# Patient Record
Sex: MALE | Race: WHITE | NOT HISPANIC OR LATINO | Employment: OTHER | URBAN - METROPOLITAN AREA
[De-identification: names, ages, dates, MRNs, and addresses within clinical notes are randomized per-mention and may not be internally consistent; named-entity substitution may affect disease eponyms.]

---

## 2017-02-09 ENCOUNTER — HOSPITAL ENCOUNTER (OUTPATIENT)
Dept: LAB | Facility: MEDICAL CENTER | Age: 72
End: 2017-02-09
Attending: UROLOGY
Payer: MEDICARE

## 2017-02-09 LAB
BUN SERPL-MCNC: 22 MG/DL (ref 8–22)
CREAT SERPL-MCNC: 1.18 MG/DL (ref 0.5–1.4)

## 2017-02-09 PROCEDURE — 36415 COLL VENOUS BLD VENIPUNCTURE: CPT

## 2017-02-09 PROCEDURE — 84520 ASSAY OF UREA NITROGEN: CPT

## 2017-02-09 PROCEDURE — 82565 ASSAY OF CREATININE: CPT

## 2017-02-13 ENCOUNTER — HOSPITAL ENCOUNTER (OUTPATIENT)
Dept: RADIOLOGY | Facility: MEDICAL CENTER | Age: 72
End: 2017-02-13
Attending: UROLOGY
Payer: MEDICARE

## 2017-02-13 DIAGNOSIS — R97.20 ELEVATED PROSTATE SPECIFIC ANTIGEN (PSA): ICD-10-CM

## 2017-02-13 DIAGNOSIS — Z80.42 FAMILY HISTORY OF MALIGNANT NEOPLASM OF PROSTATE: ICD-10-CM

## 2017-02-13 PROCEDURE — 700117 HCHG RX CONTRAST REV CODE 255: Performed by: UROLOGY

## 2017-02-13 PROCEDURE — 700111 HCHG RX REV CODE 636 W/ 250 OVERRIDE (IP): Performed by: RADIOLOGY

## 2017-02-13 PROCEDURE — 72197 MRI PELVIS W/O & W/DYE: CPT

## 2017-02-13 PROCEDURE — A9579 GAD-BASE MR CONTRAST NOS,1ML: HCPCS | Performed by: UROLOGY

## 2017-02-13 RX ADMIN — GADODIAMIDE 20 ML: 287 INJECTION INTRAVENOUS at 13:56

## 2017-02-13 RX ADMIN — GLUCAGON HYDROCHLORIDE 1 MG: KIT at 13:19

## 2017-02-21 DIAGNOSIS — I10 ESSENTIAL HYPERTENSION: ICD-10-CM

## 2017-02-21 RX ORDER — LOSARTAN POTASSIUM AND HYDROCHLOROTHIAZIDE 12.5; 1 MG/1; MG/1
1 TABLET ORAL DAILY
Qty: 90 TAB | Refills: 3 | Status: SHIPPED | OUTPATIENT
Start: 2017-02-21 | End: 2018-02-26 | Stop reason: SDUPTHER

## 2017-03-07 ENCOUNTER — TELEPHONE (OUTPATIENT)
Dept: CARDIOLOGY | Facility: MEDICAL CENTER | Age: 72
End: 2017-03-07

## 2017-03-07 NOTE — TELEPHONE ENCOUNTER
Scheduled for Trans-rectal biopsy jonathon/ Avelina on 3/22 & needs surgical clearance. He's only taking losartan/ HCTZ. He never started the Diltiazem due to reading about it & choosing not to take. (also had vague side effects from amlodipine)  BP in 118/77 range per pt.   To Dr. Alfonso to advise.

## 2017-03-15 NOTE — TELEPHONE ENCOUNTER
Clearance letter faxed to Dr. Quan        Message  Received: 3 days ago       YENNIFER Foote R.N.       Caller: Unspecified (1 week ago)                     Low risk

## 2017-08-10 ENCOUNTER — TELEPHONE (OUTPATIENT)
Dept: CARDIOLOGY | Facility: MEDICAL CENTER | Age: 72
End: 2017-08-10

## 2017-08-10 NOTE — TELEPHONE ENCOUNTER
"----- Message from Lashell Johnson sent at 8/10/2017  1:31 PM PDT -----  Regarding: Patient wants call back  JERARDO/Sana    Patient sent a \"my chart\" message to Dr Alfonso yesterday, 8/9 and wants a call back at [phone].  "

## 2017-08-11 ENCOUNTER — HOSPITAL ENCOUNTER (EMERGENCY)
Facility: MEDICAL CENTER | Age: 72
End: 2017-08-11
Attending: EMERGENCY MEDICINE
Payer: MEDICARE

## 2017-08-11 ENCOUNTER — APPOINTMENT (OUTPATIENT)
Dept: RADIOLOGY | Facility: MEDICAL CENTER | Age: 72
End: 2017-08-11
Attending: EMERGENCY MEDICINE
Payer: MEDICARE

## 2017-08-11 VITALS
RESPIRATION RATE: 14 BRPM | HEIGHT: 72 IN | SYSTOLIC BLOOD PRESSURE: 115 MMHG | TEMPERATURE: 97.4 F | WEIGHT: 191.36 LBS | DIASTOLIC BLOOD PRESSURE: 60 MMHG | BODY MASS INDEX: 25.92 KG/M2 | HEART RATE: 63 BPM | OXYGEN SATURATION: 95 %

## 2017-08-11 DIAGNOSIS — R00.2 PALPITATIONS: ICD-10-CM

## 2017-08-11 LAB
ALBUMIN SERPL BCP-MCNC: 4 G/DL (ref 3.2–4.9)
ALBUMIN/GLOB SERPL: 1.5 G/DL
ALP SERPL-CCNC: 54 U/L (ref 30–99)
ALT SERPL-CCNC: 15 U/L (ref 2–50)
ANION GAP SERPL CALC-SCNC: 9 MMOL/L (ref 0–11.9)
APPEARANCE UR: CLEAR
APTT PPP: 29.5 SEC (ref 24.7–36)
AST SERPL-CCNC: 20 U/L (ref 12–45)
BASOPHILS # BLD AUTO: 0.9 % (ref 0–1.8)
BASOPHILS # BLD: 0.06 K/UL (ref 0–0.12)
BILIRUB SERPL-MCNC: 0.8 MG/DL (ref 0.1–1.5)
BILIRUB UR QL STRIP.AUTO: NEGATIVE
BNP SERPL-MCNC: 68 PG/ML (ref 0–100)
BUN SERPL-MCNC: 21 MG/DL (ref 8–22)
CALCIUM SERPL-MCNC: 9.4 MG/DL (ref 8.5–10.5)
CHLORIDE SERPL-SCNC: 108 MMOL/L (ref 96–112)
CO2 SERPL-SCNC: 22 MMOL/L (ref 20–33)
COLOR UR: YELLOW
CREAT SERPL-MCNC: 0.98 MG/DL (ref 0.5–1.4)
CULTURE IF INDICATED INDCX: NO UA CULTURE
EKG IMPRESSION: NORMAL
EOSINOPHIL # BLD AUTO: 0.09 K/UL (ref 0–0.51)
EOSINOPHIL NFR BLD: 1.3 % (ref 0–6.9)
ERYTHROCYTE [DISTWIDTH] IN BLOOD BY AUTOMATED COUNT: 44.1 FL (ref 35.9–50)
GFR SERPL CREATININE-BSD FRML MDRD: >60 ML/MIN/1.73 M 2
GLOBULIN SER CALC-MCNC: 2.6 G/DL (ref 1.9–3.5)
GLUCOSE SERPL-MCNC: 116 MG/DL (ref 65–99)
GLUCOSE UR STRIP.AUTO-MCNC: NEGATIVE MG/DL
HCT VFR BLD AUTO: 47.7 % (ref 42–52)
HGB BLD-MCNC: 16.3 G/DL (ref 14–18)
IMM GRANULOCYTES # BLD AUTO: 0.02 K/UL (ref 0–0.11)
IMM GRANULOCYTES NFR BLD AUTO: 0.3 % (ref 0–0.9)
INR PPP: 1.04 (ref 0.87–1.13)
KETONES UR STRIP.AUTO-MCNC: ABNORMAL MG/DL
LEUKOCYTE ESTERASE UR QL STRIP.AUTO: NEGATIVE
LIPASE SERPL-CCNC: 19 U/L (ref 11–82)
LYMPHOCYTES # BLD AUTO: 1.72 K/UL (ref 1–4.8)
LYMPHOCYTES NFR BLD: 24.8 % (ref 22–41)
MAGNESIUM SERPL-MCNC: 2.1 MG/DL (ref 1.5–2.5)
MCH RBC QN AUTO: 30.5 PG (ref 27–33)
MCHC RBC AUTO-ENTMCNC: 34.2 G/DL (ref 33.7–35.3)
MCV RBC AUTO: 89.3 FL (ref 81.4–97.8)
MICRO URNS: ABNORMAL
MONOCYTES # BLD AUTO: 0.52 K/UL (ref 0–0.85)
MONOCYTES NFR BLD AUTO: 7.5 % (ref 0–13.4)
NEUTROPHILS # BLD AUTO: 4.52 K/UL (ref 1.82–7.42)
NEUTROPHILS NFR BLD: 65.2 % (ref 44–72)
NITRITE UR QL STRIP.AUTO: NEGATIVE
NRBC # BLD AUTO: 0 K/UL
NRBC BLD AUTO-RTO: 0 /100 WBC
PH UR STRIP.AUTO: 8 [PH]
PHOSPHATE SERPL-MCNC: 2.3 MG/DL (ref 2.5–4.5)
PLATELET # BLD AUTO: 214 K/UL (ref 164–446)
PMV BLD AUTO: 10.8 FL (ref 9–12.9)
POTASSIUM SERPL-SCNC: 3.8 MMOL/L (ref 3.6–5.5)
PROT SERPL-MCNC: 6.6 G/DL (ref 6–8.2)
PROT UR QL STRIP: NEGATIVE MG/DL
PROTHROMBIN TIME: 13.9 SEC (ref 12–14.6)
RBC # BLD AUTO: 5.34 M/UL (ref 4.7–6.1)
RBC UR QL AUTO: NEGATIVE
SODIUM SERPL-SCNC: 139 MMOL/L (ref 135–145)
SP GR UR STRIP.AUTO: 1.02
TROPONIN I SERPL-MCNC: <0.01 NG/ML (ref 0–0.04)
TSH SERPL DL<=0.005 MIU/L-ACNC: 2.07 UIU/ML (ref 0.3–3.7)
UROBILINOGEN UR STRIP.AUTO-MCNC: 0.2 MG/DL
WBC # BLD AUTO: 6.9 K/UL (ref 4.8–10.8)

## 2017-08-11 PROCEDURE — 99284 EMERGENCY DEPT VISIT MOD MDM: CPT

## 2017-08-11 PROCEDURE — 80053 COMPREHEN METABOLIC PANEL: CPT

## 2017-08-11 PROCEDURE — 85025 COMPLETE CBC W/AUTO DIFF WBC: CPT

## 2017-08-11 PROCEDURE — 36415 COLL VENOUS BLD VENIPUNCTURE: CPT

## 2017-08-11 PROCEDURE — 84484 ASSAY OF TROPONIN QUANT: CPT

## 2017-08-11 PROCEDURE — 83690 ASSAY OF LIPASE: CPT

## 2017-08-11 PROCEDURE — 81003 URINALYSIS AUTO W/O SCOPE: CPT

## 2017-08-11 PROCEDURE — 84443 ASSAY THYROID STIM HORMONE: CPT

## 2017-08-11 PROCEDURE — 83880 ASSAY OF NATRIURETIC PEPTIDE: CPT

## 2017-08-11 PROCEDURE — 83735 ASSAY OF MAGNESIUM: CPT

## 2017-08-11 PROCEDURE — 93005 ELECTROCARDIOGRAM TRACING: CPT

## 2017-08-11 PROCEDURE — 85730 THROMBOPLASTIN TIME PARTIAL: CPT

## 2017-08-11 PROCEDURE — 84100 ASSAY OF PHOSPHORUS: CPT

## 2017-08-11 PROCEDURE — 85610 PROTHROMBIN TIME: CPT

## 2017-08-11 PROCEDURE — 71010 DX-CHEST-LIMITED (1 VIEW): CPT

## 2017-08-11 RX ORDER — VITAMIN B COMPLEX
1 TABLET ORAL DAILY
COMMUNITY
End: 2019-06-25

## 2017-08-11 RX ORDER — AMOXICILLIN 500 MG/1
2000 CAPSULE ORAL ONCE
COMMUNITY
End: 2018-06-12

## 2017-08-11 RX ORDER — VITAMIN E 268 MG
2000 CAPSULE ORAL DAILY
COMMUNITY
End: 2018-12-14

## 2017-08-11 RX ORDER — CHLORAL HYDRATE 500 MG
1000 CAPSULE ORAL 2 TIMES DAILY
COMMUNITY
End: 2019-06-25

## 2017-08-11 ASSESSMENT — PAIN SCALES - GENERAL
PAINLEVEL_OUTOF10: 0
PAINLEVEL_OUTOF10: 0

## 2017-08-11 NOTE — ED AVS SNAPSHOT
8/11/2017    Selma Avila  [address] [place] [address]    Dear Selma:    [facility] wants to ensure your discharge home is safe and you or your loved ones have had all of your questions answered regarding your care after you leave the hospital.    Below is a list of resources and contact information should you have any questions regarding your hospital stay, follow-up instructions, or active medical symptoms.    Questions or Concerns Regarding… Contact   Medical Questions Related to Your Discharge  (7 days a week, 8am-5pm) Contact a Nurse Care Coordinator   [phone]   Medical Questions Not Related to Your Discharge  (24 hours a day / 7 days a week)  Contact the Nurse Health Line   [phone]    Medications or Discharge Instructions Refer to your discharge packet   or contact your [facility] Primary Care Provider   [phone]   Follow-up Appointment(s) Schedule your appointment via [employer]   or contact Scheduling [phone]   Billing Review your statement via [employer]  or contact Billing [phone]   Medical Records Review your records via [employer]   or contact Medical Records [phone]     You may receive a telephone call within two days of discharge. This call is to make certain you understand your discharge instructions and have the opportunity to have any questions answered. You can also easily access your medical information, test results and upcoming appointments via the [employer] free online health management tool. You can learn more and sign up at [employer]. For assistance setting up your [employer] account, please call [phone].    Once again, we want to ensure your discharge home is safe and that you have a clear understanding of any next steps in your care. If you have any questions or concerns, please do not hesitate to contact us, we are here for you. Thank you for choosing [facility] for your healthcare needs.    Sincerely,    Your [facility] Healthcare Team

## 2017-08-11 NOTE — ED PROVIDER NOTES
"ED Provider Note    Scribed for Donnell Lee D.O. by Montserrat Duncan. 8/11/2017  3:52 PM    Primary care provider: Antony Ceja M.D.  Means of arrival: walk-in  History obtained from: patient  History limited by: none    CHIEF COMPLAINT  Chief Complaint   Patient presents with   • Irregular Heart Beat     x 3 days   • Shortness of Breath   • Fatigue       HPI  Selma Avila is a 72 y.o. male who presents to the Emergency Department for intermittent episodes of palpitations onset 4 days ago with associated shortness of breath and fatigue. Patient describes this discomfort as \"skipping heart beats\" and it has woken him up every morning since onset. He states that he has experienced this sensation in the past, however, it always resolves after a short amount of time and he has never had it evaluated. No history of any previous heart attacks, diagnosed arrhythmias, COPD, or asthma. Patient states that he works out on a regular basis and eats healthily. He takes Losartan to control his blood pressure.   At this moment in time, patient is still experiencing the palpitations. Patient has been attempting to reach his PCP since onset for the last few days for evaluation of it, and was finally able to contact them, and it was their office who prompted him to come into the ER today. Patient smoked for one month 50 years ago, has never been diagnosed with diabetes, and his father has a history for atrial fibrillation. No family history of cardiac problems at a young age. Dr. Gansert is the cardiologist who follow his mitral valve prolapse.   Patient has no history of blood clots. He stopped drinking his normal one cup of coffee a day yesterday.  No complaints of leg swelling, chest pain, headache, vision changes, nausea, vomiting, fever, chills, light headed, dizzy.    REVIEW OF SYSTEMS  Pertinent positives include heart palpitations, shortness of breath, and fatigue. Pertinent negatives include no chest pain, " headache, vision changes, nausea, vomiting, fever, chills, light headed, dizziness.  All other systems reviewed and negative.    PAST MEDICAL HISTORY  Mitral valve prolapse    SURGICAL HISTORY  No pertinent surgical history    SOCIAL HISTORY  Social History   Substance Use Topics   • Smoking status: Former Smoker     Quit date: 09/12/1963   • Smokeless tobacco: Never Used   • Alcohol Use: Yes      Comment: 8 oz of beer two to three time a week      History   Drug Use Not on file       FAMILY HISTORY  Atrial fibrillation in his father    CURRENT MEDICATIONS  Home Medications     Reviewed by Yesenia Cao (Pharmacy Tech) on 08/11/17 at 1650  Med List Status: Complete    Medication Last Dose Status    amoxicillin (AMOXIL) 500 MG Cap 8/8/2017 Active    Coenzyme Q10 (COQ10) 100 MG Cap 8/11/2017 Active    losartan-hydrochlorothiazide (HYZAAR) 100-12.5 MG per tablet 8/11/2017 Active    multivitamin (THERAGRAN) Tab 8/10/2017 Active    Omega-3 Fatty Acids (FISH OIL) 1000 MG Cap capsule 8/11/2017 Active    Saw Palmetto, Serenoa repens, (SAW PALMETTO EXTRACT PO) 8/11/2017 Active    vitamin e (VITAMIN E) 400 UNIT Cap 8/11/2017 Active                ALLERGIES  No Known Allergies    PHYSICAL EXAM  VITAL SIGNS: /60 mmHg  Pulse 57  Temp(Src) 36.3 °C (97.4 °F)  Resp 17  Ht 1.829 m (6')  Wt 86.8 kg (191 lb 5.8 oz)  BMI 25.95 kg/m2  SpO2 98%    Nursing notes and vitals reviewed.  Constitutional: Well developed, Well nourished, No acute distress, Non-toxic appearance.   Eyes: PERRLA, EOMI, Conjunctiva normal, No discharge.   Cardiovascular: Normal heart rate, Normal rhythm, 3/6 systolic murmur, No rubs, No gallops. noJVD  Thorax & Lungs: No respiratory distress, No rales, No rhonchi, No wheezing, No chest tenderness.   Abdomen: Bowel sounds normal, Soft, No tenderness, No guarding, No rebound, No masses, No pulsatile masses.   Skin: Warm, Dry, No erythema, No rash.   Musculoskeletal: Intact distal pulses, No LE  edema, No cyanosis, No clubbing. Good range of motion in all major joints. No tenderness to palpation or major deformities noted, no midline back tenderness.   Neurologic: Alert & oriented x 3, Normal motor function, Normal sensory function, No focal deficits noted.  Psychiatric: Affect normal for clinical presentation.    DIAGNOSTIC STUDIES/PROCEDURES    LABS  Results for orders placed or performed during the hospital encounter of 08/11/17   Troponin   Result Value Ref Range    Troponin I <0.01 0.00 - 0.04 ng/mL   Btype Natriuretic Peptide   Result Value Ref Range    B Natriuretic Peptide 68 0 - 100 pg/mL   CBC with Differential   Result Value Ref Range    WBC 6.9 4.8 - 10.8 K/uL    RBC 5.34 4.70 - 6.10 M/uL    Hemoglobin 16.3 14.0 - 18.0 g/dL    Hematocrit 47.7 42.0 - 52.0 %    MCV 89.3 81.4 - 97.8 fL    MCH 30.5 27.0 - 33.0 pg    MCHC 34.2 33.7 - 35.3 g/dL    RDW 44.1 35.9 - 50.0 fL    Platelet Count 214 164 - 446 K/uL    MPV 10.8 9.0 - 12.9 fL    Neutrophils-Polys 65.20 44.00 - 72.00 %    Lymphocytes 24.80 22.00 - 41.00 %    Monocytes 7.50 0.00 - 13.40 %    Eosinophils 1.30 0.00 - 6.90 %    Basophils 0.90 0.00 - 1.80 %    Immature Granulocytes 0.30 0.00 - 0.90 %    Nucleated RBC 0.00 /100 WBC    Neutrophils (Absolute) 4.52 1.82 - 7.42 K/uL    Lymphs (Absolute) 1.72 1.00 - 4.80 K/uL    Monos (Absolute) 0.52 0.00 - 0.85 K/uL    Eos (Absolute) 0.09 0.00 - 0.51 K/uL    Baso (Absolute) 0.06 0.00 - 0.12 K/uL    Immature Granulocytes (abs) 0.02 0.00 - 0.11 K/uL    NRBC (Absolute) 0.00 K/uL   Complete Metabolic Panel (CMP)   Result Value Ref Range    Sodium 139 135 - 145 mmol/L    Potassium 3.8 3.6 - 5.5 mmol/L    Chloride 108 96 - 112 mmol/L    Co2 22 20 - 33 mmol/L    Anion Gap 9.0 0.0 - 11.9    Glucose 116 (H) 65 - 99 mg/dL    Bun 21 8 - 22 mg/dL    Creatinine 0.98 0.50 - 1.40 mg/dL    Calcium 9.4 8.5 - 10.5 mg/dL    AST(SGOT) 20 12 - 45 U/L    ALT(SGPT) 15 2 - 50 U/L    Alkaline Phosphatase 54 30 - 99 U/L    Total  Bilirubin 0.8 0.1 - 1.5 mg/dL    Albumin 4.0 3.2 - 4.9 g/dL    Total Protein 6.6 6.0 - 8.2 g/dL    Globulin 2.6 1.9 - 3.5 g/dL    A-G Ratio 1.5 g/dL   Prothrombin Time   Result Value Ref Range    PT 13.9 12.0 - 14.6 sec    INR 1.04 0.87 - 1.13   APTT   Result Value Ref Range    APTT 29.5 24.7 - 36.0 sec   Lipase   Result Value Ref Range    Lipase 19 11 - 82 U/L   ESTIMATED GFR   Result Value Ref Range    GFR If African American >60 >60 mL/min/1.73 m 2    GFR If Non African American >60 >60 mL/min/1.73 m 2   TSH   Result Value Ref Range    TSH 2.070 0.300 - 3.700 uIU/mL   URINALYSIS,CULTURE IF INDICATED   Result Value Ref Range    Color Yellow     Character Clear     Specific Gravity 1.024 <1.035    Ph 8.0 5.0-8.0    Glucose Negative Negative mg/dL    Ketones Trace (A) Negative mg/dL    Protein Negative Negative mg/dL    Bilirubin Negative Negative    Urobilinogen, Urine 0.2 Negative    Nitrite Negative Negative    Leukocyte Esterase Negative Negative    Occult Blood Negative Negative    Micro Urine Req see below     Culture Indicated No UA Culture   MAGNESIUM   Result Value Ref Range    Magnesium 2.1 1.5 - 2.5 mg/dL   PHOSPHORUS   Result Value Ref Range    Phosphorus 2.3 (L) 2.5 - 4.5 mg/dL   EKG (NOW)   Result Value Ref Range    Report       [facility] Emergency Dept.    Test Date:  2017  Pt Name:    MILLA HAMILTON                  Department: ER  MRN:        [id number]                      Room:  Gender:     M                            Technician: [id number]  :        [id number]                   Requested By:ER TRIAGE PROTOCOL  Order #:    [id number]                    Ness MD: BERNARDO SCHMITZ, DO    Measurements  Intervals                                Axis  Rate:       70                           P:          54  KS:         204                          QRS:        33  QRSD:       110                          T:          -6  QT:         412  QTc:        445    Interpretive  Statements  SINUS RHYTHM  VENTRICULAR TRIGEMINY  NONSPECIFIC INTRAVENTRICULAR CONDUCTION DELAY  No previous ECG available for comparison    Electronically Signed On 8- 16:01:06 PDT by BERNARDO SCHMITZ, DO         All labs reviewed by me.      RADIOLOGY  DX-CHEST-LIMITED (1 VIEW)   Final Result      1.  No acute cardiac or pulmonary abnormalities are identified.   2.  Emphysema   3.  Mild bibasilar peripheral atelectasis and/or scar        The radiologist's interpretation of all radiological studies have been reviewed by me.    COURSE & MEDICAL DECISION MAKING  Pertinent Labs & Imaging studies reviewed. (See chart for details)    3:52 PM - Patient seen and examined at bedside. Ordered chest xray, UA, TSH, magnesium, phosphorus, troponin, BNP, CBC, CMP, prothrombin time, APTT, lipase, estimated GFR, and EKG to evaluate his symptoms. I informed the patient that we would evaluate his cardiac status with labs and imaging.    4:10 PM Paged Cardiologist    4:20 PM Spoke with Dr. Puente, Cardiologist, about the patient's condition. Advises the patient can go home and follow up outpatient    4:21 PM I informed the patient that he is currently experiencing PVCs which can be caused by several different things. I explained that we have ruled out acute causes for his symptoms and that his labs have returned normal. I informed him what the Cardiologist advised and gave him strict return precautions such as chest, increased shortness of breath, or significant worsening of the palpitations. Patient will be discharged.      [facility] 72 [facility] that presents with palpitations. The patient does have evidence of intermittent PVC. The patient has no evidence of elevated troponin, no evidence of ST or non-ST elevation myocardial infarction. I discussed the patient with Dr. Puente who agrees that the patient has no evidence of significant cardiac abnormality, acute coronary syndrome or myocardial  infarction and is comfortable for the patient to follow-up with a cardiologist next week. The patient will return for new or worsening symptoms and is stable at the time of discharge.    DISPOSITION:  Patient will be discharged home in stable condition.    FOLLOW UP:  [facility], Emergency Dept  [address]  [place] [id number] [phone]    If symptoms worsen    Russell Damian M.D.  [address]  [address]  [facility] [address]  [phone]    Schedule an appointment as soon as possible for a visit in 1 day          FINAL IMPRESSION  1. Palpitations          IMontserrat (Scribe), am scribing for, and in the presence of, Donnell Lee D.O    Electronically signed by: Montserrat Dunacn (Scribe), 8/11/2017    IDonnell D.O. personally performed the services described in this documentation, as scribed by Montserrat Duncan in my presence, and it is both accurate and complete.    The note accurately reflects work and decisions made by me.  Donnell Lee  8/11/2017  11:56 PM

## 2017-08-11 NOTE — ED AVS SNAPSHOT
Playtabase Access Code: Activation code not generated  Current Playtabase Status: Active    [employer]  A secure, online tool to manage your health information     [employer]’s Playtabase® is a secure, online tool that connects you to your personalized health information from the privacy of your home -- day or night - making it very easy for you to manage your healthcare. Once the activation process is completed, you can even access your medical information using the Playtabase lloyd, which is available for free in the [employer] Lloyd store or Google Play store.     Playtabase provides the following levels of access (as shown below):   My Chart Features   [facility] Primary Care Doctor [facility]  Specialists [facility]  Urgent  Care Non-[facility]  Primary Care  Doctor   Email your healthcare team securely and privately 24/7 X X X X   Manage appointments: schedule your next appointment; view details of past/upcoming appointments X      Request prescription refills. X      View recent personal medical records, including lab and immunizations X X X X   View health record, including health history, allergies, medications X X X X   Read reports about your outpatient visits, procedures, consult and ER notes X X X X   See your discharge summary, which is a recap of your hospital and/or ER visit that includes your diagnosis, lab results, and care plan. X X       How to register for Playtabase:  1. Go to  [email or web address].  2. Click on the Sign Up Now box, which takes you to the New Member Sign Up page. You will need to provide the following information:  a. Enter your Playtabase Access Code exactly as it appears at the top of this page. (You will not need to use this code after you’ve completed the sign-up process. If you do not sign up before the expiration date, you must request a new code.)   b. Enter your date of birth.   c. Enter your home email address.   d. Click Submit, and follow the next screen’s instructions.  3. Create a Playtabase ID. This will  be your [employer] login ID and cannot be changed, so think of one that is secure and easy to remember.  4. Create a [employer] password. You can change your password at any time.  5. Enter your Password Reset Question and Answer. This can be used at a later time if you forget your password.   6. Enter your e-mail address. This allows you to receive e-mail notifications when new information is available in [employer].  7. Click Sign Up. You can now view your health information.    For assistance activating your [employer] account, call [phone]

## 2017-08-11 NOTE — TELEPHONE ENCOUNTER
"He informs me that he has been in a sustained irregular rhythm and \"occasionally\" gets a normal beat.  He is also short of breath which is uncharacteristic of him.  I have advised him to go to ER and he will comply.    "

## 2017-08-11 NOTE — ED NOTES
Med rec completed per pt at bedside  Allergies reviewed  Pt took a one time dose of Amoxicillin on 8/8/17 before  A dentist appointment

## 2017-08-11 NOTE — ED NOTES
73 y/o male ambulatory to triage with c/o irregular heart beat causing sob and fatigue. Pt states he has had intermittent episodes of palpitations that resolve after an hour or less. Pt states he has a history of MVP. EKG done prior to triage.

## 2017-08-12 NOTE — DISCHARGE INSTRUCTIONS
Palpitations  A palpitation is the feeling that your heartbeat is irregular or is faster than normal. It may feel like your heart is fluttering or skipping a beat. Palpitations are usually not a serious problem. However, in some cases, you may need further medical evaluation.  CAUSES   Palpitations can be caused by:  · Smoking.  · Caffeine or other stimulants, such as diet pills or energy drinks.  · Alcohol.  · Stress and anxiety.  · Strenuous physical activity.  · Fatigue.  · Certain medicines.  · Heart disease, especially if you have a history of irregular heart rhythms (arrhythmias), such as atrial fibrillation, atrial flutter, or supraventricular tachycardia.  · An improperly working pacemaker or defibrillator.  DIAGNOSIS   To find the cause of your palpitations, your health care provider will take your medical history and perform a physical exam. Your health care provider may also have you take a test called an ambulatory electrocardiogram (ECG). An ECG records your heartbeat patterns over a 24-hour period. You may also have other tests, such as:  · Transthoracic echocardiogram (TTE). During echocardiography, sound waves are used to evaluate how blood flows through your heart.  · Transesophageal echocardiogram (LOUIS).  · Cardiac monitoring. This allows your health care provider to monitor your heart rate and rhythm in real time.  · Holter monitor. This is a portable device that records your heartbeat and can help diagnose heart arrhythmias. It allows your health care provider to track your heart activity for several days, if needed.  · Stress tests by exercise or by giving medicine that makes the heart beat faster.  TREATMENT   Treatment of palpitations depends on the cause of your symptoms and can vary greatly. Most cases of palpitations do not require any treatment other than time, relaxation, and monitoring your symptoms. Other causes, such as atrial fibrillation, atrial flutter, or supraventricular  tachycardia, usually require further treatment.  HOME CARE INSTRUCTIONS   · Avoid:  ¨ Caffeinated coffee, tea, soft drinks, diet pills, and energy drinks.  ¨ Chocolate.  ¨ Alcohol.  · Stop smoking if you smoke.  · Reduce your stress and anxiety. Things that can help you relax include:  ¨ A method of controlling things in your body, such as your heartbeats, with your mind (biofeedback).  ¨ Yoga.  ¨ Meditation.  ¨ Physical activity such as swimming, jogging, or walking.  · Get plenty of rest and sleep.  SEEK MEDICAL CARE IF:   · You continue to have a fast or irregular heartbeat beyond 24 hours.  · Your palpitations occur more often.  SEEK IMMEDIATE MEDICAL CARE IF:  · You have chest pain or shortness of breath.  · You have a severe headache.  · You feel dizzy or you faint.  MAKE SURE YOU:  · Understand these instructions.  · Will watch your condition.  · Will get help right away if you are not doing well or get worse.     This information is not intended to replace advice given to you by your health care provider. Make sure you discuss any questions you have with your health care provider.     Document Released: 12/15/2001 Document Revised: 12/23/2014 Document Reviewed: 02/15/2013  Elsevier Interactive Patient Education ©2016 Elsevier Inc.

## 2017-08-12 NOTE — ED NOTES
OK for discharge per ERP, patient and significant other given discharge instructions and follow up information, verbalized understanding, ambulatory out of ED w/steady gait, NAD

## 2017-08-14 ENCOUNTER — TELEPHONE (OUTPATIENT)
Dept: CARDIOLOGY | Facility: MEDICAL CENTER | Age: 72
End: 2017-08-14

## 2017-08-14 NOTE — TELEPHONE ENCOUNTER
"Pt of Dr. Alfonso. MD has an opening tomorrow at 10am. Called pt and he confirmed appt. He says he has \"turned a corner\" and is feeling better.    "

## 2017-08-14 NOTE — TELEPHONE ENCOUNTER
"Pt of Dr. Alfonso. MD has an opening tomorrow at 10am. Called pt and he confirmed appt. He says he has \"turned a corner\" and is feeling better.      ALE Hood RN    ----- Message from Russell Damian M.D. sent at 8/11/2017  5:20 PM PDT -----  Regarding: ER follow-up  Please have the patient seen on Monday or Tuesday for ER follow-up for palpitations and PVCs history mitral valve prolapse.  "

## 2017-08-15 ENCOUNTER — OFFICE VISIT (OUTPATIENT)
Dept: CARDIOLOGY | Facility: MEDICAL CENTER | Age: 72
End: 2017-08-15
Payer: MEDICARE

## 2017-08-15 VITALS
HEIGHT: 71 IN | OXYGEN SATURATION: 92 % | DIASTOLIC BLOOD PRESSURE: 78 MMHG | SYSTOLIC BLOOD PRESSURE: 104 MMHG | WEIGHT: 188 LBS | HEART RATE: 60 BPM | BODY MASS INDEX: 26.32 KG/M2

## 2017-08-15 DIAGNOSIS — I34.0 MITRAL VALVE INSUFFICIENCY, UNSPECIFIED ETIOLOGY: ICD-10-CM

## 2017-08-15 DIAGNOSIS — I49.3 VPC'S (VENTRICULAR PREMATURE COMPLEXES): ICD-10-CM

## 2017-08-15 DIAGNOSIS — I34.1 MITRAL VALVE PROLAPSE: ICD-10-CM

## 2017-08-15 DIAGNOSIS — I10 ESSENTIAL HYPERTENSION: ICD-10-CM

## 2017-08-15 PROCEDURE — 99214 OFFICE O/P EST MOD 30 MIN: CPT | Performed by: INTERNAL MEDICINE

## 2017-08-15 ASSESSMENT — ENCOUNTER SYMPTOMS
NERVOUS/ANXIOUS: 0
BLURRED VISION: 0
PALPITATIONS: 1
DIZZINESS: 0
NAUSEA: 0
PND: 0
MYALGIAS: 0
HEARTBURN: 0
BRUISES/BLEEDS EASILY: 0
FEVER: 0
EYE DISCHARGE: 0
HEADACHES: 0
SHORTNESS OF BREATH: 0
CHILLS: 0
DEPRESSION: 0
COUGH: 0

## 2017-08-15 NOTE — Clinical Note
[facility]   [address]   [address]  [place] [address]  Phone: [phone]  Fax: [phone]              Selma Avila  1945    Encounter Date: 8/15/2017    Ulises Alfonso M.D.          PROGRESS NOTE:  Subjective:   Selma Avila is a 72 y.o. male who presents today after recent emergency room visit for palpitations documented his frequent PVCs with an otherwise normal EKG. The palpitations and the present only for several weeks.  He is still having palpitations but tends to ignore them now.  No change in exercise ability  No new medical issues.  All lab work including TSH was normal.  No particular stressors in his life    No past medical history on file.  No past surgical history on file.  No family history on file.  History   Smoking status   • Former Smoker   • Quit date: 09/12/1963   Smokeless tobacco   • Never Used     No Known Allergies  Outpatient Encounter Prescriptions as of 8/15/2017   Medication Sig Dispense Refill   • Omega-3 Fatty Acids (FISH OIL) 1000 MG Cap capsule Take 1,000 mg by mouth every day.     • vitamin e (VITAMIN E) 400 UNIT Cap Take 400 Units by mouth every day.     • Saw Palmetto, Serenoa repens, (SAW PALMETTO EXTRACT PO) Take 1 Cap by mouth every day.     • Coenzyme Q10 (COQ10) 100 MG Cap Take 1 Cap by mouth every day.     • multivitamin (THERAGRAN) Tab Take 1 Tab by mouth every day. Vitalizer Gold Multivitamin     • amoxicillin (AMOXIL) 500 MG Cap Take 2,000 mg by mouth Once. One time dose before dentist appointment on 8/8/17     • losartan-hydrochlorothiazide (HYZAAR) 100-12.5 MG per tablet Take 1 Tab by mouth every day. 90 Tab 3     No facility-administered encounter medications on file as of 8/15/2017.     Review of Systems   Constitutional: Negative for fever, chills and malaise/fatigue.   Eyes: Negative for blurred vision and discharge.   Respiratory: Negative for cough and shortness of  "breath.    Cardiovascular: Positive for palpitations. Negative for chest pain, leg swelling and PND.   Gastrointestinal: Negative for heartburn and nausea.   Genitourinary: Negative for dysuria and urgency.   Musculoskeletal: Negative for myalgias.   Skin: Negative for itching and rash.   Neurological: Negative for dizziness and headaches.   Endo/Heme/Allergies: Negative for environmental allergies. Does not bruise/bleed easily.   Psychiatric/Behavioral: Negative for depression. The patient is not nervous/anxious.         Objective:   /78 mmHg  Pulse 60  Ht 1.803 m (5' 10.98\")  Wt 85.276 kg (188 lb)  BMI 26.23 kg/m2  SpO2 92%    Physical Exam   Constitutional: He is oriented to person, place, and time. He appears well-developed and well-nourished.   HENT:   Head: Normocephalic and atraumatic.   Eyes: Conjunctivae and EOM are normal. No scleral icterus.   Neck: Neck supple. No JVD present. No thyromegaly present.   Cardiovascular: Normal rate.  An irregular rhythm present. Frequent extrasystoles are present. Exam reveals no gallop and no friction rub.    Murmur heard.   Systolic murmur is present with a grade of 3/6   Systolic blood pressure 114 right arm sitting by palpation   Pulmonary/Chest: Effort normal and breath sounds normal. No respiratory distress. He has no wheezes. He has no rales. He exhibits no tenderness.   Abdominal: Soft. Bowel sounds are normal. He exhibits no distension and no mass. There is no tenderness.   Neurological: He is alert and oriented to person, place, and time. Coordination normal.   Skin: Skin is warm and dry. No rash noted. No pallor.   Psychiatric: He has a normal mood and affect. His behavior is normal. Judgment and thought content normal.       Assessment:     1. Mitral valve prolapse  Echocardiogram Comp w/o Cont   2. Mitral valve insufficiency, unspecified etiology  Echocardiogram Comp w/o Cont   3. VPC's (ventricular premature complexes)  Echocardiogram Comp w/o Cont "    HOLTER MONITOR STUDY   4. Essential hypertension          we'll quantitate PVCs with a Holter monitor  We will evaluate mitral regurgitation with follow-up echo  Consider beta-blockade for hypertension depending on the above results  Return in one month after above tests      Antony Ceja M.D.  [address] [place] [address] [address]  [address]  [place] [address]  VIA Facsimile: [phone]

## 2017-08-15 NOTE — MR AVS SNAPSHOT
"        Selma Avila   8/15/2017 10:00 AM   Office Visit   MRN: [id number]    Department:  Heart [facility]   Dept Phone:  [phone]    Description:  Male : 1945   Provider:  Ulises Alfonso M.D.           Reason for Visit     Follow-Up           Allergies as of 8/15/2017     No Known Allergies      You were diagnosed with     Mitral valve prolapse   [654224]       Mitral valve insufficiency, unspecified etiology   [4274557]       VPC's (ventricular premature complexes)   [823750]       Essential hypertension   [1134637]         Vital Signs     Blood Pressure Pulse Height Weight Body Mass Index Oxygen Saturation    104/78 mmHg 60 1.803 m (5' 10.98\") 85.276 kg (188 lb) 26.23 kg/m2 92%    Smoking Status                   Former Smoker           Basic Information     Date Of Birth Sex Race Ethnicity Preferred Language    1945 Male White Non- English      Problem List              ICD-10-CM Priority Class Noted - Resolved    Mitral valve prolapse I34.1   2013 - Present    Mitral regurgitation I34.0   2013 - Present    Chest rales R09.89   2013 - Present    HTN (hypertension) I10   2013 - Present      Health Maintenance        Date Due Completion Dates    IMM DTaP/Tdap/Td Vaccine (1 - Tdap) 1965 ---    COLONOSCOPY 1996 ---    IMM ZOSTER VACCINE 2006 ---    IMM PNEUMOCOCCAL 65+ (ADULT) LOW/MEDIUM RISK SERIES (1 of 2 - PCV13) 2011 ---    IMM INFLUENZA (1) 2017 ---            Current Immunizations     No immunizations on file.      Below and/or attached are the medications your provider expects you to take. Review all of your home medications and newly ordered medications with your provider and/or pharmacist. Follow medication instructions as directed by your provider and/or pharmacist. Please keep your medication list with you and share with your provider. Update the information when medications are discontinued, doses are changed, or new " medications (including over-the-counter products) are added; and carry medication information at all times in the event of emergency situations     Allergies:  No Known Allergies          Medications  Valid as of: August 15, 2017 - 11:27 AM    Generic Name Brand Name Tablet Size Instructions for use    Amoxicillin (Cap) AMOXIL 500 MG Take 2,000 mg by mouth Once. One time dose before dentist appointment on 8/8/17        Coenzyme Q10 (Cap) CoQ10 100 MG Take 1 Cap by mouth every day.        Losartan Potassium-HCTZ (Tab) HYZAAR 100-12.5 MG Take 1 Tab by mouth every day.        Multiple Vitamin (Tab) THERAGRAN  Take 1 Tab by mouth every day. Vitalizer Gold Multivitamin        Omega-3 Fatty Acids (Cap) fish oil 1000 MG Take 1,000 mg by mouth every day.        Saw Palmetto (Serenoa repens)   Take 1 Cap by mouth every day.        Vitamin E (Cap) VITAMIN E 400 UNIT Take 400 Units by mouth every day.        .                 Medicines prescribed today were sent to:     [facility] [id number] - [place], [place] - [address]    [address] [place] [address]    Phone: [phone] Fax: [phone]    Open 24 Hours?: No      Medication refill instructions:       If your prescription bottle indicates you have medication refills left, it is not necessary to call your provider’s office. Please contact your pharmacy and they will refill your medication.    If your prescription bottle indicates you do not have any refills left, you may request refills at any time through one of the following ways: The online [employer] system (except Urgent Care), by calling your provider’s office, or by asking your pharmacy to contact your provider’s office with a refill request. Medication refills are processed only during regular business hours and may not be available until the next business day. Your provider may request additional information or to have a follow-up visit with you prior to refilling your medication.   *Please Note: Medication refills  are assigned a new Rx number when refilled electronically. Your pharmacy may indicate that no refills were authorized even though a new prescription for the same medication is available at the pharmacy. Please request the medicine by name with the pharmacy before contacting your provider for a refill.        Your To Do List     Future Labs/Procedures Complete By Expires    Echocardiogram Comp w/o Cont  As directed 8/15/2018         [employer] Access Code: Activation code not generated  Current [employer] Status: Active

## 2017-08-15 NOTE — PROGRESS NOTES
Subjective:   Selma Avila is a 72 y.o. male who presents today after recent emergency room visit for palpitations documented his frequent PVCs with an otherwise normal EKG. The palpitations and the present only for several weeks.  He is still having palpitations but tends to ignore them now.  No change in exercise ability  No new medical issues.  All lab work including TSH was normal.  No particular stressors in his life    No past medical history on file.  No past surgical history on file.  No family history on file.  History   Smoking status   • Former Smoker   • Quit date: 09/12/1963   Smokeless tobacco   • Never Used     No Known Allergies  Outpatient Encounter Prescriptions as of 8/15/2017   Medication Sig Dispense Refill   • Omega-3 Fatty Acids (FISH OIL) 1000 MG Cap capsule Take 1,000 mg by mouth every day.     • vitamin e (VITAMIN E) 400 UNIT Cap Take 400 Units by mouth every day.     • Saw Palmetto, Serenoa repens, (SAW PALMETTO EXTRACT PO) Take 1 Cap by mouth every day.     • Coenzyme Q10 (COQ10) 100 MG Cap Take 1 Cap by mouth every day.     • multivitamin (THERAGRAN) Tab Take 1 Tab by mouth every day. Vitalizer Gold Multivitamin     • amoxicillin (AMOXIL) 500 MG Cap Take 2,000 mg by mouth Once. One time dose before dentist appointment on 8/8/17     • losartan-hydrochlorothiazide (HYZAAR) 100-12.5 MG per tablet Take 1 Tab by mouth every day. 90 Tab 3     No facility-administered encounter medications on file as of 8/15/2017.     Review of Systems   Constitutional: Negative for fever, chills and malaise/fatigue.   Eyes: Negative for blurred vision and discharge.   Respiratory: Negative for cough and shortness of breath.    Cardiovascular: Positive for palpitations. Negative for chest pain, leg swelling and PND.   Gastrointestinal: Negative for heartburn and nausea.   Genitourinary: Negative for dysuria and urgency.   Musculoskeletal: Negative for myalgias.   Skin: Negative for itching and rash.  "  Neurological: Negative for dizziness and headaches.   Endo/Heme/Allergies: Negative for environmental allergies. Does not bruise/bleed easily.   Psychiatric/Behavioral: Negative for depression. The patient is not nervous/anxious.         Objective:   /78 mmHg  Pulse 60  Ht 1.803 m (5' 10.98\")  Wt 85.276 kg (188 lb)  BMI 26.23 kg/m2  SpO2 92%    Physical Exam   Constitutional: He is oriented to person, place, and time. He appears well-developed and well-nourished.   HENT:   Head: Normocephalic and atraumatic.   Eyes: Conjunctivae and EOM are normal. No scleral icterus.   Neck: Neck supple. No JVD present. No thyromegaly present.   Cardiovascular: Normal rate.  An irregular rhythm present. Frequent extrasystoles are present. Exam reveals no gallop and no friction rub.    Murmur heard.   Systolic murmur is present with a grade of 3/6   Systolic blood pressure 114 right arm sitting by palpation   Pulmonary/Chest: Effort normal and breath sounds normal. No respiratory distress. He has no wheezes. He has no rales. He exhibits no tenderness.   Abdominal: Soft. Bowel sounds are normal. He exhibits no distension and no mass. There is no tenderness.   Neurological: He is alert and oriented to person, place, and time. Coordination normal.   Skin: Skin is warm and dry. No rash noted. No pallor.   Psychiatric: He has a normal mood and affect. His behavior is normal. Judgment and thought content normal.       Assessment:     1. Mitral valve prolapse  Echocardiogram Comp w/o Cont   2. Mitral valve insufficiency, unspecified etiology  Echocardiogram Comp w/o Cont   3. VPC's (ventricular premature complexes)  Echocardiogram Comp w/o Cont    HOLTER MONITOR STUDY   4. Essential hypertension          we'll quantitate PVCs with a Holter monitor  We will evaluate mitral regurgitation with follow-up echo  Consider beta-blockade for hypertension depending on the above results  Return in one month after above tests  "

## 2017-08-30 ENCOUNTER — HOSPITAL ENCOUNTER (OUTPATIENT)
Dept: CARDIOLOGY | Facility: MEDICAL CENTER | Age: 72
End: 2017-08-30
Attending: INTERNAL MEDICINE
Payer: MEDICARE

## 2017-08-30 DIAGNOSIS — I49.3 VPC'S (VENTRICULAR PREMATURE COMPLEXES): ICD-10-CM

## 2017-08-30 DIAGNOSIS — I34.1 MITRAL VALVE PROLAPSE: ICD-10-CM

## 2017-08-30 DIAGNOSIS — I34.0 MITRAL VALVE INSUFFICIENCY, UNSPECIFIED ETIOLOGY: ICD-10-CM

## 2017-08-30 PROCEDURE — 93306 TTE W/DOPPLER COMPLETE: CPT | Mod: 26 | Performed by: INTERNAL MEDICINE

## 2017-08-30 PROCEDURE — 93306 TTE W/DOPPLER COMPLETE: CPT

## 2017-08-31 LAB
LV EJECT FRACT  99904: 65
LV EJECT FRACT MOD 2C 99903: 64.08
LV EJECT FRACT MOD 4C 99902: 64.26
LV EJECT FRACT MOD BP 99901: 65.39

## 2017-09-01 ENCOUNTER — NON-PROVIDER VISIT (OUTPATIENT)
Dept: CARDIOLOGY | Facility: MEDICAL CENTER | Age: 72
End: 2017-09-01
Payer: MEDICARE

## 2017-09-01 DIAGNOSIS — I49.8 BIGEMINY: ICD-10-CM

## 2017-09-01 DIAGNOSIS — R00.8 TRIGEMINY: ICD-10-CM

## 2017-09-01 DIAGNOSIS — R00.1 SINUS BRADYCARDIA: ICD-10-CM

## 2017-09-01 DIAGNOSIS — I49.3 PVC (PREMATURE VENTRICULAR CONTRACTION): ICD-10-CM

## 2017-09-05 ENCOUNTER — TELEPHONE (OUTPATIENT)
Dept: CARDIOLOGY | Facility: MEDICAL CENTER | Age: 72
End: 2017-09-05

## 2017-09-06 NOTE — TELEPHONE ENCOUNTER
He is wondering about the results of his holter.  I explained it had not been read yet and I would let him know ASAP.

## 2017-09-07 ENCOUNTER — HOSPITAL ENCOUNTER (OUTPATIENT)
Dept: LAB | Facility: MEDICAL CENTER | Age: 72
End: 2017-09-07
Attending: INTERNAL MEDICINE
Payer: MEDICARE

## 2017-09-07 DIAGNOSIS — I49.3 FREQUENT PVCS: ICD-10-CM

## 2017-09-07 DIAGNOSIS — I49.3 PVC (PREMATURE VENTRICULAR CONTRACTION): ICD-10-CM

## 2017-09-07 PROCEDURE — 83735 ASSAY OF MAGNESIUM: CPT

## 2017-09-07 PROCEDURE — 36415 COLL VENOUS BLD VENIPUNCTURE: CPT

## 2017-09-07 RX ORDER — METOPROLOL SUCCINATE 25 MG/1
25 TABLET, EXTENDED RELEASE ORAL DAILY
Qty: 30 TAB | Refills: 6 | Status: SHIPPED | OUTPATIENT
Start: 2017-09-07 | End: 2017-12-21 | Stop reason: SINTOL

## 2017-09-07 NOTE — TELEPHONE ENCOUNTER
Jenny was informed that, per Dr. Eller, he should start Toprol XL 25 and reduce his Losartan / HCTZ by 1/2 if he has lightheadedness.  He will also proceed to the lab to get his Mg+ done and I will call him tomorrow with those results.

## 2017-09-07 NOTE — PROGRESS NOTES
Holter showed very frequent PVCs with multiform pairs. We'll therefore follow through with Dr. Alfonso's plan of adding beta blockade but he may require reduction in his losartan/HCT dose to make room for this. He will notify us immediately if he gets any symptoms. Follow-up with Dr. TEJADA next week. Serum magnesium before then.

## 2017-09-08 LAB — MAGNESIUM SERPL-MCNC: 1.9 MG/DL (ref 1.5–2.5)

## 2017-09-12 ENCOUNTER — OFFICE VISIT (OUTPATIENT)
Dept: CARDIOLOGY | Facility: MEDICAL CENTER | Age: 72
End: 2017-09-12
Payer: MEDICARE

## 2017-09-12 VITALS
SYSTOLIC BLOOD PRESSURE: 110 MMHG | DIASTOLIC BLOOD PRESSURE: 68 MMHG | OXYGEN SATURATION: 94 % | HEIGHT: 71 IN | WEIGHT: 189 LBS | BODY MASS INDEX: 26.46 KG/M2 | HEART RATE: 66 BPM

## 2017-09-12 DIAGNOSIS — I49.3 FREQUENT PVCS: ICD-10-CM

## 2017-09-12 DIAGNOSIS — I34.0 MITRAL VALVE INSUFFICIENCY, UNSPECIFIED ETIOLOGY: ICD-10-CM

## 2017-09-12 DIAGNOSIS — I34.1 MITRAL VALVE PROLAPSE: ICD-10-CM

## 2017-09-12 LAB — EKG IMPRESSION: NORMAL

## 2017-09-12 PROCEDURE — 93224 XTRNL ECG REC UP TO 48 HRS: CPT | Performed by: INTERNAL MEDICINE

## 2017-09-12 PROCEDURE — 99213 OFFICE O/P EST LOW 20 MIN: CPT | Performed by: INTERNAL MEDICINE

## 2017-09-12 ASSESSMENT — ENCOUNTER SYMPTOMS
NAUSEA: 0
BLURRED VISION: 0
DIZZINESS: 0
SHORTNESS OF BREATH: 0
FEVER: 0
BRUISES/BLEEDS EASILY: 0
CHILLS: 0
EYE DISCHARGE: 0
MYALGIAS: 0
PND: 0
NERVOUS/ANXIOUS: 0
DEPRESSION: 0
HEADACHES: 0
COUGH: 0
PALPITATIONS: 1
HEARTBURN: 0

## 2017-09-12 NOTE — PROGRESS NOTES
Subjective:   Selma Avila is a 72 y.o. male who presents today In follow-up for mitral valve prolapse, mitral regurgitation and frequent PVCs that are somewhat symptomatic.    24-hour Holter monitor demonstrates 10,500 PVCs with nothing sustained.  Echocardiogram demonstrates mild mitral regurgitation, LVEF equals 65 and PA systolic pressure of 30    Metoprolol was added to his regimen. 25 mg per day  He notes less morning palpitations.  Evening blood pressures initially were normal and now they've increased a little bit.  No side effects from metoprolol    No past medical history on file.  No past surgical history on file.  History reviewed. No pertinent family history.  History   Smoking Status   • Former Smoker   • Quit date: 9/12/1963   Smokeless Tobacco   • Never Used     No Known Allergies  Outpatient Encounter Prescriptions as of 9/12/2017   Medication Sig Dispense Refill   • metoprolol SR (TOPROL XL) 25 MG TABLET SR 24 HR Take 1 Tab by mouth every day. 30 Tab 6   • Omega-3 Fatty Acids (FISH OIL) 1000 MG Cap capsule Take 1,000 mg by mouth 2 Times a Day.     • vitamin e (VITAMIN E) 400 UNIT Cap Take 2,000 Units by mouth every day.     • Saw Palmetto, Serenoa repens, (SAW PALMETTO EXTRACT PO) Take 2 Caps by mouth every day.     • Coenzyme Q10 (COQ10) 100 MG Cap Take 1 Cap by mouth every day.     • multivitamin (THERAGRAN) Tab Take 1 Tab by mouth every day. Vitalizer Gold Multivitamin     • amoxicillin (AMOXIL) 500 MG Cap Take 2,000 mg by mouth Once. One time dose before dentist appointment on 8/8/17     • losartan-hydrochlorothiazide (HYZAAR) 100-12.5 MG per tablet Take 1 Tab by mouth every day. 90 Tab 3     No facility-administered encounter medications on file as of 9/12/2017.      Review of Systems   Constitutional: Negative for chills, fever and malaise/fatigue.   Eyes: Negative for blurred vision and discharge.   Respiratory: Negative for cough and shortness of breath.    Cardiovascular: Positive for  "palpitations. Negative for chest pain, leg swelling and PND.   Gastrointestinal: Negative for heartburn and nausea.   Genitourinary: Negative for dysuria and urgency.   Musculoskeletal: Negative for myalgias.   Skin: Negative for itching and rash.   Neurological: Negative for dizziness and headaches.   Endo/Heme/Allergies: Negative for environmental allergies. Does not bruise/bleed easily.   Psychiatric/Behavioral: Negative for depression. The patient is not nervous/anxious.         Objective:   /68   Pulse 66   Ht 1.803 m (5' 11\")   Wt 85.7 kg (189 lb)   SpO2 94%   BMI 26.36 kg/m²     Physical Exam   Constitutional: He is oriented to person, place, and time. He appears well-developed and well-nourished.   HENT:   Head: Normocephalic and atraumatic.   Eyes: Conjunctivae and EOM are normal. No scleral icterus.   Neck: Neck supple. No JVD present. No thyromegaly present.   Cardiovascular: Normal rate.  An irregular rhythm present. Frequent extrasystoles are present. Exam reveals no gallop and no friction rub.    Murmur heard.   Systolic murmur is present with a grade of 3/6   Pulmonary/Chest: Effort normal and breath sounds normal. No respiratory distress. He has no wheezes. He has no rales. He exhibits no tenderness.   Abdominal: Soft. Bowel sounds are normal. He exhibits no distension and no mass. There is no tenderness.   Neurological: He is alert and oriented to person, place, and time. Coordination normal.   Skin: Skin is warm and dry. No rash noted. No pallor.   Psychiatric: He has a normal mood and affect. His behavior is normal. Judgment and thought content normal.       Assessment:     1. Frequent PVCs     2. Mitral valve prolapse     3. Mitral valve insufficiency, unspecified etiology         Medical Decision Making:  Today's Assessment / Status / Plan:   Clinical status has improved with metoprolol 25 mg per day with less morning Palpitations.   continue current medications and return in 3 " months

## 2017-09-12 NOTE — LETTER
[facility]   [address]   [address]  [place] [address]  Phone: [phone]  Fax: [phone]              Nancyal Jenny Avila  1945    Encounter Date: 9/12/2017    Ulises Alfonso M.D.          PROGRESS NOTE:  No notes on file      Antony Ceja M.D.  [address] [facility] [address]  [address]  [place] [address]  VIA Facsimile: [phone]

## 2017-11-20 ENCOUNTER — HOSPITAL ENCOUNTER (OUTPATIENT)
Dept: CARDIOLOGY | Facility: MEDICAL CENTER | Age: 72
End: 2017-11-20
Attending: INTERNAL MEDICINE
Payer: MEDICARE

## 2017-11-20 DIAGNOSIS — I10 ESSENTIAL HYPERTENSION: ICD-10-CM

## 2017-11-20 DIAGNOSIS — I34.0 NON-RHEUMATIC MITRAL REGURGITATION: ICD-10-CM

## 2017-11-20 LAB
LV EJECT FRACT MOD 2C 99903: 59.3
LV EJECT FRACT MOD 4C 99902: 79.86
LV EJECT FRACT MOD BP 99901: 70.83

## 2017-11-20 PROCEDURE — 93306 TTE W/DOPPLER COMPLETE: CPT | Mod: 26 | Performed by: INTERNAL MEDICINE

## 2017-11-20 PROCEDURE — 93306 TTE W/DOPPLER COMPLETE: CPT

## 2017-12-12 ENCOUNTER — OFFICE VISIT (OUTPATIENT)
Dept: CARDIOLOGY | Facility: MEDICAL CENTER | Age: 72
End: 2017-12-12
Payer: MEDICARE

## 2017-12-12 VITALS
OXYGEN SATURATION: 93 % | WEIGHT: 195 LBS | SYSTOLIC BLOOD PRESSURE: 130 MMHG | BODY MASS INDEX: 27.3 KG/M2 | DIASTOLIC BLOOD PRESSURE: 82 MMHG | HEIGHT: 71 IN | HEART RATE: 64 BPM

## 2017-12-12 DIAGNOSIS — I49.3 FREQUENT PVCS: ICD-10-CM

## 2017-12-12 DIAGNOSIS — I34.0 MITRAL VALVE INSUFFICIENCY, UNSPECIFIED ETIOLOGY: ICD-10-CM

## 2017-12-12 DIAGNOSIS — I34.1 MITRAL VALVE PROLAPSE: ICD-10-CM

## 2017-12-12 PROCEDURE — 99214 OFFICE O/P EST MOD 30 MIN: CPT | Performed by: INTERNAL MEDICINE

## 2017-12-12 RX ORDER — GRAPE SEED OIL 100 %
OIL (ML) MISCELLANEOUS
COMMUNITY
End: 2018-06-12

## 2017-12-12 ASSESSMENT — ENCOUNTER SYMPTOMS
COUGH: 0
NERVOUS/ANXIOUS: 0
FEVER: 0
EYE DISCHARGE: 0
DEPRESSION: 0
HEADACHES: 1
BLURRED VISION: 0
HEARTBURN: 0
CHILLS: 0
BRUISES/BLEEDS EASILY: 0
SHORTNESS OF BREATH: 0
PND: 0
PALPITATIONS: 0
MYALGIAS: 0
NAUSEA: 0
DIZZINESS: 0

## 2017-12-12 NOTE — PROGRESS NOTES
Subjective:   Selma Avila is a 72 y.o. male who presents todayIn follow-up for mitral regurgitation mitral valve prolapse and frequent PVCs   With metoprolol, he has had less palpitations  However he has a constant low-grade headache and wonders if it's due to metoprolol.  He also reports that with vigorous hunting he has to stop and rest. 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    Today we went over the results of the echo in detail and noted that by Doppler the mitral regurgitation appears to be moderate and left ventricular measurements including ejection fraction and end-systolic diameter are okay. Left atrium was not enlarged and the pulmonary artery pressure cannot be measured    History reviewed. No pertinent surgical history.  History reviewed. No pertinent family history.  History   Smoking Status   • Former Smoker   • Quit date: 9/12/1963   Smokeless Tobacco   • Never Used     No Known Allergies  Outpatient Encounter Prescriptions as of 12/12/2017   Medication Sig Dispense Refill   • Grape Seed Oil by Does not apply route.     • metoprolol SR (TOPROL XL) 25 MG TABLET SR 24 HR Take 1 Tab by mouth every day. 30 Tab 6   • Omega-3 Fatty Acids (FISH OIL) 1000 MG Cap capsule Take 1,000 mg by mouth 2 Times a Day.     • vitamin e (VITAMIN E) 400 UNIT Cap Take 2,000 Units by mouth every day.     • Saw Palmetto, Serenoa repens, (SAW PALMETTO EXTRACT PO) Take 2 Caps by mouth every day.     • Coenzyme Q10 (COQ10) 100 MG Cap Take 1 Cap by mouth every day.     • multivitamin (THERAGRAN) Tab Take 1 Tab by mouth every day. Vitalizer Gold Multivitamin     • amoxicillin (AMOXIL) 500 MG Cap Take 2,000 mg by mouth Once. One time dose before dentist appointment on 8/8/17     • losartan-hydrochlorothiazide (HYZAAR) 100-12.5 MG per tablet Take 1 Tab by mouth every day. 90 Tab 3     No facility-administered encounter medications  "on file as of 12/12/2017.      Review of Systems   Constitutional: Negative for chills, fever and malaise/fatigue.   Eyes: Negative for blurred vision and discharge.   Respiratory: Negative for cough and shortness of breath.    Cardiovascular: Negative for chest pain, palpitations, leg swelling and PND.   Gastrointestinal: Negative for heartburn and nausea.   Genitourinary: Negative for dysuria and urgency.   Musculoskeletal: Negative for myalgias.   Skin: Negative for itching and rash.   Neurological: Positive for headaches. Negative for dizziness.   Endo/Heme/Allergies: Negative for environmental allergies. Does not bruise/bleed easily.   Psychiatric/Behavioral: Negative for depression. The patient is not nervous/anxious.         Objective:   /82   Pulse 64   Ht 1.803 m (5' 11\")   Wt 88.5 kg (195 lb)   SpO2 93%   BMI 27.20 kg/m²     Physical Exam   Constitutional: He is oriented to person, place, and time. He appears well-developed and well-nourished.   HENT:   Head: Normocephalic and atraumatic.   Eyes: Conjunctivae and EOM are normal. No scleral icterus.   Neck: Neck supple. No JVD present. No thyromegaly present.   Cardiovascular: Normal rate.  An irregular rhythm present.  No extrasystoles are present. Exam reveals no gallop and no friction rub.    Murmur heard.   Systolic murmur is present with a grade of 3/6   Pulmonary/Chest: Effort normal and breath sounds normal. No respiratory distress. He has no wheezes. He has no rales. He exhibits no tenderness.   Abdominal: Soft. Bowel sounds are normal. He exhibits no distension and no mass. There is no tenderness.   Neurological: He is alert and oriented to person, place, and time. Coordination normal.   Skin: Skin is warm and dry. No rash noted. No pallor.   Psychiatric: He has a normal mood and affect. His behavior is normal. Judgment and thought content normal.       Assessment:     1. Frequent PVCs     2. Mitral valve prolapse     3. Mitral valve " insufficiency, unspecified etiology         Medical Decision Making:  Today's Assessment / Status / Plan:   With respect to headache, stop metoprolol for 7 days.  If headache disappears he will call us and we'll prescribe atenolol for PVCs.  Return in 6 months.

## 2017-12-12 NOTE — LETTER
[facility]   [address]   [address]  [place] [address]  Phone: [phone]  Fax: [phone]              Selma Avila  1945    Encounter Date: 12/12/2017    Ulises Alfonso M.D.          PROGRESS NOTE:  Subjective:   Selma Avila is a 72 y.o. male who presents todayIn follow-up for mitral regurgitation mitral valve prolapse and frequent PVCs   With metoprolol, he has had less palpitations  However he has a constant low-grade headache and wonders if it's due to metoprolol.  He also reports that with vigorous hunting he has to stop and rest. 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    Today we went over the results of the echo in detail and noted that by Doppler the mitral regurgitation appears to be moderate and left ventricular measurements including ejection fraction and end-systolic diameter are okay. Left atrium was not enlarged and the pulmonary artery pressure cannot be measured    History reviewed. No pertinent surgical history.  History reviewed. No pertinent family history.  History   Smoking Status   • Former Smoker   • Quit date: 9/12/1963   Smokeless Tobacco   • Never Used     No Known Allergies  Outpatient Encounter Prescriptions as of 12/12/2017   Medication Sig Dispense Refill   • Grape Seed Oil by Does not apply route.     • metoprolol SR (TOPROL XL) 25 MG TABLET SR 24 HR Take 1 Tab by mouth every day. 30 Tab 6   • Omega-3 Fatty Acids (FISH OIL) 1000 MG Cap capsule Take 1,000 mg by mouth 2 Times a Day.     • vitamin e (VITAMIN E) 400 UNIT Cap Take 2,000 Units by mouth every day.     • Saw Palmetto, Serenoa repens, (SAW PALMETTO EXTRACT PO) Take 2 Caps by mouth every day.     • Coenzyme Q10 (COQ10) 100 MG Cap Take 1 Cap by mouth every day.     • multivitamin (THERAGRAN) Tab Take 1 Tab by mouth every day. Vitalizer  "Gold Multivitamin     • amoxicillin (AMOXIL) 500 MG Cap Take 2,000 mg by mouth Once. One time dose before dentist appointment on 8/8/17     • losartan-hydrochlorothiazide (HYZAAR) 100-12.5 MG per tablet Take 1 Tab by mouth every day. 90 Tab 3     No facility-administered encounter medications on file as of 12/12/2017.      Review of Systems   Constitutional: Negative for chills, fever and malaise/fatigue.   Eyes: Negative for blurred vision and discharge.   Respiratory: Negative for cough and shortness of breath.    Cardiovascular: Negative for chest pain, palpitations, leg swelling and PND.   Gastrointestinal: Negative for heartburn and nausea.   Genitourinary: Negative for dysuria and urgency.   Musculoskeletal: Negative for myalgias.   Skin: Negative for itching and rash.   Neurological: Positive for headaches. Negative for dizziness.   Endo/Heme/Allergies: Negative for environmental allergies. Does not bruise/bleed easily.   Psychiatric/Behavioral: Negative for depression. The patient is not nervous/anxious.         Objective:   /82   Pulse 64   Ht 1.803 m (5' 11\")   Wt 88.5 kg (195 lb)   SpO2 93%   BMI 27.20 kg/m²      Physical Exam   Constitutional: He is oriented to person, place, and time. He appears well-developed and well-nourished.   HENT:   Head: Normocephalic and atraumatic.   Eyes: Conjunctivae and EOM are normal. No scleral icterus.   Neck: Neck supple. No JVD present. No thyromegaly present.   Cardiovascular: Normal rate.  An irregular rhythm present.  No extrasystoles are present. Exam reveals no gallop and no friction rub.    Murmur heard.   Systolic murmur is present with a grade of 3/6   Pulmonary/Chest: Effort normal and breath sounds normal. No respiratory distress. He has no wheezes. He has no rales. He exhibits no tenderness.   Abdominal: Soft. Bowel sounds are normal. He exhibits no distension and no mass. There is no tenderness.   Neurological: He is alert and oriented to person, " place, and time. Coordination normal.   Skin: Skin is warm and dry. No rash noted. No pallor.   Psychiatric: He has a normal mood and affect. His behavior is normal. Judgment and thought content normal.       Assessment:     1. Frequent PVCs     2. Mitral valve prolapse     3. Mitral valve insufficiency, unspecified etiology         Medical Decision Making:  Today's Assessment / Status / Plan:   With respect to headache, stop metoprolol for 7 days.  If headache disappears he will call us and we'll prescribe atenolol for PVCs.  Return in 6 months.      Antony Ceja M.D.  [address] [place] [address] [address]  [address]  [place] [address]  VIA Facsimile: [phone]

## 2017-12-19 ENCOUNTER — HOSPITAL ENCOUNTER (OUTPATIENT)
Dept: LAB | Facility: MEDICAL CENTER | Age: 72
End: 2017-12-19
Attending: UROLOGY
Payer: MEDICARE

## 2017-12-19 LAB — PSA SERPL-MCNC: 10.48 NG/ML (ref 0–4)

## 2017-12-19 PROCEDURE — 36415 COLL VENOUS BLD VENIPUNCTURE: CPT

## 2017-12-19 PROCEDURE — 84153 ASSAY OF PSA TOTAL: CPT

## 2017-12-21 RX ORDER — ATENOLOL 25 MG/1
12.5 TABLET ORAL DAILY
Qty: 45 TAB | Refills: 6 | OUTPATIENT
Start: 2017-12-21 | End: 2017-12-21 | Stop reason: RX

## 2017-12-21 RX ORDER — NEBIVOLOL 5 MG/1
5 TABLET ORAL DAILY
Qty: 30 TAB | Refills: 6 | OUTPATIENT
Start: 2017-12-21 | End: 2018-06-12 | Stop reason: SDUPTHER

## 2018-01-30 ENCOUNTER — HOSPITAL ENCOUNTER (OUTPATIENT)
Dept: LAB | Facility: MEDICAL CENTER | Age: 73
End: 2018-01-30
Attending: UROLOGY
Payer: MEDICARE

## 2018-01-30 LAB — PSA SERPL-MCNC: 10.33 NG/ML (ref 0–4)

## 2018-01-30 PROCEDURE — 84153 ASSAY OF PSA TOTAL: CPT

## 2018-01-30 PROCEDURE — 36415 COLL VENOUS BLD VENIPUNCTURE: CPT

## 2018-02-26 DIAGNOSIS — I10 ESSENTIAL HYPERTENSION: ICD-10-CM

## 2018-02-26 RX ORDER — LOSARTAN POTASSIUM AND HYDROCHLOROTHIAZIDE 12.5; 1 MG/1; MG/1
1 TABLET ORAL DAILY
Qty: 90 TAB | Refills: 1 | OUTPATIENT
Start: 2018-02-26 | End: 2018-08-28 | Stop reason: SDUPTHER

## 2018-04-23 ENCOUNTER — HOSPITAL ENCOUNTER (OUTPATIENT)
Dept: LAB | Facility: MEDICAL CENTER | Age: 73
End: 2018-04-23
Attending: PODIATRIST
Payer: MEDICARE

## 2018-04-23 LAB
BASOPHILS # BLD AUTO: 0.6 % (ref 0–1.8)
BASOPHILS # BLD: 0.04 K/UL (ref 0–0.12)
CRP SERPL HS-MCNC: 0.15 MG/DL (ref 0–0.75)
EOSINOPHIL # BLD AUTO: 0.11 K/UL (ref 0–0.51)
EOSINOPHIL NFR BLD: 1.6 % (ref 0–6.9)
ERYTHROCYTE [DISTWIDTH] IN BLOOD BY AUTOMATED COUNT: 42.5 FL (ref 35.9–50)
ERYTHROCYTE [SEDIMENTATION RATE] IN BLOOD BY WESTERGREN METHOD: 3 MM/HOUR (ref 0–20)
HCT VFR BLD AUTO: 49.3 % (ref 42–52)
HGB BLD-MCNC: 16.5 G/DL (ref 14–18)
IMM GRANULOCYTES # BLD AUTO: 0.02 K/UL (ref 0–0.11)
IMM GRANULOCYTES NFR BLD AUTO: 0.3 % (ref 0–0.9)
LYMPHOCYTES # BLD AUTO: 1.82 K/UL (ref 1–4.8)
LYMPHOCYTES NFR BLD: 25.8 % (ref 22–41)
MCH RBC QN AUTO: 30.1 PG (ref 27–33)
MCHC RBC AUTO-ENTMCNC: 33.5 G/DL (ref 33.7–35.3)
MCV RBC AUTO: 90 FL (ref 81.4–97.8)
MONOCYTES # BLD AUTO: 0.64 K/UL (ref 0–0.85)
MONOCYTES NFR BLD AUTO: 9.1 % (ref 0–13.4)
NEUTROPHILS # BLD AUTO: 4.42 K/UL (ref 1.82–7.42)
NEUTROPHILS NFR BLD: 62.6 % (ref 44–72)
NRBC # BLD AUTO: 0 K/UL
NRBC BLD-RTO: 0 /100 WBC
PLATELET # BLD AUTO: 208 K/UL (ref 164–446)
PMV BLD AUTO: 10.8 FL (ref 9–12.9)
RBC # BLD AUTO: 5.48 M/UL (ref 4.7–6.1)
RHEUMATOID FACT SER IA-ACNC: <10 IU/ML (ref 0–14)
URATE SERPL-MCNC: 7.2 MG/DL (ref 2.5–8.3)
WBC # BLD AUTO: 7.1 K/UL (ref 4.8–10.8)

## 2018-04-23 PROCEDURE — 85025 COMPLETE CBC W/AUTO DIFF WBC: CPT

## 2018-04-23 PROCEDURE — 85652 RBC SED RATE AUTOMATED: CPT

## 2018-04-23 PROCEDURE — 86038 ANTINUCLEAR ANTIBODIES: CPT

## 2018-04-23 PROCEDURE — 86140 C-REACTIVE PROTEIN: CPT

## 2018-04-23 PROCEDURE — 36415 COLL VENOUS BLD VENIPUNCTURE: CPT

## 2018-04-23 PROCEDURE — 86431 RHEUMATOID FACTOR QUANT: CPT

## 2018-04-23 PROCEDURE — 84550 ASSAY OF BLOOD/URIC ACID: CPT

## 2018-04-25 LAB — NUCLEAR IGG SER QL IA: NORMAL

## 2018-06-12 ENCOUNTER — OFFICE VISIT (OUTPATIENT)
Dept: CARDIOLOGY | Facility: MEDICAL CENTER | Age: 73
End: 2018-06-12
Payer: MEDICARE

## 2018-06-12 VITALS
SYSTOLIC BLOOD PRESSURE: 118 MMHG | HEART RATE: 60 BPM | OXYGEN SATURATION: 89 % | HEIGHT: 71 IN | BODY MASS INDEX: 27.3 KG/M2 | DIASTOLIC BLOOD PRESSURE: 64 MMHG | WEIGHT: 195 LBS

## 2018-06-12 DIAGNOSIS — I10 ESSENTIAL HYPERTENSION: ICD-10-CM

## 2018-06-12 DIAGNOSIS — I34.1 MITRAL VALVE PROLAPSE: ICD-10-CM

## 2018-06-12 DIAGNOSIS — I49.3 FREQUENT PVCS: ICD-10-CM

## 2018-06-12 PROCEDURE — 99213 OFFICE O/P EST LOW 20 MIN: CPT | Performed by: INTERNAL MEDICINE

## 2018-06-12 RX ORDER — NEBIVOLOL 5 MG/1
5 TABLET ORAL DAILY
Qty: 30 TAB | Refills: 11 | Status: SHIPPED | OUTPATIENT
Start: 2018-06-12 | End: 2018-12-14

## 2018-06-12 ASSESSMENT — ENCOUNTER SYMPTOMS
DEPRESSION: 0
FEVER: 0
BRUISES/BLEEDS EASILY: 0
COUGH: 0
PALPITATIONS: 0
NERVOUS/ANXIOUS: 0
BLURRED VISION: 0
MYALGIAS: 0
PND: 0
NAUSEA: 0
HEARTBURN: 0
EYE DISCHARGE: 0
CHILLS: 0
SHORTNESS OF BREATH: 0
HEADACHES: 0
DIZZINESS: 0

## 2018-06-12 NOTE — PROGRESS NOTES
Chief Complaint   Patient presents with   • Premature Ventricular Contractions (PVCs)     Frequent       Subjective:   Selma Avila is a 73 y.o. male who presents today in follow-up for hypertension, mitral valve prolapse with regurgitation and PVCs.  He did not tolerate metoprolol  He is tolerating Bystolic 2.5 mg per day which controls his blood pressure and PVCs with low sense of slowing of heart rate or other side effects.  Exercise treadmill is fine.  Severe foot pain prevents hunting.  No effort dyspnea.  No new medical issues.    History reviewed. No pertinent past medical history.  History reviewed. No pertinent surgical history.  History reviewed. No pertinent family history.  Social History     Social History   • Marital status:      Spouse name: N/A   • Number of children: N/A   • Years of education: N/A     Occupational History   • Not on file.     Social History Main Topics   • Smoking status: Former Smoker     Quit date: 9/12/1963   • Smokeless tobacco: Never Used   • Alcohol use Yes      Comment: 8 oz of beer two to three time a week   • Drug use: No   • Sexual activity: Not on file      Comment:      Other Topics Concern   • Not on file     Social History Narrative   • No narrative on file     No Known Allergies  Outpatient Encounter Prescriptions as of 6/12/2018   Medication Sig Dispense Refill   • Multiple Vitamins-Minerals (MULTIVITAMIN ADULTS PO) Take  by mouth.     • Cholecalciferol (SM VITAMIN D3) 4000 units Cap Take  by mouth.     • nebivolol (BYSTOLIC) 5 MG Tab tablet Take 1 Tab by mouth every day. 30 Tab 11   • losartan-hydrochlorothiazide (HYZAAR) 100-12.5 MG per tablet Take 1 Tab by mouth every day. 90 Tab 1   • Omega-3 Fatty Acids (FISH OIL) 1000 MG Cap capsule Take 1,000 mg by mouth 2 Times a Day.     • vitamin e (VITAMIN E) 400 UNIT Cap Take 2,000 Units by mouth every day.     • Coenzyme Q10 (COQ10) 100 MG Cap Take 1 Cap by mouth every day.     • [DISCONTINUED]  "nebivolol (BYSTOLIC) 5 MG Tab tablet Take 1 Tab by mouth every day. (Patient taking differently: Take 2.5 mg by mouth every day.) 30 Tab 6   • [DISCONTINUED] Grape Seed Oil by Does not apply route.     • [DISCONTINUED] Saw Palmetto, Serenoa repens, (SAW PALMETTO EXTRACT PO) Take 2 Caps by mouth every day.     • [DISCONTINUED] multivitamin (THERAGRAN) Tab Take 1 Tab by mouth every day. Vitalizer Gold Multivitamin     • [DISCONTINUED] amoxicillin (AMOXIL) 500 MG Cap Take 2,000 mg by mouth Once. One time dose before dentist appointment on 8/8/17       No facility-administered encounter medications on file as of 6/12/2018.      Review of Systems   Constitutional: Negative for chills, fever and malaise/fatigue.   Eyes: Negative for blurred vision and discharge.   Respiratory: Negative for cough and shortness of breath.    Cardiovascular: Negative for chest pain, palpitations, leg swelling and PND.   Gastrointestinal: Negative for heartburn and nausea.   Genitourinary: Negative for dysuria and urgency.   Musculoskeletal: Positive for joint pain. Negative for myalgias.   Skin: Negative for itching and rash.   Neurological: Negative for dizziness and headaches.   Endo/Heme/Allergies: Negative for environmental allergies. Does not bruise/bleed easily.   Psychiatric/Behavioral: Negative for depression. The patient is not nervous/anxious.         Objective:   /64   Pulse 60   Ht 1.803 m (5' 11\")   Wt 88.5 kg (195 lb)   SpO2 89%   BMI 27.20 kg/m²     Physical Exam   Constitutional: He is oriented to person, place, and time. He appears well-developed and well-nourished.   Neck: No JVD present.   Cardiovascular: Normal rate and regular rhythm.  Exam reveals no gallop and no friction rub.    Murmur heard.   Systolic murmur is present with a grade of 3/6   Pulmonary/Chest: Effort normal and breath sounds normal.   Abdominal: Soft.   Musculoskeletal: He exhibits no edema.   Neurological: He is alert and oriented to " person, place, and time.   Skin: Skin is warm and dry.       Assessment:     1. Essential hypertension     2. Frequent PVCs     3. Mitral valve prolapse  ECHOCARDIOGRAM COMP W/O CONT       Medical Decision Making:  Today's Assessment / Status / Plan:   Clinical status stable  No change in meds  Echocardiogram in 6 months and follow-up thereafter

## 2018-06-12 NOTE — LETTER
[facility]   [address]   [address]  [place] [address]  Phone: [phone]  Fax: [phone]              Selma Avila  1945    Encounter Date: 6/12/2018    Ulises Alfonso M.D.          PROGRESS NOTE:  Chief Complaint   Patient presents with   • Premature Ventricular Contractions (PVCs)     Frequent       Subjective:   Selma Avila is a 73 y.o. male who presents today in follow-up for hypertension, mitral valve prolapse with regurgitation and PVCs.  He did not tolerate metoprolol  He is tolerating Bystolic 2.5 mg per day which controls his blood pressure and PVCs with low sense of slowing of heart rate or other side effects.  Exercise treadmill is fine.  Severe foot pain prevents hunting.  No effort dyspnea.  No new medical issues.    History reviewed. No pertinent past medical history.  History reviewed. No pertinent surgical history.  History reviewed. No pertinent family history.  Social History     Social History   • Marital status:      Spouse name: N/A   • Number of children: N/A   • Years of education: N/A     Occupational History   • Not on file.     Social History Main Topics   • Smoking status: Former Smoker     Quit date: 9/12/1963   • Smokeless tobacco: Never Used   • Alcohol use Yes      Comment: 8 oz of beer two to three time a week   • Drug use: No   • Sexual activity: Not on file      Comment:      Other Topics Concern   • Not on file     Social History Narrative   • No narrative on file     No Known Allergies  Outpatient Encounter Prescriptions as of 6/12/2018   Medication Sig Dispense Refill   • Multiple Vitamins-Minerals (MULTIVITAMIN ADULTS PO) Take  by mouth.     • Cholecalciferol (SM VITAMIN D3) 4000 units Cap Take  by mouth.     • nebivolol (BYSTOLIC) 5 MG Tab tablet Take 1 Tab by mouth every day. 30 Tab 11   • losartan-hydrochlorothiazide (HYZAAR) 100-12.5 MG per tablet Take 1 Tab by  "mouth every day. 90 Tab 1   • Omega-3 Fatty Acids (FISH OIL) 1000 MG Cap capsule Take 1,000 mg by mouth 2 Times a Day.     • vitamin e (VITAMIN E) 400 UNIT Cap Take 2,000 Units by mouth every day.     • Coenzyme Q10 (COQ10) 100 MG Cap Take 1 Cap by mouth every day.     • [DISCONTINUED] nebivolol (BYSTOLIC) 5 MG Tab tablet Take 1 Tab by mouth every day. (Patient taking differently: Take 2.5 mg by mouth every day.) 30 Tab 6   • [DISCONTINUED] Grape Seed Oil by Does not apply route.     • [DISCONTINUED] Saw Palmetto, Serenoa repens, (SAW PALMETTO EXTRACT PO) Take 2 Caps by mouth every day.     • [DISCONTINUED] multivitamin (THERAGRAN) Tab Take 1 Tab by mouth every day. Vitalizer Gold Multivitamin     • [DISCONTINUED] amoxicillin (AMOXIL) 500 MG Cap Take 2,000 mg by mouth Once. One time dose before dentist appointment on 8/8/17       No facility-administered encounter medications on file as of 6/12/2018.      Review of Systems   Constitutional: Negative for chills, fever and malaise/fatigue.   Eyes: Negative for blurred vision and discharge.   Respiratory: Negative for cough and shortness of breath.    Cardiovascular: Negative for chest pain, palpitations, leg swelling and PND.   Gastrointestinal: Negative for heartburn and nausea.   Genitourinary: Negative for dysuria and urgency.   Musculoskeletal: Positive for joint pain. Negative for myalgias.   Skin: Negative for itching and rash.   Neurological: Negative for dizziness and headaches.   Endo/Heme/Allergies: Negative for environmental allergies. Does not bruise/bleed easily.   Psychiatric/Behavioral: Negative for depression. The patient is not nervous/anxious.         Objective:   /64   Pulse 60   Ht 1.803 m (5' 11\")   Wt 88.5 kg (195 lb)   SpO2 89%   BMI 27.20 kg/m²      Physical Exam   Constitutional: He is oriented to person, place, and time. He appears well-developed and well-nourished.   Neck: No JVD present.   Cardiovascular: Normal rate and regular " rhythm.  Exam reveals no gallop and no friction rub.    Murmur heard.   Systolic murmur is present with a grade of 3/6   Pulmonary/Chest: Effort normal and breath sounds normal.   Abdominal: Soft.   Musculoskeletal: He exhibits no edema.   Neurological: He is alert and oriented to person, place, and time.   Skin: Skin is warm and dry.       Assessment:     1. Essential hypertension     2. Frequent PVCs     3. Mitral valve prolapse  ECHOCARDIOGRAM COMP W/O CONT       Medical Decision Making:  Today's Assessment / Status / Plan:   Clinical status stable  No change in meds  Echocardiogram in 6 months and follow-up thereafter      Antony Ceja M.D.  [address] [facility] [address]  [address]  [place] [address]  VIA Facsimile: [phone]

## 2018-08-28 DIAGNOSIS — I10 ESSENTIAL HYPERTENSION: ICD-10-CM

## 2018-08-29 RX ORDER — LOSARTAN POTASSIUM AND HYDROCHLOROTHIAZIDE 12.5; 1 MG/1; MG/1
1 TABLET ORAL DAILY
Qty: 90 TAB | Refills: 2 | Status: SHIPPED | OUTPATIENT
Start: 2018-08-29 | End: 2019-06-20 | Stop reason: SDUPTHER

## 2018-09-11 ENCOUNTER — HOSPITAL ENCOUNTER (OUTPATIENT)
Dept: LAB | Facility: MEDICAL CENTER | Age: 73
End: 2018-09-11
Attending: NURSE PRACTITIONER
Payer: MEDICARE

## 2018-09-11 LAB
ALBUMIN SERPL BCP-MCNC: 4.3 G/DL (ref 3.2–4.9)
ALBUMIN/GLOB SERPL: 1.6 G/DL
ALP SERPL-CCNC: 52 U/L (ref 30–99)
ALT SERPL-CCNC: 23 U/L (ref 2–50)
ANION GAP SERPL CALC-SCNC: 2 MMOL/L (ref 0–11.9)
AST SERPL-CCNC: 28 U/L (ref 12–45)
BILIRUB SERPL-MCNC: 1.5 MG/DL (ref 0.1–1.5)
BUN SERPL-MCNC: 17 MG/DL (ref 8–22)
CALCIUM SERPL-MCNC: 9.5 MG/DL (ref 8.5–10.5)
CHLORIDE SERPL-SCNC: 105 MMOL/L (ref 96–112)
CHOLEST SERPL-MCNC: 189 MG/DL (ref 100–199)
CO2 SERPL-SCNC: 29 MMOL/L (ref 20–33)
CREAT SERPL-MCNC: 1.08 MG/DL (ref 0.5–1.4)
EST. AVERAGE GLUCOSE BLD GHB EST-MCNC: 117 MG/DL
FASTING STATUS PATIENT QL REPORTED: NORMAL
GLOBULIN SER CALC-MCNC: 2.7 G/DL (ref 1.9–3.5)
GLUCOSE SERPL-MCNC: 94 MG/DL (ref 65–99)
HBA1C MFR BLD: 5.7 % (ref 0–5.6)
HDLC SERPL-MCNC: 47 MG/DL
LDLC SERPL CALC-MCNC: 120 MG/DL
POTASSIUM SERPL-SCNC: 4.2 MMOL/L (ref 3.6–5.5)
PROT SERPL-MCNC: 7 G/DL (ref 6–8.2)
SODIUM SERPL-SCNC: 136 MMOL/L (ref 135–145)
TRIGL SERPL-MCNC: 111 MG/DL (ref 0–149)

## 2018-09-11 PROCEDURE — 36415 COLL VENOUS BLD VENIPUNCTURE: CPT

## 2018-09-11 PROCEDURE — 80053 COMPREHEN METABOLIC PANEL: CPT

## 2018-09-11 PROCEDURE — 83036 HEMOGLOBIN GLYCOSYLATED A1C: CPT

## 2018-09-11 PROCEDURE — 80061 LIPID PANEL: CPT

## 2018-12-03 ENCOUNTER — HOSPITAL ENCOUNTER (OUTPATIENT)
Dept: CARDIOLOGY | Facility: MEDICAL CENTER | Age: 73
End: 2018-12-03
Attending: INTERNAL MEDICINE
Payer: MEDICARE

## 2018-12-03 DIAGNOSIS — I34.1 MITRAL VALVE PROLAPSE: ICD-10-CM

## 2018-12-03 LAB
LV EJECT FRACT  99904: 65
LV EJECT FRACT MOD 2C 99903: 64.24
LV EJECT FRACT MOD 4C 99902: 68.09
LV EJECT FRACT MOD BP 99901: 64.17

## 2018-12-03 PROCEDURE — 93306 TTE W/DOPPLER COMPLETE: CPT

## 2018-12-03 PROCEDURE — 93306 TTE W/DOPPLER COMPLETE: CPT | Mod: 26 | Performed by: INTERNAL MEDICINE

## 2018-12-14 ENCOUNTER — OFFICE VISIT (OUTPATIENT)
Dept: CARDIOLOGY | Facility: MEDICAL CENTER | Age: 73
End: 2018-12-14
Payer: MEDICARE

## 2018-12-14 VITALS
HEART RATE: 58 BPM | DIASTOLIC BLOOD PRESSURE: 78 MMHG | HEIGHT: 71 IN | BODY MASS INDEX: 27.16 KG/M2 | OXYGEN SATURATION: 96 % | SYSTOLIC BLOOD PRESSURE: 122 MMHG | WEIGHT: 194 LBS

## 2018-12-14 DIAGNOSIS — I34.0 NON-RHEUMATIC MITRAL REGURGITATION: ICD-10-CM

## 2018-12-14 DIAGNOSIS — I34.1 MITRAL VALVE PROLAPSE: Primary | ICD-10-CM

## 2018-12-14 DIAGNOSIS — Z91.89 OTHER SPECIFIED PERSONAL RISK FACTORS, NOT ELSEWHERE CLASSIFIED: ICD-10-CM

## 2018-12-14 DIAGNOSIS — E78.5 DYSLIPIDEMIA: ICD-10-CM

## 2018-12-14 DIAGNOSIS — I10 ESSENTIAL HYPERTENSION: ICD-10-CM

## 2018-12-14 PROCEDURE — 99214 OFFICE O/P EST MOD 30 MIN: CPT | Performed by: INTERNAL MEDICINE

## 2018-12-14 NOTE — LETTER
Name:          Selma Avila   YOB: 1945  Date:     12/14/2018      Antony Ceja M.D.  [address] [facility]  [facility] [address]     Zay Pearson MD  [address]  [place] [address]  Phone: [phone]  Back Line: [phone]  Fax: [phone]  E-mail: [email or web address]   Dear Dr. Ceja,    We had the pleasure of seeing your patient, Selma Avila, in Cardiology Clinic at [facility] today.    As you know, he is a 73-year-old man with a history of mitral valve prolapse, mild to moderate mitral regurgitation, hypertension, and dyslipidemia.    He is doing well today, and has no cardiovascular complaints.  Certainly, his murmur of mitral regurgitation with his mitral valve prolapse is fairly noticeable on physical examination.  I reviewed with him the results of his echocardiogram from about 10 days ago at this point documenting prolapse of his anterior leaflet and mild to moderate mitral regurgitation.  I am relatively optimistic in the setting of his age of 73 that this may never turn into severe mitral regurgitation especially if he does not sustain any other injury to his left ventricle such as with a myocardial infarction.  In that vein, I noticed that is LDL is 120 mg/dL, and I ordered a coronary calcium score to inform our lipid targets.  Certainly, if we need to start a statin, we should consider medical management of prediabetes level hemoglobin A1c.  I will defer that to the primary care setting.    Return in about 3 months (around 3/14/2019).    Thank you for the referral and please do not hesitate to contact me at any time. My contact information is listed above.    This note was dictated using Dragon speech recognition software.     A full note including my physical examination and a full list of rectified medications is available in our medical record, and can be faxed as well.    Zay Pearson MD  Cardiologist  [facility]

## 2018-12-14 NOTE — PROGRESS NOTES
Chief Complaint   Patient presents with   • Hypertension       Subjective:   Selma Avila is a 73 -year-old man with a history of mitral valve prolapse, mild to moderate mitral regurgitation, hypertension, and dyslipidemia.    He is doing well today, and has no cardiovascular complaints coming in to review the results of his recent labs and echocardiogram.    He denies any side effects with his medications.    Past Medical History:   Diagnosis Date   • Dyslipidemia 12/14/2018   • Essential hypertension 9/12/2013   • Mitral valve prolapse 9/12/2013   • Non-rheumatic mitral regurgitation 9/12/2013     History reviewed. No pertinent surgical history.  Family History   Problem Relation Age of Onset   • Heart Disease Neg Hx      Social History     Social History   • Marital status:      Spouse name: N/A   • Number of children: N/A   • Years of education: N/A     Occupational History   • Not on file.     Social History Main Topics   • Smoking status: Former Smoker     Quit date: 9/12/1963   • Smokeless tobacco: Never Used   • Alcohol use Yes      Comment: 8 oz of beer two to three time a week   • Drug use: No   • Sexual activity: Not on file      Comment:      Other Topics Concern   • Not on file     Social History Narrative   • No narrative on file     No Known Allergies  Outpatient Encounter Prescriptions as of 12/14/2018   Medication Sig Dispense Refill   • losartan-hydrochlorothiazide (HYZAAR) 100-12.5 MG per tablet Take 1 Tab by mouth every day. 90 Tab 2   • Multiple Vitamins-Minerals (MULTIVITAMIN ADULTS PO) Take  by mouth.     • Cholecalciferol (SM VITAMIN D3) 4000 units Cap Take  by mouth.     • Omega-3 Fatty Acids (FISH OIL) 1000 MG Cap capsule Take 1,000 mg by mouth 2 Times a Day.     • Coenzyme Q10 (COQ10) 100 MG Cap Take 1 Cap by mouth every day.     • [DISCONTINUED] nebivolol (BYSTOLIC) 5 MG Tab tablet Take 1 Tab by mouth every day. (Patient not taking: Reported on 12/14/2018) 30 Tab 11   •  "[DISCONTINUED] vitamin e (VITAMIN E) 400 UNIT Cap Take 2,000 Units by mouth every day.       No facility-administered encounter medications on file as of 12/14/2018.      ROS     Objective:   /78 (BP Location: Right arm, Patient Position: Sitting, BP Cuff Size: Adult)   Pulse (!) 58   Ht 1.803 m (5' 11\")   Wt 88 kg (194 lb 0.1 oz)   SpO2 96%   BMI 27.06 kg/m²     Physical Exam    Lab Results   Component Value Date/Time    WBC 7.1 04/23/2018 10:38 AM    RBC 5.48 04/23/2018 10:38 AM    HEMOGLOBIN 16.5 04/23/2018 10:38 AM    HEMATOCRIT 49.3 04/23/2018 10:38 AM    MCV 90.0 04/23/2018 10:38 AM    MCH 30.1 04/23/2018 10:38 AM    MCHC 33.5 (L) 04/23/2018 10:38 AM    MPV 10.8 04/23/2018 10:38 AM        Lab Results   Component Value Date/Time    SODIUM 136 09/11/2018 09:59 AM    POTASSIUM 4.2 09/11/2018 09:59 AM    CHLORIDE 105 09/11/2018 09:59 AM    CO2 29 09/11/2018 09:59 AM    GLUCOSE 94 09/11/2018 09:59 AM    BUN 17 09/11/2018 09:59 AM    CREATININE 1.08 09/11/2018 09:59 AM        Lab Results   Component Value Date/Time    ASTSGOT 28 09/11/2018 09:59 AM    ALTSGPT 23 09/11/2018 09:59 AM        Lab Results   Component Value Date/Time    CHOLSTRLTOT 189 09/11/2018 09:59 AM     (H) 09/11/2018 09:59 AM    HDL 47 09/11/2018 09:59 AM    TRIGLYCERIDE 111 09/11/2018 09:59 AM         No results found for this or any previous visit.    Echocardiogram, 12/3/2018, images and report reviewed, my dictation: Anterior mitral valve prolapse with mild to moderate mitral regurgitation, left ventricular ejection fraction is 65-70%.  There is mild, concentric left ventricular hypertrophy, and mild left atrial enlargement with a volume index of 39 mL/meter squared    Assessment:     1. Mitral valve prolapse     2. Other specified personal risk factors, not elsewhere classified  CT-CARDIAC SCORING   3. Dyslipidemia  CT-CARDIAC SCORING   4. Non-rheumatic mitral regurgitation     5. Essential hypertension         Medical " Decision Making:  Today's Assessment / Status / Plan:     He is doing well today, and has no cardiovascular complaints.  Certainly, his murmur of mitral regurgitation with his mitral valve prolapse is fairly noticeable on physical examination.  I reviewed with him the results of his echocardiogram from about 10 days ago at this point documenting prolapse of his anterior leaflet and mild to moderate mitral regurgitation.  I am relatively optimistic in the setting of his age of 73 that this may never turn into severe mitral regurgitation especially if he does not sustain any other injury to his left ventricle such as with a myocardial infarction.  In that vein, I noticed that is LDL is 120 mg/dL, and I ordered a coronary calcium score to inform our lipid targets.  Certainly, if we need to start a statin, we should consider medical management of prediabetes level hemoglobin A1c.  I will defer that to the primary care setting.    Zay Pearson MD  Cardiologist, [facility] [place]     Return in about 3 months (around 3/14/2019).

## 2018-12-14 NOTE — PATIENT INSTRUCTIONS
"For more information about diet and heart disease, I would recommend that you read    \"The Spectrum\" by Andrea Oscar MD          We can discuss your reaction to his advice at our next follow-up.  I will preface to say that I do disagree with his stance on Olive Oil, which is part of a Mediterranean diet.  A Mediterranean diet has been associated with less heart attacks by studies (Primary Prevention of Cardiovascular Disease with a Mediterranean Diet, [place] Journal of Medicine 2013; 368:5632-1063).    For more information on the therapeutic benefits of exercise, consider watching the "[employer]ube video, called \"23 and 1/2 hours: What is the single best thing we can do for our health?\":    https://www.[employer].com/watch?[email or web address]          Zay Pearson MD  Cardiologist, [facility] and V[facility] [place]   "

## 2019-01-04 ENCOUNTER — HOSPITAL ENCOUNTER (OUTPATIENT)
Dept: RADIOLOGY | Facility: MEDICAL CENTER | Age: 74
End: 2019-01-04
Attending: INTERNAL MEDICINE
Payer: COMMERCIAL

## 2019-01-04 DIAGNOSIS — Z91.89 OTHER SPECIFIED PERSONAL RISK FACTORS, NOT ELSEWHERE CLASSIFIED: ICD-10-CM

## 2019-01-04 DIAGNOSIS — E78.5 DYSLIPIDEMIA: ICD-10-CM

## 2019-01-04 PROCEDURE — 4410556 CT-CARDIAC SCORING

## 2019-06-19 DIAGNOSIS — I10 ESSENTIAL HYPERTENSION: ICD-10-CM

## 2019-06-19 RX ORDER — LOSARTAN POTASSIUM AND HYDROCHLOROTHIAZIDE 12.5; 1 MG/1; MG/1
1 TABLET ORAL DAILY
Qty: 90 TAB | Refills: 0 | Status: CANCELLED | OUTPATIENT
Start: 2019-06-19

## 2019-06-20 DIAGNOSIS — I10 ESSENTIAL HYPERTENSION: ICD-10-CM

## 2019-06-20 RX ORDER — LOSARTAN POTASSIUM AND HYDROCHLOROTHIAZIDE 12.5; 1 MG/1; MG/1
1 TABLET ORAL DAILY
Qty: 100 TAB | Refills: 0 | Status: SHIPPED | OUTPATIENT
Start: 2019-06-20 | End: 2019-09-28 | Stop reason: SDUPTHER

## 2019-06-20 RX ORDER — LOSARTAN POTASSIUM AND HYDROCHLOROTHIAZIDE 12.5; 1 MG/1; MG/1
1 TABLET ORAL DAILY
Qty: 90 TAB | Refills: 0 | Status: SHIPPED | OUTPATIENT
Start: 2019-06-20 | End: 2019-06-20 | Stop reason: SDUPTHER

## 2019-06-25 ENCOUNTER — OFFICE VISIT (OUTPATIENT)
Dept: CARDIOLOGY | Facility: MEDICAL CENTER | Age: 74
End: 2019-06-25
Payer: MEDICARE

## 2019-06-25 VITALS
BODY MASS INDEX: 26.6 KG/M2 | HEART RATE: 73 BPM | HEIGHT: 71 IN | WEIGHT: 190 LBS | OXYGEN SATURATION: 95 % | DIASTOLIC BLOOD PRESSURE: 80 MMHG | SYSTOLIC BLOOD PRESSURE: 130 MMHG

## 2019-06-25 DIAGNOSIS — I34.1 MITRAL VALVE PROLAPSE: ICD-10-CM

## 2019-06-25 DIAGNOSIS — E78.5 DYSLIPIDEMIA: ICD-10-CM

## 2019-06-25 DIAGNOSIS — I10 ESSENTIAL HYPERTENSION: ICD-10-CM

## 2019-06-25 DIAGNOSIS — I34.0 NON-RHEUMATIC MITRAL REGURGITATION: ICD-10-CM

## 2019-06-25 PROCEDURE — 99214 OFFICE O/P EST MOD 30 MIN: CPT | Performed by: INTERNAL MEDICINE

## 2019-06-25 ASSESSMENT — ENCOUNTER SYMPTOMS
PALPITATIONS: 0
STRIDOR: 0
HEMOPTYSIS: 0
ORTHOPNEA: 0
LOSS OF CONSCIOUSNESS: 0
CARDIOVASCULAR NEGATIVE: 1
CONSTITUTIONAL NEGATIVE: 1
RESPIRATORY NEGATIVE: 1
CLAUDICATION: 0
MUSCULOSKELETAL NEGATIVE: 1
FEVER: 0
WEAKNESS: 0
EYES NEGATIVE: 1
SORE THROAT: 0
PND: 0
SHORTNESS OF BREATH: 0
DIZZINESS: 0
COUGH: 0
NEUROLOGICAL NEGATIVE: 1
CHILLS: 0
WHEEZING: 0
GASTROINTESTINAL NEGATIVE: 1
SPUTUM PRODUCTION: 0
BRUISES/BLEEDS EASILY: 0

## 2019-06-25 NOTE — PROGRESS NOTES
Chief Complaint   Patient presents with   • Hypertension       Subjective:   Selma Avila is a 74 y.o. male who presents today as a follow-up for his mitral valve prolapse high blood pressure and hyperlipidemia.  Since he was last seen he continues to do well.  He hikes around 10,000 feet hunting elk with no physical limitations.  He only takes Hyzaar for blood pressure.  He gets no lower extremity edema.  Is not been have any worsening shortness of breath or chest pain.  His last LDL was at goal.    Past Medical History:   Diagnosis Date   • Dyslipidemia 12/14/2018   • Essential hypertension 9/12/2013   • Mitral valve prolapse 9/12/2013   • Non-rheumatic mitral regurgitation 9/12/2013     History reviewed. No pertinent surgical history.  Family History   Problem Relation Age of Onset   • Heart Disease Neg Hx      Social History     Social History   • Marital status:      Spouse name: N/A   • Number of children: N/A   • Years of education: N/A     Occupational History   • Not on file.     Social History Main Topics   • Smoking status: Former Smoker     Quit date: 9/12/1963   • Smokeless tobacco: Never Used   • Alcohol use Yes      Comment: 8 oz of beer two to three time a week   • Drug use: No   • Sexual activity: Not on file      Comment:      Other Topics Concern   • Not on file     Social History Narrative   • No narrative on file     No Known Allergies  Outpatient Encounter Prescriptions as of 6/25/2019   Medication Sig Dispense Refill   • losartan-hydrochlorothiazide (HYZAAR) 100-12.5 MG per tablet Take 1 Tab by mouth every day. 100 Tab 0   • [DISCONTINUED] Multiple Vitamins-Minerals (MULTIVITAMIN ADULTS PO) Take  by mouth.     • [DISCONTINUED] Cholecalciferol ( VITAMIN D3) 4000 units Cap Take  by mouth.     • [DISCONTINUED] Omega-3 Fatty Acids (FISH OIL) 1000 MG Cap capsule Take 1,000 mg by mouth 2 Times a Day.     • [DISCONTINUED] Coenzyme Q10 (COQ10) 100 MG Cap Take 1 Cap by mouth every  "day.       No facility-administered encounter medications on file as of 6/25/2019.      Review of Systems   Constitutional: Negative.  Negative for chills, fever and malaise/fatigue.   HENT: Negative.  Negative for sore throat.    Eyes: Negative.    Respiratory: Negative.  Negative for cough, hemoptysis, sputum production, shortness of breath, wheezing and stridor.    Cardiovascular: Negative.  Negative for chest pain, palpitations, orthopnea, claudication, leg swelling and PND.   Gastrointestinal: Negative.    Genitourinary: Negative.    Musculoskeletal: Negative.    Skin: Negative.    Neurological: Negative.  Negative for dizziness, loss of consciousness and weakness.   Endo/Heme/Allergies: Negative.  Does not bruise/bleed easily.   All other systems reviewed and are negative.       Objective:   /80 (BP Location: Left arm, Patient Position: Sitting, BP Cuff Size: Adult)   Pulse 73   Ht 1.803 m (5' 11\")   Wt 86.2 kg (190 lb)   SpO2 95%   BMI 26.50 kg/m²     Physical Exam   Constitutional: He appears well-developed and well-nourished. No distress.   HENT:   Head: Normocephalic and atraumatic.   Right Ear: External ear normal.   Left Ear: External ear normal.   Nose: Nose normal.   Mouth/Throat: No oropharyngeal exudate.   Eyes: Pupils are equal, round, and reactive to light. Conjunctivae and EOM are normal. Right eye exhibits no discharge. Left eye exhibits no discharge. No scleral icterus.   Neck: Neck supple. No JVD present.   Cardiovascular: Normal rate, regular rhythm and intact distal pulses.  Exam reveals no gallop and no friction rub.    No murmur heard.  Pulmonary/Chest: Effort normal. No stridor. No respiratory distress. He has no wheezes. He has no rales. He exhibits no tenderness.   Abdominal: Soft. He exhibits no distension. There is no guarding.   Musculoskeletal: Normal range of motion. He exhibits no edema, tenderness or deformity.   Neurological: He is alert. He has normal reflexes. He " displays normal reflexes. No cranial nerve deficit. He exhibits normal muscle tone. Coordination normal.   Skin: Skin is warm and dry. No rash noted. He is not diaphoretic. No erythema. No pallor.   Psychiatric: He has a normal mood and affect. His behavior is normal. Judgment and thought content normal.   Nursing note and vitals reviewed.      Assessment:     1. Essential hypertension     2. Mitral valve prolapse     3. Non-rheumatic mitral regurgitation     4. Dyslipidemia         Medical Decision Making:  Today's Assessment / Status / Plan:     74-year-old male with nonfunctional limiting mild mitral valve prolapse and high blood pressure.  At this point his risk factors are well controlled.  I do not feel we need a repeat echocardiogram.  He has no limitations.  We can continue his medications for his high blood pressure.  We will continue to watch his lipids.  He declines medication at this visit.  We will see him back in 1 year.    Thank for you allowing me to take part in your patient's care, please call should you have any questions or would like to discuss this patient.

## 2019-06-25 NOTE — LETTER
[facility]   [address], [facility] [address]  [place] [address]  Phone: [phone]  Fax: [phone]              Selma Avila  1945    Encounter Date: 6/25/2019    Edgar Perez M.D.          PROGRESS NOTE:  Chief Complaint   Patient presents with   • Hypertension       Subjective:   Selam Avila is a 74 y.o. male who presents today as a follow-up for his mitral valve prolapse high blood pressure and hyperlipidemia.  Since he was last seen he continues to do well.  He hikes around 10,000 feet hunting elk with no physical limitations.  He only takes Hyzaar for blood pressure.  He gets no lower extremity edema.  Is not been have any worsening shortness of breath or chest pain.  His last LDL was at goal.    Past Medical History:   Diagnosis Date   • Dyslipidemia 12/14/2018   • Essential hypertension 9/12/2013   • Mitral valve prolapse 9/12/2013   • Non-rheumatic mitral regurgitation 9/12/2013     History reviewed. No pertinent surgical history.  Family History   Problem Relation Age of Onset   • Heart Disease Neg Hx      Social History     Social History   • Marital status:      Spouse name: N/A   • Number of children: N/A   • Years of education: N/A     Occupational History   • Not on file.     Social History Main Topics   • Smoking status: Former Smoker     Quit date: 9/12/1963   • Smokeless tobacco: Never Used   • Alcohol use Yes      Comment: 8 oz of beer two to three time a week   • Drug use: No   • Sexual activity: Not on file      Comment:      Other Topics Concern   • Not on file     Social History Narrative   • No narrative on file     No Known Allergies  Outpatient Encounter Prescriptions as of 6/25/2019   Medication Sig Dispense Refill   • losartan-hydrochlorothiazide (HYZAAR) 100-12.5 MG per tablet Take 1 Tab by mouth every day. 100 Tab 0   • [DISCONTINUED] Multiple Vitamins-Minerals (MULTIVITAMIN ADULTS PO) Take  by mouth.     •  "[DISCONTINUED] Cholecalciferol ( VITAMIN D3) 4000 units Cap Take  by mouth.     • [DISCONTINUED] Omega-3 Fatty Acids (FISH OIL) 1000 MG Cap capsule Take 1,000 mg by mouth 2 Times a Day.     • [DISCONTINUED] Coenzyme Q10 (COQ10) 100 MG Cap Take 1 Cap by mouth every day.       No facility-administered encounter medications on file as of 6/25/2019.      Review of Systems   Constitutional: Negative.  Negative for chills, fever and malaise/fatigue.   HENT: Negative.  Negative for sore throat.    Eyes: Negative.    Respiratory: Negative.  Negative for cough, hemoptysis, sputum production, shortness of breath, wheezing and stridor.    Cardiovascular: Negative.  Negative for chest pain, palpitations, orthopnea, claudication, leg swelling and PND.   Gastrointestinal: Negative.    Genitourinary: Negative.    Musculoskeletal: Negative.    Skin: Negative.    Neurological: Negative.  Negative for dizziness, loss of consciousness and weakness.   Endo/Heme/Allergies: Negative.  Does not bruise/bleed easily.   All other systems reviewed and are negative.       Objective:   /80 (BP Location: Left arm, Patient Position: Sitting, BP Cuff Size: Adult)   Pulse 73   Ht 1.803 m (5' 11\")   Wt 86.2 kg (190 lb)   SpO2 95%   BMI 26.50 kg/m²      Physical Exam   Constitutional: He appears well-developed and well-nourished. No distress.   HENT:   Head: Normocephalic and atraumatic.   Right Ear: External ear normal.   Left Ear: External ear normal.   Nose: Nose normal.   Mouth/Throat: No oropharyngeal exudate.   Eyes: Pupils are equal, round, and reactive to light. Conjunctivae and EOM are normal. Right eye exhibits no discharge. Left eye exhibits no discharge. No scleral icterus.   Neck: Neck supple. No JVD present.   Cardiovascular: Normal rate, regular rhythm and intact distal pulses.  Exam reveals no gallop and no friction rub.    No murmur heard.  Pulmonary/Chest: Effort normal. No stridor. No respiratory distress. He has no " wheezes. He has no rales. He exhibits no tenderness.   Abdominal: Soft. He exhibits no distension. There is no guarding.   Musculoskeletal: Normal range of motion. He exhibits no edema, tenderness or deformity.   Neurological: He is alert. He has normal reflexes. He displays normal reflexes. No cranial nerve deficit. He exhibits normal muscle tone. Coordination normal.   Skin: Skin is warm and dry. No rash noted. He is not diaphoretic. No erythema. No pallor.   Psychiatric: He has a normal mood and affect. His behavior is normal. Judgment and thought content normal.   Nursing note and vitals reviewed.      Assessment:     1. Essential hypertension     2. Mitral valve prolapse     3. Non-rheumatic mitral regurgitation     4. Dyslipidemia         Medical Decision Making:  Today's Assessment / Status / Plan:     74-year-old male with nonfunctional limiting mild mitral valve prolapse and high blood pressure.  At this point his risk factors are well controlled.  I do not feel we need a repeat echocardiogram.  He has no limitations.  We can continue his medications for his high blood pressure.  We will continue to watch his lipids.  He declines medication at this visit.  We will see him back in 1 year.    Thank for you allowing me to take part in your patient's care, please call should you have any questions or would like to discuss this patient.      Antony Ceja M.D.  [address]  [address]  [place] [address]  VIA Facsimile: [phone]

## 2019-09-28 DIAGNOSIS — I10 ESSENTIAL HYPERTENSION: ICD-10-CM

## 2019-09-30 RX ORDER — LOSARTAN POTASSIUM AND HYDROCHLOROTHIAZIDE 12.5; 1 MG/1; MG/1
TABLET ORAL
Qty: 100 TAB | Refills: 3 | Status: SHIPPED
Start: 2019-09-30 | End: 2020-06-15

## 2020-01-16 ENCOUNTER — HOSPITAL ENCOUNTER (OUTPATIENT)
Dept: LAB | Facility: MEDICAL CENTER | Age: 75
End: 2020-01-16
Attending: PHYSICIAN ASSISTANT
Payer: MEDICARE

## 2020-01-16 LAB
ALBUMIN SERPL BCP-MCNC: 4.2 G/DL (ref 3.2–4.9)
ALBUMIN/GLOB SERPL: 1.4 G/DL
ALP SERPL-CCNC: 63 U/L (ref 30–99)
ALT SERPL-CCNC: 13 U/L (ref 2–50)
ANION GAP SERPL CALC-SCNC: 8 MMOL/L (ref 0–11.9)
AST SERPL-CCNC: 19 U/L (ref 12–45)
BASOPHILS # BLD AUTO: 0.6 % (ref 0–1.8)
BASOPHILS # BLD: 0.07 K/UL (ref 0–0.12)
BILIRUB SERPL-MCNC: 0.7 MG/DL (ref 0.1–1.5)
BUN SERPL-MCNC: 26 MG/DL (ref 8–22)
CALCIUM SERPL-MCNC: 9.5 MG/DL (ref 8.5–10.5)
CHLORIDE SERPL-SCNC: 103 MMOL/L (ref 96–112)
CHOLEST SERPL-MCNC: 157 MG/DL (ref 100–199)
CO2 SERPL-SCNC: 27 MMOL/L (ref 20–33)
CREAT SERPL-MCNC: 1.01 MG/DL (ref 0.5–1.4)
EOSINOPHIL # BLD AUTO: 0.1 K/UL (ref 0–0.51)
EOSINOPHIL NFR BLD: 0.9 % (ref 0–6.9)
ERYTHROCYTE [DISTWIDTH] IN BLOOD BY AUTOMATED COUNT: 46.4 FL (ref 35.9–50)
EST. AVERAGE GLUCOSE BLD GHB EST-MCNC: 111 MG/DL
GLOBULIN SER CALC-MCNC: 3 G/DL (ref 1.9–3.5)
GLUCOSE SERPL-MCNC: 83 MG/DL (ref 65–99)
HBA1C MFR BLD: 5.5 % (ref 0–5.6)
HCT VFR BLD AUTO: 51.5 % (ref 42–52)
HDLC SERPL-MCNC: 45 MG/DL
HGB BLD-MCNC: 16.6 G/DL (ref 14–18)
IMM GRANULOCYTES # BLD AUTO: 0.06 K/UL (ref 0–0.11)
IMM GRANULOCYTES NFR BLD AUTO: 0.5 % (ref 0–0.9)
LDLC SERPL CALC-MCNC: 101 MG/DL
LYMPHOCYTES # BLD AUTO: 1.33 K/UL (ref 1–4.8)
LYMPHOCYTES NFR BLD: 12 % (ref 22–41)
MCH RBC QN AUTO: 29.4 PG (ref 27–33)
MCHC RBC AUTO-ENTMCNC: 32.2 G/DL (ref 33.7–35.3)
MCV RBC AUTO: 91.3 FL (ref 81.4–97.8)
MONOCYTES # BLD AUTO: 0.88 K/UL (ref 0–0.85)
MONOCYTES NFR BLD AUTO: 7.9 % (ref 0–13.4)
NEUTROPHILS # BLD AUTO: 8.67 K/UL (ref 1.82–7.42)
NEUTROPHILS NFR BLD: 78.1 % (ref 44–72)
NRBC # BLD AUTO: 0 K/UL
NRBC BLD-RTO: 0 /100 WBC
PLATELET # BLD AUTO: 281 K/UL (ref 164–446)
PMV BLD AUTO: 11.8 FL (ref 9–12.9)
POTASSIUM SERPL-SCNC: 4.2 MMOL/L (ref 3.6–5.5)
PROT SERPL-MCNC: 7.2 G/DL (ref 6–8.2)
PSA SERPL-MCNC: 20.51 NG/ML (ref 0–4)
RBC # BLD AUTO: 5.64 M/UL (ref 4.7–6.1)
SODIUM SERPL-SCNC: 138 MMOL/L (ref 135–145)
TRIGL SERPL-MCNC: 53 MG/DL (ref 0–149)
WBC # BLD AUTO: 11.1 K/UL (ref 4.8–10.8)

## 2020-01-16 PROCEDURE — 80053 COMPREHEN METABOLIC PANEL: CPT

## 2020-01-16 PROCEDURE — 36415 COLL VENOUS BLD VENIPUNCTURE: CPT

## 2020-01-16 PROCEDURE — 85025 COMPLETE CBC W/AUTO DIFF WBC: CPT

## 2020-01-16 PROCEDURE — 84153 ASSAY OF PSA TOTAL: CPT

## 2020-01-16 PROCEDURE — 83036 HEMOGLOBIN GLYCOSYLATED A1C: CPT

## 2020-01-16 PROCEDURE — 80061 LIPID PANEL: CPT

## 2020-02-13 ENCOUNTER — HOSPITAL ENCOUNTER (OUTPATIENT)
Dept: LAB | Facility: MEDICAL CENTER | Age: 75
End: 2020-02-13
Attending: UROLOGY
Payer: MEDICARE

## 2020-02-13 LAB — PSA SERPL-MCNC: 11.99 NG/ML (ref 0–4)

## 2020-02-13 PROCEDURE — 36415 COLL VENOUS BLD VENIPUNCTURE: CPT

## 2020-02-13 PROCEDURE — 84153 ASSAY OF PSA TOTAL: CPT

## 2020-04-15 ENCOUNTER — TELEPHONE (OUTPATIENT)
Dept: CARDIOLOGY | Facility: MEDICAL CENTER | Age: 75
End: 2020-04-15

## 2020-04-15 NOTE — TELEPHONE ENCOUNTER
Discussed with MD.    Returned patient call. Pt remains in [place] at this time. HR 36 this AM. Pt is asymptomatic. ER advised if patient becomes symptomatic. Pt states understanding. F/U appt upon return to [place] advised.

## 2020-04-25 DIAGNOSIS — I10 ESSENTIAL HYPERTENSION: Primary | ICD-10-CM

## 2020-04-27 RX ORDER — HYDROCHLOROTHIAZIDE 12.5 MG/1
TABLET ORAL
Qty: 100 TAB | Refills: 2 | Status: SHIPPED
Start: 2020-04-27 | End: 2020-05-04 | Stop reason: CLARIF

## 2020-04-28 ENCOUNTER — HOSPITAL ENCOUNTER (OUTPATIENT)
Dept: LAB | Facility: MEDICAL CENTER | Age: 75
End: 2020-04-28
Attending: FAMILY MEDICINE
Payer: MEDICARE

## 2020-04-28 LAB
ALBUMIN SERPL BCP-MCNC: 4.5 G/DL (ref 3.2–4.9)
ALBUMIN/GLOB SERPL: 1.6 G/DL
ALP SERPL-CCNC: 66 U/L (ref 30–99)
ALT SERPL-CCNC: 18 U/L (ref 2–50)
ANION GAP SERPL CALC-SCNC: 12 MMOL/L (ref 7–16)
AST SERPL-CCNC: 26 U/L (ref 12–45)
BASOPHILS # BLD AUTO: 0.9 % (ref 0–1.8)
BASOPHILS # BLD: 0.07 K/UL (ref 0–0.12)
BILIRUB SERPL-MCNC: 1.1 MG/DL (ref 0.1–1.5)
BUN SERPL-MCNC: 25 MG/DL (ref 8–22)
CALCIUM SERPL-MCNC: 9.6 MG/DL (ref 8.5–10.5)
CHLORIDE SERPL-SCNC: 102 MMOL/L (ref 96–112)
CO2 SERPL-SCNC: 25 MMOL/L (ref 20–33)
CREAT SERPL-MCNC: 1.15 MG/DL (ref 0.5–1.4)
EOSINOPHIL # BLD AUTO: 0.15 K/UL (ref 0–0.51)
EOSINOPHIL NFR BLD: 2 % (ref 0–6.9)
ERYTHROCYTE [DISTWIDTH] IN BLOOD BY AUTOMATED COUNT: 46.9 FL (ref 35.9–50)
GLOBULIN SER CALC-MCNC: 2.9 G/DL (ref 1.9–3.5)
GLUCOSE SERPL-MCNC: 92 MG/DL (ref 65–99)
HCT VFR BLD AUTO: 51.5 % (ref 42–52)
HGB BLD-MCNC: 17 G/DL (ref 14–18)
IMM GRANULOCYTES # BLD AUTO: 0.03 K/UL (ref 0–0.11)
IMM GRANULOCYTES NFR BLD AUTO: 0.4 % (ref 0–0.9)
LYMPHOCYTES # BLD AUTO: 2.2 K/UL (ref 1–4.8)
LYMPHOCYTES NFR BLD: 29.6 % (ref 22–41)
MCH RBC QN AUTO: 29.7 PG (ref 27–33)
MCHC RBC AUTO-ENTMCNC: 33 G/DL (ref 33.7–35.3)
MCV RBC AUTO: 90 FL (ref 81.4–97.8)
MONOCYTES # BLD AUTO: 0.62 K/UL (ref 0–0.85)
MONOCYTES NFR BLD AUTO: 8.3 % (ref 0–13.4)
NEUTROPHILS # BLD AUTO: 4.36 K/UL (ref 1.82–7.42)
NEUTROPHILS NFR BLD: 58.8 % (ref 44–72)
NRBC # BLD AUTO: 0 K/UL
NRBC BLD-RTO: 0 /100 WBC
PLATELET # BLD AUTO: 220 K/UL (ref 164–446)
PMV BLD AUTO: 11.7 FL (ref 9–12.9)
POTASSIUM SERPL-SCNC: 4.3 MMOL/L (ref 3.6–5.5)
PROT SERPL-MCNC: 7.4 G/DL (ref 6–8.2)
RBC # BLD AUTO: 5.72 M/UL (ref 4.7–6.1)
SODIUM SERPL-SCNC: 139 MMOL/L (ref 135–145)
TSH SERPL DL<=0.005 MIU/L-ACNC: 2.84 UIU/ML (ref 0.38–5.33)
WBC # BLD AUTO: 7.4 K/UL (ref 4.8–10.8)

## 2020-04-28 PROCEDURE — 80053 COMPREHEN METABOLIC PANEL: CPT

## 2020-04-28 PROCEDURE — 84443 ASSAY THYROID STIM HORMONE: CPT

## 2020-04-28 PROCEDURE — 36415 COLL VENOUS BLD VENIPUNCTURE: CPT

## 2020-04-28 PROCEDURE — 85025 COMPLETE CBC W/AUTO DIFF WBC: CPT

## 2020-05-04 ENCOUNTER — HOSPITAL ENCOUNTER (EMERGENCY)
Facility: MEDICAL CENTER | Age: 75
End: 2020-05-04
Attending: EMERGENCY MEDICINE
Payer: MEDICARE

## 2020-05-04 ENCOUNTER — TELEMEDICINE (OUTPATIENT)
Dept: CARDIOLOGY | Facility: MEDICAL CENTER | Age: 75
End: 2020-05-04
Payer: MEDICARE

## 2020-05-04 ENCOUNTER — APPOINTMENT (OUTPATIENT)
Dept: RADIOLOGY | Facility: MEDICAL CENTER | Age: 75
End: 2020-05-04
Attending: EMERGENCY MEDICINE
Payer: MEDICARE

## 2020-05-04 VITALS
SYSTOLIC BLOOD PRESSURE: 140 MMHG | BODY MASS INDEX: 26.04 KG/M2 | HEART RATE: 39 BPM | DIASTOLIC BLOOD PRESSURE: 68 MMHG | HEIGHT: 71 IN | WEIGHT: 186 LBS

## 2020-05-04 VITALS
TEMPERATURE: 97.2 F | OXYGEN SATURATION: 96 % | WEIGHT: 186 LBS | BODY MASS INDEX: 25.19 KG/M2 | HEART RATE: 73 BPM | RESPIRATION RATE: 17 BRPM | DIASTOLIC BLOOD PRESSURE: 68 MMHG | SYSTOLIC BLOOD PRESSURE: 111 MMHG | HEIGHT: 72 IN

## 2020-05-04 DIAGNOSIS — I49.8 VENTRICULAR BIGEMINY: ICD-10-CM

## 2020-05-04 DIAGNOSIS — R00.1 SYMPTOMATIC BRADYCARDIA: ICD-10-CM

## 2020-05-04 DIAGNOSIS — R00.1 BRADYCARDIA: ICD-10-CM

## 2020-05-04 PROBLEM — R97.20 RAISED PROSTATE SPECIFIC ANTIGEN: Status: ACTIVE | Noted: 2020-02-12

## 2020-05-04 LAB
ALBUMIN SERPL BCP-MCNC: 4.2 G/DL (ref 3.2–4.9)
ALBUMIN/GLOB SERPL: 1.5 G/DL
ALP SERPL-CCNC: 69 U/L (ref 30–99)
ALT SERPL-CCNC: 17 U/L (ref 2–50)
ANION GAP SERPL CALC-SCNC: 13 MMOL/L (ref 7–16)
AST SERPL-CCNC: 25 U/L (ref 12–45)
BASOPHILS # BLD AUTO: 0.6 % (ref 0–1.8)
BASOPHILS # BLD: 0.05 K/UL (ref 0–0.12)
BILIRUB SERPL-MCNC: 0.7 MG/DL (ref 0.1–1.5)
BUN SERPL-MCNC: 27 MG/DL (ref 8–22)
CALCIUM SERPL-MCNC: 9.3 MG/DL (ref 8.5–10.5)
CHLORIDE SERPL-SCNC: 101 MMOL/L (ref 96–112)
CO2 SERPL-SCNC: 21 MMOL/L (ref 20–33)
CREAT SERPL-MCNC: 1.16 MG/DL (ref 0.5–1.4)
EKG IMPRESSION: NORMAL
EOSINOPHIL # BLD AUTO: 0.16 K/UL (ref 0–0.51)
EOSINOPHIL NFR BLD: 2 % (ref 0–6.9)
ERYTHROCYTE [DISTWIDTH] IN BLOOD BY AUTOMATED COUNT: 45.6 FL (ref 35.9–50)
GLOBULIN SER CALC-MCNC: 2.8 G/DL (ref 1.9–3.5)
GLUCOSE SERPL-MCNC: 102 MG/DL (ref 65–99)
HCT VFR BLD AUTO: 49.1 % (ref 42–52)
HGB BLD-MCNC: 16.7 G/DL (ref 14–18)
IMM GRANULOCYTES # BLD AUTO: 0.02 K/UL (ref 0–0.11)
IMM GRANULOCYTES NFR BLD AUTO: 0.2 % (ref 0–0.9)
LYMPHOCYTES # BLD AUTO: 2.17 K/UL (ref 1–4.8)
LYMPHOCYTES NFR BLD: 26.9 % (ref 22–41)
MCH RBC QN AUTO: 30.6 PG (ref 27–33)
MCHC RBC AUTO-ENTMCNC: 34 G/DL (ref 33.7–35.3)
MCV RBC AUTO: 89.9 FL (ref 81.4–97.8)
MONOCYTES # BLD AUTO: 0.7 K/UL (ref 0–0.85)
MONOCYTES NFR BLD AUTO: 8.7 % (ref 0–13.4)
NEUTROPHILS # BLD AUTO: 4.96 K/UL (ref 1.82–7.42)
NEUTROPHILS NFR BLD: 61.6 % (ref 44–72)
NRBC # BLD AUTO: 0 K/UL
NRBC BLD-RTO: 0 /100 WBC
PLATELET # BLD AUTO: 201 K/UL (ref 164–446)
PMV BLD AUTO: 11 FL (ref 9–12.9)
POTASSIUM SERPL-SCNC: 4.1 MMOL/L (ref 3.6–5.5)
PROT SERPL-MCNC: 7 G/DL (ref 6–8.2)
RBC # BLD AUTO: 5.46 M/UL (ref 4.7–6.1)
SODIUM SERPL-SCNC: 135 MMOL/L (ref 135–145)
T4 FREE SERPL-MCNC: 1.01 NG/DL (ref 0.93–1.7)
TROPONIN T SERPL-MCNC: 11 NG/L (ref 6–19)
TSH SERPL DL<=0.005 MIU/L-ACNC: 2.53 UIU/ML (ref 0.38–5.33)
WBC # BLD AUTO: 8.1 K/UL (ref 4.8–10.8)

## 2020-05-04 PROCEDURE — 80053 COMPREHEN METABOLIC PANEL: CPT

## 2020-05-04 PROCEDURE — 93005 ELECTROCARDIOGRAM TRACING: CPT | Performed by: EMERGENCY MEDICINE

## 2020-05-04 PROCEDURE — 84443 ASSAY THYROID STIM HORMONE: CPT

## 2020-05-04 PROCEDURE — 99284 EMERGENCY DEPT VISIT MOD MDM: CPT | Performed by: INTERNAL MEDICINE

## 2020-05-04 PROCEDURE — 99285 EMERGENCY DEPT VISIT HI MDM: CPT

## 2020-05-04 PROCEDURE — 93005 ELECTROCARDIOGRAM TRACING: CPT

## 2020-05-04 PROCEDURE — 99214 OFFICE O/P EST MOD 30 MIN: CPT | Mod: 25,95,CR | Performed by: NURSE PRACTITIONER

## 2020-05-04 PROCEDURE — 71045 X-RAY EXAM CHEST 1 VIEW: CPT

## 2020-05-04 PROCEDURE — 84439 ASSAY OF FREE THYROXINE: CPT

## 2020-05-04 PROCEDURE — 84484 ASSAY OF TROPONIN QUANT: CPT

## 2020-05-04 PROCEDURE — 85025 COMPLETE CBC W/AUTO DIFF WBC: CPT

## 2020-05-04 PROCEDURE — 94760 N-INVAS EAR/PLS OXIMETRY 1: CPT

## 2020-05-04 RX ORDER — ASCORBIC ACID 500 MG
1000 TABLET ORAL DAILY
COMMUNITY
End: 2022-01-06

## 2020-05-04 RX ORDER — TAMSULOSIN HYDROCHLORIDE 0.4 MG/1
0.4 CAPSULE ORAL DAILY
COMMUNITY
Start: 2020-04-04 | End: 2022-10-24

## 2020-05-04 RX ORDER — AMOXICILLIN 500 MG
1200 CAPSULE ORAL DAILY
Status: ON HOLD | COMMUNITY
End: 2023-07-12

## 2020-05-04 RX ORDER — CELECOXIB 200 MG/1
200 CAPSULE ORAL DAILY
COMMUNITY
Start: 2020-04-25 | End: 2020-07-20

## 2020-05-04 ASSESSMENT — ENCOUNTER SYMPTOMS
DIZZINESS: 1
NERVOUS/ANXIOUS: 1
PND: 1
SHORTNESS OF BREATH: 1
ORTHOPNEA: 1
COUGH: 0
FEVER: 0
ABDOMINAL PAIN: 0
MYALGIAS: 0
PALPITATIONS: 0
CLAUDICATION: 0

## 2020-05-04 ASSESSMENT — FIBROSIS 4 INDEX
FIB4 SCORE: 2.06
FIB4 SCORE: 2.06

## 2020-05-04 NOTE — PROGRESS NOTES
Chief Complaint   Patient presents with   • Hypertension       Subjective:   This encounter was conducted via [employer] .   Verbal consent was obtained. Patient's identity was verified.    Jenny Avila is a 75 y.o. male who is following up on symptoms of dizziness and bradycardia.    He is a patient of Dr. Perez.  He was last seen in clinic 6/25/2019.    Patient was scheduled for an appointment today after he sent a message through his [employer]t reporting dizziness/lightheadedness.  Patient reports he woke up at 3 AM gasping for air and then went to sleep in his recliner.  He is anxious.  He is also been shortness of breath with activities.    Patient states over the past 2 weeks he has noticed a low heart rate in the 30s to 40s.  He has not had any symptoms until last night.    He had an EKG performed by his provider and also with Dr. Quiroz last week.    Patient is concerned.    He is not taking any beta-blockers.    He is taking losartan-order chlorothiazide to treat his hypertension, Flomax and Celebrex.  He is also taking vitamins.    His home blood pressures have primarily been ranging from the 130s to 150s, systolic.    Additonally, patient has the following medical problems:    -Mitral valve prolapse    -Hypertension    -Hyperlipidemia: Only taking fish oil    Past Medical History:   Diagnosis Date   • Dyslipidemia 12/14/2018   • Essential hypertension 9/12/2013   • Mitral valve prolapse 9/12/2013   • Non-rheumatic mitral regurgitation 9/12/2013     History reviewed. No pertinent surgical history.  Family History   Problem Relation Age of Onset   • Heart Disease Neg Hx      Social History     Socioeconomic History   • Marital status:      Spouse name: Not on file   • Number of children: Not on file   • Years of education: Not on file   • Highest education level: Not on file   Occupational History   • Not on file   Social Needs   • Financial resource strain: Not on file   • Food insecurity     Worry:  Not on file     Inability: Not on file   • Transportation needs     Medical: Not on file     Non-medical: Not on file   Tobacco Use   • Smoking status: Former Smoker     Last attempt to quit: 1963     Years since quittin.6   • Smokeless tobacco: Never Used   Substance and Sexual Activity   • Alcohol use: Yes     Comment: 8 oz of beer two to three time a week   • Drug use: No   • Sexual activity: Not on file     Comment:    Lifestyle   • Physical activity     Days per week: Not on file     Minutes per session: Not on file   • Stress: Not on file   Relationships   • Social connections     Talks on phone: Not on file     Gets together: Not on file     Attends [employer] service: Not on file     Active member of club or organization: Not on file     Attends meetings of clubs or organizations: Not on file     Relationship status: Not on file   • Intimate partner violence     Fear of current or ex partner: Not on file     Emotionally abused: Not on file     Physically abused: Not on file     Forced sexual activity: Not on file   Other Topics Concern   • Not on file   Social History Narrative   • Not on file     No Known Allergies  Outpatient Encounter Medications as of 2020   Medication Sig Dispense Refill   • celecoxib (CELEBREX) 200 MG Cap      • tamsulosin (FLOMAX) 0.4 MG capsule      • Omega-3 Fatty Acids (FISH OIL) 1000 MG Cap capsule Take 1,000 mg by mouth every day.     • ascorbic acid (ASCORBIC ACID) 500 MG Tab Take 500 mg by mouth every day.     • Multiple Vitamin (MULTI VITAMIN DAILY PO) Take 1 Tab by mouth every day.     • losartan-hydrochlorothiazide (HYZAAR) 100-12.5 MG per tablet TAKE ONE TABLET BY MOUTH EVERY  Tab 3   • [DISCONTINUED] hydroCHLOROthiazide (HYDRODIURIL) 12.5 MG tablet TAKE ONE TABLET BY MOUTH EVERY DAY (Patient not taking: Reported on 2020) 100 Tab 2     No facility-administered encounter medications on file as of 2020.      Review of Systems  "  Constitutional: Negative for fever and malaise/fatigue.   Respiratory: Positive for shortness of breath. Negative for cough.    Cardiovascular: Positive for orthopnea (Laid in Recliner after wakign up at 3 AM today) and PND. Negative for chest pain, palpitations, claudication and leg swelling.   Gastrointestinal: Negative for abdominal pain.   Musculoskeletal: Negative for myalgias.   Neurological: Positive for dizziness.   Psychiatric/Behavioral: The patient is nervous/anxious.    All other systems reviewed and are negative.       Objective:   /68 (BP Location: Left arm, Patient Position: Sitting, BP Cuff Size: Adult)   Pulse (!) 39   Ht 1.803 m (5' 11\")   Wt 84.4 kg (186 lb)   BMI 25.94 kg/m²     Physical Exam   Constitutional: He is oriented to person, place, and time. He appears well-developed and well-nourished.   HENT:   Head: Normocephalic and atraumatic.   Eyes: EOM and lids are normal.   Neck: Normal range of motion.   Pulmonary/Chest: Effort normal.   Neurological: He is oriented to person, place, and time.   Skin: Skin is intact.   Psychiatric: He has a normal mood and affect. His speech is normal and behavior is normal. Judgment and thought content normal. Cognition and memory are normal.       Assessment:     1. Symptomatic bradycardia         Medical Decision Making:  Today's Assessment / Status / Plan:   1.  Bradycardia:  -Patient is having symptoms.  After talking the patient, recommend going to the emergency room for further evaluation.  Concern for symptomatic bradycardia or possible block.  Discussed the possibility of needing a pacemaker.    Patient to be driven to that his wife.    Patient encouraged to follow-up in clinic after discharge.    FU in clinic in 1 week after hospitalization/ER visit. Sooner if needed.    Patient verbalizes understanding and agrees with the plan of care.     Collaborating MD: Edgar Perez MD    "

## 2020-05-04 NOTE — ED PROVIDER NOTES
ED Provider Note    CHIEF COMPLAINT  Chief Complaint   Patient presents with   • Bradycardia       HPI  Selma Avila is a 75 y.o. male who presents complaining of symptomatic bradycardia that has been intermittent for the last few weeks but only symptomatic today.  The patient states that he has had heart rates down into the 30s in the past.  He is a patient of Dr. Duran.  He denies any chest pain or shortness of breath but states that today he noticed his pulse was below and he was dizzy and weak.  He denies any fevers chills sore throat cough or cold symptoms.  He denies any leg swelling PND orthopnea.  He is chest pain-free currently and mentating well.    REVIEW OF SYSTEMS  HEENT:  No ear pain, congestion or sore throat   EYES: no discharge redness or vision changes  CARDIAC: no chest pain, palpitations    PULMONARY: Positive lightheadedness with mild shortness of breath no coughing   GI: no vomiting diarrhea or abdominal pain   : no dysuria, back pain or hematuria   Neuro: positive generalized weakness, numbness aphasia or headache  Musculoskeletal: no swelling deformity or pain no joint swelling  Endocrine: no fevers, sweating, weight loss   SKIN: no rash, erythema or contusions     See history of present illness all other systems are negative      PAST MEDICAL HISTORY  Past Medical History:   Diagnosis Date   • Dyslipidemia 12/14/2018   • Mitral valve prolapse 9/12/2013   • Non-rheumatic mitral regurgitation 9/12/2013   • Essential hypertension 9/12/2013       FAMILY HISTORY  Family History   Problem Relation Age of Onset   • Heart Disease Neg Hx        SOCIAL HISTORY  Social History     Socioeconomic History   • Marital status:      Spouse name: Not on file   • Number of children: Not on file   • Years of education: Not on file   • Highest education level: Not on file   Occupational History   • Not on file   Social Needs   • Financial resource strain: Not on file   • Food insecurity     Worry:  Not on file     Inability: Not on file   • Transportation needs     Medical: Not on file     Non-medical: Not on file   Tobacco Use   • Smoking status: Former Smoker     Last attempt to quit: 1963     Years since quittin.6   • Smokeless tobacco: Never Used   Substance and Sexual Activity   • Alcohol use: Not Currently   • Drug use: No   • Sexual activity: Not on file     Comment:    Lifestyle   • Physical activity     Days per week: Not on file     Minutes per session: Not on file   • Stress: Not on file   Relationships   • Social connections     Talks on phone: Not on file     Gets together: Not on file     Attends [employer] service: Not on file     Active member of club or organization: Not on file     Attends meetings of clubs or organizations: Not on file     Relationship status: Not on file   • Intimate partner violence     Fear of current or ex partner: Not on file     Emotionally abused: Not on file     Physically abused: Not on file     Forced sexual activity: Not on file   Other Topics Concern   • Not on file   Social History Narrative   • Not on file       SURGICAL HISTORY  No past surgical history on file.    CURRENT MEDICATIONS  Home Medications     Reviewed by Yesenia Woo (Pharmacy Tech) on 20 at 1643  Med List Status: Complete   Medication Last Dose Status   ascorbic acid (ASCORBIC ACID) 500 MG Tab UNK Active   celecoxib (CELEBREX) 200 MG Cap UNK Active   losartan-hydrochlorothiazide (HYZAAR) 100-12.5 MG per tablet UNK Active   Multiple Vitamin (MULTI VITAMIN DAILY PO) UNK Active   Omega-3 Fatty Acids (FISH OIL) 1000 MG Cap capsule UNK Active   tamsulosin (FLOMAX) 0.4 MG capsule UNK Active                ALLERGIES  No Known Allergies    PHYSICAL EXAM  VITAL SIGNS: /64   Pulse (!) 35   Temp 36.2 °C (97.2 °F) (Temporal)   Resp 14   Ht 1.829 m (6')   Wt 84.4 kg (186 lb)   SpO2 94%   BMI 25.23 kg/m²       Constitutional: Well developed, Well nourished, No acute  distress, Non-toxic appearance.   HENT: Normocephalic, Atraumatic, Bilateral external ears normal, Oropharynx moist, No oral exudates, Nose normal.   Eyes: PERRLA, EOMI, Conjunctiva normal, No discharge.   Neck: Normal range of motion, No tenderness, Supple, No stridor.   Cardiovascular: Bradycardic no murmurs rubs or gallops  Thorax & Lungs: There to auscultation bilaterally without wheezes rales or rhonchi  Abdomen: Bowel sounds normal, Soft, No tenderness, No masses, No pulsatile masses.   Skin: Warm, Dry, No erythema, No rash.   Back: No tenderness, No CVA tenderness.   Extremities: Intact distal pulses, No tenderness, No cyanosis, No clubbing.  Gait of edema negative cording negative Homans sign  Neurologic: Alert & oriented x 3, Normal motor function, Normal sensory function, No focal deficits noted.         RADIOLOGY/PROCEDURES  DX-CHEST-PORTABLE (1 VIEW)   Final Result      No acute cardiopulmonary abnormality.            COURSE & MEDICAL DECISION MAKING  Pertinent Labs & Imaging studies reviewed. (See chart for details)  Differential diagnosis: Symptomatic bradycardia, cardiac ischemia, arrhythmia, bundle branch block, heart block    Results for orders placed or performed during the hospital encounter of 05/04/20   CBC with Differential   Result Value Ref Range    WBC 8.1 4.8 - 10.8 K/uL    RBC 5.46 4.70 - 6.10 M/uL    Hemoglobin 16.7 14.0 - 18.0 g/dL    Hematocrit 49.1 42.0 - 52.0 %    MCV 89.9 81.4 - 97.8 fL    MCH 30.6 27.0 - 33.0 pg    MCHC 34.0 33.7 - 35.3 g/dL    RDW 45.6 35.9 - 50.0 fL    Platelet Count 201 164 - 446 K/uL    MPV 11.0 9.0 - 12.9 fL    Neutrophils-Polys 61.60 44.00 - 72.00 %    Lymphocytes 26.90 22.00 - 41.00 %    Monocytes 8.70 0.00 - 13.40 %    Eosinophils 2.00 0.00 - 6.90 %    Basophils 0.60 0.00 - 1.80 %    Immature Granulocytes 0.20 0.00 - 0.90 %    Nucleated RBC 0.00 /100 WBC    Neutrophils (Absolute) 4.96 1.82 - 7.42 K/uL    Lymphs (Absolute) 2.17 1.00 - 4.80 K/uL    Monos  (Absolute) 0.70 0.00 - 0.85 K/uL    Eos (Absolute) 0.16 0.00 - 0.51 K/uL    Baso (Absolute) 0.05 0.00 - 0.12 K/uL    Immature Granulocytes (abs) 0.02 0.00 - 0.11 K/uL    NRBC (Absolute) 0.00 K/uL   Complete Metabolic Panel (CMP)   Result Value Ref Range    Sodium 135 135 - 145 mmol/L    Potassium 4.1 3.6 - 5.5 mmol/L    Chloride 101 96 - 112 mmol/L    Co2 21 20 - 33 mmol/L    Anion Gap 13.0 7.0 - 16.0    Glucose 102 (H) 65 - 99 mg/dL    Bun 27 (H) 8 - 22 mg/dL    Creatinine 1.16 0.50 - 1.40 mg/dL    Calcium 9.3 8.5 - 10.5 mg/dL    AST(SGOT) 25 12 - 45 U/L    ALT(SGPT) 17 2 - 50 U/L    Alkaline Phosphatase 69 30 - 99 U/L    Total Bilirubin 0.7 0.1 - 1.5 mg/dL    Albumin 4.2 3.2 - 4.9 g/dL    Total Protein 7.0 6.0 - 8.2 g/dL    Globulin 2.8 1.9 - 3.5 g/dL    A-G Ratio 1.5 g/dL   Troponin STAT   Result Value Ref Range    Troponin T 11 6 - 19 ng/L   TSH (for screening thyroid dysfunction)   Result Value Ref Range    TSH 2.530 0.380 - 5.330 uIU/mL   Free Thyroxine (add to TSH for patients with existing thyroid dysfunction)   Result Value Ref Range    Free T-4 1.01 0.93 - 1.70 ng/dL   ESTIMATED GFR   Result Value Ref Range    GFR If African American >60 >60 mL/min/1.73 m 2    GFR If Non African American >60 >60 mL/min/1.73 m 2   EKG (NOW)   Result Value Ref Range    Report       [facility]    Test Date:  2020  Pt Name:    MILLA HAMILTON                  Department: ER  MRN:        [id number]                      Room:  Gender:     Male                         Technician: [id number]  :        [id number]                   Requested By:ER TRIAGE PROTOCOL  Order #:    [id number]                    Reading MD: LAVINIA FOSTER MD    Measurements  Intervals                                Axis  Rate:       87                           P:          46  MD:         201                          QRS:        24  QRSD:       110                          T:          43  QT:         435  QTc:         524    Interpretive Statements  Sinus rhythm  Ventricular bigeminy  Probable left atrial enlargement  Compared to ECG 08/11/2017 11:21:48  Intraventricular conduction delay no longer present  Electronically Signed On 5-4-2020 15:37:36 PDT by LAVINIA FOSTER MD          Patient presents with ventricular bigeminy and bradycardia that is symptomatic with dizziness lightheadedness and some shortness of breath.  Currently on recheck he is resting comfortably with a heart rate at 64 and he is not in any respiratory distress.  I spoke with cardiology Dr. Carvajal who will consult on the patient's case.    4:40 PM Dr. Carvajal saw the patient and since he is asymptomatic now he states the patient can follow-up as an outpatient.  He is to return for any dizziness lightheadedness or worsening symptoms.    The patient will return for new or worsening symptoms and is stable at the time of discharge.    The patient is referred to a primary physician for blood pressure management, diabetic screening, and for all other preventative health concerns.      DISPOSITION:  Patient will be discharged home in stable condition.    FOLLOW UP:  Edgar Perez M.D.  [address] [address]  [phone]    In 2 days  for recheck      OUTPATIENT MEDICATIONS:  New Prescriptions    No medications on file         FINAL IMPRESSION  1. Bradycardia     2. Ventricular bigeminy             Electronically signed by: Lavinia Foster M.D., 5/4/2020 3:14 PM

## 2020-05-04 NOTE — ED TRIAGE NOTES
Selma Avila  75 y.o.  Chief Complaint   Patient presents with   • Bradycardia     Mask applied to patient prior to triage.    EKG completed in triage per protocol.    Ambulatory to triage with steady gait. A & O x 4, GCS 15.    States that he has been asymptomatically bradycardic in the 30's x 2 weeks. Today woke from sleep feeling unable to catch his breath, with accompanying dizziness and lightheadedness. Denies chest pain. Has not improved. Did Tele med consult with his Cardiologist who told him to present to the ED.    Cardiologist: Dr. Perez.    Charge MICHELL Willams notified of patient. Patient directly from triage to Red 4.

## 2020-05-05 ENCOUNTER — TELEPHONE (OUTPATIENT)
Dept: CARDIOLOGY | Facility: MEDICAL CENTER | Age: 75
End: 2020-05-05

## 2020-05-05 DIAGNOSIS — R06.02 SOB (SHORTNESS OF BREATH): ICD-10-CM

## 2020-05-05 NOTE — TELEPHONE ENCOUNTER
----- Message from Marcia Mcbride R.N. sent at 5/5/2020  8:51 AM PDT -----    ----- Message -----  From: Morgan Carvajal M.D.  Sent: 5/4/2020   4:43 PM PDT  To: CHUCK Jones, #    I was able to see this patient in ER. His bradycardia is due to bigeminy. He also is experiencing shortness of breath. He was offered admission and evaluation with echocardiogram however, he declined the suggestion. He wishes to obtain an echocardiogram as outpatient and follow up  p with Dr. Perez. Please arrange for this.    Thanks.  RAMONA

## 2020-05-05 NOTE — CONSULTS
DATE OF SERVICE:  05/04/2020    CARDIOLOGY CONSULT    REFERRING PHYSICIAN:  Roseanne Leggett MD    CHIEF COMPLAINT:  1.  Bradycardia.  2.  Shortness of breath.    HISTORY OF PRESENT ILLNESS:  The patient is a 75-year-old male with   past medical history significant for mitral valve prolapse, hypertension and   hyperlipidemia.  He is followed in our office by Dr. Edgar Perez.  He   was well until 2 weeks ago when he began to experience shortness of breath,   dizziness with heart rate in the 30-40 beats per minute range.  He was seen   via teleconference with Ms. Soni Deanner today, and due to his bradycardia,   he was recommended to be evaluated at the emergency room.  He is clinically   doing well at this time without any complaints.    PAST MEDICAL ILLNESS:  ALLERGIES:  No known drug allergies.    MEDICATIONS:  Celebrex 200 mg daily, Flomax 0.4 mg daily, Hyzaar 100/12.5 mg   daily, omega-3 fatty acids.    PAST MEDICAL ILLNESS:  As above plus BPH.    PAST SURGICAL HISTORY:  As above.    SOCIAL HISTORY:  He is , previous tobacco abuse.    FAMILY HISTORY:  Negative for heart disease.    REVIEW OF SYSTEMS:  Positive for shortness of breath, dizziness and low heart   rate.  Negative for fever, chills, productive cough.  (ROS is limited due to COVID precautions)    PHYSICAL EXAMINATION:  Includes:  VITAL SIGNS:  Pulse 75 beats per minute, respiration 14 breaths per minute, BP   138/64 mmHg, currently systolic in the 128 range.  GENERAL:  A 75-year-old male in no acute distress.  HEENT:  Head normocephalic, atraumatic.  Eyes equal, oral moist.  NECK:  Supple.  EXTREMITIES:  Lower extremity, no edema.  NEUROLOGIC:  Alert and oriented x3.  PSYCHIATRIC:  No anxiety or depression.  SKIN:  Warm and dry.  (Phyical Examination is limited due to COVID precaution)    CARDIAC STUDY/PROCEDURE:      ECHOCARDIOGRAM CONCLUSIONS (12/03/18)  Mild concentric left ventricular hypertrophy.  Normal left ventricular systolic  function.  Left ventricular ejection fraction is visually estimated to be 65%.  Normal diastolic function.  The mitral valve is not well visualized in the parasternal long axis   view.  Grossly, there appears to be mild prolapse of the anterior   mitral leaflet. Mild to moderate mitral regurgitation.  (study result reviewed)    EKG performed on (05/04/20) was reviewed: EKG personally interpreted shows  sinus rhythm with ventricular bigeminy.    ASSESSMENT AND PLAN:  1.  Bigeminy:  The patient is a 75-year-old male with past medical history significant   for mitral valve prolapse with mild to moderatemitral regurgitation and hypertension.    He presents with complaints as described above.  His EKG and telemetry shows   bigeminy.  No further recommendations from bigeminy standpoint at this point. Of   Note, he is not bradycardic with heart rate in 70 to 80 bpm range.  2.  Shortness of breath with history of mitral valve prolapse and mitral regurgitation:    The patient was recommended to undergo repeat echocardiogram to reassess his   mitral valve prolapse and his mitral regurgitation; however, he wishes to have this   performed as an outpatient.  We will arrange for echocardiogram and follow up   with Dr. Perez.  3.  Hypertension:  His blood pressure was elevated upon presentation; however,  has normalized.    Thank you for this consult.       ____________________________________     MD ARNAUD LUNA / MARY ANN    DD:  05/04/2020 16:51:59  DT:  05/04/2020 22:10:02    D#:  [id number]  Job#:  [id number]

## 2020-05-05 NOTE — PROGRESS NOTES
Called patient and spoke with patients wife Ricardo. Given [phone] for scheduling and notified about f/u appt to call once echo appointment is made. She states understanding and states she will go get him now to schedule that.

## 2020-05-06 ENCOUNTER — HOSPITAL ENCOUNTER (OUTPATIENT)
Dept: CARDIOLOGY | Facility: MEDICAL CENTER | Age: 75
End: 2020-05-06
Attending: NURSE PRACTITIONER
Payer: MEDICARE

## 2020-05-06 DIAGNOSIS — R06.02 SOB (SHORTNESS OF BREATH): ICD-10-CM

## 2020-05-06 DIAGNOSIS — R00.1 SYMPTOMATIC BRADYCARDIA: ICD-10-CM

## 2020-05-06 LAB
LV EJECT FRACT  99904: 70
LV EJECT FRACT MOD 2C 99903: 63.35
LV EJECT FRACT MOD 4C 99902: 72.09
LV EJECT FRACT MOD BP 99901: 68.36

## 2020-05-06 PROCEDURE — 93306 TTE W/DOPPLER COMPLETE: CPT | Mod: 26 | Performed by: INTERNAL MEDICINE

## 2020-05-06 PROCEDURE — 93306 TTE W/DOPPLER COMPLETE: CPT

## 2020-05-15 ENCOUNTER — NON-PROVIDER VISIT (OUTPATIENT)
Dept: CARDIOLOGY | Facility: MEDICAL CENTER | Age: 75
End: 2020-05-15
Attending: INTERNAL MEDICINE
Payer: MEDICARE

## 2020-05-15 ENCOUNTER — TELEPHONE (OUTPATIENT)
Dept: CARDIOLOGY | Facility: MEDICAL CENTER | Age: 75
End: 2020-05-15

## 2020-05-15 DIAGNOSIS — I49.3 PVC'S (PREMATURE VENTRICULAR CONTRACTIONS): ICD-10-CM

## 2020-05-15 DIAGNOSIS — R00.1 SYMPTOMATIC BRADYCARDIA: ICD-10-CM

## 2020-05-15 NOTE — TELEPHONE ENCOUNTER
Home enrollment completed for the 14 day  Zio patch program per Dr. Perez.  Monitor to be mailed to patient by [employer].    >Currently pending EOS.

## 2020-06-01 ENCOUNTER — HOSPITAL ENCOUNTER (OUTPATIENT)
Dept: LAB | Facility: MEDICAL CENTER | Age: 75
End: 2020-06-01
Attending: UROLOGY
Payer: MEDICARE

## 2020-06-01 LAB — PSA SERPL-MCNC: 9.52 NG/ML (ref 0–4)

## 2020-06-01 PROCEDURE — 84153 ASSAY OF PSA TOTAL: CPT

## 2020-06-01 PROCEDURE — 36415 COLL VENOUS BLD VENIPUNCTURE: CPT

## 2020-06-04 ENCOUNTER — TELEPHONE (OUTPATIENT)
Dept: CARDIOLOGY | Facility: MEDICAL CENTER | Age: 75
End: 2020-06-04

## 2020-06-04 NOTE — TELEPHONE ENCOUNTER
Linh calling from [facility]. Direct line # [phone] She will re-send surgical clearance request page. Pt is scheduled for surgery 6/12. She will fax to [phone]. We discussed what was received on 6/2. She sent 11 pages. 8 were received. [facility] notified of Dr. Perez's next day in office and states understanding.

## 2020-06-11 PROCEDURE — 0296T PR EXT ECG > 48HR TO 21 DAY RCRD W/CONECT INTL RCRD: CPT | Performed by: INTERNAL MEDICINE

## 2020-06-11 PROCEDURE — 0298T PR EXT ECG > 48HR TO 21 DAY REVIEW AND INTERPRETATN: CPT | Performed by: INTERNAL MEDICINE

## 2020-06-15 ENCOUNTER — OFFICE VISIT (OUTPATIENT)
Dept: CARDIOLOGY | Facility: MEDICAL CENTER | Age: 75
End: 2020-06-15
Payer: MEDICARE

## 2020-06-15 VITALS
WEIGHT: 185 LBS | HEART RATE: 42 BPM | DIASTOLIC BLOOD PRESSURE: 62 MMHG | SYSTOLIC BLOOD PRESSURE: 104 MMHG | BODY MASS INDEX: 25.06 KG/M2 | OXYGEN SATURATION: 96 % | HEIGHT: 72 IN | RESPIRATION RATE: 20 BRPM

## 2020-06-15 DIAGNOSIS — I49.8 BIGEMINY: ICD-10-CM

## 2020-06-15 DIAGNOSIS — I34.1 MITRAL VALVE PROLAPSE: ICD-10-CM

## 2020-06-15 DIAGNOSIS — R09.89 CHEST RALES: ICD-10-CM

## 2020-06-15 DIAGNOSIS — I10 ESSENTIAL HYPERTENSION: ICD-10-CM

## 2020-06-15 DIAGNOSIS — I34.0 NON-RHEUMATIC MITRAL REGURGITATION: ICD-10-CM

## 2020-06-15 DIAGNOSIS — E78.5 DYSLIPIDEMIA: ICD-10-CM

## 2020-06-15 PROCEDURE — 99214 OFFICE O/P EST MOD 30 MIN: CPT | Performed by: INTERNAL MEDICINE

## 2020-06-15 RX ORDER — FLECAINIDE ACETATE 100 MG/1
100 TABLET ORAL 2 TIMES DAILY
Qty: 60 TAB | Refills: 11 | Status: SHIPPED | OUTPATIENT
Start: 2020-06-15 | End: 2020-11-09 | Stop reason: SDUPTHER

## 2020-06-15 RX ORDER — LOSARTAN POTASSIUM 100 MG/1
100 TABLET ORAL DAILY
Qty: 30 TAB | Refills: 11 | Status: SHIPPED | OUTPATIENT
Start: 2020-06-15 | End: 2020-06-16 | Stop reason: SDUPTHER

## 2020-06-15 RX ORDER — CELECOXIB 200 MG/1
CAPSULE ORAL
COMMUNITY
Start: 2020-06-12 | End: 2021-01-26 | Stop reason: SDUPTHER

## 2020-06-15 ASSESSMENT — ENCOUNTER SYMPTOMS
WEAKNESS: 0
NEUROLOGICAL NEGATIVE: 1
SORE THROAT: 0
EYES NEGATIVE: 1
SHORTNESS OF BREATH: 0
PND: 0
CONSTITUTIONAL NEGATIVE: 1
CHILLS: 0
PALPITATIONS: 0
STRIDOR: 0
ORTHOPNEA: 0
SPUTUM PRODUCTION: 0
WHEEZING: 0
GASTROINTESTINAL NEGATIVE: 1
CLAUDICATION: 0
HEMOPTYSIS: 0
RESPIRATORY NEGATIVE: 1
COUGH: 0
MUSCULOSKELETAL NEGATIVE: 1
BRUISES/BLEEDS EASILY: 0
LOSS OF CONSCIOUSNESS: 0
FEVER: 0
DIZZINESS: 0
CARDIOVASCULAR NEGATIVE: 1

## 2020-06-15 ASSESSMENT — FIBROSIS 4 INDEX: FIB4 SCORE: 2.23

## 2020-06-15 NOTE — PROGRESS NOTES
"Chief Complaint   Patient presents with   • HTN (Controlled)   • Mitral Valve Prolapse   • Dyslipidemia       Subjective:   Jenny Avila is a 75 y.o. male who presents today as a follow-up for his dizziness lightheadedness and bigeminy.  He wore a event recorder that showed short nonsustained runs of SVT as well as bigeminy.  His symptoms are dizziness fatigue and shortness of breath but only while sitting or laying down.  He never gets symptoms when he is physically active.  He hikes daily with no function limitations.  He is looking forward to elk season because he got an LTAC.  He is concerned he will be able to do this.  His wife wants him to stop Celebrex because of her \"reported cardiac symptoms.  He does take losartan hydrochlorothiazide.  He is scheduled for an upcoming surgery.    Past Medical History:   Diagnosis Date   • Dyslipidemia 2018   • Essential hypertension 2013   • Mitral valve prolapse 2013   • Non-rheumatic mitral regurgitation 2013     No past surgical history on file.  Family History   Problem Relation Age of Onset   • Heart Disease Neg Hx      Social History     Socioeconomic History   • Marital status:      Spouse name: Not on file   • Number of children: Not on file   • Years of education: Not on file   • Highest education level: Not on file   Occupational History   • Not on file   Social Needs   • Financial resource strain: Not on file   • Food insecurity     Worry: Not on file     Inability: Not on file   • Transportation needs     Medical: Not on file     Non-medical: Not on file   Tobacco Use   • Smoking status: Former Smoker     Last attempt to quit: 1963     Years since quittin.7   • Smokeless tobacco: Never Used   Substance and Sexual Activity   • Alcohol use: Not Currently   • Drug use: No   • Sexual activity: Not on file     Comment:    Lifestyle   • Physical activity     Days per week: Not on file     Minutes per session: Not on file "   • Stress: Not on file   Relationships   • Social connections     Talks on phone: Not on file     Gets together: Not on file     Attends [employer] service: Not on file     Active member of club or organization: Not on file     Attends meetings of clubs or organizations: Not on file     Relationship status: Not on file   • Intimate partner violence     Fear of current or ex partner: Not on file     Emotionally abused: Not on file     Physically abused: Not on file     Forced sexual activity: Not on file   Other Topics Concern   • Not on file   Social History Narrative   • Not on file     No Known Allergies  Outpatient Encounter Medications as of 6/15/2020   Medication Sig Dispense Refill   • losartan (COZAAR) 100 MG Tab Take 1 Tab by mouth every day. 30 Tab 11   • flecainide (TAMBOCOR) 100 MG Tab Take 1 Tab by mouth 2 times a day. 60 Tab 11   • tamsulosin (FLOMAX) 0.4 MG capsule      • Omega-3 Fatty Acids (FISH OIL) 1000 MG Cap capsule Take 1,000 mg by mouth every day.     • ascorbic acid (ASCORBIC ACID) 500 MG Tab Take 500 mg by mouth every day.     • Multiple Vitamin (MULTI VITAMIN DAILY PO) Take 1 Tab by mouth every day.     • celecoxib (CELEBREX) 200 MG Cap      • celecoxib (CELEBREX) 200 MG Cap Take 200 mg by mouth every day.     • [DISCONTINUED] losartan-hydrochlorothiazide (HYZAAR) 100-12.5 MG per tablet TAKE ONE TABLET BY MOUTH EVERY  Tab 3     No facility-administered encounter medications on file as of 6/15/2020.      Review of Systems   Constitutional: Negative.  Negative for chills, fever and malaise/fatigue.   HENT: Negative.  Negative for sore throat.    Eyes: Negative.    Respiratory: Negative.  Negative for cough, hemoptysis, sputum production, shortness of breath, wheezing and stridor.    Cardiovascular: Negative.  Negative for chest pain, palpitations, orthopnea, claudication, leg swelling and PND.   Gastrointestinal: Negative.    Genitourinary: Negative.    Musculoskeletal: Negative.     Skin: Negative.    Neurological: Negative.  Negative for dizziness, loss of consciousness and weakness.   Endo/Heme/Allergies: Negative.  Does not bruise/bleed easily.   All other systems reviewed and are negative.       Objective:   /62 (BP Location: Left arm, Patient Position: Sitting, BP Cuff Size: Adult)   Pulse (!) 42   Resp 20   Ht 1.829 m (6')   Wt 83.9 kg (185 lb)   SpO2 96%   BMI 25.09 kg/m²     Physical Exam   Constitutional: He appears well-developed and well-nourished. No distress.   HENT:   Head: Normocephalic and atraumatic.   Right Ear: External ear normal.   Left Ear: External ear normal.   Nose: Nose normal.   Mouth/Throat: No oropharyngeal exudate.   Eyes: Pupils are equal, round, and reactive to light. Conjunctivae and EOM are normal. Right eye exhibits no discharge. Left eye exhibits no discharge. No scleral icterus.   Neck: Neck supple. No JVD present.   Cardiovascular: Normal rate, regular rhythm and intact distal pulses. Exam reveals no gallop and no friction rub.   No murmur heard.  Pulmonary/Chest: Effort normal. No stridor. No respiratory distress. He has no wheezes. He has no rales. He exhibits no tenderness.   Abdominal: Soft. He exhibits no distension. There is no guarding.   Musculoskeletal: Normal range of motion.         General: No tenderness, deformity or edema.   Neurological: He is alert. He has normal reflexes. He displays normal reflexes. No cranial nerve deficit. He exhibits normal muscle tone. Coordination normal.   Skin: Skin is warm and dry. No rash noted. He is not diaphoretic. No erythema. No pallor.   Psychiatric: He has a normal mood and affect. His behavior is normal. Judgment and thought content normal.   Nursing note and vitals reviewed.      Assessment:     1. Dyslipidemia  flecainide (TAMBOCOR) 100 MG Tab   2. Essential hypertension  losartan (COZAAR) 100 MG Tab    Comp Metabolic Panel    MAGNESIUM    flecainide (TAMBOCOR) 100 MG Tab   3. Mitral valve  prolapse  losartan (COZAAR) 100 MG Tab    Comp Metabolic Panel    MAGNESIUM   4. Non-rheumatic mitral regurgitation  losartan (COZAAR) 100 MG Tab    Comp Metabolic Panel   5. Bigeminy  Comp Metabolic Panel    MAGNESIUM   6. Chest rales         Medical Decision Making:  Today's Assessment / Status / Plan:     75-year-old male with preoperative stratification prior to a revision of his fistula.  For his bigeminy I will go and start him on flecainide 100 twice daily.  We will also stop his losartan hydrochlorothiazide using Aleve losartan.  I told him to stay hydrated.  We will check a panel of electrolytes to look for electrolyte abnormalities.  We will see him back in 4 weeks.  For preoperative stratification he is moderate risk and does not require any further testing.

## 2020-06-16 DIAGNOSIS — I34.1 MITRAL VALVE PROLAPSE: ICD-10-CM

## 2020-06-16 DIAGNOSIS — I10 ESSENTIAL HYPERTENSION: ICD-10-CM

## 2020-06-16 DIAGNOSIS — I34.0 NON-RHEUMATIC MITRAL REGURGITATION: ICD-10-CM

## 2020-06-17 ENCOUNTER — HOSPITAL ENCOUNTER (OUTPATIENT)
Dept: LAB | Facility: MEDICAL CENTER | Age: 75
End: 2020-06-17
Attending: INTERNAL MEDICINE
Payer: MEDICARE

## 2020-06-17 DIAGNOSIS — I49.8 BIGEMINY: ICD-10-CM

## 2020-06-17 DIAGNOSIS — I34.0 NON-RHEUMATIC MITRAL REGURGITATION: ICD-10-CM

## 2020-06-17 DIAGNOSIS — I10 ESSENTIAL HYPERTENSION: ICD-10-CM

## 2020-06-17 DIAGNOSIS — I34.1 MITRAL VALVE PROLAPSE: ICD-10-CM

## 2020-06-17 PROCEDURE — 36415 COLL VENOUS BLD VENIPUNCTURE: CPT

## 2020-06-17 PROCEDURE — 80053 COMPREHEN METABOLIC PANEL: CPT

## 2020-06-17 PROCEDURE — 83735 ASSAY OF MAGNESIUM: CPT

## 2020-06-18 ENCOUNTER — HOSPITAL ENCOUNTER (OUTPATIENT)
Dept: CARDIOLOGY | Facility: MEDICAL CENTER | Age: 75
End: 2020-06-18
Attending: PHYSICIAN ASSISTANT
Payer: MEDICARE

## 2020-06-18 DIAGNOSIS — Z01.810 PRE-OPERATIVE CARDIOVASCULAR EXAMINATION: ICD-10-CM

## 2020-06-18 LAB
ALBUMIN SERPL BCP-MCNC: 4.2 G/DL (ref 3.2–4.9)
ALBUMIN/GLOB SERPL: 1.5 G/DL
ALP SERPL-CCNC: 80 U/L (ref 30–99)
ALT SERPL-CCNC: 17 U/L (ref 2–50)
ANION GAP SERPL CALC-SCNC: 10 MMOL/L (ref 7–16)
AST SERPL-CCNC: 27 U/L (ref 12–45)
BILIRUB SERPL-MCNC: 0.6 MG/DL (ref 0.1–1.5)
BUN SERPL-MCNC: 20 MG/DL (ref 8–22)
CALCIUM SERPL-MCNC: 9.2 MG/DL (ref 8.5–10.5)
CHLORIDE SERPL-SCNC: 99 MMOL/L (ref 96–112)
CO2 SERPL-SCNC: 24 MMOL/L (ref 20–33)
CREAT SERPL-MCNC: 1.02 MG/DL (ref 0.5–1.4)
EKG IMPRESSION: NORMAL
GLOBULIN SER CALC-MCNC: 2.8 G/DL (ref 1.9–3.5)
GLUCOSE SERPL-MCNC: 107 MG/DL (ref 65–99)
MAGNESIUM SERPL-MCNC: 2.2 MG/DL (ref 1.5–2.5)
POTASSIUM SERPL-SCNC: 4.1 MMOL/L (ref 3.6–5.5)
PROT SERPL-MCNC: 7 G/DL (ref 6–8.2)
SODIUM SERPL-SCNC: 133 MMOL/L (ref 135–145)

## 2020-06-18 PROCEDURE — 93010 ELECTROCARDIOGRAM REPORT: CPT | Performed by: INTERNAL MEDICINE

## 2020-06-18 PROCEDURE — 93005 ELECTROCARDIOGRAM TRACING: CPT | Performed by: ANESTHESIOLOGY

## 2020-06-19 RX ORDER — LOSARTAN POTASSIUM 100 MG/1
100 TABLET ORAL DAILY
Qty: 100 TAB | Refills: 3 | Status: SHIPPED | OUTPATIENT
Start: 2020-06-19 | End: 2021-04-26 | Stop reason: SDUPTHER

## 2020-07-20 ENCOUNTER — OFFICE VISIT (OUTPATIENT)
Dept: CARDIOLOGY | Facility: MEDICAL CENTER | Age: 75
End: 2020-07-20
Payer: MEDICARE

## 2020-07-20 VITALS
OXYGEN SATURATION: 93 % | WEIGHT: 186 LBS | HEART RATE: 63 BPM | HEIGHT: 71 IN | SYSTOLIC BLOOD PRESSURE: 110 MMHG | DIASTOLIC BLOOD PRESSURE: 60 MMHG | BODY MASS INDEX: 26.04 KG/M2

## 2020-07-20 DIAGNOSIS — I10 ESSENTIAL HYPERTENSION: ICD-10-CM

## 2020-07-20 DIAGNOSIS — I49.8 BIGEMINY: ICD-10-CM

## 2020-07-20 DIAGNOSIS — I49.3 PVC'S (PREMATURE VENTRICULAR CONTRACTIONS): ICD-10-CM

## 2020-07-20 DIAGNOSIS — I34.0 NON-RHEUMATIC MITRAL REGURGITATION: ICD-10-CM

## 2020-07-20 DIAGNOSIS — I34.1 MITRAL VALVE PROLAPSE: ICD-10-CM

## 2020-07-20 DIAGNOSIS — Z79.899 HIGH RISK MEDICATION USE: ICD-10-CM

## 2020-07-20 DIAGNOSIS — E78.5 DYSLIPIDEMIA: ICD-10-CM

## 2020-07-20 LAB — EKG IMPRESSION: NORMAL

## 2020-07-20 PROCEDURE — 93000 ELECTROCARDIOGRAM COMPLETE: CPT | Performed by: INTERNAL MEDICINE

## 2020-07-20 PROCEDURE — 99214 OFFICE O/P EST MOD 30 MIN: CPT | Performed by: NURSE PRACTITIONER

## 2020-07-20 RX ORDER — DOCUSATE SODIUM 100 MG/1
200 CAPSULE, LIQUID FILLED ORAL DAILY
COMMUNITY
End: 2021-12-17

## 2020-07-20 ASSESSMENT — ENCOUNTER SYMPTOMS
DIZZINESS: 0
COUGH: 0
ABDOMINAL PAIN: 0
SHORTNESS OF BREATH: 0
PND: 0
MYALGIAS: 0
FEVER: 0
PALPITATIONS: 0
ORTHOPNEA: 0
CLAUDICATION: 0

## 2020-07-20 ASSESSMENT — FIBROSIS 4 INDEX: FIB4 SCORE: 2.41

## 2020-07-20 NOTE — PROGRESS NOTES
Chief Complaint   Patient presents with   • Dyslipidemia       Subjective:   Jenny Avila is a 75 y.o. male who presents today for follow after starting new medications and lab testing.    Patient of Dr. Perez. He was last seen in clinic on 6/15/2020. During that visit, he was started on flecainide 100 mg twice a day for his bigeminy, and losartan hydrochlorothiazide was discontinued and he was started on losartan alone.    With the medication adjustments, patient reports feeling much better.  He states he no longer has any shortness of breath, irregular rhythm, he has improved energy level.  He states he has been able to hike about 3 miles with a 35 pound backpack on.  He denies chest pain, palpitations, orthopnea, PND, edema or dizziness/lightheadedness as well.    Additonally, patient has the following medical problems:     -Mitral valve prolapse     -Hypertension     -Hyperlipidemia: Only taking fish oil    Past Medical History:   Diagnosis Date   • Dyslipidemia 2018   • Essential hypertension 2013   • Mitral valve prolapse 2013   • Non-rheumatic mitral regurgitation 2013     History reviewed. No pertinent surgical history.  Family History   Problem Relation Age of Onset   • Heart Disease Neg Hx      Social History     Socioeconomic History   • Marital status:      Spouse name: Not on file   • Number of children: Not on file   • Years of education: Not on file   • Highest education level: Not on file   Occupational History   • Not on file   Social Needs   • Financial resource strain: Not on file   • Food insecurity     Worry: Not on file     Inability: Not on file   • Transportation needs     Medical: Not on file     Non-medical: Not on file   Tobacco Use   • Smoking status: Former Smoker     Last attempt to quit: 1963     Years since quittin.8   • Smokeless tobacco: Never Used   Substance and Sexual Activity   • Alcohol use: Not Currently   • Drug use: No   • Sexual  activity: Not on file     Comment:    Lifestyle   • Physical activity     Days per week: Not on file     Minutes per session: Not on file   • Stress: Not on file   Relationships   • Social connections     Talks on phone: Not on file     Gets together: Not on file     Attends [employer] service: Not on file     Active member of club or organization: Not on file     Attends meetings of clubs or organizations: Not on file     Relationship status: Not on file   • Intimate partner violence     Fear of current or ex partner: Not on file     Emotionally abused: Not on file     Physically abused: Not on file     Forced sexual activity: Not on file   Other Topics Concern   • Not on file   Social History Narrative   • Not on file     No Known Allergies  Outpatient Encounter Medications as of 7/20/2020   Medication Sig Dispense Refill   • DOCUSATE SODIUM PO Take 200 mg by mouth every day.     • losartan (COZAAR) 100 MG Tab Take 1 Tab by mouth every day. 100 Tab 3   • celecoxib (CELEBREX) 200 MG Cap      • flecainide (TAMBOCOR) 100 MG Tab Take 1 Tab by mouth 2 times a day. 60 Tab 11   • tamsulosin (FLOMAX) 0.4 MG capsule      • Omega-3 Fatty Acids (FISH OIL) 1000 MG Cap capsule Take 1,000 mg by mouth every day.     • ascorbic acid (ASCORBIC ACID) 500 MG Tab Take 500 mg by mouth every day.     • Multiple Vitamin (MULTI VITAMIN DAILY PO) Take 1 Tab by mouth every day.     • [DISCONTINUED] celecoxib (CELEBREX) 200 MG Cap Take 200 mg by mouth every day.       No facility-administered encounter medications on file as of 7/20/2020.      Review of Systems   Constitutional: Negative for fever and malaise/fatigue.   Respiratory: Negative for cough and shortness of breath.    Cardiovascular: Negative for chest pain, palpitations, orthopnea, claudication, leg swelling and PND.   Gastrointestinal: Negative for abdominal pain.   Musculoskeletal: Negative for myalgias.   Neurological: Negative for dizziness.   All other systems  "reviewed and are negative.       Objective:   /60 (BP Location: Left arm, Patient Position: Sitting, BP Cuff Size: Adult)   Pulse 63   Ht 1.803 m (5' 11\")   Wt 84.4 kg (186 lb)   SpO2 93%   BMI 25.94 kg/m²     Physical Exam   Constitutional: He is oriented to person, place, and time. He appears well-developed and well-nourished.   HENT:   Head: Normocephalic and atraumatic.   Eyes: Pupils are equal, round, and reactive to light. EOM are normal.   Neck: Normal range of motion. Neck supple. No JVD present.   Cardiovascular: Normal rate, regular rhythm and normal heart sounds.   Pulmonary/Chest: Effort normal and breath sounds normal. No respiratory distress. He has no wheezes. He has no rales.   Abdominal: Soft. Bowel sounds are normal.   Musculoskeletal:         General: No edema.   Neurological: He is alert and oriented to person, place, and time.   Skin: Skin is warm and dry.   Psychiatric: He has a normal mood and affect. His behavior is normal.   Vitals reviewed.    Lab Results   Component Value Date/Time    CHOLSTRLTOT 157 01/16/2020 08:23 AM     (H) 01/16/2020 08:23 AM    HDL 45 01/16/2020 08:23 AM    TRIGLYCERIDE 53 01/16/2020 08:23 AM       Lab Results   Component Value Date/Time    SODIUM 133 (L) 06/17/2020 02:52 PM    POTASSIUM 4.1 06/17/2020 02:52 PM    CHLORIDE 99 06/17/2020 02:52 PM    CO2 24 06/17/2020 02:52 PM    GLUCOSE 107 (H) 06/17/2020 02:52 PM    BUN 20 06/17/2020 02:52 PM    CREATININE 1.02 06/17/2020 02:52 PM     Lab Results   Component Value Date/Time    ALKPHOSPHAT 80 06/17/2020 02:52 PM    ASTSGOT 27 06/17/2020 02:52 PM    ALTSGPT 17 06/17/2020 02:52 PM    TBILIRUBIN 0.6 06/17/2020 02:52 PM        Other imaging studies in medical record    Transthoracic Echo Report 12/3/2018  Mild concentric left ventricular hypertrophy.  Normal left ventricular systolic function.  Left ventricular ejection fraction is visually estimated to be 65%.  Normal diastolic function.  The mitral " valve is not well visualized in the parasternal long axis view.  Grossly, there appears to be mild prolapse of the anterior mitral leaflet. Mild to moderate mitral regurgitation.     Transthoracic Echo Report 5/6/2020  Compared to the images of the prior study done 12/032018 there has been no significant changes.  Left ventricular ejection fraction is visually estimated to be 70%.  Estimated right ventricular systolic pressure is  20 mmHg.     ZIO Patch 6/5/2020-6/3/2020  NO VT/VF   Sustained Bigemeny   33 runs of NSVT.   37.6% [place] of PVCs     Assessment:     1. Bigeminy  EKG    Basic Metabolic Panel   2. High risk medication use  EKG    Basic Metabolic Panel   3. Essential hypertension  Basic Metabolic Panel   4. Non-rheumatic mitral regurgitation  Basic Metabolic Panel   5. Dyslipidemia  Basic Metabolic Panel   6. PVC's (premature ventricular contractions)     7. Mitral valve prolapse         Medical Decision Making:  Today's Assessment / Status / Plan:   1.  Bigeminy/PVCs/NSVT: EKG today shows sinus rhythm 62, with 1 PVC and 1 PAC and first-degree heart block.  Patient asymptomatic, will follow  -Continue flecainide 100 mg twice a day  -Repeat a BMP in 4 months, patient had borderline low sodium level    2.  Hypertension: Stable  -Continue losartan 100 mg daily    3.  Dyslipidemia: Last  on 1/16/2020  -Continue omega-3 fatty acids 1000 mg daily    4.  Mitral valve prolapse:  -Mild to moderate mitral regurgitation on last echo, patient asymptomatic  -will follow    Patient reports he does use Celebrex on occasion, patient understands to limit use.    FU in clinic in 4-5 months with Dr. Perez. Sooner if needed.    Patient verbalizes understanding and agrees with the plan of care.     Collaborating MD: BRYANT Ko MD    PLEASE NOTE: This Note was created using voice recognition Software. I have made every reasonable attempt to correct obvious errors, but I expect that there are errors of grammar and  possibly content that I did not discover before finalizing the note

## 2020-09-24 ENCOUNTER — HOSPITAL ENCOUNTER (OUTPATIENT)
Dept: RADIOLOGY | Facility: MEDICAL CENTER | Age: 75
End: 2020-09-24
Attending: FAMILY MEDICINE
Payer: MEDICARE

## 2020-09-24 DIAGNOSIS — M25.552 LEFT HIP PAIN: ICD-10-CM

## 2020-09-24 DIAGNOSIS — M25.551 RIGHT HIP PAIN: ICD-10-CM

## 2020-09-24 DIAGNOSIS — M25.512 LEFT SHOULDER PAIN, UNSPECIFIED CHRONICITY: ICD-10-CM

## 2020-09-24 PROCEDURE — 73521 X-RAY EXAM HIPS BI 2 VIEWS: CPT

## 2020-09-24 PROCEDURE — 73030 X-RAY EXAM OF SHOULDER: CPT | Mod: LT

## 2020-11-06 ENCOUNTER — HOSPITAL ENCOUNTER (OUTPATIENT)
Dept: LAB | Facility: MEDICAL CENTER | Age: 75
End: 2020-11-06
Attending: NURSE PRACTITIONER
Payer: MEDICARE

## 2020-11-06 DIAGNOSIS — E78.5 DYSLIPIDEMIA: ICD-10-CM

## 2020-11-06 DIAGNOSIS — I34.0 NON-RHEUMATIC MITRAL REGURGITATION: ICD-10-CM

## 2020-11-06 DIAGNOSIS — I49.8 BIGEMINY: ICD-10-CM

## 2020-11-06 DIAGNOSIS — I10 ESSENTIAL HYPERTENSION: ICD-10-CM

## 2020-11-06 DIAGNOSIS — Z79.899 HIGH RISK MEDICATION USE: ICD-10-CM

## 2020-11-06 LAB
ANION GAP SERPL CALC-SCNC: 10 MMOL/L (ref 7–16)
BUN SERPL-MCNC: 19 MG/DL (ref 8–22)
CALCIUM SERPL-MCNC: 9.3 MG/DL (ref 8.5–10.5)
CHLORIDE SERPL-SCNC: 102 MMOL/L (ref 96–112)
CO2 SERPL-SCNC: 26 MMOL/L (ref 20–33)
CREAT SERPL-MCNC: 0.97 MG/DL (ref 0.5–1.4)
GLUCOSE SERPL-MCNC: 90 MG/DL (ref 65–99)
POTASSIUM SERPL-SCNC: 4.1 MMOL/L (ref 3.6–5.5)
SODIUM SERPL-SCNC: 138 MMOL/L (ref 135–145)

## 2020-11-06 PROCEDURE — 80048 BASIC METABOLIC PNL TOTAL CA: CPT

## 2020-11-06 PROCEDURE — 36415 COLL VENOUS BLD VENIPUNCTURE: CPT

## 2020-11-09 DIAGNOSIS — I10 ESSENTIAL HYPERTENSION: ICD-10-CM

## 2020-11-09 DIAGNOSIS — E78.5 DYSLIPIDEMIA: ICD-10-CM

## 2020-11-10 RX ORDER — FLECAINIDE ACETATE 100 MG/1
100 TABLET ORAL 2 TIMES DAILY
Qty: 180 TAB | Refills: 3 | Status: SHIPPED | OUTPATIENT
Start: 2020-11-10 | End: 2021-04-16

## 2020-11-30 ENCOUNTER — TELEMEDICINE (OUTPATIENT)
Dept: CARDIOLOGY | Facility: MEDICAL CENTER | Age: 75
End: 2020-11-30
Payer: MEDICARE

## 2020-11-30 DIAGNOSIS — I34.0 NON-RHEUMATIC MITRAL REGURGITATION: ICD-10-CM

## 2020-11-30 DIAGNOSIS — E78.5 DYSLIPIDEMIA: ICD-10-CM

## 2020-11-30 DIAGNOSIS — I10 ESSENTIAL HYPERTENSION: ICD-10-CM

## 2020-11-30 DIAGNOSIS — I49.8 BIGEMINY: ICD-10-CM

## 2020-11-30 DIAGNOSIS — Z79.899 HIGH RISK MEDICATION USE: ICD-10-CM

## 2020-11-30 DIAGNOSIS — I49.3 PVC'S (PREMATURE VENTRICULAR CONTRACTIONS): ICD-10-CM

## 2020-11-30 DIAGNOSIS — I34.1 MITRAL VALVE PROLAPSE: ICD-10-CM

## 2020-11-30 PROCEDURE — 99214 OFFICE O/P EST MOD 30 MIN: CPT | Mod: 95,CR | Performed by: NURSE PRACTITIONER

## 2020-11-30 ASSESSMENT — ENCOUNTER SYMPTOMS
DIZZINESS: 0
SHORTNESS OF BREATH: 0
FEVER: 0
ABDOMINAL PAIN: 0
PALPITATIONS: 0
MYALGIAS: 0
ORTHOPNEA: 0
PND: 0
COUGH: 0
CLAUDICATION: 0
ROS SKIN COMMENTS: HAIR LOSS

## 2020-12-01 NOTE — PROGRESS NOTES
Chief Complaint   Patient presents with   • Dyslipidemia   • Hypertension       Subjective:   This evaluation was conducted via Zoom using secure and encrypted videoconferencing technology. The patient was in a private location in the state of [place].    The patient's identity was confirmed and verbal consent was obtained for this virtual visit.    Jenny Avila is a 75 y.o. male who presents today for follow on his Bigeminy, HTN, HLD.     He is currently visiting his daughter in [place].     Patient of Dr. Perez. He was last seen in clinic on 7/20/2020.  No changes were made during that visit.    Patient continues to feel well. Patient denies chest pain, shortness of breath, palpitations, dizziness/lightheadedness, orthopnea, PND or Edema.     He does report having decreased stamina and hair loss.    He is compliant with his medications, did state he forgot a dose of his flecainide 1 night and did feel the difference the next day.    He has been able to do your an L count at 9000 feet without difficulty.    Additonally, patient has the following medical problems:     -Mitral valve prolapse     -Hypertension     -Hyperlipidemia: Only taking fish oil    Past Medical History:   Diagnosis Date   • Dyslipidemia 12/14/2018   • Essential hypertension 9/12/2013   • Mitral valve prolapse 9/12/2013   • Non-rheumatic mitral regurgitation 9/12/2013     History reviewed. No pertinent surgical history.  Family History   Problem Relation Age of Onset   • Heart Disease Neg Hx      Social History     Socioeconomic History   • Marital status:      Spouse name: Not on file   • Number of children: Not on file   • Years of education: Not on file   • Highest education level: Not on file   Occupational History   • Not on file   Social Needs   • Financial resource strain: Not on file   • Food insecurity     Worry: Not on file     Inability: Not on file   • Transportation needs     Medical: Not on file     Non-medical: Not on file    Tobacco Use   • Smoking status: Former Smoker     Quit date: 1963     Years since quittin.2   • Smokeless tobacco: Never Used   Substance and Sexual Activity   • Alcohol use: Not Currently   • Drug use: No   • Sexual activity: Not on file     Comment:    Lifestyle   • Physical activity     Days per week: Not on file     Minutes per session: Not on file   • Stress: Not on file   Relationships   • Social connections     Talks on phone: Not on file     Gets together: Not on file     Attends Synagogue service: Not on file     Active member of club or organization: Not on file     Attends meetings of clubs or organizations: Not on file     Relationship status: Not on file   • Intimate partner violence     Fear of current or ex partner: Not on file     Emotionally abused: Not on file     Physically abused: Not on file     Forced sexual activity: Not on file   Other Topics Concern   • Not on file   Social History Narrative   • Not on file     No Known Allergies  Outpatient Encounter Medications as of 2020   Medication Sig Dispense Refill   • flecainide (TAMBOCOR) 100 MG Tab Take 1 Tab by mouth 2 times a day. 180 Tab 3   • DOCUSATE SODIUM PO Take 200 mg by mouth every day.     • losartan (COZAAR) 100 MG Tab Take 1 Tab by mouth every day. 100 Tab 3   • celecoxib (CELEBREX) 200 MG Cap      • tamsulosin (FLOMAX) 0.4 MG capsule      • Omega-3 Fatty Acids (FISH OIL) 1000 MG Cap capsule Take 1,000 mg by mouth every day.     • ascorbic acid (ASCORBIC ACID) 500 MG Tab Take 500 mg by mouth every day.     • Multiple Vitamin (MULTI VITAMIN DAILY PO) Take 1 Tab by mouth every day.       No facility-administered encounter medications on file as of 2020.      Review of Systems   Constitutional: Positive for malaise/fatigue (Decreased stamina). Negative for fever.   Respiratory: Negative for cough and shortness of breath.    Cardiovascular: Negative for chest pain, palpitations, orthopnea, claudication, leg  swelling and PND.   Gastrointestinal: Negative for abdominal pain.   Musculoskeletal: Negative for myalgias.   Skin:        Hair loss   Neurological: Negative for dizziness.   All other systems reviewed and are negative.       Objective:   There were no vitals taken for this visit.    Physical Exam   Constitutional: He is oriented to person, place, and time. He appears well-developed and well-nourished.   HENT:   Head: Normocephalic and atraumatic.   Eyes: Pupils are equal, round, and reactive to light. EOM are normal.   Neck: Normal range of motion. Neck supple. No JVD present.   Cardiovascular: Normal rate, regular rhythm and normal heart sounds.   Pulmonary/Chest: Effort normal and breath sounds normal. No respiratory distress. He has no wheezes. He has no rales.   Abdominal: Soft. Bowel sounds are normal.   Musculoskeletal:         General: No edema.   Neurological: He is alert and oriented to person, place, and time.   Skin: Skin is warm and dry.   Psychiatric: He has a normal mood and affect. His behavior is normal.   Vitals reviewed.    Lab Results   Component Value Date/Time    CHOLSTRLTOT 157 01/16/2020 08:23 AM     (H) 01/16/2020 08:23 AM    HDL 45 01/16/2020 08:23 AM    TRIGLYCERIDE 53 01/16/2020 08:23 AM       Lab Results   Component Value Date/Time    SODIUM 138 11/06/2020 11:01 AM    POTASSIUM 4.1 11/06/2020 11:01 AM    CHLORIDE 102 11/06/2020 11:01 AM    CO2 26 11/06/2020 11:01 AM    GLUCOSE 90 11/06/2020 11:01 AM    BUN 19 11/06/2020 11:01 AM    CREATININE 0.97 11/06/2020 11:01 AM     Lab Results   Component Value Date/Time    ALKPHOSPHAT 80 06/17/2020 02:52 PM    ASTSGOT 27 06/17/2020 02:52 PM    ALTSGPT 17 06/17/2020 02:52 PM    TBILIRUBIN 0.6 06/17/2020 02:52 PM        Other imaging studies in medical record    Transthoracic Echo Report 12/3/2018  Mild concentric left ventricular hypertrophy.  Normal left ventricular systolic function.  Left ventricular ejection fraction is visually  estimated to be 65%.  Normal diastolic function.  The mitral valve is not well visualized in the parasternal long axis view.  Grossly, there appears to be mild prolapse of the anterior mitral leaflet. Mild to moderate mitral regurgitation.     Transthoracic Echo Report 5/6/2020  Compared to the images of the prior study done 12/032018 there has been no significant changes.  Left ventricular ejection fraction is visually estimated to be 70%.  Estimated right ventricular systolic pressure is  20 mmHg.     ZIO Patch 6/5/2020-6/3/2020  NO VT/VF   Sustained Bigemeny   33 runs of NSVT.   37.6% [place] of PVCs     Assessment:     1. Dyslipidemia  Comp Metabolic Panel    Lipid Profile   2. Essential hypertension  Comp Metabolic Panel    Lipid Profile   3. High risk medication use  Comp Metabolic Panel    Lipid Profile   4. Non-rheumatic mitral regurgitation  Comp Metabolic Panel    Lipid Profile   5. Bigeminy     6. PVC's (premature ventricular contractions)     7. Mitral valve prolapse         Medical Decision Making:  Today's Assessment / Status / Plan:   1.  Bigeminy/PVCs/NSVT: EKG today shows sinus rhythm 62, with 1 PVC and 1 PAC and first-degree heart block.  Patient asymptomatic, will follow  -Continue flecainide 100 mg twice a day  -Did discuss with patient that flecainide does have <1% reported alopecia with use, patient does not want to stop the medication at this time.  Suggested taking biotin or collagen if he chooses.  -Patient to get an EKG at next visit    2.  Hypertension: Stable  -Continue losartan 100 mg daily    3.  Dyslipidemia: Last  on 1/16/2020  -Continue omega-3 fatty acids 1000 mg daily    4.  Mitral valve prolapse:  -Mild to moderate mitral regurgitation on last echo, patient asymptomatic  -will follow    Patient reports he does use Celebrex on occasion, patient understands to limit use.    CMP and lipid panel due before next visit.    FU in clinic in 4-5 months with Dr. Perez and labs. Sooner  if needed.    Patient verbalizes understanding and agrees with the plan of care.     PLEASE NOTE: This Note was created using voice recognition Software. I have made every reasonable attempt to correct obvious errors, but I expect that there are errors of grammar and possibly content that I did not discover before finalizing the note

## 2020-12-15 RX ORDER — AMLODIPINE BESYLATE 5 MG/1
5 TABLET ORAL DAILY
Qty: 30 TAB | Refills: 3 | Status: SHIPPED
Start: 2020-12-15 | End: 2021-05-04

## 2020-12-15 NOTE — PROGRESS NOTES
SAMANTHA Jones.  to Me        9:57 AM  Note     Are his Blood pressures before taking his losartan or 2 hours after losartan? I saw one BP at 3 pm and it seemed ok around 130s.      He could use more control. Losartan is at the highest dose.      Would like to add Amlodipine 5 mg daily.

## 2021-01-15 DIAGNOSIS — Z23 NEED FOR VACCINATION: ICD-10-CM

## 2021-01-21 ENCOUNTER — TELEPHONE (OUTPATIENT)
Dept: MEDICAL GROUP | Facility: MEDICAL CENTER | Age: 76
End: 2021-01-21

## 2021-01-21 ENCOUNTER — HOSPITAL ENCOUNTER (OUTPATIENT)
Dept: LAB | Facility: MEDICAL CENTER | Age: 76
End: 2021-01-21
Attending: NURSE PRACTITIONER
Payer: MEDICARE

## 2021-01-21 DIAGNOSIS — E78.5 DYSLIPIDEMIA: ICD-10-CM

## 2021-01-21 DIAGNOSIS — Z79.899 HIGH RISK MEDICATION USE: ICD-10-CM

## 2021-01-21 DIAGNOSIS — I10 ESSENTIAL HYPERTENSION: ICD-10-CM

## 2021-01-21 DIAGNOSIS — I34.0 NON-RHEUMATIC MITRAL REGURGITATION: ICD-10-CM

## 2021-01-21 LAB
ALBUMIN SERPL BCP-MCNC: 4.3 G/DL (ref 3.2–4.9)
ALBUMIN/GLOB SERPL: 1.5 G/DL
ALP SERPL-CCNC: 71 U/L (ref 30–99)
ALT SERPL-CCNC: 17 U/L (ref 2–50)
ANION GAP SERPL CALC-SCNC: 8 MMOL/L (ref 7–16)
AST SERPL-CCNC: 23 U/L (ref 12–45)
BILIRUB SERPL-MCNC: 0.9 MG/DL (ref 0.1–1.5)
BUN SERPL-MCNC: 18 MG/DL (ref 8–22)
CALCIUM SERPL-MCNC: 9.6 MG/DL (ref 8.5–10.5)
CHLORIDE SERPL-SCNC: 102 MMOL/L (ref 96–112)
CHOLEST SERPL-MCNC: 182 MG/DL (ref 100–199)
CO2 SERPL-SCNC: 29 MMOL/L (ref 20–33)
CREAT SERPL-MCNC: 1.02 MG/DL (ref 0.5–1.4)
FASTING STATUS PATIENT QL REPORTED: NORMAL
GLOBULIN SER CALC-MCNC: 2.8 G/DL (ref 1.9–3.5)
GLUCOSE SERPL-MCNC: 93 MG/DL (ref 65–99)
HDLC SERPL-MCNC: 50 MG/DL
LDLC SERPL CALC-MCNC: 114 MG/DL
POTASSIUM SERPL-SCNC: 4.5 MMOL/L (ref 3.6–5.5)
PROT SERPL-MCNC: 7.1 G/DL (ref 6–8.2)
SODIUM SERPL-SCNC: 139 MMOL/L (ref 135–145)
TRIGL SERPL-MCNC: 92 MG/DL (ref 0–149)

## 2021-01-21 PROCEDURE — 36415 COLL VENOUS BLD VENIPUNCTURE: CPT

## 2021-01-21 PROCEDURE — 80061 LIPID PANEL: CPT

## 2021-01-21 PROCEDURE — 80053 COMPREHEN METABOLIC PANEL: CPT

## 2021-01-21 NOTE — TELEPHONE ENCOUNTER
Left message for patient to call back regarding pre-visit planning. Please transfer call to [phone]  .

## 2021-01-25 ENCOUNTER — OFFICE VISIT (OUTPATIENT)
Dept: MEDICAL GROUP | Facility: MEDICAL CENTER | Age: 76
End: 2021-01-25
Payer: MEDICARE

## 2021-01-25 VITALS
WEIGHT: 200 LBS | OXYGEN SATURATION: 95 % | HEIGHT: 71 IN | SYSTOLIC BLOOD PRESSURE: 106 MMHG | BODY MASS INDEX: 28 KG/M2 | HEART RATE: 62 BPM | TEMPERATURE: 97.8 F | DIASTOLIC BLOOD PRESSURE: 60 MMHG

## 2021-01-25 DIAGNOSIS — I34.1 MITRAL VALVE PROLAPSE: ICD-10-CM

## 2021-01-25 DIAGNOSIS — I49.8 BIGEMINY: ICD-10-CM

## 2021-01-25 DIAGNOSIS — K60.3 FISTULA-IN-ANO: ICD-10-CM

## 2021-01-25 DIAGNOSIS — I10 ESSENTIAL HYPERTENSION: ICD-10-CM

## 2021-01-25 DIAGNOSIS — N13.9 OBSTRUCTIVE UROPATHY: ICD-10-CM

## 2021-01-25 DIAGNOSIS — R97.20 RAISED PROSTATE SPECIFIC ANTIGEN: ICD-10-CM

## 2021-01-25 DIAGNOSIS — E78.5 HYPERLIPIDEMIA, UNSPECIFIED HYPERLIPIDEMIA TYPE: ICD-10-CM

## 2021-01-25 DIAGNOSIS — I34.0 NON-RHEUMATIC MITRAL REGURGITATION: ICD-10-CM

## 2021-01-25 PROBLEM — K60.30 FISTULA-IN-ANO: Status: ACTIVE | Noted: 2021-01-25

## 2021-01-25 PROCEDURE — 99214 OFFICE O/P EST MOD 30 MIN: CPT | Performed by: INTERNAL MEDICINE

## 2021-01-25 PROCEDURE — 8041 PR SCP AHA: Performed by: INTERNAL MEDICINE

## 2021-01-25 SDOH — HEALTH STABILITY: MENTAL HEALTH: HOW OFTEN DO YOU HAVE 6 OR MORE DRINKS ON ONE OCCASION?: MONTHLY

## 2021-01-25 SDOH — HEALTH STABILITY: MENTAL HEALTH: HOW MANY STANDARD DRINKS CONTAINING ALCOHOL DO YOU HAVE ON A TYPICAL DAY?: 1 OR 2

## 2021-01-25 ASSESSMENT — PATIENT HEALTH QUESTIONNAIRE - PHQ9: CLINICAL INTERPRETATION OF PHQ2 SCORE: 0

## 2021-01-25 ASSESSMENT — FIBROSIS 4 INDEX: FIB4 SCORE: 2.05

## 2021-01-25 NOTE — ASSESSMENT & PLAN NOTE
He has hypertension for which she is on losartan and amlodipine.  Blood pressure is satisfactory.  He denies lightheadedness.

## 2021-01-25 NOTE — ASSESSMENT & PLAN NOTE
He has hyperlipidemia treated primarily with diet.  Most recent cholesterol is 182, triglycerides 92, HDL 50, .

## 2021-01-25 NOTE — ASSESSMENT & PLAN NOTE
He has a history of bigeminy for which he is followed by cardiology and on flecainide.  He currently denies any significant palpitations.

## 2021-01-25 NOTE — ASSESSMENT & PLAN NOTE
Mitral regurgitation secondary to mitral valve prolapse.  He has a prominent systolic murmur loudest at the apex.

## 2021-01-25 NOTE — ASSESSMENT & PLAN NOTE
He has a history of mitral valve prolapse and significant mitral regurgitation for which she is followed by cardiology.

## 2021-01-25 NOTE — ASSESSMENT & PLAN NOTE
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  This was first diagnosed by Dr. Ramon and followed by Dr. Barnes.

## 2021-01-25 NOTE — ASSESSMENT & PLAN NOTE
He has had elevated PSA that is gone up to as high as 20 and currently is down to 9.52.  This is followed by urology.

## 2021-01-26 NOTE — PROGRESS NOTES
Subjective:     Chief Complaint   Patient presents with   • Establish Care     Nancyal Jenny Avila is a 75 y.o. 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    Mitral valve prolapse  He has a history of mitral valve prolapse and significant mitral regurgitation for which she is followed by cardiology.    Non-rheumatic mitral regurgitation  Mitral regurgitation secondary to mitral valve prolapse.  He has a prominent systolic murmur loudest at the apex.    Essential hypertension  He has hypertension for which she is on losartan. He was on amlodipine but that was discontinued because of side effects..  Blood pressure is satisfactory.  He denies lightheadedness.    Hyperlipidemia  He has hyperlipidemia treated primarily with diet.  Most recent cholesterol is 182, triglycerides 92, HDL 50, .    Bigeminy  He has a history of bigeminy for which he is followed by cardiology and on flecainide.  He currently denies any significant palpitations.    Obstructive uropathy  Obstructive uropathy symptoms are well controlled with tamsulosin.    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  This was first diagnosed by Dr. Ramon and followed by Dr. Barnes.    Raised prostate specific antigen  He has had elevated PSA that is gone up to as high as 20 and currently is down to 9.52.  This is followed by urology.       Diagnoses of Essential hypertension, Hyperlipidemia, unspecified hyperlipidemia type, Mitral valve prolapse, Non-rheumatic mitral regurgitation, Bigeminy, Obstructive uropathy, Fistula-in-ano, and Raised prostate specific antigen were pertinent to this visit.    Allergies: Patient has no known allergies.  Current medicines (including changes today)  Current Outpatient Medications   Medication Sig Dispense Refill   • flecainide (TAMBOCOR) 100 MG Tab Take 1 Tab by mouth 2 times a  "day. 180 Tab 3   • DOCUSATE SODIUM PO Take 200 mg by mouth every day.     • losartan (COZAAR) 100 MG Tab Take 1 Tab by mouth every day. 100 Tab 3   • celecoxib (CELEBREX) 200 MG Cap      • tamsulosin (FLOMAX) 0.4 MG capsule      • Omega-3 Fatty Acids (FISH OIL) 1000 MG Cap capsule Take 1,000 mg by mouth every day.     • ascorbic acid (ASCORBIC ACID) 500 MG Tab Take 500 mg by mouth every day.     • Multiple Vitamin (MULTI VITAMIN DAILY PO) Take 1 Tab by mouth every day.     • amLODIPine (NORVASC) 5 MG Tab Take 1 Tab by mouth every day. 30 Tab 3     No current facility-administered medications for this visit.        He  has a past medical history of Dyslipidemia (12/14/2018), Essential hypertension (9/12/2013), Mitral valve prolapse (9/12/2013), Non-rheumatic mitral regurgitation (9/12/2013), and Obstructive uropathy (1/25/2021).    ROS    Patient denies significant change in strength, weight or appetite.  No significant lightheadedness or headaches.  No change in vision, hearing, or swallowing.  No new dyspnea, coughing, chest pain, or palpitations.  No indigestion, abdominal pain, or change in bowel habits.  No change in urinating.  He does have obstructive uropathy.  No new ankle swelling.       Objective:     PE:  /60 (BP Location: Left arm, Patient Position: Sitting, BP Cuff Size: Adult)   Pulse 62   Temp 36.6 °C (97.8 °F) (Temporal)   Ht 1.803 m (5' 11\")   Wt 90.7 kg (200 lb)   SpO2 95%   BMI 27.89 kg/m²    Neck is supple without significant lymphadenopathy or masses.  Lungs are clear with normal breath sounds without wheezes or rales .  Cardiovascular: peripheral circulation is satisfactory, heart sounds are unchanged and unremarkable.  Abdomen is soft, without masses or tenderness, with normal bowel sounds.  Extremities are without significant edema, cyanosis or deformity.      Assessment and Plan:   The following treatment plan was discussed  1. Essential hypertension  Basic Metabolic Panel    No " medication change.  We reviewed low-salt diet.   2. Hyperlipidemia, unspecified hyperlipidemia type  Lipid Profile    Continue periodic lipid checks, we reviewed appropriate diet.   3. Mitral valve prolapse      Continue follow-up with cardiology.   4. Non-rheumatic mitral regurgitation      Continue follow-up with cardiology.   5. Bigeminy      Continue flecainide and follow-up with cardiology.   6. Obstructive uropathy      Continue tamsulosin.   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 Barnes and with GI.   8. Raised prostate specific antigen      Follow-up with urology.  Periodic PSA checks.       Followup: Return in about 19 weeks (around 6/7/2021), or wellness, for Long.

## 2021-03-08 ENCOUNTER — APPOINTMENT (OUTPATIENT)
Dept: CARDIOLOGY | Facility: MEDICAL CENTER | Age: 76
End: 2021-03-08
Payer: MEDICARE

## 2021-04-07 ENCOUNTER — PATIENT MESSAGE (OUTPATIENT)
Dept: CARDIOLOGY | Facility: MEDICAL CENTER | Age: 76
End: 2021-04-07

## 2021-04-07 RX ORDER — HYDRALAZINE HYDROCHLORIDE 25 MG/1
25 TABLET, FILM COATED ORAL 2 TIMES DAILY
Qty: 60 TABLET | Refills: 11 | Status: SHIPPED | OUTPATIENT
Start: 2021-04-07 | End: 2021-10-21

## 2021-04-07 NOTE — PATIENT COMMUNICATION
SAMANTHA Jones.  You 26 minutes ago (4:12 PM)        Try hydralazine 25 mg twice a day for now        S/W pt, aware to try new medication, provided S/S of hypotension to look out for.

## 2021-04-07 NOTE — PATIENT COMMUNICATION
Sel[place] Avila Christopher J, M.D. 23 minutes ago (10:50 AM)   DC  Two hours after meds, BP dropped from 159/97  to  147/ 87  Which is still nearly 20 points above optimum- as I've heard.      To PIOTR, do you want to try another anti-hypertensive since patient is no longer taking amlodipine?

## 2021-04-07 NOTE — PATIENT COMMUNICATION
S/W pt, Before taking medication BP was 159/97. Pt will take it again once he is back home about 2 hours after medication and send us another message to let us know where he is at.    Medication  Losartan 100mg daily- yes  Amlodipine- patient is not taking, only took for a couple of weeks but stopped because he was getting a headache.    Will F/U with PIOTR once I have new BP

## 2021-04-11 ENCOUNTER — PATIENT MESSAGE (OUTPATIENT)
Dept: CARDIOLOGY | Facility: MEDICAL CENTER | Age: 76
End: 2021-04-11

## 2021-04-12 NOTE — PATIENT COMMUNICATION
"S/W pt, aware that overall his blood pressures are not alarmingly high and continuously taking BP and focusing on \"will it be high\" can lead to high blood pressure. Advised patient to take BP two hours after medications only and to sit and rest 5 minutes prior. Will Continue to monitor while in [place] and knows he can reach out to us with any questions.   "

## 2021-04-16 DIAGNOSIS — E78.5 DYSLIPIDEMIA: ICD-10-CM

## 2021-04-16 DIAGNOSIS — I10 ESSENTIAL HYPERTENSION: ICD-10-CM

## 2021-04-16 RX ORDER — FLECAINIDE ACETATE 100 MG/1
TABLET ORAL
Qty: 180 TABLET | Refills: 1 | Status: SHIPPED | OUTPATIENT
Start: 2021-04-16 | End: 2021-09-08

## 2021-04-26 DIAGNOSIS — I10 ESSENTIAL HYPERTENSION: ICD-10-CM

## 2021-04-26 DIAGNOSIS — I34.1 MITRAL VALVE PROLAPSE: ICD-10-CM

## 2021-04-26 DIAGNOSIS — I34.0 NON-RHEUMATIC MITRAL REGURGITATION: ICD-10-CM

## 2021-04-26 RX ORDER — LOSARTAN POTASSIUM 100 MG/1
100 TABLET ORAL DAILY
Qty: 100 TABLET | Refills: 3 | Status: SHIPPED | OUTPATIENT
Start: 2021-04-26 | End: 2021-09-08

## 2021-05-04 ENCOUNTER — OFFICE VISIT (OUTPATIENT)
Dept: CARDIOLOGY | Facility: MEDICAL CENTER | Age: 76
End: 2021-05-04
Payer: MEDICARE

## 2021-05-04 VITALS
OXYGEN SATURATION: 94 % | HEIGHT: 71 IN | SYSTOLIC BLOOD PRESSURE: 116 MMHG | BODY MASS INDEX: 27.02 KG/M2 | WEIGHT: 193 LBS | DIASTOLIC BLOOD PRESSURE: 62 MMHG | HEART RATE: 74 BPM

## 2021-05-04 DIAGNOSIS — I49.8 BIGEMINY: ICD-10-CM

## 2021-05-04 DIAGNOSIS — I34.0 NON-RHEUMATIC MITRAL REGURGITATION: ICD-10-CM

## 2021-05-04 DIAGNOSIS — I34.1 MITRAL VALVE PROLAPSE: ICD-10-CM

## 2021-05-04 DIAGNOSIS — I10 ESSENTIAL HYPERTENSION: ICD-10-CM

## 2021-05-04 DIAGNOSIS — N13.9 OBSTRUCTIVE UROPATHY: ICD-10-CM

## 2021-05-04 DIAGNOSIS — E78.2 MIXED HYPERLIPIDEMIA: ICD-10-CM

## 2021-05-04 DIAGNOSIS — R09.89 CHEST RALES: ICD-10-CM

## 2021-05-04 PROCEDURE — 99214 OFFICE O/P EST MOD 30 MIN: CPT | Performed by: INTERNAL MEDICINE

## 2021-05-04 RX ORDER — FLUOROURACIL 50 MG/G
CREAM TOPICAL
COMMUNITY
Start: 2021-02-25 | End: 2021-05-04

## 2021-05-04 ASSESSMENT — ENCOUNTER SYMPTOMS
BRUISES/BLEEDS EASILY: 0
PALPITATIONS: 0
RESPIRATORY NEGATIVE: 1
GASTROINTESTINAL NEGATIVE: 1
SHORTNESS OF BREATH: 0
WHEEZING: 0
WEAKNESS: 0
MUSCULOSKELETAL NEGATIVE: 1
SORE THROAT: 0
EYES NEGATIVE: 1
CHILLS: 0
CARDIOVASCULAR NEGATIVE: 1
HEMOPTYSIS: 0
FEVER: 0
COUGH: 0
SPUTUM PRODUCTION: 0
CLAUDICATION: 0
STRIDOR: 0
ORTHOPNEA: 0
DIZZINESS: 0
PND: 0
LOSS OF CONSCIOUSNESS: 0
NEUROLOGICAL NEGATIVE: 1
CONSTITUTIONAL NEGATIVE: 1

## 2021-05-04 ASSESSMENT — FIBROSIS 4 INDEX: FIB4 SCORE: 2.08

## 2021-05-04 NOTE — PROGRESS NOTES
Chief Complaint   Patient presents with   • Dyslipidemia   • HTN (Controlled)       Subjective:   Jenny Avila is a 75 y.o. male who presents today as a follow-up for his dizziness lightheadedness and bigeminy.  He wore a event recorder that showed short nonsustained runs of SVT as well as bigeminy.    Since he was last seen he continues to do well with exception of occasional headaches.  He gets headaches he thinks from hydralazine.  His blood pressure at home he says in the 150s but every time he goes to his doctor's office is in the 120s.  He has had his blood pressure cuff checked and is always 10 points higher.    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      Family History   Problem Relation Age of Onset   • Cancer Mother         ovrian cancer   • Lung Disease Father    • Heart Disease Neg Hx      Social History     Socioeconomic History   • Marital status:      Spouse name: Not on file   • Number of children: Not on file   • Years of education: Not on file   • Highest education level: Not on file   Occupational History   • Not on file   Tobacco Use   • Smoking status: Former Smoker     Quit date: 1963     Years since quittin.6   • Smokeless tobacco: Never Used   Substance and Sexual Activity   • Alcohol use: Not Currently     Alcohol/week: 0.6 oz     Types: 1 Cans of beer per week   • Drug use: No   • Sexual activity: Not on file     Comment:    Other Topics Concern   • Not on file   Social History Narrative   • Not on file     Social Determinants of Health     Financial Resource Strain:    • Difficulty of Paying Living Expenses:    Food Insecurity:    • Worried About Running Out of Food in the Last Year:    • Ran Out of  Food in the Last Year:    Transportation Needs:    • Lack of Transportation (Medical):    • Lack of Transportation (Non-Medical):    Physical Activity:    • Days of Exercise per Week:    • Minutes of Exercise per Session:    Stress:    • Feeling of Stress :    Social Connections:    • Frequency of Communication with Friends and Family:    • Frequency of Social Gatherings with Friends and Family:    • Attends Anabaptist Services:    • Active Member of Clubs or Organizations:    • Attends Club or Organization Meetings:    • Marital Status:    Intimate Partner Violence:    • Fear of Current or Ex-Partner:    • Emotionally Abused:    • Physically Abused:    • Sexually Abused:      No Known Allergies  Outpatient Encounter Medications as of 5/4/2021   Medication Sig Dispense Refill   • losartan (COZAAR) 100 MG Tab Take 1 tablet by mouth every day. 100 tablet 3   • flecainide (TAMBOCOR) 100 MG Tab TAKE 1 TABLET BY MOUTH TWICE A  tablet 1   • hydrALAZINE (APRESOLINE) 25 MG Tab Take 1 tablet by mouth 2 times a day. (Patient taking differently: Take 25 mg by mouth 2 times a day. 12.5 mg) 60 tablet 11   • celecoxib (CELEBREX) 200 MG Cap Take 1 Cap by mouth every day. 60 Cap 11   • DOCUSATE SODIUM PO Take 200 mg by mouth every day.     • tamsulosin (FLOMAX) 0.4 MG capsule      • Omega-3 Fatty Acids (FISH OIL) 1000 MG Cap capsule Take 1,000 mg by mouth every day.     • ascorbic acid (ASCORBIC ACID) 500 MG Tab Take 500 mg by mouth every day.     • Multiple Vitamin (MULTI VITAMIN DAILY PO) Take 1 Tab by mouth every day.     • [DISCONTINUED] fluorouracil (EFUDEX) 5 % cream      • [DISCONTINUED] amLODIPine (NORVASC) 5 MG Tab Take 1 Tab by mouth every day. 30 Tab 3     No facility-administered encounter medications on file as of 5/4/2021.     Review of Systems   Constitutional: Negative.  Negative for chills, fever and malaise/fatigue.   HENT: Negative.  Negative for sore throat.    Eyes: Negative.    Respiratory: Negative.   "Negative for cough, hemoptysis, sputum production, shortness of breath, wheezing and stridor.    Cardiovascular: Negative.  Negative for chest pain, palpitations, orthopnea, claudication, leg swelling and PND.   Gastrointestinal: Negative.    Genitourinary: Negative.    Musculoskeletal: Negative.    Skin: Negative.    Neurological: Negative.  Negative for dizziness, loss of consciousness and weakness.   Endo/Heme/Allergies: Negative.  Does not bruise/bleed easily.   All other systems reviewed and are negative.       Objective:   /62 (BP Location: Right arm, Patient Position: Sitting, BP Cuff Size: Adult)   Pulse 74   Ht 1.803 m (5' 11\")   Wt 87.5 kg (193 lb)   SpO2 94%   BMI 26.92 kg/m²     Physical Exam   Constitutional: He appears well-developed and well-nourished. No distress.   HENT:   Head: Normocephalic and atraumatic.   Right Ear: External ear normal.   Left Ear: External ear normal.   Nose: Nose normal.   Mouth/Throat: No oropharyngeal exudate.   Eyes: Pupils are equal, round, and reactive to light. Conjunctivae and EOM are normal. Right eye exhibits no discharge. Left eye exhibits no discharge. No scleral icterus.   Neck: No JVD present.   Cardiovascular: Normal rate, regular rhythm and intact distal pulses. Exam reveals no gallop and no friction rub.   No murmur heard.  Pulmonary/Chest: Effort normal. No stridor. No respiratory distress. He has no wheezes. He has no rales. He exhibits no tenderness.   Abdominal: Soft. He exhibits no distension. There is no guarding.   Musculoskeletal:         General: No tenderness, deformity or edema. Normal range of motion.      Cervical back: Neck supple.   Neurological: He is alert. He has normal reflexes. He displays normal reflexes. No cranial nerve deficit. He exhibits normal muscle tone. Coordination normal.   Skin: Skin is warm and dry. No rash noted. He is not diaphoretic. No erythema. No pallor.   Psychiatric: He has a normal mood and affect. His " behavior is normal. Judgment and thought content normal.   Nursing note and vitals reviewed.      Assessment:     1. Mitral valve prolapse     2. Non-rheumatic mitral regurgitation     3. Obstructive uropathy     4. Essential hypertension     5. Chest rales     6. Bigeminy     7. Mixed hyperlipidemia         Medical Decision Making:  Today's Assessment / Status / Plan:     75-year-old male with preoperative stratification prior to a revision of his fistula.  For his bigeminy I will go and start him on flecainide 100 twice daily.  I offered him a change of hydralazine to a different drug but he declined at this point.  He will stay on that as well as losartan.  We can with the flecainide for the palpitations and NSVT.  We will see him back in 6 months.

## 2021-06-15 ENCOUNTER — HOSPITAL ENCOUNTER (OUTPATIENT)
Dept: LAB | Facility: MEDICAL CENTER | Age: 76
End: 2021-06-15
Attending: INTERNAL MEDICINE
Payer: MEDICARE

## 2021-06-15 DIAGNOSIS — E78.5 HYPERLIPIDEMIA, UNSPECIFIED HYPERLIPIDEMIA TYPE: ICD-10-CM

## 2021-06-15 DIAGNOSIS — I10 ESSENTIAL HYPERTENSION: ICD-10-CM

## 2021-06-15 LAB
ANION GAP SERPL CALC-SCNC: 8 MMOL/L (ref 7–16)
BUN SERPL-MCNC: 29 MG/DL (ref 8–22)
CALCIUM SERPL-MCNC: 9.1 MG/DL (ref 8.5–10.5)
CHLORIDE SERPL-SCNC: 105 MMOL/L (ref 96–112)
CHOLEST SERPL-MCNC: 181 MG/DL (ref 100–199)
CO2 SERPL-SCNC: 26 MMOL/L (ref 20–33)
CREAT SERPL-MCNC: 1.1 MG/DL (ref 0.5–1.4)
FASTING STATUS PATIENT QL REPORTED: NORMAL
GLUCOSE SERPL-MCNC: 94 MG/DL (ref 65–99)
HDLC SERPL-MCNC: 46 MG/DL
LDLC SERPL CALC-MCNC: 121 MG/DL
POTASSIUM SERPL-SCNC: 4.8 MMOL/L (ref 3.6–5.5)
SODIUM SERPL-SCNC: 139 MMOL/L (ref 135–145)
TRIGL SERPL-MCNC: 72 MG/DL (ref 0–149)

## 2021-06-15 PROCEDURE — 80048 BASIC METABOLIC PNL TOTAL CA: CPT

## 2021-06-15 PROCEDURE — 36415 COLL VENOUS BLD VENIPUNCTURE: CPT

## 2021-06-15 PROCEDURE — 80061 LIPID PANEL: CPT

## 2021-07-01 ENCOUNTER — PATIENT MESSAGE (OUTPATIENT)
Dept: HEALTH INFORMATION MANAGEMENT | Facility: OTHER | Age: 76
End: 2021-07-01

## 2021-09-05 DIAGNOSIS — I10 ESSENTIAL HYPERTENSION: ICD-10-CM

## 2021-09-05 DIAGNOSIS — I34.0 NON-RHEUMATIC MITRAL REGURGITATION: ICD-10-CM

## 2021-09-05 DIAGNOSIS — I34.1 MITRAL VALVE PROLAPSE: ICD-10-CM

## 2021-09-05 DIAGNOSIS — E78.5 DYSLIPIDEMIA: ICD-10-CM

## 2021-09-08 RX ORDER — FLECAINIDE ACETATE 100 MG/1
TABLET ORAL
Qty: 180 TABLET | Refills: 0 | Status: SHIPPED | OUTPATIENT
Start: 2021-09-08 | End: 2021-10-20 | Stop reason: SDUPTHER

## 2021-09-08 RX ORDER — LOSARTAN POTASSIUM 100 MG/1
TABLET ORAL
Qty: 100 TABLET | Refills: 3 | Status: SHIPPED | OUTPATIENT
Start: 2021-09-08 | End: 2022-03-30 | Stop reason: SDUPTHER

## 2021-09-22 ENCOUNTER — APPOINTMENT (OUTPATIENT)
Dept: MEDICAL GROUP | Facility: MEDICAL CENTER | Age: 76
End: 2021-09-22
Payer: MEDICARE

## 2021-10-20 DIAGNOSIS — I10 ESSENTIAL HYPERTENSION: ICD-10-CM

## 2021-10-20 DIAGNOSIS — E78.5 DYSLIPIDEMIA: ICD-10-CM

## 2021-10-20 RX ORDER — FLECAINIDE ACETATE 100 MG/1
TABLET ORAL
Qty: 180 TABLET | Refills: 0 | Status: SHIPPED | OUTPATIENT
Start: 2021-10-20 | End: 2021-11-04 | Stop reason: SDUPTHER

## 2021-10-20 NOTE — TELEPHONE ENCOUNTER
Received request via: Patient    Was the patient seen in the last year in this department? Yes    Does the patient have an active prescription (recently filled or refills available) for medication(s) requested? yes     Per patient pharmacy states they have no refills available for him. [facility] to [facility].    Thank you   Radha SAINI

## 2021-10-21 ENCOUNTER — OFFICE VISIT (OUTPATIENT)
Dept: MEDICAL GROUP | Facility: MEDICAL CENTER | Age: 76
End: 2021-10-21
Payer: MEDICARE

## 2021-10-21 VITALS
DIASTOLIC BLOOD PRESSURE: 60 MMHG | WEIGHT: 187.61 LBS | TEMPERATURE: 97.8 F | SYSTOLIC BLOOD PRESSURE: 120 MMHG | OXYGEN SATURATION: 96 % | HEART RATE: 71 BPM | HEIGHT: 71 IN | RESPIRATION RATE: 16 BRPM | BODY MASS INDEX: 26.27 KG/M2

## 2021-10-21 DIAGNOSIS — I49.8 BIGEMINY: ICD-10-CM

## 2021-10-21 DIAGNOSIS — R97.20 RAISED PROSTATE SPECIFIC ANTIGEN: ICD-10-CM

## 2021-10-21 DIAGNOSIS — I34.1 MITRAL VALVE PROLAPSE: ICD-10-CM

## 2021-10-21 DIAGNOSIS — I10 ESSENTIAL HYPERTENSION: ICD-10-CM

## 2021-10-21 DIAGNOSIS — E78.2 MIXED HYPERLIPIDEMIA: ICD-10-CM

## 2021-10-21 PROCEDURE — 99214 OFFICE O/P EST MOD 30 MIN: CPT | Performed by: FAMILY MEDICINE

## 2021-10-21 RX ORDER — IVERMECTIN 3 MG/1
TABLET ORAL
COMMUNITY
Start: 2021-09-13 | End: 2021-10-21

## 2021-10-21 RX ORDER — HYDROXYCHLOROQUINE SULFATE 200 MG/1
TABLET, FILM COATED ORAL
COMMUNITY
Start: 2021-10-17 | End: 2021-10-21

## 2021-10-21 ASSESSMENT — FIBROSIS 4 INDEX: FIB4 SCORE: 2.08

## 2021-10-21 NOTE — PROGRESS NOTES
CC: New patient: Hypertension, hyperlipidemia, elevated PSA, bigeminy, mitral valve prolapse    HPI:  Selma presents today to establish new PCP.    Patient has been with all ADLs.  Has the following chronic medical issues:    Essential hypertension  Blood pressure has been adequately controlled on losartan 100 mg daily.  No side effects    Mixed hyperlipidemia  Patient's last lipid panel showed:    Ref Range & Units 4 mo ago   Cholesterol,Tot 100 - 199 mg/dL 181    Triglycerides 0 - 149 mg/dL 72    HDL >=40 mg/dL 46    LDL <100 mg/dL 121 High       Patient has no history of CAD , DM, or stroke.  Currently not on medication    Raised prostate specific antigen  PSA has always been high.  However denies urinary retention or blood in the urine.  Patient had prostate biopsy and was negative for prostate cancer.  Patient has been following up with his urologist..    Bigeminy  Patient stated that sometimes he feels his heart pounding, however denies any shortness of breath or leg swelling.  It has been associated with nonsustained SVT, patient is currently on flecainide and has been following up with cardiology .    Mitral valve prolapse  Patient has been asymptomatic.  Last echocardiogram was done in May 2020 patient has been having normal ejection fraction and right ventricular systolic pressure.    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 60 Cap 11   • DOCUSATE SODIUM PO Take 200 mg by mouth every day.     • tamsulosin (FLOMAX) 0.4 MG capsule      • Omega-3 Fatty Acids (FISH OIL) 1000 MG Cap capsule Take 1,000 mg by mouth every day.     • ascorbic acid (ASCORBIC ACID) 500 MG Tab Take 500 mg by mouth every day.     • Multiple Vitamin (MULTI VITAMIN DAILY PO) Take 1 Tab by mouth every day.     • hydrALAZINE (APRESOLINE) 25 MG Tab Take 1 tablet by mouth 2 times a day. (Patient taking differently: Take 25 mg by mouth 2 times a day. 12.5 mg) 60 tablet 11     No current facility-administered medications for this visit.         Allergies as of 10/21/2021   • (No Known Allergies)        Social History     Socioeconomic History   • Marital status:      Spouse name: Not on file   • Number of children: Not on file   • Years of education: Not on file   • Highest education level: Not on file   Occupational History   • Not on file   Tobacco Use   • Smoking status: Former Smoker     Quit date: 1963     Years since quittin.1   • Smokeless tobacco: Never Used   Vaping Use   • Vaping Use: Never used   Substance and Sexual Activity   • Alcohol use: Not Currently     Alcohol/week: 0.6 oz     Types: 1 Cans of beer per week   • Drug use: No   • Sexual activity: Not on file     Comment:    Other Topics Concern   • Not on file   Social History Narrative   • Not on file     Social Determinants of Health     Financial Resource Strain:    • Difficulty of Paying Living Expenses:    Food Insecurity:    • Worried About Running Out of Food in the Last Year:    • Ran Out of Food in the Last Year:    Transportation Needs:    • Lack of Transportation (Medical):    • Lack of Transportation (Non-Medical):    Physical Activity:    • Days of Exercise per Week:    • Minutes of Exercise per Session:    Stress:    • Feeling of Stress :    Social Connections:    • Frequency of Communication with Friends and Family:    • Frequency of  "Social Gatherings with Friends and Family:    • Attends Evangelical Services:    • Active Member of Clubs or Organizations:    • Attends Club or Organization Meetings:    • Marital Status:    Intimate Partner Violence:    • Fear of Current or Ex-Partner:    • Emotionally Abused:    • Physically Abused:    • Sexually Abused:        Family History   Problem Relation Age of Onset   • Cancer Mother         ovrian cancer   • Lung Disease Father    • Heart Disease Neg Hx        Past Surgical History:   Procedure Laterality Date   • 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  All other systems are negative.    /60 (BP Location: Right arm, Patient Position: Sitting, BP Cuff Size: Adult)   Pulse 71   Temp 36.6 °C (97.8 °F) (Temporal)   Resp 16   Ht 1.803 m (5' 11\")   Wt 85.1 kg (187 lb 9.8 oz)   SpO2 96%   BMI 26.17 kg/m²     Physical Exam:  Gen:         Alert and oriented, No apparent distress.  HEENT:   Perrla, TM clear,  Oralpharynx no erythema or exudates.  Neck:       No Jugular venous distension, Lymphadenopathy, Thyromegaly, Bruits.  Lungs:     Clear to auscultation bilaterally  CV:          Regular rate and rhythm. No murmurs, rubs or gallops.  Abd:         Soft non tender, non distended. Normal active bowel sounds. No                                        Hepatosplenomegaly, No pulsatile masses.  Ext:          No clubbing, cyanosis, edema.      Assessment and Plan.   76 y.o. male     1. Essential hypertension  Controlled.  Continue losartan 100 mg daily    - CBC WITH DIFFERENTIAL; Future  - Comp Metabolic Panel; Future  - Lipid Profile; Future  - TSH; Future    2. Mixed hyperlipidemia  Last lipid panel showed high LDL(114)  Patient is counseled on lifestyle medication.  We will continue monitor lipid " panel    - Lipid Profile; Future  - TSH; Future    3. Raised prostate specific antigen  PSA has always been high.  Patient had prostate biopsy and was negative.  Continue monitor PSA  Continue follow-up with urology    4. Bigeminy  Stable.  Has been associated with nonsustained SVT, however patient has been asymptomatic  Continue flecainide  Continue follow-up with cardiology    5. Mitral valve prolapse  Stable.  Asymptomatic.  Continue follow-up with cardiology

## 2021-11-04 ENCOUNTER — OFFICE VISIT (OUTPATIENT)
Dept: CARDIOLOGY | Facility: MEDICAL CENTER | Age: 76
End: 2021-11-04
Payer: MEDICARE

## 2021-11-04 VITALS
SYSTOLIC BLOOD PRESSURE: 112 MMHG | BODY MASS INDEX: 27.3 KG/M2 | RESPIRATION RATE: 16 BRPM | HEART RATE: 74 BPM | WEIGHT: 195 LBS | OXYGEN SATURATION: 96 % | DIASTOLIC BLOOD PRESSURE: 60 MMHG | HEIGHT: 71 IN

## 2021-11-04 DIAGNOSIS — E78.5 DYSLIPIDEMIA: ICD-10-CM

## 2021-11-04 DIAGNOSIS — I34.0 NON-RHEUMATIC MITRAL REGURGITATION: ICD-10-CM

## 2021-11-04 DIAGNOSIS — N13.9 OBSTRUCTIVE UROPATHY: ICD-10-CM

## 2021-11-04 DIAGNOSIS — R09.89 CHEST RALES: ICD-10-CM

## 2021-11-04 DIAGNOSIS — I34.1 MITRAL VALVE PROLAPSE: ICD-10-CM

## 2021-11-04 DIAGNOSIS — I10 ESSENTIAL HYPERTENSION: ICD-10-CM

## 2021-11-04 DIAGNOSIS — E78.2 MIXED HYPERLIPIDEMIA: ICD-10-CM

## 2021-11-04 PROCEDURE — 99214 OFFICE O/P EST MOD 30 MIN: CPT | Performed by: INTERNAL MEDICINE

## 2021-11-04 RX ORDER — FLECAINIDE ACETATE 100 MG/1
TABLET ORAL
Qty: 180 TABLET | Refills: 3 | Status: SHIPPED | OUTPATIENT
Start: 2021-11-04 | End: 2021-12-20

## 2021-11-04 ASSESSMENT — ENCOUNTER SYMPTOMS
WHEEZING: 0
WEAKNESS: 0
PALPITATIONS: 0
PND: 0
RESPIRATORY NEGATIVE: 1
CHILLS: 0
CARDIOVASCULAR NEGATIVE: 1
GASTROINTESTINAL NEGATIVE: 1
SORE THROAT: 0
CONSTITUTIONAL NEGATIVE: 1
FEVER: 0
CLAUDICATION: 0
ORTHOPNEA: 0
SPUTUM PRODUCTION: 0
LOSS OF CONSCIOUSNESS: 0
STRIDOR: 0
COUGH: 0
DIZZINESS: 0
HEMOPTYSIS: 0
EYES NEGATIVE: 1
NEUROLOGICAL NEGATIVE: 1
BRUISES/BLEEDS EASILY: 0
MUSCULOSKELETAL NEGATIVE: 1
SHORTNESS OF BREATH: 0

## 2021-11-04 ASSESSMENT — FIBROSIS 4 INDEX: FIB4 SCORE: 2.08

## 2021-11-04 NOTE — PROGRESS NOTES
Chief Complaint   Patient presents with   • Dyslipidemia   • Hypertension       Subjective:   Jenny Avila is a 75 y.o. male who presents today as a follow-up for his dizziness lightheadedness and bigeminy.  He wore a event recorder that showed short nonsustained runs of SVT as well as bigeminy.    Since he was last seen his PVCs and SVT is well controlled.  Is having no chest pain or pressure.  He does have a murmur of MR.  Is been unchanged.  He  he does say he got short of breath the other day working on the yard.    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      Family History   Problem Relation Age of Onset   • Cancer Mother         ovrian cancer   • Lung Disease Father    • Heart Disease Neg Hx      Social History     Socioeconomic History   • Marital status:      Spouse name: Not on file   • Number of children: Not on file   • Years of education: Not on file   • Highest education level: Not on file   Occupational History   • Not on file   Tobacco Use   • Smoking status: Former Smoker     Quit date: 1963     Years since quittin.1   • Smokeless tobacco: Never Used   Vaping Use   • Vaping Use: Never used   Substance and Sexual Activity   • Alcohol use: Not Currently     Alcohol/week: 0.6 oz     Types: 1 Cans of beer per week   • Drug use: No   • Sexual activity: Not on file     Comment:    Other Topics Concern   • Not on file   Social History Narrative   • Not on file     Social Determinants of Health     Financial Resource Strain:    • Difficulty of Paying Living Expenses:    Food Insecurity:    • Worried About Running Out of Food in the Last Year:    • Ran Out of Food in the Last Year:    Transportation Needs:    • Lack of  Transportation (Medical):    • Lack of Transportation (Non-Medical):    Physical Activity:    • Days of Exercise per Week:    • Minutes of Exercise per Session:    Stress:    • Feeling of Stress :    Social Connections:    • Frequency of Communication with Friends and Family:    • Frequency of Social Gatherings with Friends and Family:    • Attends Voodoo Services:    • Active Member of Clubs or Organizations:    • Attends Club or Organization Meetings:    • Marital Status:    Intimate Partner Violence:    • Fear of Current or Ex-Partner:    • Emotionally Abused:    • Physically Abused:    • Sexually Abused:      No Known Allergies  Outpatient Encounter Medications as of 11/4/2021   Medication Sig Dispense Refill   • flecainide (TAMBOCOR) 100 MG Tab TAKE 1 TABLET BY MOUTH TWICE A  Tablet 3   • losartan (COZAAR) 100 MG Tab TAKE 1 TABLET BY MOUTH DAILY 100 Tablet 3   • celecoxib (CELEBREX) 200 MG Cap Take 1 Cap by mouth every day. 60 Cap 11   • DOCUSATE SODIUM PO Take 200 mg by mouth every day.     • tamsulosin (FLOMAX) 0.4 MG capsule      • Omega-3 Fatty Acids (FISH OIL) 1000 MG Cap capsule Take 1,000 mg by mouth every day.     • ascorbic acid (ASCORBIC ACID) 500 MG Tab Take 500 mg by mouth every day.     • [DISCONTINUED] flecainide (TAMBOCOR) 100 MG Tab TAKE 1 TABLET BY MOUTH TWICE A  Tablet 0   • [DISCONTINUED] Multiple Vitamin (MULTI VITAMIN DAILY PO) Take 1 Tab by mouth every day. (Patient not taking: Reported on 11/4/2021)       No facility-administered encounter medications on file as of 11/4/2021.     Review of Systems   Constitutional: Negative.  Negative for chills, fever and malaise/fatigue.   HENT: Negative.  Negative for sore throat.    Eyes: Negative.    Respiratory: Negative.  Negative for cough, hemoptysis, sputum production, shortness of breath, wheezing and stridor.    Cardiovascular: Negative.  Negative for chest pain, palpitations, orthopnea, claudication, leg swelling and PND.  "  Gastrointestinal: Negative.    Genitourinary: Negative.    Musculoskeletal: Negative.    Skin: Negative.    Neurological: Negative.  Negative for dizziness, loss of consciousness and weakness.   Endo/Heme/Allergies: Negative.  Does not bruise/bleed easily.   All other systems reviewed and are negative.       Objective:   /60 (BP Location: Left arm, Patient Position: Sitting, BP Cuff Size: Adult)   Pulse 74   Resp 16   Ht 1.803 m (5' 11\")   Wt 88.5 kg (195 lb)   SpO2 96%   BMI 27.20 kg/m²     Physical Exam  Vitals and nursing note reviewed.   Constitutional:       General: He is not in acute distress.     Appearance: He is well-developed. He is not diaphoretic.   HENT:      Head: Normocephalic and atraumatic.      Right Ear: External ear normal.      Left Ear: External ear normal.      Nose: Nose normal.      Mouth/Throat:      Pharynx: No oropharyngeal exudate.   Eyes:      General: No scleral icterus.        Right eye: No discharge.         Left eye: No discharge.      Conjunctiva/sclera: Conjunctivae normal.      Pupils: Pupils are equal, round, and reactive to light.   Neck:      Vascular: No JVD.   Cardiovascular:      Rate and Rhythm: Normal rate and regular rhythm.      Heart sounds: Murmur heard.   No friction rub. No gallop.    Pulmonary:      Effort: Pulmonary effort is normal. No respiratory distress.      Breath sounds: No stridor. No wheezing or rales.   Chest:      Chest wall: No tenderness.   Abdominal:      General: There is no distension.      Palpations: Abdomen is soft.      Tenderness: There is no guarding.   Musculoskeletal:         General: No tenderness or deformity. Normal range of motion.      Cervical back: Neck supple.   Skin:     General: Skin is warm and dry.      Coloration: Skin is not pale.      Findings: No erythema or rash.   Neurological:      Mental Status: He is alert.      Cranial Nerves: No cranial nerve deficit.      Motor: No abnormal muscle tone.      " Coordination: Coordination normal.      Deep Tendon Reflexes: Reflexes are normal and symmetric. Reflexes normal.   Psychiatric:         Behavior: Behavior normal.         Thought Content: Thought content normal.         Judgment: Judgment normal.         Assessment:     1. Chest rales     2. Essential hypertension  flecainide (TAMBOCOR) 100 MG Tab   3. Mixed hyperlipidemia     4. Mitral valve prolapse  EC-ECHOCARDIOGRAM COMPLETE W/O CONT   5. Non-rheumatic mitral regurgitation  EC-ECHOCARDIOGRAM COMPLETE W/O CONT   6. Obstructive uropathy     7. Dyslipidemia  flecainide (TAMBOCOR) 100 MG Tab       Medical Decision Making:  Today's Assessment / Status / Plan:     75-year-old male with preoperative stratification prior to a revision of his fistula.  For his bigeminy I will go and start him on flecainide 100 twice daily.  Given his murmur we will go ahead and repeat his echocardiogram.  I refilled his flecainide.  He is otherwise doing well.  I will see him back in 6 months.

## 2021-12-17 ENCOUNTER — TELEPHONE (OUTPATIENT)
Dept: CARDIOLOGY | Facility: MEDICAL CENTER | Age: 76
End: 2021-12-17

## 2021-12-17 ENCOUNTER — APPOINTMENT (OUTPATIENT)
Dept: RADIOLOGY | Facility: MEDICAL CENTER | Age: 76
End: 2021-12-17
Attending: EMERGENCY MEDICINE
Payer: MEDICARE

## 2021-12-17 ENCOUNTER — HOSPITAL ENCOUNTER (EMERGENCY)
Facility: MEDICAL CENTER | Age: 76
End: 2021-12-17
Attending: EMERGENCY MEDICINE
Payer: MEDICARE

## 2021-12-17 VITALS
SYSTOLIC BLOOD PRESSURE: 125 MMHG | BODY MASS INDEX: 25.06 KG/M2 | WEIGHT: 185 LBS | RESPIRATION RATE: 16 BRPM | DIASTOLIC BLOOD PRESSURE: 59 MMHG | HEIGHT: 72 IN | HEART RATE: 77 BPM | TEMPERATURE: 97.8 F | OXYGEN SATURATION: 92 %

## 2021-12-17 DIAGNOSIS — I49.8 BIGEMINY: ICD-10-CM

## 2021-12-17 LAB
ALBUMIN SERPL BCP-MCNC: 4.4 G/DL (ref 3.2–4.9)
ALBUMIN/GLOB SERPL: 1.6 G/DL
ALP SERPL-CCNC: 80 U/L (ref 30–99)
ALT SERPL-CCNC: 17 U/L (ref 2–50)
ANION GAP SERPL CALC-SCNC: 12 MMOL/L (ref 7–16)
AST SERPL-CCNC: 21 U/L (ref 12–45)
BASOPHILS # BLD AUTO: 1 % (ref 0–1.8)
BASOPHILS # BLD: 0.07 K/UL (ref 0–0.12)
BILIRUB SERPL-MCNC: 1.1 MG/DL (ref 0.1–1.5)
BUN SERPL-MCNC: 21 MG/DL (ref 8–22)
CALCIUM SERPL-MCNC: 9.5 MG/DL (ref 8.5–10.5)
CHLORIDE SERPL-SCNC: 103 MMOL/L (ref 96–112)
CO2 SERPL-SCNC: 24 MMOL/L (ref 20–33)
CREAT SERPL-MCNC: 1.01 MG/DL (ref 0.5–1.4)
EKG IMPRESSION: NORMAL
EOSINOPHIL # BLD AUTO: 0.11 K/UL (ref 0–0.51)
EOSINOPHIL NFR BLD: 1.5 % (ref 0–6.9)
ERYTHROCYTE [DISTWIDTH] IN BLOOD BY AUTOMATED COUNT: 44 FL (ref 35.9–50)
FLUAV RNA SPEC QL NAA+PROBE: NEGATIVE
FLUBV RNA SPEC QL NAA+PROBE: NEGATIVE
GLOBULIN SER CALC-MCNC: 2.8 G/DL (ref 1.9–3.5)
GLUCOSE SERPL-MCNC: 112 MG/DL (ref 65–99)
HCT VFR BLD AUTO: 51.5 % (ref 42–52)
HGB BLD-MCNC: 17.4 G/DL (ref 14–18)
IMM GRANULOCYTES # BLD AUTO: 0.06 K/UL (ref 0–0.11)
IMM GRANULOCYTES NFR BLD AUTO: 0.8 % (ref 0–0.9)
LYMPHOCYTES # BLD AUTO: 1.8 K/UL (ref 1–4.8)
LYMPHOCYTES NFR BLD: 25.2 % (ref 22–41)
MCH RBC QN AUTO: 30.3 PG (ref 27–33)
MCHC RBC AUTO-ENTMCNC: 33.8 G/DL (ref 33.7–35.3)
MCV RBC AUTO: 89.6 FL (ref 81.4–97.8)
MONOCYTES # BLD AUTO: 0.74 K/UL (ref 0–0.85)
MONOCYTES NFR BLD AUTO: 10.4 % (ref 0–13.4)
NEUTROPHILS # BLD AUTO: 4.35 K/UL (ref 1.82–7.42)
NEUTROPHILS NFR BLD: 61.1 % (ref 44–72)
NRBC # BLD AUTO: 0 K/UL
NRBC BLD-RTO: 0 /100 WBC
PLATELET # BLD AUTO: 226 K/UL (ref 164–446)
PMV BLD AUTO: 10.1 FL (ref 9–12.9)
POTASSIUM SERPL-SCNC: 4.2 MMOL/L (ref 3.6–5.5)
PROT SERPL-MCNC: 7.2 G/DL (ref 6–8.2)
RBC # BLD AUTO: 5.75 M/UL (ref 4.7–6.1)
RSV RNA SPEC QL NAA+PROBE: NEGATIVE
SARS-COV-2 RNA RESP QL NAA+PROBE: NOTDETECTED
SODIUM SERPL-SCNC: 139 MMOL/L (ref 135–145)
SPECIMEN SOURCE: NORMAL
TROPONIN T SERPL-MCNC: 10 NG/L (ref 6–19)
WBC # BLD AUTO: 7.1 K/UL (ref 4.8–10.8)

## 2021-12-17 PROCEDURE — 0241U HCHG SARS-COV-2 COVID-19 NFCT DS RESP RNA 4 TRGT MIC: CPT

## 2021-12-17 PROCEDURE — 85025 COMPLETE CBC W/AUTO DIFF WBC: CPT

## 2021-12-17 PROCEDURE — 93005 ELECTROCARDIOGRAM TRACING: CPT | Performed by: EMERGENCY MEDICINE

## 2021-12-17 PROCEDURE — 93005 ELECTROCARDIOGRAM TRACING: CPT

## 2021-12-17 PROCEDURE — 99284 EMERGENCY DEPT VISIT MOD MDM: CPT

## 2021-12-17 PROCEDURE — 700102 HCHG RX REV CODE 250 W/ 637 OVERRIDE(OP): Performed by: INTERNAL MEDICINE

## 2021-12-17 PROCEDURE — 80053 COMPREHEN METABOLIC PANEL: CPT

## 2021-12-17 PROCEDURE — 84484 ASSAY OF TROPONIN QUANT: CPT

## 2021-12-17 PROCEDURE — A9270 NON-COVERED ITEM OR SERVICE: HCPCS | Performed by: INTERNAL MEDICINE

## 2021-12-17 PROCEDURE — 71045 X-RAY EXAM CHEST 1 VIEW: CPT

## 2021-12-17 PROCEDURE — C9803 HOPD COVID-19 SPEC COLLECT: HCPCS | Performed by: EMERGENCY MEDICINE

## 2021-12-17 RX ORDER — DILTIAZEM HYDROCHLORIDE 120 MG/1
120 CAPSULE, COATED, EXTENDED RELEASE ORAL DAILY
Qty: 30 CAPSULE | Refills: 0 | Status: SHIPPED | OUTPATIENT
Start: 2021-12-17 | End: 2022-01-06

## 2021-12-17 RX ORDER — DILTIAZEM HYDROCHLORIDE 120 MG/1
120 CAPSULE, COATED, EXTENDED RELEASE ORAL
Status: DISCONTINUED | OUTPATIENT
Start: 2021-12-17 | End: 2021-12-17 | Stop reason: HOSPADM

## 2021-12-17 RX ADMIN — DILTIAZEM HYDROCHLORIDE 120 MG: 120 CAPSULE, COATED, EXTENDED RELEASE ORAL at 15:37

## 2021-12-17 ASSESSMENT — FIBROSIS 4 INDEX: FIB4 SCORE: 2.08

## 2021-12-17 NOTE — TELEPHONE ENCOUNTER
"Feels like he is short of breath at rest, when he moves up and around his heart rate seems normal. Oxygenation is ok but he \"feels\" like he can't quite get a good breath    Had pt take BP- 140/70, HR 38 on cuff    Per patient- stopped taking the flecainide about 3 weeks ago, per patient \"RO said I could\" believe there may be some confusion there, based on OVnote from 11/4/21.     Based on patient symptoms, advised for him to go to ER for immediate evaluation.     RO is rounding, notified via Voalte  "

## 2021-12-17 NOTE — ED NOTES
Pt to rm 8 from triage.  Agree with triage note.  Pt having trigeminal PVCs.  Denies , SOB.  ER Tech at bedside for IV insertion

## 2021-12-17 NOTE — ED TRIAGE NOTES
Chief Complaint   Patient presents with   • Bradycardia     Starting at 0500 this morning pt woke up feeling lightheaded and SOB. Pt states he checked and his O2 was 99, /70 and HR was 37. When patient was walking around today saw his heart rate come up to the 60's but at rest it lowers.      Pt has hx of a-fib and bigeminy. His cardiologist is Dr. Perez.  /66   Pulse (!) 36   Temp 35.9 °C (96.6 °F) (Temporal)   Resp 16   Ht 1.829 m (6')   Wt 83.9 kg (185 lb)   SpO2 96%   BMI 25.09 kg/m²   Pt placed back in lobby, educated on triage process, and told to inform staff of any change in condition.

## 2021-12-17 NOTE — DISCHARGE INSTRUCTIONS
Follow-up with Dr. Perez as scheduled on Monday, December 20th.      Return to the ER for any worsening palpitations, worsening lightheadedness or fatigue, chest pain, chest pressure, shortness of breath, unexplained sweating, or for any concerns.

## 2021-12-17 NOTE — TELEPHONE ENCOUNTER
CONSTANTINO Malik,    This patient is calling to state he is in Bradycardia since around 5am this morning. He says his pulse is around 36-38 per minute and his O2 is 96. He stated he's feeling really funny, weakness and would like a call back at [phone].      Thank you,    LAURENCE

## 2021-12-17 NOTE — ED PROVIDER NOTES
ED Provider Note    Scribed for Jodi Moe M.D. by Hakan Wilson. 12/17/2021  11:14 AM    Primary care provider: Temitope Gilbert M.D.  Means of arrival: Walk-In  History obtained from: Patient  History limited by: None    CHIEF COMPLAINT  Chief Complaint   Patient presents with   • Bradycardia     Starting at 0500 this morning pt woke up feeling lightheaded and SOB. Pt states he checked and his O2 was 99, /70 and HR was 37. When patient was walking around today saw his heart rate come up to the 60's but at rest it lowers.      HPI  Selma Avila is a 76 y.o. male with history of bigeminy who presents for evaluation of bradycardia onset 5:00 this morning. He states he woke up this morning feeling lightheaded with some slight shortness of breath and he measured his HR with his pulse oximeter which measured in the 30's. He admits to associated symptoms of general malaise and lightheadedness, but denies clamminess, diaphoresis, dizziness, body or muscle aches, chest pain, loss of taste or smell. He adds his shortness of breath is worsened when he lies down flat but is not worsened on exertion.  Patient reports that he actually feels better when he is up and moving around.  He reports he had a cold last week with a small fever and he still has nasal congestion. He denies being tested for COVID-19. No alleviating factors were reported. He denies vaccination against COVID-19 and states he believes he already had COVID-19 and now has natural immunity. He was started on flecainide after seeing his cardiologist, Dr. Perez, on November 4, 2021 for event monitor findings of bigeminy and nonsustained runs of SVT.  Patient reports he stopped taking Flecainide 3 weeks ago because he felt it was interfering with his sleep and he generally felt better off of it. He details he first cut the dose in half, saw no change in how he  slept or felt, so then stopped altogether and had felt better until today.    PPE  Note: I personally donned full PPE for all patient encounters during this visit, with an N95 respirator mask, gloves, gown, and goggles.     Scribe remained outside the patient's room and did not have any contact with the patient for the duration of patient encounter.     REVIEW OF SYSTEMS  Pertinent positives include bradycardia, shortness of breath, weakness, general malaise, and lightheadedness.   Pertinent negatives include no  clamminess, diaphoresis, dizziness, body or muscle aches, loss of taste or smel.    See HPI for further details. All other systems are negative.    PAST MEDICAL HISTORY   has a past medical history of Dyslipidemia (2018), Essential hypertension (2013), Mitral valve prolapse (2013), Non-rheumatic mitral regurgitation (2013), and Obstructive uropathy (2021).    FAMILY HISTORY  Family History   Problem Relation Age of Onset   • Cancer Mother         ovrian cancer   • Lung Disease Father    • Heart Disease Neg Hx        SOCIAL HISTORY  Social History     Tobacco Use   • Smoking status: Former Smoker     Quit date: 1963     Years since quittin.3   • Smokeless tobacco: Never Used   Vaping Use   • Vaping Use: Never used   Substance Use Topics   • Alcohol use: Not Currently     Alcohol/week: 0.6 oz     Types: 1 Cans of beer per week   • Drug use: No      Social History     Substance and Sexual Activity   Drug Use No       SURGICAL HISTORY  Past Surgical History:   Procedure Laterality Date   • FISTULA IN ANO REPAIR Left        CURRENT MEDICATIONS  Home Medications     Reviewed by Yesenia Root (Pharmacy Tech) on 21 at 1426  Med List Status: Complete   Medication Last Dose Status   ascorbic acid (ASCORBIC ACID) 500 MG Tab 2021 Active   celecoxib (CELEBREX) 200 MG Cap 2021 Active   DOCUSATE CALCIUM PO 2021 Active   flecainide (TAMBOCOR) 100 MG Tab >3 weeks Active   losartan (COZAAR) 100 MG Tab 2021 Active   Omega-3 Fatty  Acids (FISH OIL) 1200 MG Cap 12/16/2021 Active   tamsulosin (FLOMAX) 0.4 MG capsule 12/16/2021 Active   VITAMIN D PO 12/16/2021 Active                ALLERGIES  No Known Allergies    PHYSICAL EXAM  VITAL SIGNS: /66   Pulse 66   Temp 35.9 °C (96.6 °F) (Temporal)   Resp 16   Ht 1.829 m (6')   Wt 83.9 kg (185 lb)   SpO2 96%   BMI 25.09 kg/m²      Constitutional: Well developed, well nourished; No acute distress; Non-toxic appearance.   HENT: Normocephalic, atraumatic; Bilateral external ears normal; Oropharyngeal exam deferred to COVID-19 outbreak and lack of oropharyngeal complaints.   Eyes: PERRL, EOMI, Conjunctiva normal. No discharge.   Neck:  Supple, nontender midline; No stridor; No nuchal rigidity.   Lymphatic: No cervical lymphadenopathy noted.   Cardiovascular: Regular rate and rhythm with 3/6 systolic murmur heard best at the left lateral chest wall.  No rubs, or gallop.   Thorax & Lungs: No respiratory distress, breath sounds clear to auscultation bilaterally without wheezing, rales or rhonchi. Nontender chest wall. No crepitus or subcutaneous air  Abdomen: Soft, nontender, bowel sounds normal. No obvious masses; No pulsatile masses; no rebound, guarding, or peritoneal signs.   Skin: Good color; warm and dry without rash or petechia.  Back: Nontender, No CVA tenderness.   Extremities: Distal radial, dorsalis pedis, posterior tibial pulses are equal bilaterally; No edema; Nontender calves or saphenous, No cyanosis, No clubbing.   Musculoskeletal: Good range of motion in all major joints. No tenderness to palpation or major deformities noted.   Neurologic: Alert & oriented x 4, clear speech    EKG  12 Lead EKG interpreted by me as below.     LABS/RADIOLOGY/PROCEDURES  Results for orders placed or performed during the hospital encounter of 12/17/21   CBC with Differential   Result Value Ref Range    WBC 7.1 4.8 - 10.8 K/uL    RBC 5.75 4.70 - 6.10 M/uL    Hemoglobin 17.4 14.0 - 18.0 g/dL     Hematocrit 51.5 42.0 - 52.0 %    MCV 89.6 81.4 - 97.8 fL    MCH 30.3 27.0 - 33.0 pg    MCHC 33.8 33.7 - 35.3 g/dL    RDW 44.0 35.9 - 50.0 fL    Platelet Count 226 164 - 446 K/uL    MPV 10.1 9.0 - 12.9 fL    Neutrophils-Polys 61.10 44.00 - 72.00 %    Lymphocytes 25.20 22.00 - 41.00 %    Monocytes 10.40 0.00 - 13.40 %    Eosinophils 1.50 0.00 - 6.90 %    Basophils 1.00 0.00 - 1.80 %    Immature Granulocytes 0.80 0.00 - 0.90 %    Nucleated RBC 0.00 /100 WBC    Neutrophils (Absolute) 4.35 1.82 - 7.42 K/uL    Lymphs (Absolute) 1.80 1.00 - 4.80 K/uL    Monos (Absolute) 0.74 0.00 - 0.85 K/uL    Eos (Absolute) 0.11 0.00 - 0.51 K/uL    Baso (Absolute) 0.07 0.00 - 0.12 K/uL    Immature Granulocytes (abs) 0.06 0.00 - 0.11 K/uL    NRBC (Absolute) 0.00 K/uL   Complete Metabolic Panel (CMP)   Result Value Ref Range    Sodium 139 135 - 145 mmol/L    Potassium 4.2 3.6 - 5.5 mmol/L    Chloride 103 96 - 112 mmol/L    Co2 24 20 - 33 mmol/L    Anion Gap 12.0 7.0 - 16.0    Glucose 112 (H) 65 - 99 mg/dL    Bun 21 8 - 22 mg/dL    Creatinine 1.01 0.50 - 1.40 mg/dL    Calcium 9.5 8.5 - 10.5 mg/dL    AST(SGOT) 21 12 - 45 U/L    ALT(SGPT) 17 2 - 50 U/L    Alkaline Phosphatase 80 30 - 99 U/L    Total Bilirubin 1.1 0.1 - 1.5 mg/dL    Albumin 4.4 3.2 - 4.9 g/dL    Total Protein 7.2 6.0 - 8.2 g/dL    Globulin 2.8 1.9 - 3.5 g/dL    A-G Ratio 1.6 g/dL   Troponin   Result Value Ref Range    Troponin T 10 6 - 19 ng/L   ESTIMATED GFR   Result Value Ref Range    GFR If African American >60 >60 mL/min/1.73 m 2    GFR If Non African American >60 >60 mL/min/1.73 m 2   COV-2, FLU A/B, AND RSV BY PCR (2-4 HOURS CEPHEID): Collect NP swab in VTM    Specimen: Nasopharyngeal; Respirate   Result Value Ref Range    Influenza virus A RNA Negative Negative    Influenza virus B, PCR Negative Negative    RSV, PCR Negative Negative    SARS-CoV-2 by PCR NotDetected     SARS-CoV-2 Source NP Swab    EKG (NOW)   Result Value Ref Range    Report       [facility]  [facility] Emergency Dept.    Test Date:  2021  Pt Name:    MILLA HAMILTON                  Department: ER  MRN:        [id number]                      Room:  Gender:     Male                         Technician: [id number]  :        1945                   Requested By:ER TRIAGE PROTOCOL  Order #:    [id number]                    Reading MD: Jodi Moe    Measurements  Intervals                                Axis  Rate:       66                           P:          50  SD:         192                          QRS:        45  QRSD:       110                          T:          48  QT:         432  QTc:        453    Interpretive Statements  SINUS RHYTHM rate 66  Normal axis  Normal intervals  No ST elevation or depression  VENTRICULAR BIGEMINY  NONSPECIFIC INTRAVENTRICULAR CONDUCTION DELAY  ABNRM R PROG, CONSIDER ASMI OR LEAD PLACEMENT  Compared to ECG 2020 08:48:14  Ventricular premature complex(es) now present  Myocardial infarct finding now present  Livier ctronically Signed On 2021 11:17:38 PST by Jodi Moe       All labs reviewed by me.    DX-CHEST-PORTABLE (1 VIEW)   Final Result      No acute cardiac or pulmonary abnormalities are identified.        The radiologist's interpretation of all radiological studies have been reviewed by me.    COURSE & MEDICAL DECISION MAKING  Pertinent Labs & Imaging studies reviewed. (See chart for details)    Reviewed patient's old medical records which showed he saw Dr. Perez (Cardiology) in office in early  for follow-up for dizziness and bigeminy. He wore an event  which showed short non-sustatined runs of SVT and bigeminy. He was placed on Flecainide for his bigeminy. He called the Cardiology office to tell them his pulse was 36-38 bpm. The patient stopped taking Flecainide 3 weeks ago. The Cardiology office states there is probably some confusion there. Dr. Perez was notified via [employer] that the patient was coming in to the ER.    11:14 AM -  Patient seen and examined at bedside. Discussed plan of care, including EKG, labs, and imaging to evaluate his symptoms. Patient agrees to the plan of care.     1:40 PM - Cardiology paged.    1:58 PM I discussed the patient's case and the above findings with Dr. Perez (Cardiology) who will assess the patient.    3:26 PM - Nursing staff informed me the patient is frustrated about not being seen or discharged. I informed the patient I am waiting to hear back from the Cardiologist about his medications prior to discharge and will update him as soon as I hear back.     3:41 PM - I spoke with Dr. Perez who said to give the patient 120 mg of Cardizem now and to send him home with 120 mg of Cardizem. He will be seen in the office on Monday.     3:40 PM - I reevaluated the patient at bedside. I discussed plan for discharge and follow up as outlined below. The patient verbalizes they feel comfortable going home. The patient is stable for discharge at this time and will return for any new or worsening symptoms. Patient verbalizes understanding and support with my plan for discharge.       Patient presents to the ER with complaint of bradycardia which he noted on his pulse oximeter today at home.  Patient has history of bigeminy.  He was seen by Dr. Perez in the cardiology office on November 4.  He had just done a event monitor and he was found to have nonsustained episodes of SVT and bigeminy.  He was placed on flecainide.  The patient said that 3 weeks ago he decreased his dose of flecainide by a half because he says the flecainide was causing him to not sleep very well.  However, cutting the dose down by half still cause some sleep disturbance so he stopped taking it altogether.  He arrives in the ER in [facility].  He describes feeling lightheaded and a little short of breath, particularly when he walks around.  Here in the ER the patient is well-appearing.  He says he feels better here in the ER.  He is very anxious to go  home.  He is clearly in bigeminy most of his ER stay.  There are moments in which he goes back into a pretty consistent sinus rhythm, but for most part he is in bigeminy.  I spoke with Dr. Perez, patient's cardiologist.  He has kindly come to the ER to see the patient.  It is likely that the patient was getting bradycardia on his pulse oximeter because the PVCs/bigeminy beats were not perfusing.  He has no chest pain.  Troponin is negative.  There is no ST elevation or depression on his EKG.  At this time no concern for STEMI or non-STEMI.  Last week patient had what he thought was a cold.  He is not vaccinated for COVID and tells me that someone can have to hold a gun to his head in order to get vaccinated.  His cold symptoms have since improved, but I did go ahead and tested for Covid.  He is Covid negative at this time.  Dr. Max feels the patient is safe and stable for discharge from the ER.  He wrote for the patient to get 120 mg dose of diltiazem before discharge from the ER.  He is going to see the patient in his office on Monday.  He asked that I prescribed the patient 120 mg of diltiazem daily out of the emergency department.  Patient's vital signs are normal stable.  He is not in any distress.  Patient's cardiologist feels he is safe for outpatient management and discharge home.  He has been given strict return precautions and discharge instructions and he understands treatment plan and follow-up.    The patient will return for new or worsening symptoms and is stable at the time of discharge.    The patient is referred to a primary physician for blood pressure management, diabetic screening, and for all other preventative health concerns.    DISPOSITION:  Patient will be discharged home in stable condition.    FOLLOW UP:  Edgar Perez M.D.  [address]  [address]  [facility] [address]  [phone]    Go on 12/20/2021  If symptoms worsen return to ER    OUTPATIENT MEDICATIONS:  Discharge Medication  List as of 12/17/2021  3:47 PM      START taking these medications    Details   DILTIAZem CD (CARDIZEM CD) 120 MG CAPSULE SR 24 HR Take 1 Capsule by mouth every day for 30 days., Disp-30 Capsule, R-0, Normal           FINAL IMPRESSION  1. Bigeminy Acute       Hakan BAZAN (Scribe), am scribing for, and in the presence of, Jodi Moe M.D..    Electronically signed by: Hakan Wilson (Scribe), 12/17/2021    Jodi BAZAN M.D. personally performed the services described in this documentation, as scribed by Hakan Wilson in my presence, and it is both accurate and complete.    This dictation has been created using voice recognition software. The accuracy of the dictation is limited by the abilities of the software. I expect there may be some errors of grammar and possibly content. I made every attempt to manually correct the errors within my dictation. However, errors related to voice recognition software may still exist and should be interpreted within the appropriate context.    The note accurately reflects work and decisions made by me.  Jodi Moe M.D.  12/17/2021  5:37 PM

## 2021-12-20 ENCOUNTER — TELEPHONE (OUTPATIENT)
Dept: CARDIOLOGY | Facility: MEDICAL CENTER | Age: 76
End: 2021-12-20

## 2021-12-20 ENCOUNTER — OFFICE VISIT (OUTPATIENT)
Dept: CARDIOLOGY | Facility: MEDICAL CENTER | Age: 76
End: 2021-12-20
Payer: MEDICARE

## 2021-12-20 VITALS
OXYGEN SATURATION: 99 % | BODY MASS INDEX: 25.06 KG/M2 | WEIGHT: 185 LBS | SYSTOLIC BLOOD PRESSURE: 112 MMHG | HEIGHT: 72 IN | DIASTOLIC BLOOD PRESSURE: 50 MMHG | HEART RATE: 48 BPM

## 2021-12-20 DIAGNOSIS — I10 ESSENTIAL HYPERTENSION: ICD-10-CM

## 2021-12-20 DIAGNOSIS — I49.3 PVC (PREMATURE VENTRICULAR CONTRACTION): ICD-10-CM

## 2021-12-20 DIAGNOSIS — I34.1 MITRAL VALVE PROLAPSE: ICD-10-CM

## 2021-12-20 DIAGNOSIS — E78.2 MIXED HYPERLIPIDEMIA: ICD-10-CM

## 2021-12-20 DIAGNOSIS — I34.0 NON-RHEUMATIC MITRAL REGURGITATION: ICD-10-CM

## 2021-12-20 DIAGNOSIS — R09.89 CHEST RALES: ICD-10-CM

## 2021-12-20 PROCEDURE — 99215 OFFICE O/P EST HI 40 MIN: CPT | Performed by: INTERNAL MEDICINE

## 2021-12-20 RX ORDER — DRONEDARONE 400 MG/1
400 TABLET, FILM COATED ORAL 2 TIMES DAILY WITH MEALS
Qty: 60 TABLET | Refills: 11 | Status: SHIPPED | OUTPATIENT
Start: 2021-12-20 | End: 2022-01-06

## 2021-12-20 RX ORDER — PROPAFENONE HYDROCHLORIDE 150 MG/1
150 TABLET, COATED ORAL EVERY 8 HOURS
Qty: 90 TABLET | Refills: 3 | Status: SHIPPED | OUTPATIENT
Start: 2021-12-20 | End: 2021-12-20 | Stop reason: CLARIF

## 2021-12-20 ASSESSMENT — ENCOUNTER SYMPTOMS
SORE THROAT: 0
PND: 0
SPUTUM PRODUCTION: 0
CONSTITUTIONAL NEGATIVE: 1
CHILLS: 0
RESPIRATORY NEGATIVE: 1
STRIDOR: 0
EYES NEGATIVE: 1
NEUROLOGICAL NEGATIVE: 1
WEAKNESS: 0
DIZZINESS: 0
SHORTNESS OF BREATH: 0
LOSS OF CONSCIOUSNESS: 0
GASTROINTESTINAL NEGATIVE: 1
CARDIOVASCULAR NEGATIVE: 1
BRUISES/BLEEDS EASILY: 0
CLAUDICATION: 0
ORTHOPNEA: 0
HEMOPTYSIS: 0
WHEEZING: 0
FEVER: 0
MUSCULOSKELETAL NEGATIVE: 1
COUGH: 0
PALPITATIONS: 0

## 2021-12-20 ASSESSMENT — FIBROSIS 4 INDEX: FIB4 SCORE: 1.69

## 2021-12-20 NOTE — PROGRESS NOTES
No chief complaint on file.      Subjective:   Jenny Avila is a 75 y.o. male who presents today as a follow-up for his dizziness lightheadedness and bigeminy.  He wore a event recorder that showed short nonsustained runs of SVT as well as bigeminy.    Since I last saw him in the ER he continues to have palpitations.  He is bothered by them.  He did do on the flecainide.  The diltiazem is dropping his blood pressure.  He is leaving for [place] today to be supportive to his daughter whose  is passively from brain cancer.    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      Family History   Problem Relation Age of Onset   • Cancer Mother         ovrian cancer   • Lung Disease Father    • Heart Disease Neg Hx      Social History     Socioeconomic History   • Marital status:      Spouse name: Not on file   • Number of children: Not on file   • Years of education: Not on file   • Highest education level: Not on file   Occupational History   • Not on file   Tobacco Use   • Smoking status: Former Smoker     Quit date: 1963     Years since quittin.3   • Smokeless tobacco: Never Used   Vaping Use   • Vaping Use: Never used   Substance and Sexual Activity   • Alcohol use: Not Currently     Alcohol/week: 0.6 oz     Types: 1 Cans of beer per week   • Drug use: No   • Sexual activity: Not on file     Comment:    Other Topics Concern   • Not on file   Social History Narrative   • Not on file     Social Determinants of Health     Financial Resource Strain:    • Difficulty of Paying Living Expenses: Not on file   Food Insecurity:    • Worried About Running Out of Food in the Last Year: Not on file   • Ran Out of Food in the Last Year: Not  on file   Transportation Needs:    • Lack of Transportation (Medical): Not on file   • Lack of Transportation (Non-Medical): Not on file   Physical Activity:    • Days of Exercise per Week: Not on file   • Minutes of Exercise per Session: Not on file   Stress:    • Feeling of Stress : Not on file   Social Connections:    • Frequency of Communication with Friends and Family: Not on file   • Frequency of Social Gatherings with Friends and Family: Not on file   • Attends [employer] Services: Not on file   • Active Member of Clubs or Organizations: Not on file   • Attends Club or Organization Meetings: Not on file   • Marital Status: Not on file   Intimate Partner Violence:    • Fear of Current or Ex-Partner: Not on file   • Emotionally Abused: Not on file   • Physically Abused: Not on file   • Sexually Abused: Not on file   Housing Stability:    • Unable to Pay for Housing in the Last Year: Not on file   • Number of Places Lived in the Last Year: Not on file   • Unstable Housing in the Last Year: Not on file     No Known Allergies  Outpatient Encounter Medications as of 12/20/2021   Medication Sig Dispense Refill   • dronedarone (MULTAQ) 400 MG Tab Take 1 Tablet by mouth 2 times a day with meals. 60 Tablet 11   • [DISCONTINUED] propafenone (RYTHMOL) 150 MG Tab Take 1 Tablet by mouth every 8 hours. 90 Tablet 3   • DOCUSATE CALCIUM PO Take 200 mg by mouth every day.     • VITAMIN D PO Take 6,000 Units by mouth every day.     • DILTIAZem CD (CARDIZEM CD) 120 MG CAPSULE SR 24 HR Take 1 Capsule by mouth every day for 30 days. 30 Capsule 0   • losartan (COZAAR) 100 MG Tab TAKE 1 TABLET BY MOUTH DAILY 100 Tablet 3   • celecoxib (CELEBREX) 200 MG Cap Take 1 Cap by mouth every day. 60 Cap 11   • tamsulosin (FLOMAX) 0.4 MG capsule Take 0.4 mg by mouth every day.     • Omega-3 Fatty Acids (FISH OIL) 1200 MG Cap Take 1,200 mg by mouth every day.     • ascorbic acid (ASCORBIC ACID) 500 MG Tab Take 1,000 mg by mouth every day.     •  [DISCONTINUED] flecainide (TAMBOCOR) 100 MG Tab TAKE 1 TABLET BY MOUTH TWICE A DAY (Patient not taking: Reported on 12/20/2021) 180 Tablet 3     No facility-administered encounter medications on file as of 12/20/2021.     Review of Systems   Constitutional: Negative.  Negative for chills, fever and malaise/fatigue.   HENT: Negative.  Negative for sore throat.    Eyes: Negative.    Respiratory: Negative.  Negative for cough, hemoptysis, sputum production, shortness of breath, wheezing and stridor.    Cardiovascular: Negative.  Negative for chest pain, palpitations, orthopnea, claudication, leg swelling and PND.   Gastrointestinal: Negative.    Genitourinary: Negative.    Musculoskeletal: Negative.    Skin: Negative.    Neurological: Negative.  Negative for dizziness, loss of consciousness and weakness.   Endo/Heme/Allergies: Negative.  Does not bruise/bleed easily.   All other systems reviewed and are negative.       Objective:   /50 (BP Location: Left arm, Patient Position: Sitting)   Pulse (!) 48   Ht 1.829 m (6')   Wt 83.9 kg (185 lb)   SpO2 99%   BMI 25.09 kg/m²     Physical Exam  Vitals and nursing note reviewed.   Constitutional:       General: He is not in acute distress.     Appearance: He is well-developed. He is not diaphoretic.   HENT:      Head: Normocephalic and atraumatic.      Right Ear: External ear normal.      Left Ear: External ear normal.      Nose: Nose normal.      Mouth/Throat:      Pharynx: No oropharyngeal exudate.   Eyes:      General: No scleral icterus.        Right eye: No discharge.         Left eye: No discharge.      Conjunctiva/sclera: Conjunctivae normal.      Pupils: Pupils are equal, round, and reactive to light.   Neck:      Vascular: No JVD.   Cardiovascular:      Rate and Rhythm: Normal rate and regular rhythm.      Heart sounds: Murmur heard.   No friction rub. No gallop.    Pulmonary:      Effort: Pulmonary effort is normal. No respiratory distress.      Breath  sounds: No stridor. No wheezing or rales.   Chest:      Chest wall: No tenderness.   Abdominal:      General: There is no distension.      Palpations: Abdomen is soft.      Tenderness: There is no guarding.   Musculoskeletal:         General: No tenderness or deformity. Normal range of motion.      Cervical back: Neck supple.   Skin:     General: Skin is warm and dry.      Coloration: Skin is not pale.      Findings: No erythema or rash.   Neurological:      Mental Status: He is alert.      Cranial Nerves: No cranial nerve deficit.      Motor: No abnormal muscle tone.      Coordination: Coordination normal.      Deep Tendon Reflexes: Reflexes are normal and symmetric. Reflexes normal.   Psychiatric:         Behavior: Behavior normal.         Thought Content: Thought content normal.         Judgment: Judgment normal.     Coronary calcium score: Dated 2019 personally viewed to myself showing a calcium score of 0.    Echocardiogram: Dated 5/6/2020 personally viewed inter myself showing normal LV systolic function without valvular heart disease.    Lab Results   Component Value Date/Time    CHOLSTRLTOT 181 06/15/2021 07:55 AM     (H) 06/15/2021 07:55 AM    HDL 46 06/15/2021 07:55 AM    TRIGLYCERIDE 72 06/15/2021 07:55 AM       Lab Results   Component Value Date/Time    SODIUM 139 12/17/2021 10:34 AM    POTASSIUM 4.2 12/17/2021 10:34 AM    CHLORIDE 103 12/17/2021 10:34 AM    CO2 24 12/17/2021 10:34 AM    GLUCOSE 112 (H) 12/17/2021 10:34 AM    BUN 21 12/17/2021 10:34 AM    CREATININE 1.01 12/17/2021 10:34 AM     Lab Results   Component Value Date/Time    ALKPHOSPHAT 80 12/17/2021 10:34 AM    ASTSGOT 21 12/17/2021 10:34 AM    ALTSGPT 17 12/17/2021 10:34 AM    TBILIRUBIN 1.1 12/17/2021 10:34 AM          Assessment:     1. Mitral valve prolapse  dronedarone (MULTAQ) 400 MG Tab   2. Non-rheumatic mitral regurgitation  dronedarone (MULTAQ) 400 MG Tab    REFERRAL TO CARDIOLOGY   3. Chest rales  dronedarone (MULTAQ) 400  MG Tab   4. Essential hypertension     5. Mixed hyperlipidemia  REFERRAL TO CARDIOLOGY    DISCONTINUED: propafenone (RYTHMOL) 150 MG Tab   6. PVC (premature ventricular contraction)  dronedarone (MULTAQ) 400 MG Tab    REFERRAL TO CARDIOLOGY    DISCONTINUED: propafenone (RYTHMOL) 150 MG Tab       Medical Decision Making:  Today's Assessment / Status / Plan:     75-year-old male with preoperative stratification prior to a revision of his fistula.  For symptomatic bigeminy we will start him on Multaq.  We will stop his diltiazem.  We will refer him to EP for ablation.  We will see him back in 3 months.

## 2021-12-20 NOTE — TELEPHONE ENCOUNTER
CONSTANTINO Malik,    This patient called and wanted to speak to you regarding your call and script you were giving him. He states he's currently at [facility] on [facility] And the pharmacy has no information on that script you told him about. Can you please call him back at [phone].      Thank you,    LAURENCE

## 2021-12-20 NOTE — TELEPHONE ENCOUNTER
S/W [facility] and pt, aware of Multaq being in system, pt has two different profiles with [facility]. , likely caused the issue

## 2022-01-04 ENCOUNTER — HOSPITAL ENCOUNTER (OUTPATIENT)
Dept: CARDIOLOGY | Facility: MEDICAL CENTER | Age: 77
End: 2022-01-04
Attending: INTERNAL MEDICINE
Payer: MEDICARE

## 2022-01-04 DIAGNOSIS — I34.0 NON-RHEUMATIC MITRAL REGURGITATION: ICD-10-CM

## 2022-01-04 DIAGNOSIS — I34.1 MITRAL VALVE PROLAPSE: ICD-10-CM

## 2022-01-04 PROCEDURE — 93306 TTE W/DOPPLER COMPLETE: CPT

## 2022-01-05 LAB
LV EJECT FRACT  99904: 70
LV EJECT FRACT MOD 2C 99903: 72.71
LV EJECT FRACT MOD 4C 99902: 82.67
LV EJECT FRACT MOD BP 99901: 79.15

## 2022-01-05 PROCEDURE — 93306 TTE W/DOPPLER COMPLETE: CPT | Mod: 26 | Performed by: INTERNAL MEDICINE

## 2022-01-06 ENCOUNTER — TELEPHONE (OUTPATIENT)
Dept: CARDIOLOGY | Facility: MEDICAL CENTER | Age: 77
End: 2022-01-06

## 2022-01-06 ENCOUNTER — OFFICE VISIT (OUTPATIENT)
Dept: CARDIOLOGY | Facility: MEDICAL CENTER | Age: 77
End: 2022-01-06
Payer: MEDICARE

## 2022-01-06 VITALS
BODY MASS INDEX: 26.41 KG/M2 | SYSTOLIC BLOOD PRESSURE: 120 MMHG | OXYGEN SATURATION: 94 % | DIASTOLIC BLOOD PRESSURE: 82 MMHG | RESPIRATION RATE: 18 BRPM | HEIGHT: 72 IN | HEART RATE: 85 BPM | WEIGHT: 195 LBS

## 2022-01-06 DIAGNOSIS — I49.3 PVC (PREMATURE VENTRICULAR CONTRACTION): ICD-10-CM

## 2022-01-06 DIAGNOSIS — I10 ESSENTIAL HYPERTENSION: ICD-10-CM

## 2022-01-06 DIAGNOSIS — I49.8 BIGEMINY: ICD-10-CM

## 2022-01-06 DIAGNOSIS — I34.1 MITRAL VALVE PROLAPSE: ICD-10-CM

## 2022-01-06 PROCEDURE — 93000 ELECTROCARDIOGRAM COMPLETE: CPT | Performed by: INTERNAL MEDICINE

## 2022-01-06 PROCEDURE — 99214 OFFICE O/P EST MOD 30 MIN: CPT | Performed by: INTERNAL MEDICINE

## 2022-01-06 ASSESSMENT — FIBROSIS 4 INDEX: FIB4 SCORE: 1.69

## 2022-01-06 NOTE — TELEPHONE ENCOUNTER
OPO      OPO was completed and faxed to [facility]  Phone:[phone]  Fax: [phone]      Confirmation fax received

## 2022-01-06 NOTE — PROGRESS NOTES
Arrhythmia Clinic Note (New Patient)    DOS: 2022    Referring physician: Edgar Perez    Chief complaint/Reason for consult: PVCs    HPI:  Pt is a 75 yo M. History of HTN and MVP with at least moderate MR. He also has a history of anal fistula. Sees Dr. Perez initially for symptomatic PVCs. Tried antiarrhythmics in form of flec which gave him headaches and more recently Multaq which he failed to tolerate due to fatigue. He says he feels much better without antiarrhythmic and do all the normal activities. He does feel the irregularity in his heart beat and skipped beats, particularly during bedtime hours and when he wakes up in the morning, but overall not extremely bothersome to him. Referred for potential ablation.    ROS (+in BOLD):  Constitutional: Fevers/chills/fatigue/weightloss  HEENT: Blurry vision/eye pain/sore throat/hearing loss  Respiratory: Shortness of breath/cough  Cardiovascular: Chest pain/palpitations/edema/orthopnea/syncope  GI: Nausea/vomitting/diarrhea  MSK: Arthralgias/myagias/muscle weakness  Skin: Rash/sores  Neurological: Numbness/tremors/vertigo  Endocrine: Excessive thirst/polyuria/cold intolerance/heat intolerance  Psych: Depression/anxiety      Past Medical History:   Diagnosis Date   • Dyslipidemia 2018   • Essential hypertension 2013   • Mitral valve prolapse 2013   • Non-rheumatic mitral regurgitation 2013   • Obstructive uropathy 2021       Past Surgical History:   Procedure Laterality Date   • FISTULA IN ANO REPAIR Left        Social History     Socioeconomic History   • Marital status:      Spouse name: Not on file   • Number of children: Not on file   • Years of education: Not on file   • Highest education level: Not on file   Occupational History   • Not on file   Tobacco Use   • Smoking status: Former Smoker     Quit date: 1963     Years since quittin.3   • Smokeless tobacco: Never Used   Vaping Use   • Vaping Use: Never used    Substance and Sexual Activity   • Alcohol use: Not Currently     Alcohol/week: 0.6 oz     Types: 1 Cans of beer per week   • Drug use: No   • Sexual activity: Not on file     Comment:    Other Topics Concern   • Not on file   Social History Narrative   • Not on file     Social Determinants of Health     Financial Resource Strain:    • Difficulty of Paying Living Expenses: Not on file   Food Insecurity:    • Worried About Running Out of Food in the Last Year: Not on file   • Ran Out of Food in the Last Year: Not on file   Transportation Needs:    • Lack of Transportation (Medical): Not on file   • Lack of Transportation (Non-Medical): Not on file   Physical Activity:    • Days of Exercise per Week: Not on file   • Minutes of Exercise per Session: Not on file   Stress:    • Feeling of Stress : Not on file   Social Connections:    • Frequency of Communication with Friends and Family: Not on file   • Frequency of Social Gatherings with Friends and Family: Not on file   • Attends [employer] Services: Not on file   • Active Member of Clubs or Organizations: Not on file   • Attends Club or Organization Meetings: Not on file   • Marital Status: Not on file   Intimate Partner Violence:    • Fear of Current or Ex-Partner: Not on file   • Emotionally Abused: Not on file   • Physically Abused: Not on file   • Sexually Abused: Not on file   Housing Stability:    • Unable to Pay for Housing in the Last Year: Not on file   • Number of Places Lived in the Last Year: Not on file   • Unstable Housing in the Last Year: Not on file       Family History   Problem Relation Age of Onset   • Cancer Mother         ovrian cancer   • Lung Disease Father    • Heart Disease Neg Hx        No Known Allergies    Current Outpatient Medications   Medication Sig Dispense Refill   • DOCUSATE CALCIUM PO Take 200 mg by mouth every day.     • VITAMIN D PO Take 6,000 Units by mouth every day.     • losartan (COZAAR) 100 MG Tab TAKE 1 TABLET BY  MOUTH DAILY 100 Tablet 3   • celecoxib (CELEBREX) 200 MG Cap Take 1 Cap by mouth every day. 60 Cap 11   • tamsulosin (FLOMAX) 0.4 MG capsule Take 0.4 mg by mouth every day.     • Omega-3 Fatty Acids (FISH OIL) 1200 MG Cap Take 1,200 mg by mouth every day.       No current facility-administered medications for this visit.       Physical Exam:  Vitals:    01/06/22 0817   BP: 120/82   BP Location: Left arm   Patient Position: Sitting   BP Cuff Size: Adult   Pulse: 85   Resp: 18   SpO2: 94%   Weight: 88.5 kg (195 lb)   Height: 1.829 m (6')     General appearance: NAD, conversant  Eyes: anicteric sclerae, no lid-lag; PERRLA  HENT: Atraumatic; moist mucous membranes, no ulcerations  Neck: Trachea midline; FROM, supple, no thyromegaly  Lungs: CTA, with normal respiratory effort and no intercostal retractions  CV: Irregular, 3/6 systolic murmur, no JVD  Abdomen: Soft, non-tender; normal bowel sounds, no HSM  Extremities: No peripheral edema. No clubbing or cyanosis.  Skin: Normal temperature, turgor and texture; no rash or ulcers  Psych: Appropriate affect, alert and oriented to person, place and time      Data:  Labs reviewed    Prior echo/stress reviewed:  Preserved LV function, significant prolapse of anterior mitral valve leaflet and at least moderate MR    EKG interpreted by me:  Sinus, PVCs    Impression/Plan:  1. PVC (premature ventricular contraction)  EKG    OVERNIGHT PULSE OXIMETRY   2. Essential hypertension     3. Mitral valve prolapse     4. Bigeminy       -PVC of interest with RBBB, positive across precordium, inferior axis, coming from near anterior mitral valve annulus  -Quite possibly could be related to the prolapse  -I discussed with him risks/benefits/alternatives to ablation and discussed that this would be based on how much symptoms bothered him  -Also possible addressing the valve may address PVC  -He will think about his options  -OPO to screen for apnea    April Craig MD

## 2022-01-12 LAB — EKG IMPRESSION: NORMAL

## 2022-02-07 RX ORDER — CELECOXIB 200 MG/1
CAPSULE ORAL
Qty: 60 CAPSULE | Refills: 10 | Status: SHIPPED | OUTPATIENT
Start: 2022-02-07 | End: 2023-03-15

## 2022-02-08 ENCOUNTER — PATIENT MESSAGE (OUTPATIENT)
Dept: HEALTH INFORMATION MANAGEMENT | Facility: OTHER | Age: 77
End: 2022-02-08
Payer: MEDICARE

## 2022-03-21 ENCOUNTER — TELEPHONE (OUTPATIENT)
Dept: HEALTH INFORMATION MANAGEMENT | Facility: OTHER | Age: 77
End: 2022-03-21

## 2022-03-21 ENCOUNTER — OFFICE VISIT (OUTPATIENT)
Dept: URGENT CARE | Facility: CLINIC | Age: 77
End: 2022-03-21
Payer: MEDICARE

## 2022-03-21 VITALS
BODY MASS INDEX: 25.73 KG/M2 | RESPIRATION RATE: 18 BRPM | DIASTOLIC BLOOD PRESSURE: 72 MMHG | HEART RATE: 68 BPM | TEMPERATURE: 97 F | OXYGEN SATURATION: 95 % | SYSTOLIC BLOOD PRESSURE: 130 MMHG | WEIGHT: 190 LBS | HEIGHT: 72 IN

## 2022-03-21 DIAGNOSIS — R05.9 COUGH: ICD-10-CM

## 2022-03-21 DIAGNOSIS — J01.00 ACUTE NON-RECURRENT MAXILLARY SINUSITIS: ICD-10-CM

## 2022-03-21 PROCEDURE — 99214 OFFICE O/P EST MOD 30 MIN: CPT | Performed by: PHYSICIAN ASSISTANT

## 2022-03-21 RX ORDER — AMOXICILLIN AND CLAVULANATE POTASSIUM 875; 125 MG/1; MG/1
1 TABLET, FILM COATED ORAL 2 TIMES DAILY
Qty: 14 TABLET | Refills: 0 | Status: SHIPPED | OUTPATIENT
Start: 2022-03-21 | End: 2022-03-28

## 2022-03-21 RX ORDER — DEXTROMETHORPHAN HYDROBROMIDE AND PROMETHAZINE HYDROCHLORIDE 15; 6.25 MG/5ML; MG/5ML
5 SYRUP ORAL EVERY 4 HOURS PRN
Qty: 120 ML | Refills: 0 | Status: SHIPPED | OUTPATIENT
Start: 2022-03-21 | End: 2022-03-30

## 2022-03-21 RX ORDER — FLUTICASONE PROPIONATE 50 MCG
1 SPRAY, SUSPENSION (ML) NASAL DAILY
Qty: 16 G | Refills: 0 | Status: SHIPPED | OUTPATIENT
Start: 2022-03-21 | End: 2022-03-30

## 2022-03-21 RX ORDER — MULTIVITAMIN WITH IRON
125 TABLET ORAL DAILY
COMMUNITY

## 2022-03-21 ASSESSMENT — FIBROSIS 4 INDEX: FIB4 SCORE: 1.69

## 2022-03-21 ASSESSMENT — ENCOUNTER SYMPTOMS
SHORTNESS OF BREATH: 0
WHEEZING: 0
SORE THROAT: 1
FEVER: 1
COUGH: 1
SPUTUM PRODUCTION: 1
SINUS PAIN: 1

## 2022-03-29 ENCOUNTER — HOSPITAL ENCOUNTER (OUTPATIENT)
Dept: LAB | Facility: MEDICAL CENTER | Age: 77
End: 2022-03-29
Attending: FAMILY MEDICINE
Payer: MEDICARE

## 2022-03-29 DIAGNOSIS — E78.2 MIXED HYPERLIPIDEMIA: ICD-10-CM

## 2022-03-29 DIAGNOSIS — I10 ESSENTIAL HYPERTENSION: ICD-10-CM

## 2022-03-29 LAB
ALBUMIN SERPL BCP-MCNC: 4.4 G/DL (ref 3.2–4.9)
ALBUMIN/GLOB SERPL: 2 G/DL
ALP SERPL-CCNC: 78 U/L (ref 30–99)
ALT SERPL-CCNC: 19 U/L (ref 2–50)
ANION GAP SERPL CALC-SCNC: 9 MMOL/L (ref 7–16)
AST SERPL-CCNC: 25 U/L (ref 12–45)
BASOPHILS # BLD AUTO: 0.8 % (ref 0–1.8)
BASOPHILS # BLD: 0.06 K/UL (ref 0–0.12)
BILIRUB SERPL-MCNC: 1 MG/DL (ref 0.1–1.5)
BUN SERPL-MCNC: 23 MG/DL (ref 8–22)
CALCIUM SERPL-MCNC: 9.6 MG/DL (ref 8.5–10.5)
CHLORIDE SERPL-SCNC: 105 MMOL/L (ref 96–112)
CHOLEST SERPL-MCNC: 168 MG/DL (ref 100–199)
CO2 SERPL-SCNC: 27 MMOL/L (ref 20–33)
CREAT SERPL-MCNC: 1.03 MG/DL (ref 0.5–1.4)
EOSINOPHIL # BLD AUTO: 0.15 K/UL (ref 0–0.51)
EOSINOPHIL NFR BLD: 2.1 % (ref 0–6.9)
ERYTHROCYTE [DISTWIDTH] IN BLOOD BY AUTOMATED COUNT: 45.6 FL (ref 35.9–50)
FASTING STATUS PATIENT QL REPORTED: NORMAL
GFR SERPLBLD CREATININE-BSD FMLA CKD-EPI: 75 ML/MIN/1.73 M 2
GLOBULIN SER CALC-MCNC: 2.2 G/DL (ref 1.9–3.5)
GLUCOSE SERPL-MCNC: 89 MG/DL (ref 65–99)
HCT VFR BLD AUTO: 51 % (ref 42–52)
HDLC SERPL-MCNC: 48 MG/DL
HGB BLD-MCNC: 16.8 G/DL (ref 14–18)
IMM GRANULOCYTES # BLD AUTO: 0.01 K/UL (ref 0–0.11)
IMM GRANULOCYTES NFR BLD AUTO: 0.1 % (ref 0–0.9)
LDLC SERPL CALC-MCNC: 103 MG/DL
LYMPHOCYTES # BLD AUTO: 2.25 K/UL (ref 1–4.8)
LYMPHOCYTES NFR BLD: 31.4 % (ref 22–41)
MCH RBC QN AUTO: 29.9 PG (ref 27–33)
MCHC RBC AUTO-ENTMCNC: 32.9 G/DL (ref 33.7–35.3)
MCV RBC AUTO: 90.9 FL (ref 81.4–97.8)
MONOCYTES # BLD AUTO: 0.6 K/UL (ref 0–0.85)
MONOCYTES NFR BLD AUTO: 8.4 % (ref 0–13.4)
NEUTROPHILS # BLD AUTO: 4.09 K/UL (ref 1.82–7.42)
NEUTROPHILS NFR BLD: 57.2 % (ref 44–72)
NRBC # BLD AUTO: 0 K/UL
NRBC BLD-RTO: 0 /100 WBC
PLATELET # BLD AUTO: 214 K/UL (ref 164–446)
PMV BLD AUTO: 11.2 FL (ref 9–12.9)
POTASSIUM SERPL-SCNC: 5.2 MMOL/L (ref 3.6–5.5)
PROT SERPL-MCNC: 6.6 G/DL (ref 6–8.2)
RBC # BLD AUTO: 5.61 M/UL (ref 4.7–6.1)
SODIUM SERPL-SCNC: 141 MMOL/L (ref 135–145)
TRIGL SERPL-MCNC: 83 MG/DL (ref 0–149)
TSH SERPL DL<=0.005 MIU/L-ACNC: 2.82 UIU/ML (ref 0.38–5.33)
WBC # BLD AUTO: 7.2 K/UL (ref 4.8–10.8)

## 2022-03-29 PROCEDURE — 84443 ASSAY THYROID STIM HORMONE: CPT

## 2022-03-29 PROCEDURE — 85025 COMPLETE CBC W/AUTO DIFF WBC: CPT

## 2022-03-29 PROCEDURE — 36415 COLL VENOUS BLD VENIPUNCTURE: CPT

## 2022-03-29 PROCEDURE — 80053 COMPREHEN METABOLIC PANEL: CPT

## 2022-03-29 PROCEDURE — 80061 LIPID PANEL: CPT

## 2022-03-30 ENCOUNTER — OFFICE VISIT (OUTPATIENT)
Dept: CARDIOLOGY | Facility: MEDICAL CENTER | Age: 77
End: 2022-03-30
Payer: MEDICARE

## 2022-03-30 VITALS
HEART RATE: 80 BPM | HEIGHT: 72 IN | WEIGHT: 200.8 LBS | DIASTOLIC BLOOD PRESSURE: 62 MMHG | BODY MASS INDEX: 27.2 KG/M2 | RESPIRATION RATE: 16 BRPM | OXYGEN SATURATION: 97 % | SYSTOLIC BLOOD PRESSURE: 118 MMHG

## 2022-03-30 DIAGNOSIS — I49.8 BIGEMINY: ICD-10-CM

## 2022-03-30 DIAGNOSIS — I34.0 NON-RHEUMATIC MITRAL REGURGITATION: ICD-10-CM

## 2022-03-30 DIAGNOSIS — I34.1 MITRAL VALVE PROLAPSE: ICD-10-CM

## 2022-03-30 DIAGNOSIS — E78.2 MIXED HYPERLIPIDEMIA: ICD-10-CM

## 2022-03-30 DIAGNOSIS — N13.9 OBSTRUCTIVE UROPATHY: ICD-10-CM

## 2022-03-30 DIAGNOSIS — I10 ESSENTIAL HYPERTENSION: ICD-10-CM

## 2022-03-30 DIAGNOSIS — R09.89 CHEST RALES: ICD-10-CM

## 2022-03-30 PROCEDURE — 99214 OFFICE O/P EST MOD 30 MIN: CPT | Performed by: INTERNAL MEDICINE

## 2022-03-30 RX ORDER — LOSARTAN POTASSIUM 100 MG/1
100 TABLET ORAL DAILY
Qty: 100 TABLET | Refills: 3 | Status: SHIPPED | OUTPATIENT
Start: 2022-03-30 | End: 2022-11-30

## 2022-03-30 ASSESSMENT — ENCOUNTER SYMPTOMS
COUGH: 0
CLAUDICATION: 0
RESPIRATORY NEGATIVE: 1
MUSCULOSKELETAL NEGATIVE: 1
EYES NEGATIVE: 1
STRIDOR: 0
LOSS OF CONSCIOUSNESS: 0
CHILLS: 0
WEAKNESS: 0
GASTROINTESTINAL NEGATIVE: 1
ORTHOPNEA: 0
CARDIOVASCULAR NEGATIVE: 1
DIZZINESS: 0
SHORTNESS OF BREATH: 0
PALPITATIONS: 0
SORE THROAT: 0
NEUROLOGICAL NEGATIVE: 1
CONSTITUTIONAL NEGATIVE: 1
HEMOPTYSIS: 0
FEVER: 0
SPUTUM PRODUCTION: 0
PND: 0
WHEEZING: 0
BRUISES/BLEEDS EASILY: 0

## 2022-03-30 ASSESSMENT — FIBROSIS 4 INDEX: FIB4 SCORE: 2.01

## 2022-03-30 NOTE — PROGRESS NOTES
Chief Complaint   Patient presents with   • Mitral Valve Prolapse   • Hyperlipidemia       Subjective:   Jenny Avila is a 75 y.o. male who presents today as a follow-up for his dizziness lightheadedness and bigeminy.  He wore a event recorder that showed short nonsustained runs of SVT as well as bigeminy.    Since he was last seen he was seen by EP perhaps there is palpitations be secondary to his mitral valve prolapse.  Because of the stress of his family member passing away he is feeling significantly his palpitations have essentially resolved.  His calcium score is 0.  His cholesterol LDL was 103.    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      Family History   Problem Relation Age of Onset   • Cancer Mother         ovrian cancer   • Lung Disease Father    • Heart Disease Neg Hx      Social History     Socioeconomic History   • Marital status:      Spouse name: Not on file   • Number of children: Not on file   • Years of education: Not on file   • Highest education level: Not on file   Occupational History   • Not on file   Tobacco Use   • Smoking status: Former Smoker     Quit date: 1963     Years since quittin.5   • Smokeless tobacco: Never Used   Vaping Use   • Vaping Use: Never used   Substance and Sexual Activity   • Alcohol use: Not Currently     Alcohol/week: 0.6 oz     Types: 1 Cans of beer per week   • Drug use: No   • Sexual activity: Not on file     Comment:    Other Topics Concern   • Not on file   Social History Narrative   • Not on file     Social Determinants of Health     Financial Resource Strain: Not on file   Food Insecurity: Not on file   Transportation Needs: Not on file   Physical Activity: Not on file    Stress: Not on file   Social Connections: Not on file   Intimate Partner Violence: Not on file   Housing Stability: Not on file     No Known Allergies  Outpatient Encounter Medications as of 3/30/2022   Medication Sig Dispense Refill   • losartan (COZAAR) 100 MG Tab Take 1 Tablet by mouth every day. 100 Tablet 3   • Magnesium 250 MG Tab Take  by mouth.     • celecoxib (CELEBREX) 200 MG Cap TAKE 1 CAPSULE BY MOUTH EVERY DAY 60 Capsule 10   • DOCUSATE CALCIUM PO Take 200 mg by mouth every day.     • VITAMIN D PO Take 6,000 Units by mouth every day.     • tamsulosin (FLOMAX) 0.4 MG capsule Take 0.4 mg by mouth every day.     • Omega-3 Fatty Acids (FISH OIL) 1200 MG Cap Take 1,200 mg by mouth every day.     • [DISCONTINUED] promethazine-dextromethorphan (PROMETHAZINE-DM) 6.25-15 MG/5ML syrup Take 5 mL by mouth every four hours as needed for Cough. 120 mL 0   • [DISCONTINUED] fluticasone (FLONASE) 50 MCG/ACT nasal spray Administer 1 Spray into affected nostril(S) every day. 16 g 0   • [DISCONTINUED] losartan (COZAAR) 100 MG Tab TAKE 1 TABLET BY MOUTH DAILY 100 Tablet 3     No facility-administered encounter medications on file as of 3/30/2022.     Review of Systems   Constitutional: Negative.  Negative for chills, fever and malaise/fatigue.   HENT: Negative.  Negative for sore throat.    Eyes: Negative.    Respiratory: Negative.  Negative for cough, hemoptysis, sputum production, shortness of breath, wheezing and stridor.    Cardiovascular: Negative.  Negative for chest pain, palpitations, orthopnea, claudication, leg swelling and PND.   Gastrointestinal: Negative.    Genitourinary: Negative.    Musculoskeletal: Negative.    Skin: Negative.    Neurological: Negative.  Negative for dizziness, loss of consciousness and weakness.   Endo/Heme/Allergies: Negative.  Does not bruise/bleed easily.   All other systems reviewed and are negative.       Objective:   /62 (BP Location: Left arm, Patient Position: Sitting, BP  Cuff Size: Adult)   Pulse 80   Resp 16   Ht 1.829 m (6')   Wt 91.1 kg (200 lb 12.8 oz)   SpO2 97%   BMI 27.23 kg/m²     Physical Exam  Vitals and nursing note reviewed.   Constitutional:       General: He is not in acute distress.     Appearance: He is well-developed. He is not diaphoretic.   HENT:      Head: Normocephalic and atraumatic.      Right Ear: External ear normal.      Left Ear: External ear normal.      Nose: Nose normal.      Mouth/Throat:      Pharynx: No oropharyngeal exudate.   Eyes:      General: No scleral icterus.        Right eye: No discharge.         Left eye: No discharge.      Conjunctiva/sclera: Conjunctivae normal.      Pupils: Pupils are equal, round, and reactive to light.   Neck:      Vascular: No JVD.   Cardiovascular:      Rate and Rhythm: Normal rate and regular rhythm.      Heart sounds: Murmur heard.     No friction rub. No gallop.   Pulmonary:      Effort: Pulmonary effort is normal. No respiratory distress.      Breath sounds: No stridor. No wheezing or rales.   Chest:      Chest wall: No tenderness.   Abdominal:      General: There is no distension.      Palpations: Abdomen is soft.      Tenderness: There is no guarding.   Musculoskeletal:         General: No tenderness or deformity. Normal range of motion.      Cervical back: Neck supple.   Skin:     General: Skin is warm and dry.      Coloration: Skin is not pale.      Findings: No erythema or rash.   Neurological:      Mental Status: He is alert.      Cranial Nerves: No cranial nerve deficit.      Motor: No abnormal muscle tone.      Coordination: Coordination normal.      Deep Tendon Reflexes: Reflexes are normal and symmetric. Reflexes normal.   Psychiatric:         Behavior: Behavior normal.         Thought Content: Thought content normal.         Judgment: Judgment normal.     Coronary calcium score: Dated 2019 personally viewed to myself showing a calcium score of 0.    Echocardiogram: Dated 5/6/2020 personally  viewed inter myself showing normal LV systolic function without valvular heart disease.    Lab Results   Component Value Date/Time    CHOLSTRLTOT 168 03/29/2022 08:58 AM     (H) 03/29/2022 08:58 AM    HDL 48 03/29/2022 08:58 AM    TRIGLYCERIDE 83 03/29/2022 08:58 AM       Lab Results   Component Value Date/Time    SODIUM 141 03/29/2022 08:58 AM    POTASSIUM 5.2 03/29/2022 08:58 AM    CHLORIDE 105 03/29/2022 08:58 AM    CO2 27 03/29/2022 08:58 AM    GLUCOSE 89 03/29/2022 08:58 AM    BUN 23 (H) 03/29/2022 08:58 AM    CREATININE 1.03 03/29/2022 08:58 AM     Lab Results   Component Value Date/Time    ALKPHOSPHAT 78 03/29/2022 08:58 AM    ASTSGOT 25 03/29/2022 08:58 AM    ALTSGPT 19 03/29/2022 08:58 AM    TBILIRUBIN 1.0 03/29/2022 08:58 AM          Assessment:     1. Essential hypertension  losartan (COZAAR) 100 MG Tab   2. Mixed hyperlipidemia     3. Mitral valve prolapse  losartan (COZAAR) 100 MG Tab   4. Non-rheumatic mitral regurgitation  losartan (COZAAR) 100 MG Tab   5. Chest rales     6. Bigeminy     7. Obstructive uropathy         Medical Decision Making:  Today's Assessment / Status / Plan:     76-year-old male with preoperative stratification prior to a revision of his fistula.  For symptomatic bigeminy we will start him on Multaq.  I refilled his losartan.  Otherwise he is doing well.  I will see him back in 6 months

## 2022-03-31 PROBLEM — M51.369 DEGENERATIVE DISC DISEASE, LUMBAR: Status: ACTIVE | Noted: 2022-03-31

## 2022-03-31 PROBLEM — G62.9 PERIPHERAL NEUROPATHY: Status: ACTIVE | Noted: 2022-03-31

## 2022-03-31 PROBLEM — R73.03 PRE-DIABETES: Status: ACTIVE | Noted: 2022-03-31

## 2022-03-31 PROBLEM — R73.9 HYPERGLYCEMIA: Status: ACTIVE | Noted: 2022-03-31

## 2022-03-31 PROBLEM — M51.36 DEGENERATIVE DISC DISEASE, LUMBAR: Status: ACTIVE | Noted: 2022-03-31

## 2022-03-31 PROBLEM — L57.0 ACTINIC KERATOSES: Status: ACTIVE | Noted: 2022-03-31

## 2022-03-31 PROBLEM — M18.0 PRIMARY OSTEOARTHRITIS OF BOTH FIRST CARPOMETACARPAL JOINTS: Status: ACTIVE | Noted: 2022-03-31

## 2022-07-01 NOTE — PROGRESS NOTES
"Subjective:   Selma Avila is a 76 y.o. male who presents for Congestion (X9days, cough, colored mucus, congestion, fever, getting worse)        Patient presents for evaluation of worsening nasal congestion, sinus pressure, and productive cough that has been ongoing for the last 9 to 10 days.  Patient also endorses sore throat hoarse voice that has been waxing and waning.  Cough is worse at nighttime and patient also endorses some \"chest congestion.\"  Occasional subjective fevers, no chills.  He has been taking NyQuil and daytime Tylenol cold and flu without significant symptomatic relief.      Review of Systems   Constitutional: Positive for fever and malaise/fatigue.   HENT: Positive for congestion, sinus pain and sore throat. Negative for ear pain.    Respiratory: Positive for cough and sputum production. Negative for shortness of breath and wheezing.        PMH:  has a past medical history of Dyslipidemia (12/14/2018), Essential hypertension (9/12/2013), Mitral valve prolapse (9/12/2013), Non-rheumatic mitral regurgitation (9/12/2013), and Obstructive uropathy (1/25/2021).  MEDS:   Current Outpatient Medications:   •  Magnesium 250 MG Tab, Take  by mouth., Disp: , Rfl:   •  promethazine-dextromethorphan (PROMETHAZINE-DM) 6.25-15 MG/5ML syrup, Take 5 mL by mouth every four hours as needed for Cough., Disp: 120 mL, Rfl: 0  •  amoxicillin-clavulanate (AUGMENTIN) 875-125 MG Tab, Take 1 Tablet by mouth 2 times a day for 7 days., Disp: 14 Tablet, Rfl: 0  •  fluticasone (FLONASE) 50 MCG/ACT nasal spray, Administer 1 Spray into affected nostril(S) every day., Disp: 16 g, Rfl: 0  •  celecoxib (CELEBREX) 200 MG Cap, TAKE 1 CAPSULE BY MOUTH EVERY DAY, Disp: 60 Capsule, Rfl: 10  •  DOCUSATE CALCIUM PO, Take 200 mg by mouth every day., Disp: , Rfl:   •  VITAMIN D PO, Take 6,000 Units by mouth every day., Disp: , Rfl:   •  losartan (COZAAR) 100 MG Tab, TAKE 1 TABLET BY MOUTH DAILY, Disp: 100 Tablet, Rfl: 3  •  tamsulosin " (FLOMAX) 0.4 MG capsule, Take 0.4 mg by mouth every day., Disp: , Rfl:   •  Omega-3 Fatty Acids (FISH OIL) 1200 MG Cap, Take 1,200 mg by mouth every day., Disp: , Rfl:   ALLERGIES: No Known Allergies  SURGHX:   Past Surgical History:   Procedure Laterality Date   • FISTULA IN ANO REPAIR Left      SOCHX:  reports that he quit smoking about 58 years ago. He has never used smokeless tobacco. He reports previous alcohol use of about 0.6 oz of alcohol per week. He reports that he does not use drugs.  FH: Family history was reviewed, no pertinent findings to report   Objective:   /72 (BP Location: Left arm, Patient Position: Sitting, BP Cuff Size: Adult)   Pulse 68   Temp 36.1 °C (97 °F) (Temporal)   Resp 18   Ht 1.829 m (6')   Wt 86.2 kg (190 lb)   SpO2 95%   BMI 25.77 kg/m²   Physical Exam  Vitals reviewed.   Constitutional:       General: He is not in acute distress.     Appearance: Normal appearance. He is well-developed. He is not toxic-appearing.   HENT:      Head: Normocephalic and atraumatic.      Right Ear: External ear normal.      Left Ear: External ear normal.      Nose: Congestion and rhinorrhea present. Rhinorrhea is purulent.      Right Sinus: Maxillary sinus tenderness present.      Left Sinus: Maxillary sinus tenderness present.      Mouth/Throat:      Lips: Pink.      Mouth: Mucous membranes are moist.      Pharynx: Oropharynx is clear. Posterior oropharyngeal erythema present.   Cardiovascular:      Rate and Rhythm: Normal rate and regular rhythm.      Heart sounds: Normal heart sounds, S1 normal and S2 normal.   Pulmonary:      Effort: Pulmonary effort is normal. No respiratory distress.      Breath sounds: Normal breath sounds. No stridor. No decreased breath sounds, wheezing, rhonchi or rales.   Skin:     General: Skin is dry.   Neurological:      Comments: Alert and oriented.    Psychiatric:         Speech: Speech normal.         Behavior: Behavior normal.           Assessment/Plan:  [Dear  ___] : Dear  [unfilled],   1. Acute non-recurrent maxillary sinusitis  - amoxicillin-clavulanate (AUGMENTIN) 875-125 MG Tab; Take 1 Tablet by mouth 2 times a day for 7 days.  Dispense: 14 Tablet; Refill: 0  - fluticasone (FLONASE) 50 MCG/ACT nasal spray; Administer 1 Spray into affected nostril(S) every day.  Dispense: 16 g; Refill: 0    2. Cough  - promethazine-dextromethorphan (PROMETHAZINE-DM) 6.25-15 MG/5ML syrup; Take 5 mL by mouth every four hours as needed for Cough.  Dispense: 120 mL; Refill: 0    Other orders  - Magnesium 250 MG Tab; Take  by mouth.    Patient's lungs are clear to auscultation.  Clinical suspicion for an pneumonia or bronchitis low at this time, but considerations discussed.  Presentation today most consistent with maxillary sinusitis.  Based on duration of symptoms I suspect that this is likely bacterial at this point.  Patient started on antibiotic therapy.  pt instructed to complete full course of medication despite symptomatic improvement. Pt to take med with meals to alleviate GI upset.  Antitussive as needed-patient advised that this medication is sedating and should be taken with caution, as it does increase fall risk.  Prescription    Flonase 1 squirt in each nostril, as instructed in clinic, once a day.  Use nasal saline TID to promote drainage.   Salt water gurgles to soothe sore throat.  Ayr saline gel to moisturize nasal passage and prevent bleeding.  Try to use sudafed sparingly in order to allow sinuses to drain.  Avoid the longer formulations and try to take only in the morning and/or at noon if needed.  If you fail to improve in 3-5 days or symptoms worsen/new symptoms develop, RTC for reevaluation    Differential diagnosis, natural history, supportive care, and indications for immediate follow-up discussed.   [Consult Letter:] : I had the pleasure of evaluating your patient, [unfilled]. [Please see my note below.] : Please see my note below. [Consult Closing:] : Thank you very much for allowing me to participate in the care of this patient.  If you have any questions, please do not hesitate to contact me. [Sincerely,] : Sincerely, [FreeTextEntry3] : Karely Rosales MD\par Otolaryngology and Cranial Base Surgery\par Attending Physician - Department of Otolaryngology and Head & Neck Surgery\par [facility]\par  - [employer] at [facility]\par Office: [phone]\par Fax: [phone]\par

## 2022-10-14 ENCOUNTER — TELEPHONE (OUTPATIENT)
Dept: MEDICAL GROUP | Facility: MEDICAL CENTER | Age: 77
End: 2022-10-14

## 2022-10-14 DIAGNOSIS — L98.9 SKIN LESION: ICD-10-CM

## 2022-10-14 NOTE — TELEPHONE ENCOUNTER
Patient left 2 voicemails, He is requesting a referral for dermatology for a second opinion on Mohs surgery. 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 Patient is uncomfortable with it and would like to see a different dermatologist to get a 2nd opinion please advise

## 2022-10-17 ENCOUNTER — HOSPITAL ENCOUNTER (EMERGENCY)
Facility: MEDICAL CENTER | Age: 77
End: 2022-10-17
Attending: EMERGENCY MEDICINE
Payer: MEDICARE

## 2022-10-17 ENCOUNTER — APPOINTMENT (OUTPATIENT)
Dept: RADIOLOGY | Facility: MEDICAL CENTER | Age: 77
End: 2022-10-17
Attending: EMERGENCY MEDICINE
Payer: MEDICARE

## 2022-10-17 VITALS
HEIGHT: 72 IN | DIASTOLIC BLOOD PRESSURE: 69 MMHG | HEART RATE: 74 BPM | TEMPERATURE: 98.1 F | BODY MASS INDEX: 25.56 KG/M2 | OXYGEN SATURATION: 96 % | WEIGHT: 188.71 LBS | SYSTOLIC BLOOD PRESSURE: 145 MMHG | RESPIRATION RATE: 16 BRPM

## 2022-10-17 DIAGNOSIS — K57.92 ACUTE DIVERTICULITIS: ICD-10-CM

## 2022-10-17 DIAGNOSIS — R10.32 LEFT LOWER QUADRANT ABDOMINAL PAIN: ICD-10-CM

## 2022-10-17 DIAGNOSIS — N13.5 URETEROPELVIC JUNCTION (UPJ) OBSTRUCTION: ICD-10-CM

## 2022-10-17 LAB
ALBUMIN SERPL BCP-MCNC: 4.6 G/DL (ref 3.2–4.9)
ALBUMIN/GLOB SERPL: 1.6 G/DL
ALP SERPL-CCNC: 82 U/L (ref 30–99)
ALT SERPL-CCNC: 18 U/L (ref 2–50)
ANION GAP SERPL CALC-SCNC: 12 MMOL/L (ref 7–16)
APPEARANCE UR: CLEAR
AST SERPL-CCNC: 25 U/L (ref 12–45)
BASOPHILS # BLD AUTO: 0.5 % (ref 0–1.8)
BASOPHILS # BLD: 0.07 K/UL (ref 0–0.12)
BILIRUB SERPL-MCNC: 1.3 MG/DL (ref 0.1–1.5)
BILIRUB UR QL STRIP.AUTO: NEGATIVE
BUN SERPL-MCNC: 17 MG/DL (ref 8–22)
CALCIUM SERPL-MCNC: 9.3 MG/DL (ref 8.5–10.5)
CHLORIDE SERPL-SCNC: 102 MMOL/L (ref 96–112)
CO2 SERPL-SCNC: 23 MMOL/L (ref 20–33)
COLOR UR: YELLOW
CREAT SERPL-MCNC: 0.95 MG/DL (ref 0.5–1.4)
EOSINOPHIL # BLD AUTO: 0.11 K/UL (ref 0–0.51)
EOSINOPHIL NFR BLD: 0.8 % (ref 0–6.9)
ERYTHROCYTE [DISTWIDTH] IN BLOOD BY AUTOMATED COUNT: 44.4 FL (ref 35.9–50)
GFR SERPLBLD CREATININE-BSD FMLA CKD-EPI: 83 ML/MIN/1.73 M 2
GLOBULIN SER CALC-MCNC: 2.8 G/DL (ref 1.9–3.5)
GLUCOSE SERPL-MCNC: 98 MG/DL (ref 65–99)
GLUCOSE UR STRIP.AUTO-MCNC: NEGATIVE MG/DL
HCT VFR BLD AUTO: 48.4 % (ref 42–52)
HGB BLD-MCNC: 16.3 G/DL (ref 14–18)
IMM GRANULOCYTES # BLD AUTO: 0.04 K/UL (ref 0–0.11)
IMM GRANULOCYTES NFR BLD AUTO: 0.3 % (ref 0–0.9)
KETONES UR STRIP.AUTO-MCNC: NEGATIVE MG/DL
LEUKOCYTE ESTERASE UR QL STRIP.AUTO: NEGATIVE
LIPASE SERPL-CCNC: 18 U/L (ref 11–82)
LYMPHOCYTES # BLD AUTO: 2.18 K/UL (ref 1–4.8)
LYMPHOCYTES NFR BLD: 16 % (ref 22–41)
MCH RBC QN AUTO: 30.4 PG (ref 27–33)
MCHC RBC AUTO-ENTMCNC: 33.7 G/DL (ref 33.7–35.3)
MCV RBC AUTO: 90.3 FL (ref 81.4–97.8)
MICRO URNS: NORMAL
MONOCYTES # BLD AUTO: 1.17 K/UL (ref 0–0.85)
MONOCYTES NFR BLD AUTO: 8.6 % (ref 0–13.4)
NEUTROPHILS # BLD AUTO: 10.09 K/UL (ref 1.82–7.42)
NEUTROPHILS NFR BLD: 73.8 % (ref 44–72)
NITRITE UR QL STRIP.AUTO: NEGATIVE
NRBC # BLD AUTO: 0 K/UL
NRBC BLD-RTO: 0 /100 WBC
PH UR STRIP.AUTO: 7.5 [PH] (ref 5–8)
PLATELET # BLD AUTO: 210 K/UL (ref 164–446)
PMV BLD AUTO: 10.6 FL (ref 9–12.9)
POTASSIUM SERPL-SCNC: 4.3 MMOL/L (ref 3.6–5.5)
PROT SERPL-MCNC: 7.4 G/DL (ref 6–8.2)
PROT UR QL STRIP: NEGATIVE MG/DL
RBC # BLD AUTO: 5.36 M/UL (ref 4.7–6.1)
RBC UR QL AUTO: NEGATIVE
SODIUM SERPL-SCNC: 137 MMOL/L (ref 135–145)
SP GR UR STRIP.AUTO: 1.01
UROBILINOGEN UR STRIP.AUTO-MCNC: 0.2 MG/DL
WBC # BLD AUTO: 13.7 K/UL (ref 4.8–10.8)

## 2022-10-17 PROCEDURE — 36415 COLL VENOUS BLD VENIPUNCTURE: CPT

## 2022-10-17 PROCEDURE — 700102 HCHG RX REV CODE 250 W/ 637 OVERRIDE(OP): Performed by: EMERGENCY MEDICINE

## 2022-10-17 PROCEDURE — 74177 CT ABD & PELVIS W/CONTRAST: CPT

## 2022-10-17 PROCEDURE — 83690 ASSAY OF LIPASE: CPT

## 2022-10-17 PROCEDURE — 700117 HCHG RX CONTRAST REV CODE 255: Performed by: EMERGENCY MEDICINE

## 2022-10-17 PROCEDURE — 85025 COMPLETE CBC W/AUTO DIFF WBC: CPT

## 2022-10-17 PROCEDURE — 81003 URINALYSIS AUTO W/O SCOPE: CPT

## 2022-10-17 PROCEDURE — 99285 EMERGENCY DEPT VISIT HI MDM: CPT

## 2022-10-17 PROCEDURE — A9270 NON-COVERED ITEM OR SERVICE: HCPCS | Performed by: EMERGENCY MEDICINE

## 2022-10-17 PROCEDURE — 80053 COMPREHEN METABOLIC PANEL: CPT

## 2022-10-17 RX ORDER — AMOXICILLIN AND CLAVULANATE POTASSIUM 875; 125 MG/1; MG/1
1 TABLET, FILM COATED ORAL 2 TIMES DAILY
Qty: 20 TABLET | Refills: 0 | Status: SHIPPED | OUTPATIENT
Start: 2022-10-17 | End: 2022-12-09

## 2022-10-17 RX ORDER — AMOXICILLIN AND CLAVULANATE POTASSIUM 875; 125 MG/1; MG/1
1 TABLET, FILM COATED ORAL ONCE
Status: COMPLETED | OUTPATIENT
Start: 2022-10-17 | End: 2022-10-17

## 2022-10-17 RX ORDER — HYDROCODONE BITARTRATE AND ACETAMINOPHEN 5; 325 MG/1; MG/1
1 TABLET ORAL ONCE
Status: COMPLETED | OUTPATIENT
Start: 2022-10-17 | End: 2022-10-17

## 2022-10-17 RX ORDER — HYDROCODONE BITARTRATE AND ACETAMINOPHEN 5; 325 MG/1; MG/1
1 TABLET ORAL EVERY 4 HOURS PRN
Qty: 20 TABLET | Refills: 0 | Status: SHIPPED | OUTPATIENT
Start: 2022-10-17 | End: 2022-10-22

## 2022-10-17 RX ADMIN — AMOXICILLIN AND CLAVULANATE POTASSIUM 1 TABLET: 875; 125 TABLET, FILM COATED ORAL at 20:58

## 2022-10-17 RX ADMIN — IOHEXOL 80 ML: 350 INJECTION, SOLUTION INTRAVENOUS at 20:19

## 2022-10-17 RX ADMIN — HYDROCODONE BITARTRATE AND ACETAMINOPHEN 1 TABLET: 5; 325 TABLET ORAL at 20:49

## 2022-10-17 ASSESSMENT — FIBROSIS 4 INDEX: FIB4 SCORE: 2.04

## 2022-10-17 NOTE — ED PROVIDER NOTES
ED Provider  Scribed for Bruce Babin D.O. by Hakan Wilson. 10/17/2022  4:13 PM    Means of arrival: Walk-In  History obtained from: Patient  History limited by: None    CHIEF COMPLAINT  Chief Complaint   Patient presents with    Abdominal Pain     LLQ pain that started around 0330 today. Denies n/v/d. Hx diverticulitis.      HPI  Selma Avila is a 77 y.o. male with history of diverticulitis who presents for evaluation of worsening left lower quadrant abdominal pain onset approximately 3:30 PM today. He states his pain feels like spasms. He admits to associated symptoms of subjective fever,  but denies nausea, vomiting, diarrhea, or dysuria. No alleviating factors were reported. He denies any history of abdominal surgeries. He reports he has been told he has diverticulum when he had a colonoscopy.    REVIEW OF SYSTEMS  See HPI for further details. All other systems are negative.     PAST MEDICAL HISTORY   has a past medical history of Dyslipidemia (2018), Essential hypertension (2013), Mitral valve prolapse (2013), Non-rheumatic mitral regurgitation (2013), and Obstructive uropathy (2021).    SURGICAL HISTORY   has a past surgical history that includes fistula in ano repair (Left).    SOCIAL HISTORY  Social History     Tobacco Use    Smoking status: Former     Types: Cigarettes     Quit date: 1963     Years since quittin.1    Smokeless tobacco: Never   Vaping Use    Vaping Use: Never used   Substance and Sexual Activity    Alcohol use: Not Currently     Alcohol/week: 0.6 oz     Types: 1 Cans of beer per week    Drug use: No     Comment:      FAMILY HISTORY  Family History   Problem Relation Age of Onset    Cancer Mother         ovrian cancer    Lung Disease Father     Heart Disease Neg Hx      CURRENT MEDICATIONS  Home Medications       Reviewed by Mariela Perez R.N. (Registered Nurse) on 10/17/22 at 1552  Med List Status: Not Addressed     Medication Last  Dose Status   celecoxib (CELEBREX) 200 MG Cap  Active   DOCUSATE CALCIUM PO  Active   losartan (COZAAR) 100 MG Tab  Active   Magnesium 250 MG Tab  Active   Omega-3 Fatty Acids (FISH OIL) 1200 MG Cap  Active   tamsulosin (FLOMAX) 0.4 MG capsule  Active   VITAMIN D PO  Active             ALLERGIES  No Known Allergies    PHYSICAL EXAM  VITAL SIGNS: BP (!) 153/75   Pulse 71   Temp 36.6 °C (97.8 °F) (Temporal)   Resp 16   Ht 1.829 m (6')   Wt 85.6 kg (188 lb 11.4 oz)   SpO2 96%   BMI 25.59 kg/m²   Constitutional: Alert in no apparent distress.  HENT: No signs of trauma, mucous membranes are moist  Eyes: Conjunctiva normal, Non-icteric.   Neck: Normal range of motion, No tenderness, Supple.  Lymphatic: No lymphadenopathy noted.   Cardiovascular: Regular rate and rhythm, no murmurs.   Thorax & Lungs: Normal breath sounds, No respiratory distress, No wheezing, No chest tenderness.   Abdomen: Bowel sounds normal, Soft, Mild LLQ tenderness, No masses, No pulsatile masses. No peritoneal signs.  Skin: Warm, Dry, normal color.   Back: No bony tenderness, No CVA tenderness.   Extremities: No edema, No tenderness, No cyanosis  Musculoskeletal: Good range of motion in all major joints. No tenderness to palpation or major deformities noted.   Neurologic: Alert and oriented x4, Normal motor function, Normal sensory function, No focal deficits noted.   Psychiatric: Affect normal, Judgment normal, Mood normal.     DIAGNOSTIC STUDIES / PROCEDURES    LABS  Results for orders placed or performed during the hospital encounter of 10/17/22   CBC WITH DIFFERENTIAL   Result Value Ref Range    WBC 13.7 (H) 4.8 - 10.8 K/uL    RBC 5.36 4.70 - 6.10 M/uL    Hemoglobin 16.3 14.0 - 18.0 g/dL    Hematocrit 48.4 42.0 - 52.0 %    MCV 90.3 81.4 - 97.8 fL    MCH 30.4 27.0 - 33.0 pg    MCHC 33.7 33.7 - 35.3 g/dL    RDW 44.4 35.9 - 50.0 fL    Platelet Count 210 164 - 446 K/uL    MPV 10.6 9.0 - 12.9 fL    Neutrophils-Polys 73.80 (H) 44.00 - 72.00 %     Lymphocytes 16.00 (L) 22.00 - 41.00 %    Monocytes 8.60 0.00 - 13.40 %    Eosinophils 0.80 0.00 - 6.90 %    Basophils 0.50 0.00 - 1.80 %    Immature Granulocytes 0.30 0.00 - 0.90 %    Nucleated RBC 0.00 /100 WBC    Neutrophils (Absolute) 10.09 (H) 1.82 - 7.42 K/uL    Lymphs (Absolute) 2.18 1.00 - 4.80 K/uL    Monos (Absolute) 1.17 (H) 0.00 - 0.85 K/uL    Eos (Absolute) 0.11 0.00 - 0.51 K/uL    Baso (Absolute) 0.07 0.00 - 0.12 K/uL    Immature Granulocytes (abs) 0.04 0.00 - 0.11 K/uL    NRBC (Absolute) 0.00 K/uL   COMP METABOLIC PANEL   Result Value Ref Range    Sodium 137 135 - 145 mmol/L    Potassium 4.3 3.6 - 5.5 mmol/L    Chloride 102 96 - 112 mmol/L    Co2 23 20 - 33 mmol/L    Anion Gap 12.0 7.0 - 16.0    Glucose 98 65 - 99 mg/dL    Bun 17 8 - 22 mg/dL    Creatinine 0.95 0.50 - 1.40 mg/dL    Calcium 9.3 8.5 - 10.5 mg/dL    AST(SGOT) 25 12 - 45 U/L    ALT(SGPT) 18 2 - 50 U/L    Alkaline Phosphatase 82 30 - 99 U/L    Total Bilirubin 1.3 0.1 - 1.5 mg/dL    Albumin 4.6 3.2 - 4.9 g/dL    Total Protein 7.4 6.0 - 8.2 g/dL    Globulin 2.8 1.9 - 3.5 g/dL    A-G Ratio 1.6 g/dL   LIPASE   Result Value Ref Range    Lipase 18 11 - 82 U/L   URINALYSIS    Specimen: Urine   Result Value Ref Range    Color Yellow     Character Clear     Specific Gravity 1.008 <1.035    Ph 7.5 5.0 - 8.0    Glucose Negative Negative mg/dL    Ketones Negative Negative mg/dL    Protein Negative Negative mg/dL    Bilirubin Negative Negative    Urobilinogen, Urine 0.2 Negative    Nitrite Negative Negative    Leukocyte Esterase Negative Negative    Occult Blood Negative Negative    Micro Urine Req see below    ESTIMATED GFR   Result Value Ref Range    GFR (CKD-EPI) 83 >60 mL/min/1.73 m 2       All labs reviewed by me.    RADIOLOGY  CT-ABDOMEN-PELVIS WITH   Final Result         1. Acute diverticulitis in the distal descending colon. No extraluminal gas. No fluid collection.      2. Dilated right renal pelvis with normal side right ureter, concerning for  right UPJ obstruction.      3. Large right renal cysts.      4. Enlarged prostate.        The radiologist's interpretations of all radiological studies have been reviewed by me.    Films have been independently by me      COURSE  Pertinent Labs & Imaging studies reviewed. (See chart for details)    4:13 PM - Patient seen and examined at bedside. Discussed plan of care. Ordered for UA, lipase, CMP, and CBC w/ diff to evaluate his symptoms.         MEDICAL DECISION MAKING  This is a 77 y.o. male who presents with complains of left lower quadrant abdominal pain.  It has been intermittent he describes as cramping, he thinks it may be related to diverticulitis which he has had in the past.    His white blood cell count is elevated at 13, CT was done CT does show diverticulitis.  There is also a concerns for a right ureteropelvic obstruction.  The patient's renal function, and urinalysis are normal, he has no pain in that area and the significance of the CT finding is unknown.  [facility] and he will call to make a follow-up appointment.  In the meantime we will be treated for pain, and antibiotics.      Discharged home in stable condition    FINAL IMPRESSION  1. Left lower quadrant abdominal pain    2. Ureteropelvic junction (UPJ) obstruction    3. Acute diverticulitis         Hakan BAZAN (Winter), am scribing for, and in the presence of, Bruce Babin D.O..    Electronically signed by: Hakan Wilson (Winter), 10/17/2022    Bruce BAZAN D.O. personally performed the services described in this documentation, as scribed by Hakan Wilson in my presence, and it is both accurate and complete.    The note accurately reflects work and decisions made by me.  Bruce Babin D.O.  10/17/2022  9:08 PM

## 2022-10-17 NOTE — ED TRIAGE NOTES
Chief Complaint   Patient presents with    Abdominal Pain     LLQ pain that started around 0330 today. Denies n/v/d. Hx diverticulitis.      Pt ambulatory to triage for above complaint. Protocols placed, pt educated on triage process and informed to alert staff of any changes or concerns.    /80   Pulse 71   Temp 36.6 °C (97.8 °F) (Temporal)   Resp 16   Ht 1.829 m (6')   Wt 85.6 kg (188 lb 11.4 oz)   SpO2 96%   BMI 25.59 kg/m²

## 2022-10-18 ENCOUNTER — TELEPHONE (OUTPATIENT)
Dept: SOCIAL WORK | Facility: CLINIC | Age: 77
End: 2022-10-18
Payer: MEDICARE

## 2022-10-18 NOTE — DISCHARGE INSTRUCTIONS
You are being placed on pain medication antibiotics for diverticulitis.  Your CT is also showing some obstruction of the right kidney area.  Your urine test and kidney function is normal.  The significance of this is undetermined.  It does not appear to be life-threatening or emergent at this time.    Please call your urologist tomorrow morning to make a follow-up appointment so they can review this to determine the next best way to evaluate this

## 2022-10-18 NOTE — TELEPHONE ENCOUNTER
ED Follow-up  Call Attempts: 1  Date of ED visit: 10/17/22  Call Outcome: Reviewed ED visit with patient  Since you've been home, how have you been feeling?: Better  Were you prescribed any medications?: Yes  Did you  your medications from the pharmacy?: Yes  Do you have any questions regarding your medications?: No  Do you have any questions about your discharge instructions?: No  RN Recommendations: Other  Additional details: Member is trying to make appointments with PCP and Urology. Member stated he will continue to try. Instructed member to be sure he takes all his antibiotics as prescribed until gone even if he feels better. Member verbalized understanding.   Total time spent (mins): 5

## 2022-10-24 ENCOUNTER — OFFICE VISIT (OUTPATIENT)
Dept: MEDICAL GROUP | Facility: MEDICAL CENTER | Age: 77
End: 2022-10-24
Payer: MEDICARE

## 2022-10-24 VITALS
DIASTOLIC BLOOD PRESSURE: 60 MMHG | TEMPERATURE: 97.5 F | SYSTOLIC BLOOD PRESSURE: 108 MMHG | HEIGHT: 72 IN | BODY MASS INDEX: 25.73 KG/M2 | RESPIRATION RATE: 16 BRPM | WEIGHT: 190 LBS | OXYGEN SATURATION: 96 % | HEART RATE: 67 BPM

## 2022-10-24 DIAGNOSIS — N28.1 CYST OF RIGHT KIDNEY: ICD-10-CM

## 2022-10-24 DIAGNOSIS — K57.92 ACUTE DIVERTICULITIS: ICD-10-CM

## 2022-10-24 DIAGNOSIS — Z09 HOSPITAL DISCHARGE FOLLOW-UP: ICD-10-CM

## 2022-10-24 PROCEDURE — 99214 OFFICE O/P EST MOD 30 MIN: CPT | Performed by: FAMILY MEDICINE

## 2022-10-24 ASSESSMENT — FIBROSIS 4 INDEX: FIB4 SCORE: 2.13

## 2022-10-24 NOTE — PROGRESS NOTES
CC: ER visit follow-up for acute diverticulitis    HPI:   Selma presents today for post ER visit follow-up.  Patient was seen and [facility] on 10/17/2020 for for left lower abdominal pain, and was found to have diverticulitis.  His white blood cell count was elevated at 13, CT was done CT does show diverticulitis.  There was also a large right kidney cyst .  The patient's renal function, and urinalysis were found to be normal.  However [facility] and he will call to make a follow-up appointment.  In the meantime we will be treated for pain, and antibiotics.    Patient came in today for follow-up.  Denies any abdominal pain, nausea, or vomiting.  He has been on Augmentin twice a day for 10 days.  Patient still have 6 tablets left.  Denies constipation or diarrhea.  Patient is concerned about CT scan which showed a large right kidney cyst.  However he is already seen by urologist, as per patient his urologist recommended conservative treatment, he was told by urologist that as long as his kidney functions is normal and patient is asymptomatic, no treatment is warranted for the cyst.      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 20 Tablet 0    losartan  (COZAAR) 100 MG Tab Take 1 Tablet by mouth every day. 100 Tablet 3    Magnesium 250 MG Tab Take  by mouth.      celecoxib (CELEBREX) 200 MG Cap TAKE 1 CAPSULE BY MOUTH EVERY DAY 60 Capsule 10    DOCUSATE CALCIUM PO Take 200 mg by mouth every day.      VITAMIN D PO Take 6,000 Units by mouth every day.      Omega-3 Fatty Acids (FISH OIL) 1200 MG Cap Take 1,200 mg by mouth every day.       No current facility-administered medications for this visit.         Allergies as of 10/24/2022    (No Known Allergies)        ROS: Denies any chest pain, Shortness of breath, Changes bowel or bladder, Lower extremity edema.    Physical Exam:  /60 (BP Location: Right arm, Patient Position: Sitting, BP Cuff Size: Adult)   Pulse 67   Temp 36.4 °C (97.5 °F) (Temporal)   Resp 16   Ht 1.829 m (6')   Wt 86.2 kg (190 lb)   SpO2 96%   BMI 25.77 kg/m²   Gen.: Well-developed, well-nourished, no apparent distress,pleasant and cooperative with the examination  Skin:  Warm and dry with good turgor. No rashes or suspicious lesions in visible areas  Neck: Trachea midline,no masses or adenopathy. No JVD.  Cor: Regular rate and rhythm without murmur, gallop or rub.  Lungs: Respirations unlabored.Clear to auscultation with equal breath sounds bilaterally. No wheezes, rhonchi.  Extremities: No cyanosis, clubbing or edema.  Abdomen: Mild tenderness on the left lower quadrant, no distention, normal bowel sounds.      Assessment and Plan.   77 y.o. male     1. Hospital discharge follow-up  Hospital discharge summary reviewed.    2. Acute diverticulitis  Has improved.  Continue on oral antibiotics.  Discussed proper dieting     3. Cyst of right kidney  Large right kidney cyst.  Stable.  Asymptomatic.  Patient has normal kidney function  As per patient was seen by urology who recommended conservative treatment

## 2022-10-28 ENCOUNTER — HOSPITAL ENCOUNTER (OUTPATIENT)
Dept: RADIOLOGY | Facility: MEDICAL CENTER | Age: 77
End: 2022-10-28
Attending: UROLOGY
Payer: MEDICARE

## 2022-10-28 DIAGNOSIS — N13.39 OTHER HYDRONEPHROSIS: ICD-10-CM

## 2022-10-28 PROCEDURE — A9562 TC99M MERTIATIDE: HCPCS

## 2022-10-28 RX ORDER — FUROSEMIDE 10 MG/ML
INJECTION INTRAMUSCULAR; INTRAVENOUS
Status: DISCONTINUED
Start: 2022-10-28 | End: 2022-10-29 | Stop reason: HOSPADM

## 2022-11-02 ENCOUNTER — APPOINTMENT (RX ONLY)
Dept: URBAN - METROPOLITAN AREA CLINIC 20 | Facility: CLINIC | Age: 77
Setting detail: DERMATOLOGY
End: 2022-11-02

## 2022-11-02 PROBLEM — D03.39 MELANOMA IN SITU OF OTHER PARTS OF FACE: Status: ACTIVE | Noted: 2022-11-02

## 2022-11-02 PROCEDURE — ? COUNSELING

## 2022-11-02 PROCEDURE — ? ADDITIONAL NOTES

## 2022-11-02 PROCEDURE — 99203 OFFICE O/P NEW LOW 30 MIN: CPT

## 2022-11-02 PROCEDURE — ? PHOTO-DOCUMENTATION

## 2022-11-02 NOTE — PROCEDURE: ADDITIONAL NOTES
Render Risk Assessment In Note?: no
Additional Notes: Emre a biopsy done 9/7/2022 with [facility]. Results came back as Malignant Melanoma in Site. Size 0.7 x 0.6 x 0.1cm.\\nPt counseled in possible mohs procedures and mohs closures. \\[id number]
Detail Level: Simple

## 2022-11-02 NOTE — PROCEDURE: COUNSELING
Detail Level: Detailed
Show Follow-Up Variable: Yes
When Should The Patient Follow-Up For Their Next Full-Body Skin Exam?: 3 Months
Quality 137: Melanoma: Continuity Of Care - Recall System: Patient information entered into a recall system that includes: target date for the next exam specified AND a process to follow up with patients regarding missed or unscheduled appointments
Quality 138: Melanoma: Coordination Of Care: A treatment plan was communicated to the physicians providing continuing care within one month of diagnosis outlining: diagnosis, tumor thickness and a plan for surgery or alternate care.

## 2022-11-02 NOTE — HPI: SKIN LESIONS
Is This A New Presentation, Or A Follow-Up?: Skin Lesion
Additional History: Pt saw Dr. uFad Ingram with [facility] on 9/7/22 and had lesion biopsied. Lesions results were Malignant Melanoma in Situ with a size of 0.7 x 0.6 x 0.1. Pt presents today for a second opinion with questions about treatment options.
WDL

## 2022-11-02 NOTE — PROCEDURE: MIPS QUALITY
Quality 226: Preventive Care And Screening: Tobacco Use: Screening And Cessation Intervention: Patient screened for tobacco use and is an ex/non-smoker
Quality 111:Pneumonia Vaccination Status For Older Adults: Pneumococcal vaccine (PPSV23) administered on or after patient’s 60th birthday and before the end of the measurement period
Quality 130: Documentation Of Current Medications In The Medical Record: Current Medications Documented
Quality 431: Preventive Care And Screening: Unhealthy Alcohol Use - Screening: Patient not identified as an unhealthy alcohol user when screened for unhealthy alcohol use using a systematic screening method
Quality 265: Biopsy Follow-Up: Biopsy results reviewed, communicated, tracked, and documented
Detail Level: Detailed

## 2022-11-08 ENCOUNTER — APPOINTMENT (RX ONLY)
Dept: URBAN - METROPOLITAN AREA CLINIC 31 | Facility: CLINIC | Age: 77
Setting detail: DERMATOLOGY
End: 2022-11-08

## 2022-11-08 PROBLEM — D03.39 MELANOMA IN SITU OF OTHER PARTS OF FACE: Status: ACTIVE | Noted: 2022-11-08

## 2022-11-08 PROCEDURE — ? EXCISION

## 2022-11-08 PROCEDURE — 11646 EXC F/E/E/N/L MAL+MRG >4 CM: CPT

## 2022-11-08 PROCEDURE — ? COUNSELING

## 2022-11-08 NOTE — PROCEDURE: COUNSELING
Show Follow-Up Variable: Yes
Detail Level: Detailed
Quality 137: Melanoma: Continuity Of Care - Recall System: Patient information entered into a recall system that includes: target date for the next exam specified AND a process to follow up with patients regarding missed or unscheduled appointments
When Should The Patient Follow-Up For Their Next Full-Body Skin Exam?: 3 Months
Quality 138: Melanoma: Coordination Of Care: A treatment plan was communicated to the physicians providing continuing care within one month of diagnosis outlining: diagnosis, tumor thickness and a plan for surgery or alternate care.

## 2022-11-08 NOTE — PROCEDURE: MIPS QUALITY
Quality 130: Documentation Of Current Medications In The Medical Record: Current Medications Documented
Quality 138: Melanoma: Coordination Of Care: A treatment plan was communicated to the physicians providing continuing care within one month of diagnosis outlining: diagnosis, tumor thickness and a plan for surgery or alternate care.
Quality 226: Preventive Care And Screening: Tobacco Use: Screening And Cessation Intervention: Patient screened for tobacco use and is an ex/non-smoker
Detail Level: Detailed
Quality 137: Melanoma: Continuity Of Care - Recall System: Patient information entered into a recall system that includes: target date for the next exam specified AND a process to follow up with patients regarding missed or unscheduled appointments
Quality 265: Biopsy Follow-Up: Biopsy results reviewed, communicated, tracked, and documented
Quality 111:Pneumonia Vaccination Status For Older Adults: Pneumococcal vaccine (PPSV23) administered on or after patient’s 60th birthday and before the end of the measurement period
Quality 431: Preventive Care And Screening: Unhealthy Alcohol Use - Screening: Patient not identified as an unhealthy alcohol user when screened for unhealthy alcohol use using a systematic screening method

## 2022-11-08 NOTE — PROCEDURE: EXCISION
Body Location Override (Optional - Billing Will Still Be Based On Selected Body Map Location If Applicable): left jawline
Referring Physician (Optional): Kenneth Zuniga MD
Surgeon (Optional): Urban Russell MD
Biopsy Photograph Reviewed: Yes
Previous Accession (Optional): [id number]
Date Of Previous Biopsy (Optional): 9/7/22
Size Of Lesion In Cm: 3.9
X Size Of Lesion In Cm (Optional): 2.5
Size Of Margin In Cm: 0.5
Anesthesia Volume In Cc: 0
Was An Eye Clamp Used?: No
Eye Clamp Note Details: An eye clamp was used during the procedure.
Excision Method: Fusiform
Saucerization Depth: dermis and superficial adipose tissue
Repair Type: None - Delayed Repair
Suturegard Retention Suture: 2-0 Nylon
Retention Suture Bite Size: 3 mm
Length To Time In Minutes Device Was In Place: 10
Number Of Hemigard Strips Per Side: 1
Undermining Type: Entire Wound
Debridement Text: The wound edges were debrided prior to proceeding with the closure to facilitate wound healing.
Helical Rim Text: The closure involved the helical rim.
Vermilion Border Text: The closure involved the vermilion border.
Nostril Rim Text: The closure involved the nostril rim.
Retention Suture Text: Retention sutures were placed to support the closure and prevent dehiscence.
Primary Defect Length (In Cm): 4.5
Primary Defect Width (In Cm): 3.3
Lab: [id number]
Lab Facility: 
Graft Donor Site Bandage (Optional-Leave Blank If You Don't Want In Note): Steri-strips and a pressure bandage were applied to the donor site.
Epidermal Closure Graft Donor Site (Optional): simple interrupted
Billing Type: Third-Party Bill
Excision Depth: adipose tissue
Scalpel Size: 15 blade
Anesthesia Type: 1% lidocaine with epinephrine and a 1:10 solution of 8.4% sodium bicarbonate
Additional Anesthesia Volume In Cc: 6
Hemostasis: Electrocautery
Estimated Blood Loss (Cc): minimal
Detail Level: Detailed
Wound Care: Petrolatum
Dressing: dry sterile dressing
Suturegard Intro: Intraoperative tissue expansion was performed, utilizing the SUTUREGARD device, in order to reduce wound tension.
Suturegard Body: The suture ends were repeatedly re-tightened and re-clamped to achieve the desired tissue expansion.
Hemigard Intro: Due to skin fragility and wound tension, it was decided to use HEMIGARD adhesive retention suture devices to permit a linear closure. The skin was cleaned and dried for a 6cm distance away from the wound. Excessive hair, if present, was removed to allow for adhesion.
Hemigard Postcare Instructions: The HEMIGARD strips are to remain completely dry for at least 5-7 days.
Positioning (Leave Blank If You Do Not Want): The patient was placed in a comfortable position exposing the surgical site.
Pre-Excision Curettage Text (Leave Blank If You Do Not Want): Prior to drawing the surgical margin the visible lesion was removed with electrodesiccation and curettage to clearly define the lesion size.
Complex Repair Preamble Text (Leave Blank If You Do Not Want): Extensive wide undermining was performed.
Intermediate Repair Preamble Text (Leave Blank If You Do Not Want): Undermining was performed with blunt dissection.
Curvilinear Excision Additional Text (Leave Blank If You Do Not Want): The margin was drawn around the clinically apparent lesion.  A curvilinear shape was then drawn on the skin incorporating the lesion and margins.  Incisions were then made along these lines to the appropriate tissue plane and the lesion was extirpated.
Fusiform Excision Additional Text (Leave Blank If You Do Not Want): The margin was drawn around the clinically apparent lesion.  A fusiform shape was then drawn on the skin incorporating the lesion and margins.  Incisions were then made along these lines to the appropriate tissue plane and the lesion was extirpated.
Elliptical Excision Additional Text (Leave Blank If You Do Not Want): The margin was drawn around the clinically apparent lesion.  An elliptical shape was then drawn on the skin incorporating the lesion and margins.  Incisions were then made along these lines to the appropriate tissue plane and the lesion was extirpated.
Saucerization Excision Additional Text (Leave Blank If You Do Not Want): The margin was drawn around the clinically apparent lesion.  Incisions were then made along these lines, in a tangential fashion, to the appropriate tissue plane and the lesion was extirpated.
Slit Excision Additional Text (Leave Blank If You Do Not Want): A linear line was drawn on the skin overlying the lesion. An incision was made slowly until the lesion was visualized.  Once visualized, the lesion was removed with blunt dissection.
Excisional Biopsy Additional Text (Leave Blank If You Do Not Want): The margin was drawn around the clinically apparent lesion. An elliptical shape was then drawn on the skin incorporating the lesion and margins.  Incisions were then made along these lines to the appropriate tissue plane and the lesion was extirpated.
Perilesional Excision Additional Text (Leave Blank If You Do Not Want): The margin was drawn around the clinically apparent lesion. Incisions were then made along these lines to the appropriate tissue plane and the lesion was extirpated.
Repair Performed By Another Provider Text (Leave Blank If You Do Not Want): After the tissue was excised the defect was repaired by another provider.
No Repair - Repaired With Adjacent Surgical Defect Text (Leave Blank If You Do Not Want): After the excision the defect was repaired concurrently with another surgical defect which was in close approximation.
Adjacent Tissue Transfer Text: The defect edges were debeveled with a #15 scalpel blade.  Given the location of the defect and the proximity to free margins an adjacent tissue transfer was deemed most appropriate.  Using a sterile surgical marker, an appropriate flap was drawn incorporating the defect and placing the expected incisions within the relaxed skin tension lines where possible.    The area thus outlined was incised deep to adipose tissue with a #15 scalpel blade.  The skin margins were undermined to an appropriate distance in all directions utilizing iris scissors.
Advancement Flap (Single) Text: The defect edges were debeveled with a #15 scalpel blade.  Given the location of the defect and the proximity to free margins a single advancement flap was deemed most appropriate.  Using a sterile surgical marker, an appropriate advancement flap was drawn incorporating the defect and placing the expected incisions within the relaxed skin tension lines where possible.    The area thus outlined was incised deep to adipose tissue with a #15 scalpel blade.  The skin margins were undermined to an appropriate distance in all directions utilizing iris scissors.
Advancement Flap (Double) Text: The defect edges were debeveled with a #15 scalpel blade.  Given the location of the defect and the proximity to free margins a double advancement flap was deemed most appropriate.  Using a sterile surgical marker, the appropriate advancement flaps were drawn incorporating the defect and placing the expected incisions within the relaxed skin tension lines where possible.    The area thus outlined was incised deep to adipose tissue with a #15 scalpel blade.  The skin margins were undermined to an appropriate distance in all directions utilizing iris scissors.
Burow's Advancement Flap Text: The defect edges were debeveled with a #15 scalpel blade.  Given the location of the defect and the proximity to free margins a Burow's advancement flap was deemed most appropriate.  Using a sterile surgical marker, the appropriate advancement flap was drawn incorporating the defect and placing the expected incisions within the relaxed skin tension lines where possible.    The area thus outlined was incised deep to adipose tissue with a #15 scalpel blade.  The skin margins were undermined to an appropriate distance in all directions utilizing iris scissors.
Chonodrocutaneous Helical Advancement Flap Text: The defect edges were debeveled with a #15 scalpel blade.  Given the location of the defect and the proximity to free margins a chondrocutaneous helical advancement flap was deemed most appropriate.  Using a sterile surgical marker, the appropriate advancement flap was drawn incorporating the defect and placing the expected incisions within the relaxed skin tension lines where possible.    The area thus outlined was incised deep to adipose tissue with a #15 scalpel blade.  The skin margins were undermined to an appropriate distance in all directions utilizing iris scissors.
Crescentic Advancement Flap Text: The defect edges were debeveled with a #15 scalpel blade.  Given the location of the defect and the proximity to free margins a crescentic advancement flap was deemed most appropriate.  Using a sterile surgical marker, the appropriate advancement flap was drawn incorporating the defect and placing the expected incisions within the relaxed skin tension lines where possible.    The area thus outlined was incised deep to adipose tissue with a #15 scalpel blade.  The skin margins were undermined to an appropriate distance in all directions utilizing iris scissors.
A-T Advancement Flap Text: The defect edges were debeveled with a #15 scalpel blade.  Given the location of the defect, shape of the defect and the proximity to free margins an A-T advancement flap was deemed most appropriate.  Using a sterile surgical marker, an appropriate advancement flap was drawn incorporating the defect and placing the expected incisions within the relaxed skin tension lines where possible.    The area thus outlined was incised deep to adipose tissue with a #15 scalpel blade.  The skin margins were undermined to an appropriate distance in all directions utilizing iris scissors.
O-T Advancement Flap Text: The defect edges were debeveled with a #15 scalpel blade.  Given the location of the defect, shape of the defect and the proximity to free margins an O-T advancement flap was deemed most appropriate.  Using a sterile surgical marker, an appropriate advancement flap was drawn incorporating the defect and placing the expected incisions within the relaxed skin tension lines where possible.    The area thus outlined was incised deep to adipose tissue with a #15 scalpel blade.  The skin margins were undermined to an appropriate distance in all directions utilizing iris scissors.
O-L Flap Text: The defect edges were debeveled with a #15 scalpel blade.  Given the location of the defect, shape of the defect and the proximity to free margins an O-L flap was deemed most appropriate.  Using a sterile surgical marker, an appropriate advancement flap was drawn incorporating the defect and placing the expected incisions within the relaxed skin tension lines where possible.    The area thus outlined was incised deep to adipose tissue with a #15 scalpel blade.  The skin margins were undermined to an appropriate distance in all directions utilizing iris scissors.
O-Z Flap Text: The defect edges were debeveled with a #15 scalpel blade.  Given the location of the defect, shape of the defect and the proximity to free margins an O-Z flap was deemed most appropriate.  Using a sterile surgical marker, an appropriate transposition flap was drawn incorporating the defect and placing the expected incisions within the relaxed skin tension lines where possible. The area thus outlined was incised deep to adipose tissue with a #15 scalpel blade.  The skin margins were undermined to an appropriate distance in all directions utilizing iris scissors.
Double O-Z Flap Text: The defect edges were debeveled with a #15 scalpel blade.  Given the location of the defect, shape of the defect and the proximity to free margins a Double O-Z flap was deemed most appropriate.  Using a sterile surgical marker, an appropriate transposition flap was drawn incorporating the defect and placing the expected incisions within the relaxed skin tension lines where possible. The area thus outlined was incised deep to adipose tissue with a #15 scalpel blade.  The skin margins were undermined to an appropriate distance in all directions utilizing iris scissors.
V-Y Flap Text: The defect edges were debeveled with a #15 scalpel blade.  Given the location of the defect, shape of the defect and the proximity to free margins a V-Y flap was deemed most appropriate.  Using a sterile surgical marker, an appropriate advancement flap was drawn incorporating the defect and placing the expected incisions within the relaxed skin tension lines where possible.    The area thus outlined was incised deep to adipose tissue with a #15 scalpel blade.  The skin margins were undermined to an appropriate distance in all directions utilizing iris scissors.
Advancement-Rotation Flap Text: The defect edges were debeveled with a #15 scalpel blade.  Given the location of the defect, shape of the defect and the proximity to free margins an advancement-rotation flap was deemed most appropriate.  Using a sterile surgical marker, an appropriate flap was drawn incorporating the defect and placing the expected incisions within the relaxed skin tension lines where possible. The area thus outlined was incised deep to adipose tissue with a #15 scalpel blade.  The skin margins were undermined to an appropriate distance in all directions utilizing iris scissors.
Mercedes Flap Text: The defect edges were debeveled with a #15 scalpel blade.  Given the location of the defect, shape of the defect and the proximity to free margins a Mercedes flap was deemed most appropriate.  Using a sterile surgical marker, an appropriate advancement flap was drawn incorporating the defect and placing the expected incisions within the relaxed skin tension lines where possible. The area thus outlined was incised deep to adipose tissue with a #15 scalpel blade.  The skin margins were undermined to an appropriate distance in all directions utilizing iris scissors.
Modified Advancement Flap Text: The defect edges were debeveled with a #15 scalpel blade.  Given the location of the defect, shape of the defect and the proximity to free margins a modified advancement flap was deemed most appropriate.  Using a sterile surgical marker, an appropriate advancement flap was drawn incorporating the defect and placing the expected incisions within the relaxed skin tension lines where possible.    The area thus outlined was incised deep to adipose tissue with a #15 scalpel blade.  The skin margins were undermined to an appropriate distance in all directions utilizing iris scissors.
Mucosal Advancement Flap Text: Given the location of the defect, shape of the defect and the proximity to free margins a mucosal advancement flap was deemed most appropriate. Incisions were made with a 15 blade scalpel in the appropriate fashion along the cutaneous vermilion border and the mucosal lip. The remaining actinically damaged mucosal tissue was excised.  The mucosal advancement flap was then elevated to the gingival sulcus with care taken to preserve the neurovascular structures and advanced into the primary defect. Care was taken to ensure that precise realignment of the vermilion border was achieved.
Peng Advancement Flap Text: The defect edges were debeveled with a #15 scalpel blade.  Given the location of the defect, shape of the defect and the proximity to free margins a Peng advancement flap was deemed most appropriate.  Using a sterile surgical marker, an appropriate advancement flap was drawn incorporating the defect and placing the expected incisions within the relaxed skin tension lines where possible. The area thus outlined was incised deep to adipose tissue with a #15 scalpel blade.  The skin margins were undermined to an appropriate distance in all directions utilizing iris scissors.
Hatchet Flap Text: The defect edges were debeveled with a #15 scalpel blade.  Given the location of the defect, shape of the defect and the proximity to free margins a hatchet flap was deemed most appropriate.  Using a sterile surgical marker, an appropriate hatchet flap was drawn incorporating the defect and placing the expected incisions within the relaxed skin tension lines where possible.    The area thus outlined was incised deep to adipose tissue with a #15 scalpel blade.  The skin margins were undermined to an appropriate distance in all directions utilizing iris scissors.
Rotation Flap Text: The defect edges were debeveled with a #15 scalpel blade.  Given the location of the defect, shape of the defect and the proximity to free margins a rotation flap was deemed most appropriate.  Using a sterile surgical marker, an appropriate rotation flap was drawn incorporating the defect and placing the expected incisions within the relaxed skin tension lines where possible.    The area thus outlined was incised deep to adipose tissue with a #15 scalpel blade.  The skin margins were undermined to an appropriate distance in all directions utilizing iris scissors.
Spiral Flap Text: The defect edges were debeveled with a #15 scalpel blade.  Given the location of the defect, shape of the defect and the proximity to free margins a spiral flap was deemed most appropriate.  Using a sterile surgical marker, an appropriate rotation flap was drawn incorporating the defect and placing the expected incisions within the relaxed skin tension lines where possible. The area thus outlined was incised deep to adipose tissue with a #15 scalpel blade.  The skin margins were undermined to an appropriate distance in all directions utilizing iris scissors.
Staged Advancement Flap Text: The defect edges were debeveled with a #15 scalpel blade.  Given the location of the defect, shape of the defect and the proximity to free margins a staged advancement flap was deemed most appropriate.  Using a sterile surgical marker, an appropriate advancement flap was drawn incorporating the defect and placing the expected incisions within the relaxed skin tension lines where possible. The area thus outlined was incised deep to adipose tissue with a #15 scalpel blade.  The skin margins were undermined to an appropriate distance in all directions utilizing iris scissors.
Star Wedge Flap Text: The defect edges were debeveled with a #15 scalpel blade.  Given the location of the defect, shape of the defect and the proximity to free margins a star wedge flap was deemed most appropriate.  Using a sterile surgical marker, an appropriate rotation flap was drawn incorporating the defect and placing the expected incisions within the relaxed skin tension lines where possible. The area thus outlined was incised deep to adipose tissue with a #15 scalpel blade.  The skin margins were undermined to an appropriate distance in all directions utilizing iris scissors.
Transposition Flap Text: The defect edges were debeveled with a #15 scalpel blade.  Given the location of the defect and the proximity to free margins a transposition flap was deemed most appropriate.  Using a sterile surgical marker, an appropriate transposition flap was drawn incorporating the defect.    The area thus outlined was incised deep to adipose tissue with a #15 scalpel blade.  The skin margins were undermined to an appropriate distance in all directions utilizing iris scissors.
Muscle Hinge Flap Text: The defect edges were debeveled with a #15 scalpel blade.  Given the size, depth and location of the defect and the proximity to free margins a muscle hinge flap was deemed most appropriate.  Using a sterile surgical marker, an appropriate hinge flap was drawn incorporating the defect. The area thus outlined was incised with a #15 scalpel blade.  The skin margins were undermined to an appropriate distance in all directions utilizing iris scissors.
Mustarde Flap Text: The defect edges were debeveled with a #15 scalpel blade.  Given the size, depth and location of the defect and the proximity to free margins a Mustarde flap was deemed most appropriate.  Using a sterile surgical marker, an appropriate flap was drawn incorporating the defect. The area thus outlined was incised with a #15 scalpel blade.  The skin margins were undermined to an appropriate distance in all directions utilizing iris scissors.
Nasal Turnover Hinge Flap Text: The defect edges were debeveled with a #15 scalpel blade.  Given the size, depth, location of the defect and the defect being full thickness a nasal turnover hinge flap was deemed most appropriate.  Using a sterile surgical marker, an appropriate hinge flap was drawn incorporating the defect. The area thus outlined was incised with a #15 scalpel blade. The flap was designed to recreate the nasal mucosal lining and the alar rim. The skin margins were undermined to an appropriate distance in all directions utilizing iris scissors.
Nasalis-Muscle-Based Myocutaneous Island Pedicle Flap Text: Using a #15 blade, an incision was made around the donor flap to the level of the nasalis muscle. Wide lateral undermining was then performed in both the subcutaneous plane above the nasalis muscle, and in a submuscular plane just above periosteum. This allowed the formation of a free nasalis muscle axial pedicle (based on the angular artery) which was still attached to the actual cutaneous flap, increasing its mobility and vascular viability. Hemostasis was obtained with pinpoint electrocoagulation. The flap was mobilized into position and the pivotal anchor points positioned and stabilized with buried interrupted sutures. Subcutaneous and dermal tissues were closed in a multilayered fashion with sutures. Tissue redundancies were excised, and the epidermal edges were apposed without significant tension and sutured with sutures.
Orbicularis Oris Muscle Flap Text: The defect edges were debeveled with a #15 scalpel blade.  Given that the defect affected the competency of the oral sphincter an orbicularis oris muscle flap was deemed most appropriate to restore this competency and normal muscle function.  Using a sterile surgical marker, an appropriate flap was drawn incorporating the defect. The area thus outlined was incised with a #15 scalpel blade.
Melolabial Transposition Flap Text: The defect edges were debeveled with a #15 scalpel blade.  Given the location of the defect and the proximity to free margins a melolabial flap was deemed most appropriate.  Using a sterile surgical marker, an appropriate melolabial transposition flap was drawn incorporating the defect.    The area thus outlined was incised deep to adipose tissue with a #15 scalpel blade.  The skin margins were undermined to an appropriate distance in all directions utilizing iris scissors.
Rhombic Flap Text: The defect edges were debeveled with a #15 scalpel blade.  Given the location of the defect and the proximity to free margins a rhombic flap was deemed most appropriate.  Using a sterile surgical marker, an appropriate rhombic flap was drawn incorporating the defect.    The area thus outlined was incised deep to adipose tissue with a #15 scalpel blade.  The skin margins were undermined to an appropriate distance in all directions utilizing iris scissors.
Rhomboid Transposition Flap Text: The defect edges were debeveled with a #15 scalpel blade.  Given the location of the defect and the proximity to free margins a rhomboid transposition flap was deemed most appropriate.  Using a sterile surgical marker, an appropriate rhomboid flap was drawn incorporating the defect.    The area thus outlined was incised deep to adipose tissue with a #15 scalpel blade.  The skin margins were undermined to an appropriate distance in all directions utilizing iris scissors.
Bi-Rhombic Flap Text: The defect edges were debeveled with a #15 scalpel blade.  Given the location of the defect and the proximity to free margins a bi-rhombic flap was deemed most appropriate.  Using a sterile surgical marker, an appropriate rhombic flap was drawn incorporating the defect. The area thus outlined was incised deep to adipose tissue with a #15 scalpel blade.  The skin margins were undermined to an appropriate distance in all directions utilizing iris scissors.
Helical Rim Advancement Flap Text: The defect edges were debeveled with a #15 blade scalpel.  Given the location of the defect and the proximity to free margins (helical rim) a double helical rim advancement flap was deemed most appropriate.  Using a sterile surgical marker, the appropriate advancement flaps were drawn incorporating the defect and placing the expected incisions between the helical rim and antihelix where possible.  The area thus outlined was incised through and through with a #15 scalpel blade.  With a skin hook and iris scissors, the flaps were gently and sharply undermined and freed up.
Bilateral Helical Rim Advancement Flap Text: The defect edges were debeveled with a #15 blade scalpel.  Given the location of the defect and the proximity to free margins (helical rim) a bilateral helical rim advancement flap was deemed most appropriate.  Using a sterile surgical marker, the appropriate advancement flaps were drawn incorporating the defect and placing the expected incisions between the helical rim and antihelix where possible.  The area thus outlined was incised through and through with a #15 scalpel blade.  With a skin hook and iris scissors, the flaps were gently and sharply undermined and freed up.
Ear Star Wedge Flap Text: The defect edges were debeveled with a #15 blade scalpel.  Given the location of the defect and the proximity to free margins (helical rim) an ear star wedge flap was deemed most appropriate.  Using a sterile surgical marker, the appropriate flap was drawn incorporating the defect and placing the expected incisions between the helical rim and antihelix where possible.  The area thus outlined was incised through and through with a #15 scalpel blade.
Banner Transposition Flap Text: The defect edges were debeveled with a #15 scalpel blade.  Given the location of the defect and the proximity to free margins a Banner transposition flap was deemed most appropriate.  Using a sterile surgical marker, an appropriate flap drawn around the defect. The area thus outlined was incised deep to adipose tissue with a #15 scalpel blade.  The skin margins were undermined to an appropriate distance in all directions utilizing iris scissors.
Bilobed Flap Text: The defect edges were debeveled with a #15 scalpel blade.  Given the location of the defect and the proximity to free margins a bilobe flap was deemed most appropriate.  Using a sterile surgical marker, an appropriate bilobe flap drawn around the defect.    The area thus outlined was incised deep to adipose tissue with a #15 scalpel blade.  The skin margins were undermined to an appropriate distance in all directions utilizing iris scissors.
Bilobed Transposition Flap Text: The defect edges were debeveled with a #15 scalpel blade.  Given the location of the defect and the proximity to free margins a bilobed transposition flap was deemed most appropriate.  Using a sterile surgical marker, an appropriate bilobe flap drawn around the defect.    The area thus outlined was incised deep to adipose tissue with a #15 scalpel blade.  The skin margins were undermined to an appropriate distance in all directions utilizing iris scissors.
Trilobed Flap Text: The defect edges were debeveled with a #15 scalpel blade.  Given the location of the defect and the proximity to free margins a trilobed flap was deemed most appropriate.  Using a sterile surgical marker, an appropriate trilobed flap drawn around the defect.    The area thus outlined was incised deep to adipose tissue with a #15 scalpel blade.  The skin margins were undermined to an appropriate distance in all directions utilizing iris scissors.
Dorsal Nasal Flap Text: The defect edges were debeveled with a #15 scalpel blade.  Given the location of the defect and the proximity to free margins a dorsal nasal flap was deemed most appropriate.  Using a sterile surgical marker, an appropriate dorsal nasal flap was drawn around the defect.    The area thus outlined was incised deep to adipose tissue with a #15 scalpel blade.  The skin margins were undermined to an appropriate distance in all directions utilizing iris scissors.
Island Pedicle Flap Text: The defect edges were debeveled with a #15 scalpel blade.  Given the location of the defect, shape of the defect and the proximity to free margins an island pedicle advancement flap was deemed most appropriate.  Using a sterile surgical marker, an appropriate advancement flap was drawn incorporating the defect, outlining the appropriate donor tissue and placing the expected incisions within the relaxed skin tension lines where possible.    The area thus outlined was incised deep to adipose tissue with a #15 scalpel blade.  The skin margins were undermined to an appropriate distance in all directions around the primary defect and laterally outward around the island pedicle utilizing iris scissors.  There was minimal undermining beneath the pedicle flap.
Island Pedicle Flap With Canthal Suspension Text: The defect edges were debeveled with a #15 scalpel blade.  Given the location of the defect, shape of the defect and the proximity to free margins an island pedicle advancement flap was deemed most appropriate.  Using a sterile surgical marker, an appropriate advancement flap was drawn incorporating the defect, outlining the appropriate donor tissue and placing the expected incisions within the relaxed skin tension lines where possible. The area thus outlined was incised deep to adipose tissue with a #15 scalpel blade.  The skin margins were undermined to an appropriate distance in all directions around the primary defect and laterally outward around the island pedicle utilizing iris scissors.  There was minimal undermining beneath the pedicle flap. A suspension suture was placed in the canthal tendon to prevent tension and prevent ectropion.
Alar Island Pedicle Flap Text: The defect edges were debeveled with a #15 scalpel blade.  Given the location of the defect, shape of the defect and the proximity to the alar rim an island pedicle advancement flap was deemed most appropriate.  Using a sterile surgical marker, an appropriate advancement flap was drawn incorporating the defect, outlining the appropriate donor tissue and placing the expected incisions within the nasal ala running parallel to the alar rim. The area thus outlined was incised with a #15 scalpel blade.  The skin margins were undermined minimally to an appropriate distance in all directions around the primary defect and laterally outward around the island pedicle utilizing iris scissors.  There was minimal undermining beneath the pedicle flap.
Double Island Pedicle Flap Text: The defect edges were debeveled with a #15 scalpel blade.  Given the location of the defect, shape of the defect and the proximity to free margins a double island pedicle advancement flap was deemed most appropriate.  Using a sterile surgical marker, an appropriate advancement flap was drawn incorporating the defect, outlining the appropriate donor tissue and placing the expected incisions within the relaxed skin tension lines where possible.    The area thus outlined was incised deep to adipose tissue with a #15 scalpel blade.  The skin margins were undermined to an appropriate distance in all directions around the primary defect and laterally outward around the island pedicle utilizing iris scissors.  There was minimal undermining beneath the pedicle flap.
Island Pedicle Flap-Requiring Vessel Identification Text: The defect edges were debeveled with a #15 scalpel blade.  Given the location of the defect, shape of the defect and the proximity to free margins an island pedicle advancement flap was deemed most appropriate.  Using a sterile surgical marker, an appropriate advancement flap was drawn, based on the axial vessel mentioned above, incorporating the defect, outlining the appropriate donor tissue and placing the expected incisions within the relaxed skin tension lines where possible.    The area thus outlined was incised deep to adipose tissue with a #15 scalpel blade.  The skin margins were undermined to an appropriate distance in all directions around the primary defect and laterally outward around the island pedicle utilizing iris scissors.  There was minimal undermining beneath the pedicle flap.
Keystone Flap Text: The defect edges were debeveled with a #15 scalpel blade.  Given the location of the defect, shape of the defect a keystone flap was deemed most appropriate.  Using a sterile surgical marker, an appropriate keystone flap was drawn incorporating the defect, outlining the appropriate donor tissue and placing the expected incisions within the relaxed skin tension lines where possible. The area thus outlined was incised deep to adipose tissue with a #15 scalpel blade.  The skin margins were undermined to an appropriate distance in all directions around the primary defect and laterally outward around the flap utilizing iris scissors.
O-T Plasty Text: The defect edges were debeveled with a #15 scalpel blade.  Given the location of the defect, shape of the defect and the proximity to free margins an O-T plasty was deemed most appropriate.  Using a sterile surgical marker, an appropriate O-T plasty was drawn incorporating the defect and placing the expected incisions within the relaxed skin tension lines where possible.    The area thus outlined was incised deep to adipose tissue with a #15 scalpel blade.  The skin margins were undermined to an appropriate distance in all directions utilizing iris scissors.
O-Z Plasty Text: The defect edges were debeveled with a #15 scalpel blade.  Given the location of the defect, shape of the defect and the proximity to free margins an O-Z plasty (double transposition flap) was deemed most appropriate.  Using a sterile surgical marker, the appropriate transposition flaps were drawn incorporating the defect and placing the expected incisions within the relaxed skin tension lines where possible.    The area thus outlined was incised deep to adipose tissue with a #15 scalpel blade.  The skin margins were undermined to an appropriate distance in all directions utilizing iris scissors.  Hemostasis was achieved with electrocautery.  The flaps were then transposed into place, one clockwise and the other counterclockwise, and anchored with interrupted buried subcutaneous sutures.
Double O-Z Plasty Text: The defect edges were debeveled with a #15 scalpel blade.  Given the location of the defect, shape of the defect and the proximity to free margins a Double O-Z plasty (double transposition flap) was deemed most appropriate.  Using a sterile surgical marker, the appropriate transposition flaps were drawn incorporating the defect and placing the expected incisions within the relaxed skin tension lines where possible. The area thus outlined was incised deep to adipose tissue with a #15 scalpel blade.  The skin margins were undermined to an appropriate distance in all directions utilizing iris scissors.  Hemostasis was achieved with electrocautery.  The flaps were then transposed into place, one clockwise and the other counterclockwise, and anchored with interrupted buried subcutaneous sutures.
V-Y Plasty Text: The defect edges were debeveled with a #15 scalpel blade.  Given the location of the defect, shape of the defect and the proximity to free margins an V-Y advancement flap was deemed most appropriate.  Using a sterile surgical marker, an appropriate advancement flap was drawn incorporating the defect and placing the expected incisions within the relaxed skin tension lines where possible.    The area thus outlined was incised deep to adipose tissue with a #15 scalpel blade.  The skin margins were undermined to an appropriate distance in all directions utilizing iris scissors.
H Plasty Text: Given the location of the defect, shape of the defect and the proximity to free margins a H-plasty was deemed most appropriate for repair.  Using a sterile surgical marker, the appropriate advancement arms of the H-plasty were drawn incorporating the defect and placing the expected incisions within the relaxed skin tension lines where possible. The area thus outlined was incised deep to adipose tissue with a #15 scalpel blade. The skin margins were undermined to an appropriate distance in all directions utilizing iris scissors.  The opposing advancement arms were then advanced into place in opposite direction and anchored with interrupted buried subcutaneous sutures.
W Plasty Text: The lesion was extirpated to the level of the fat with a #15 scalpel blade.  Given the location of the defect, shape of the defect and the proximity to free margins a W-plasty was deemed most appropriate for repair.  Using a sterile surgical marker, the appropriate transposition arms of the W-plasty were drawn incorporating the defect and placing the expected incisions within the relaxed skin tension lines where possible.    The area thus outlined was incised deep to adipose tissue with a #15 scalpel blade.  The skin margins were undermined to an appropriate distance in all directions utilizing iris scissors.  The opposing transposition arms were then transposed into place in opposite direction and anchored with interrupted buried subcutaneous sutures.
Z Plasty Text: The lesion was extirpated to the level of the fat with a #15 scalpel blade.  Given the location of the defect, shape of the defect and the proximity to free margins a Z-plasty was deemed most appropriate for repair.  Using a sterile surgical marker, the appropriate transposition arms of the Z-plasty were drawn incorporating the defect and placing the expected incisions within the relaxed skin tension lines where possible.    The area thus outlined was incised deep to adipose tissue with a #15 scalpel blade.  The skin margins were undermined to an appropriate distance in all directions utilizing iris scissors.  The opposing transposition arms were then transposed into place in opposite direction and anchored with interrupted buried subcutaneous sutures.
Zygomaticofacial Flap Text: Given the location of the defect, shape of the defect and the proximity to free margins a zygomaticofacial flap was deemed most appropriate for repair.  Using a sterile surgical marker, the appropriate flap was drawn incorporating the defect and placing the expected incisions within the relaxed skin tension lines where possible. The area thus outlined was incised deep to adipose tissue with a #15 scalpel blade with preservation of a vascular pedicle.  The skin margins were undermined to an appropriate distance in all directions utilizing iris scissors.  The flap was then placed into the defect and anchored with interrupted buried subcutaneous sutures.
Cheek Interpolation Flap Text: A decision was made to reconstruct the defect utilizing an interpolation axial flap and a staged reconstruction.  A telfa template was made of the defect.  This telfa template was then used to outline the Cheek Interpolation flap.  The donor area for the pedicle flap was then injected with anesthesia.  The flap was excised through the skin and subcutaneous tissue down to the layer of the underlying musculature.  The interpolation flap was carefully excised within this deep plane to maintain its blood supply.  The edges of the donor site were undermined.   The donor site was closed in a primary fashion.  The pedicle was then rotated into position and sutured.  Once the tube was sutured into place, adequate blood supply was confirmed with blanching and refill.  The pedicle was then wrapped with xeroform gauze and dressed appropriately with a telfa and gauze bandage to ensure continued blood supply and protect the attached pedicle.
Cheek-To-Nose Interpolation Flap Text: A decision was made to reconstruct the defect utilizing an interpolation axial flap and a staged reconstruction.  A telfa template was made of the defect.  This telfa template was then used to outline the Cheek-To-Nose Interpolation flap.  The donor area for the pedicle flap was then injected with anesthesia.  The flap was excised through the skin and subcutaneous tissue down to the layer of the underlying musculature.  The interpolation flap was carefully excised within this deep plane to maintain its blood supply.  The edges of the donor site were undermined.   The donor site was closed in a primary fashion.  The pedicle was then rotated into position and sutured.  Once the tube was sutured into place, adequate blood supply was confirmed with blanching and refill.  The pedicle was then wrapped with xeroform gauze and dressed appropriately with a telfa and gauze bandage to ensure continued blood supply and protect the attached pedicle.
Interpolation Flap Text: A decision was made to reconstruct the defect utilizing an interpolation axial flap and a staged reconstruction.  A telfa template was made of the defect.  This telfa template was then used to outline the interpolation flap.  The donor area for the pedicle flap was then injected with anesthesia.  The flap was excised through the skin and subcutaneous tissue down to the layer of the underlying musculature.  The interpolation flap was carefully excised within this deep plane to maintain its blood supply.  The edges of the donor site were undermined.   The donor site was closed in a primary fashion.  The pedicle was then rotated into position and sutured.  Once the tube was sutured into place, adequate blood supply was confirmed with blanching and refill.  The pedicle was then wrapped with xeroform gauze and dressed appropriately with a telfa and gauze bandage to ensure continued blood supply and protect the attached pedicle.
Melolabial Interpolation Flap Text: A decision was made to reconstruct the defect utilizing an interpolation axial flap and a staged reconstruction.  A telfa template was made of the defect.  This telfa template was then used to outline the melolabial interpolation flap.  The donor area for the pedicle flap was then injected with anesthesia.  The flap was excised through the skin and subcutaneous tissue down to the layer of the underlying musculature.  The pedicle flap was carefully excised within this deep plane to maintain its blood supply.  The edges of the donor site were undermined.   The donor site was closed in a primary fashion.  The pedicle was then rotated into position and sutured.  Once the tube was sutured into place, adequate blood supply was confirmed with blanching and refill.  The pedicle was then wrapped with xeroform gauze and dressed appropriately with a telfa and gauze bandage to ensure continued blood supply and protect the attached pedicle.
Mastoid Interpolation Flap Text: A decision was made to reconstruct the defect utilizing an interpolation axial flap and a staged reconstruction.  A telfa template was made of the defect.  This telfa template was then used to outline the mastoid interpolation flap.  The donor area for the pedicle flap was then injected with anesthesia.  The flap was excised through the skin and subcutaneous tissue down to the layer of the underlying musculature.  The pedicle flap was carefully excised within this deep plane to maintain its blood supply.  The edges of the donor site were undermined.   The donor site was closed in a primary fashion.  The pedicle was then rotated into position and sutured.  Once the tube was sutured into place, adequate blood supply was confirmed with blanching and refill.  The pedicle was then wrapped with xeroform gauze and dressed appropriately with a telfa and gauze bandage to ensure continued blood supply and protect the attached pedicle.
Posterior Auricular Interpolation Flap Text: A decision was made to reconstruct the defect utilizing an interpolation axial flap and a staged reconstruction.  A telfa template was made of the defect.  This telfa template was then used to outline the posterior auricular interpolation flap.  The donor area for the pedicle flap was then injected with anesthesia.  The flap was excised through the skin and subcutaneous tissue down to the layer of the underlying musculature.  The pedicle flap was carefully excised within this deep plane to maintain its blood supply.  The edges of the donor site were undermined.   The donor site was closed in a primary fashion.  The pedicle was then rotated into position and sutured.  Once the tube was sutured into place, adequate blood supply was confirmed with blanching and refill.  The pedicle was then wrapped with xeroform gauze and dressed appropriately with a telfa and gauze bandage to ensure continued blood supply and protect the attached pedicle.
Paramedian Forehead Flap Text: A decision was made to reconstruct the defect utilizing an interpolation axial flap and a staged reconstruction.  A telfa template was made of the defect.  This telfa template was then used to outline the paramedian forehead pedicle flap.  The donor area for the pedicle flap was then injected with anesthesia.  The flap was excised through the skin and subcutaneous tissue down to the layer of the underlying musculature.  The pedicle flap was carefully excised within this deep plane to maintain its blood supply.  The edges of the donor site were undermined.   The donor site was closed in a primary fashion.  The pedicle was then rotated into position and sutured.  Once the tube was sutured into place, adequate blood supply was confirmed with blanching and refill.  The pedicle was then wrapped with xeroform gauze and dressed appropriately with a telfa and gauze bandage to ensure continued blood supply and protect the attached pedicle.
Abbe Flap (Upper To Lower Lip) Text: The defect of the lower lip was assessed and measured.  Given the location and size of the defect, an Abbe flap was deemed most appropriate.  Using a sterile surgical marker, an appropriate Abbe flap was measured and drawn on the upper lip. Local anesthesia was then infiltrated.  A scalpel was then used to incise the upper lip through and through the skin, vermilion, muscle and mucosa, leaving the flap pedicled on the opposite side.  The flap was then rotated and transferred to the lower lip defect.  The flap was then sutured into place with a three layer technique, closing the orbicularis oris muscle layer with subcutaneous buried sutures, followed by a mucosal layer and an epidermal layer.
Abbe Flap (Lower To Upper Lip) Text: The defect of the upper lip was assessed and measured.  Given the location and size of the defect, an Abbe flap was deemed most appropriate.  Using a sterile surgical marker, an appropriate Abbe flap was measured and drawn on the lower lip. Local anesthesia was then infiltrated. A scalpel was then used to incise the upper lip through and through the skin, vermilion, muscle and mucosa, leaving the flap pedicled on the opposite side.  The flap was then rotated and transferred to the lower lip defect.  The flap was then sutured into place with a three layer technique, closing the orbicularis oris muscle layer with subcutaneous buried sutures, followed by a mucosal layer and an epidermal layer.
Estlander Flap (Upper To Lower Lip) Text: The defect of the lower lip was assessed and measured.  Given the location and size of the defect, an Estlander flap was deemed most appropriate.  Using a sterile surgical marker, an appropriate Estlander flap was measured and drawn on the upper lip. Local anesthesia was then infiltrated. A scalpel was then used to incise the lateral aspect of the flap, through skin, muscle and mucosa, leaving the flap pedicled medially.  The flap was then rotated and positioned to fill the lower lip defect.  The flap was then sutured into place with a three layer technique, closing the orbicularis oris muscle layer with subcutaneous buried sutures, followed by a mucosal layer and an epidermal layer.
Lip Wedge Excision Repair Text: Given the location of the defect and the proximity to free margins a full thickness wedge repair was deemed most appropriate.  Using a sterile surgical marker, the appropriate repair was drawn incorporating the defect and placing the expected incisions perpendicular to the vermilion border.  The vermilion border was also meticulously outlined to ensure appropriate reapproximation during the repair.  The area thus outlined was incised through and through with a #15 scalpel blade.  The muscularis and dermis were reaproximated with deep sutures following hemostasis. Care was taken to realign the vermilion border before proceeding with the superficial closure.  Once the vermilion was realigned the superfical and mucosal closure was finished.
Ftsg Text: The defect edges were debeveled with a #15 scalpel blade.  Given the location of the defect, shape of the defect and the proximity to free margins a full thickness skin graft was deemed most appropriate.  Using a sterile surgical marker, the primary defect shape was transferred to the donor site. The area thus outlined was incised deep to adipose tissue with a #15 scalpel blade.  The harvested graft was then trimmed of adipose tissue until only dermis and epidermis was left.  The skin margins of the secondary defect were undermined to an appropriate distance in all directions utilizing iris scissors.  The secondary defect was closed with interrupted buried subcutaneous sutures.  The skin edges were then re-apposed with running  sutures.  The skin graft was then placed in the primary defect and oriented appropriately.
Split-Thickness Skin Graft Text: The defect edges were debeveled with a #15 scalpel blade.  Given the location of the defect, shape of the defect and the proximity to free margins a split thickness skin graft was deemed most appropriate.  Using a sterile surgical marker, the primary defect shape was transferred to the donor site. The split thickness graft was then harvested.  The skin graft was then placed in the primary defect and oriented appropriately.
Burow's Graft Text: The defect edges were debeveled with a #15 scalpel blade.  Given the location of the defect, shape of the defect, the proximity to free margins and the presence of a standing cone deformity a Burow's skin graft was deemed most appropriate. The standing cone was removed and this tissue was then trimmed to the shape of the primary defect. The adipose tissue was also removed until only dermis and epidermis were left.  The skin margins of the secondary defect were undermined to an appropriate distance in all directions utilizing iris scissors.  The secondary defect was closed with interrupted buried subcutaneous sutures.  The skin edges were then re-apposed with running  sutures.  The skin graft was then placed in the primary defect and oriented appropriately.
Cartilage Graft Text: The defect edges were debeveled with a #15 scalpel blade.  Given the location of the defect, shape of the defect, the fact the defect involved a full thickness cartilage defect a cartilage graft was deemed most appropriate.  An appropriate donor site was identified, cleansed, and anesthetized. The cartilage graft was then harvested and transferred to the recipient site, oriented appropriately and then sutured into place.  The secondary defect was then repaired using a primary closure.
Composite Graft Text: The defect edges were debeveled with a #15 scalpel blade.  Given the location of the defect, shape of the defect, the proximity to free margins and the fact the defect was full thickness a composite graft was deemed most appropriate.  The defect was outline and then transferred to the donor site.  A full thickness graft was then excised from the donor site. The graft was then placed in the primary defect, oriented appropriately and then sutured into place.  The secondary defect was then repaired using a primary closure.
Epidermal Autograft Text: The defect edges were debeveled with a #15 scalpel blade.  Given the location of the defect, shape of the defect and the proximity to free margins an epidermal autograft was deemed most appropriate.  Using a sterile surgical marker, the primary defect shape was transferred to the donor site. The epidermal graft was then harvested.  The skin graft was then placed in the primary defect and oriented appropriately.
Dermal Autograft Text: The defect edges were debeveled with a #15 scalpel blade.  Given the location of the defect, shape of the defect and the proximity to free margins a dermal autograft was deemed most appropriate.  Using a sterile surgical marker, the primary defect shape was transferred to the donor site. The area thus outlined was incised deep to adipose tissue with a #15 scalpel blade.  The harvested graft was then trimmed of adipose and epidermal tissue until only dermis was left.  The skin graft was then placed in the primary defect and oriented appropriately.
Skin Substitute Text: The defect edges were debeveled with a #15 scalpel blade.  Given the location of the defect, shape of the defect and the proximity to free margins a skin substitute graft was deemed most appropriate.  The graft material was trimmed to fit the size of the defect. The graft was then placed in the primary defect and oriented appropriately.
Tissue Cultured Epidermal Autograft Text: The defect edges were debeveled with a #15 scalpel blade.  Given the location of the defect, shape of the defect and the proximity to free margins a tissue cultured epidermal autograft was deemed most appropriate.  The graft was then trimmed to fit the size of the defect.  The graft was then placed in the primary defect and oriented appropriately.
Xenograft Text: The defect edges were debeveled with a #15 scalpel blade.  Given the location of the defect, shape of the defect and the proximity to free margins a xenograft was deemed most appropriate.  The graft was then trimmed to fit the size of the defect.  The graft was then placed in the primary defect and oriented appropriately.
Purse String (Intermediate) Text: Given the location of the defect and the characteristics of the surrounding skin a purse string intermediate closure was deemed most appropriate.  Undermining was performed circumferentially around the surgical defect.  A purse string suture was then placed and tightened.
Purse String (Simple) Text: Given the location of the defect and the characteristics of the surrounding skin a purse string simple closure was deemed most appropriate.  Undermining was performed circumferentially around the surgical defect.  A purse string suture was then placed and tightened.
Partial Purse String (Intermediate) Text: Given the location of the defect and the characteristics of the surrounding skin an intermediate purse string closure was deemed most appropriate.  Undermining was performed circumferentially around the surgical defect.  A purse string suture was then placed and tightened. Wound tension of the circular defect prevented complete closure of the wound.
Partial Purse String (Simple) Text: Given the location of the defect and the characteristics of the surrounding skin a simple purse string closure was deemed most appropriate.  Undermining was performed circumferentially around the surgical defect.  A purse string suture was then placed and tightened. Wound tension of the circular defect prevented complete closure of the wound.
Complex Repair And Single Advancement Flap Text: The defect edges were debeveled with a #15 scalpel blade.  The primary defect was closed partially with a complex linear closure.  Given the location of the remaining defect, shape of the defect and the proximity to free margins a single advancement flap was deemed most appropriate for complete closure of the defect.  Using a sterile surgical marker, an appropriate advancement flap was drawn incorporating the defect and placing the expected incisions within the relaxed skin tension lines where possible.    The area thus outlined was incised deep to adipose tissue with a #15 scalpel blade.  The skin margins were undermined to an appropriate distance in all directions utilizing iris scissors.
Complex Repair And Double Advancement Flap Text: The defect edges were debeveled with a #15 scalpel blade.  The primary defect was closed partially with a complex linear closure.  Given the location of the remaining defect, shape of the defect and the proximity to free margins a double advancement flap was deemed most appropriate for complete closure of the defect.  Using a sterile surgical marker, an appropriate advancement flap was drawn incorporating the defect and placing the expected incisions within the relaxed skin tension lines where possible.    The area thus outlined was incised deep to adipose tissue with a #15 scalpel blade.  The skin margins were undermined to an appropriate distance in all directions utilizing iris scissors.
Complex Repair And Modified Advancement Flap Text: The defect edges were debeveled with a #15 scalpel blade.  The primary defect was closed partially with a complex linear closure.  Given the location of the remaining defect, shape of the defect and the proximity to free margins a modified advancement flap was deemed most appropriate for complete closure of the defect.  Using a sterile surgical marker, an appropriate advancement flap was drawn incorporating the defect and placing the expected incisions within the relaxed skin tension lines where possible.    The area thus outlined was incised deep to adipose tissue with a #15 scalpel blade.  The skin margins were undermined to an appropriate distance in all directions utilizing iris scissors.
Complex Repair And A-T Advancement Flap Text: The defect edges were debeveled with a #15 scalpel blade.  The primary defect was closed partially with a complex linear closure.  Given the location of the remaining defect, shape of the defect and the proximity to free margins an A-T advancement flap was deemed most appropriate for complete closure of the defect.  Using a sterile surgical marker, an appropriate advancement flap was drawn incorporating the defect and placing the expected incisions within the relaxed skin tension lines where possible.    The area thus outlined was incised deep to adipose tissue with a #15 scalpel blade.  The skin margins were undermined to an appropriate distance in all directions utilizing iris scissors.
Complex Repair And O-T Advancement Flap Text: The defect edges were debeveled with a #15 scalpel blade.  The primary defect was closed partially with a complex linear closure.  Given the location of the remaining defect, shape of the defect and the proximity to free margins an O-T advancement flap was deemed most appropriate for complete closure of the defect.  Using a sterile surgical marker, an appropriate advancement flap was drawn incorporating the defect and placing the expected incisions within the relaxed skin tension lines where possible.    The area thus outlined was incised deep to adipose tissue with a #15 scalpel blade.  The skin margins were undermined to an appropriate distance in all directions utilizing iris scissors.
Complex Repair And O-L Flap Text: The defect edges were debeveled with a #15 scalpel blade.  The primary defect was closed partially with a complex linear closure.  Given the location of the remaining defect, shape of the defect and the proximity to free margins an O-L flap was deemed most appropriate for complete closure of the defect.  Using a sterile surgical marker, an appropriate flap was drawn incorporating the defect and placing the expected incisions within the relaxed skin tension lines where possible.    The area thus outlined was incised deep to adipose tissue with a #15 scalpel blade.  The skin margins were undermined to an appropriate distance in all directions utilizing iris scissors.
Complex Repair And Bilobe Flap Text: The defect edges were debeveled with a #15 scalpel blade.  The primary defect was closed partially with a complex linear closure.  Given the location of the remaining defect, shape of the defect and the proximity to free margins a bilobe flap was deemed most appropriate for complete closure of the defect.  Using a sterile surgical marker, an appropriate advancement flap was drawn incorporating the defect and placing the expected incisions within the relaxed skin tension lines where possible.    The area thus outlined was incised deep to adipose tissue with a #15 scalpel blade.  The skin margins were undermined to an appropriate distance in all directions utilizing iris scissors.
Complex Repair And Melolabial Flap Text: The defect edges were debeveled with a #15 scalpel blade.  The primary defect was closed partially with a complex linear closure.  Given the location of the remaining defect, shape of the defect and the proximity to free margins a melolabial flap was deemed most appropriate for complete closure of the defect.  Using a sterile surgical marker, an appropriate advancement flap was drawn incorporating the defect and placing the expected incisions within the relaxed skin tension lines where possible.    The area thus outlined was incised deep to adipose tissue with a #15 scalpel blade.  The skin margins were undermined to an appropriate distance in all directions utilizing iris scissors.
Complex Repair And Rotation Flap Text: The defect edges were debeveled with a #15 scalpel blade.  The primary defect was closed partially with a complex linear closure.  Given the location of the remaining defect, shape of the defect and the proximity to free margins a rotation flap was deemed most appropriate for complete closure of the defect.  Using a sterile surgical marker, an appropriate advancement flap was drawn incorporating the defect and placing the expected incisions within the relaxed skin tension lines where possible.    The area thus outlined was incised deep to adipose tissue with a #15 scalpel blade.  The skin margins were undermined to an appropriate distance in all directions utilizing iris scissors.
Complex Repair And Rhombic Flap Text: The defect edges were debeveled with a #15 scalpel blade.  The primary defect was closed partially with a complex linear closure.  Given the location of the remaining defect, shape of the defect and the proximity to free margins a rhombic flap was deemed most appropriate for complete closure of the defect.  Using a sterile surgical marker, an appropriate advancement flap was drawn incorporating the defect and placing the expected incisions within the relaxed skin tension lines where possible.    The area thus outlined was incised deep to adipose tissue with a #15 scalpel blade.  The skin margins were undermined to an appropriate distance in all directions utilizing iris scissors.
Complex Repair And Transposition Flap Text: The defect edges were debeveled with a #15 scalpel blade.  The primary defect was closed partially with a complex linear closure.  Given the location of the remaining defect, shape of the defect and the proximity to free margins a transposition flap was deemed most appropriate for complete closure of the defect.  Using a sterile surgical marker, an appropriate advancement flap was drawn incorporating the defect and placing the expected incisions within the relaxed skin tension lines where possible.    The area thus outlined was incised deep to adipose tissue with a #15 scalpel blade.  The skin margins were undermined to an appropriate distance in all directions utilizing iris scissors.
Complex Repair And V-Y Plasty Text: The defect edges were debeveled with a #15 scalpel blade.  The primary defect was closed partially with a complex linear closure.  Given the location of the remaining defect, shape of the defect and the proximity to free margins a V-Y plasty was deemed most appropriate for complete closure of the defect.  Using a sterile surgical marker, an appropriate advancement flap was drawn incorporating the defect and placing the expected incisions within the relaxed skin tension lines where possible.    The area thus outlined was incised deep to adipose tissue with a #15 scalpel blade.  The skin margins were undermined to an appropriate distance in all directions utilizing iris scissors.
Complex Repair And M Plasty Text: The defect edges were debeveled with a #15 scalpel blade.  The primary defect was closed partially with a complex linear closure.  Given the location of the remaining defect, shape of the defect and the proximity to free margins an M plasty was deemed most appropriate for complete closure of the defect.  Using a sterile surgical marker, an appropriate advancement flap was drawn incorporating the defect and placing the expected incisions within the relaxed skin tension lines where possible.    The area thus outlined was incised deep to adipose tissue with a #15 scalpel blade.  The skin margins were undermined to an appropriate distance in all directions utilizing iris scissors.
Complex Repair And Double M Plasty Text: The defect edges were debeveled with a #15 scalpel blade.  The primary defect was closed partially with a complex linear closure.  Given the location of the remaining defect, shape of the defect and the proximity to free margins a double M plasty was deemed most appropriate for complete closure of the defect.  Using a sterile surgical marker, an appropriate advancement flap was drawn incorporating the defect and placing the expected incisions within the relaxed skin tension lines where possible.    The area thus outlined was incised deep to adipose tissue with a #15 scalpel blade.  The skin margins were undermined to an appropriate distance in all directions utilizing iris scissors.
Complex Repair And W Plasty Text: The defect edges were debeveled with a #15 scalpel blade.  The primary defect was closed partially with a complex linear closure.  Given the location of the remaining defect, shape of the defect and the proximity to free margins a W plasty was deemed most appropriate for complete closure of the defect.  Using a sterile surgical marker, an appropriate advancement flap was drawn incorporating the defect and placing the expected incisions within the relaxed skin tension lines where possible.    The area thus outlined was incised deep to adipose tissue with a #15 scalpel blade.  The skin margins were undermined to an appropriate distance in all directions utilizing iris scissors.
Complex Repair And Z Plasty Text: The defect edges were debeveled with a #15 scalpel blade.  The primary defect was closed partially with a complex linear closure.  Given the location of the remaining defect, shape of the defect and the proximity to free margins a Z plasty was deemed most appropriate for complete closure of the defect.  Using a sterile surgical marker, an appropriate advancement flap was drawn incorporating the defect and placing the expected incisions within the relaxed skin tension lines where possible.    The area thus outlined was incised deep to adipose tissue with a #15 scalpel blade.  The skin margins were undermined to an appropriate distance in all directions utilizing iris scissors.
Complex Repair And Dorsal Nasal Flap Text: The defect edges were debeveled with a #15 scalpel blade.  The primary defect was closed partially with a complex linear closure.  Given the location of the remaining defect, shape of the defect and the proximity to free margins a dorsal nasal flap was deemed most appropriate for complete closure of the defect.  Using a sterile surgical marker, an appropriate flap was drawn incorporating the defect and placing the expected incisions within the relaxed skin tension lines where possible.    The area thus outlined was incised deep to adipose tissue with a #15 scalpel blade.  The skin margins were undermined to an appropriate distance in all directions utilizing iris scissors.
Complex Repair And Ftsg Text: The defect edges were debeveled with a #15 scalpel blade.  The primary defect was closed partially with a complex linear closure.  Given the location of the defect, shape of the defect and the proximity to free margins a full thickness skin graft was deemed most appropriate to repair the remaining defect.  The graft was trimmed to fit the size of the remaining defect.  The graft was then placed in the primary defect, oriented appropriately, and sutured into place.
Complex Repair And Burow's Graft Text: The defect edges were debeveled with a #15 scalpel blade.  The primary defect was closed partially with a complex linear closure.  Given the location of the defect, shape of the defect, the proximity to free margins and the presence of a standing cone deformity a Burow's graft was deemed most appropriate to repair the remaining defect.  The graft was trimmed to fit the size of the remaining defect.  The graft was then placed in the primary defect, oriented appropriately, and sutured into place.
Complex Repair And Split-Thickness Skin Graft Text: The defect edges were debeveled with a #15 scalpel blade.  The primary defect was closed partially with a complex linear closure.  Given the location of the defect, shape of the defect and the proximity to free margins a split thickness skin graft was deemed most appropriate to repair the remaining defect.  The graft was trimmed to fit the size of the remaining defect.  The graft was then placed in the primary defect, oriented appropriately, and sutured into place.
Complex Repair And Epidermal Autograft Text: The defect edges were debeveled with a #15 scalpel blade.  The primary defect was closed partially with a complex linear closure.  Given the location of the defect, shape of the defect and the proximity to free margins an epidermal autograft was deemed most appropriate to repair the remaining defect.  The graft was trimmed to fit the size of the remaining defect.  The graft was then placed in the primary defect, oriented appropriately, and sutured into place.
Complex Repair And Dermal Autograft Text: The defect edges were debeveled with a #15 scalpel blade.  The primary defect was closed partially with a complex linear closure.  Given the location of the defect, shape of the defect and the proximity to free margins an dermal autograft was deemed most appropriate to repair the remaining defect.  The graft was trimmed to fit the size of the remaining defect.  The graft was then placed in the primary defect, oriented appropriately, and sutured into place.
Complex Repair And Tissue Cultured Epidermal Autograft Text: The defect edges were debeveled with a #15 scalpel blade.  The primary defect was closed partially with a complex linear closure.  Given the location of the defect, shape of the defect and the proximity to free margins an tissue cultured epidermal autograft was deemed most appropriate to repair the remaining defect.  The graft was trimmed to fit the size of the remaining defect.  The graft was then placed in the primary defect, oriented appropriately, and sutured into place.
Complex Repair And Xenograft Text: The defect edges were debeveled with a #15 scalpel blade.  The primary defect was closed partially with a complex linear closure.  Given the location of the defect, shape of the defect and the proximity to free margins a xenograft was deemed most appropriate to repair the remaining defect.  The graft was trimmed to fit the size of the remaining defect.  The graft was then placed in the primary defect, oriented appropriately, and sutured into place.
Complex Repair And Skin Substitute Graft Text: The defect edges were debeveled with a #15 scalpel blade.  The primary defect was closed partially with a complex linear closure.  Given the location of the remaining defect, shape of the defect and the proximity to free margins a skin substitute graft was deemed most appropriate to repair the remaining defect.  The graft was trimmed to fit the size of the remaining defect.  The graft was then placed in the primary defect, oriented appropriately, and sutured into place.
Path Notes (To The Dermatopathologist): Please check margins.
Consent was obtained from the patient. The risks and benefits to therapy were discussed in detail. Specifically, the risks of infection, scarring, bleeding, prolonged wound healing, incomplete removal, allergy to anesthesia, nerve injury and recurrence were addressed. Prior to the procedure, the treatment site was clearly identified and confirmed by the patient. All components of Universal Protocol/PAUSE Rule completed.
Post-Care Instructions: I reviewed with the patient in detail post-care instructions. Patient is not to engage in any heavy lifting, exercise, or swimming for the next 14 days. Should the patient develop any fevers, chills, bleeding, severe pain patient will contact the office immediately.
Home Suture Removal Text: Patient was provided a home suture removal kit and will remove their sutures at home.  If they have any questions or difficulties they will call the office.
Where Do You Want The Question To Include Opioid Counseling Located?: Case Summary Tab
Information: Selecting Yes will display possible errors in your note based on the variables you have selected. This validation is only offered as a suggestion for you. PLEASE NOTE THAT THE VALIDATION TEXT WILL BE REMOVED WHEN YOU FINALIZE YOUR NOTE. IF YOU WANT TO FAX A PRELIMINARY NOTE YOU WILL NEED TO TOGGLE THIS TO 'NO' IF YOU DO NOT WANT IT IN YOUR FAXED NOTE.

## 2022-11-15 ENCOUNTER — APPOINTMENT (RX ONLY)
Dept: URBAN - METROPOLITAN AREA CLINIC 22 | Facility: CLINIC | Age: 77
Setting detail: DERMATOLOGY
End: 2022-11-15

## 2022-11-15 PROBLEM — D03.39 MELANOMA IN SITU OF OTHER PARTS OF FACE: Status: ACTIVE | Noted: 2022-11-15

## 2022-11-15 PROCEDURE — 14301 TIS TRNFR ANY 30.1-60 SQ CM: CPT | Mod: 79

## 2022-11-15 PROCEDURE — ? COUNSELING

## 2022-11-15 PROCEDURE — ? REPAIR NOTE

## 2022-11-15 PROCEDURE — ? PRESCRIPTION

## 2022-11-15 PROCEDURE — ? MEDICATION COUNSELING

## 2022-11-15 RX ORDER — DOXYCYCLINE HYCLATE 100 MG/1
CAPSULE, GELATIN COATED ORAL BID
Qty: 20 | Refills: 0 | Status: ERX | COMMUNITY
Start: 2022-11-15

## 2022-11-15 RX ADMIN — DOXYCYCLINE HYCLATE: 100 CAPSULE, GELATIN COATED ORAL at 00:00

## 2022-11-15 RX ADMIN — HYDROCODONE BITARTRATE AND ACETAMINOPHEN: 5; 325 TABLET ORAL at 00:00

## 2022-11-15 NOTE — PROCEDURE: MIPS QUALITY
Quality 111:Pneumonia Vaccination Status For Older Adults: Pneumococcal vaccine (PPSV23) administered on or after patient’s 60th birthday and before the end of the measurement period
Quality 226: Preventive Care And Screening: Tobacco Use: Screening And Cessation Intervention: Patient screened for tobacco use and is an ex/non-smoker
Quality 137: Melanoma: Continuity Of Care - Recall System: Patient information entered into a recall system that includes: target date for the next exam specified AND a process to follow up with patients regarding missed or unscheduled appointments
Quality 130: Documentation Of Current Medications In The Medical Record: Current Medications Documented
Quality 431: Preventive Care And Screening: Unhealthy Alcohol Use - Screening: Patient not identified as an unhealthy alcohol user when screened for unhealthy alcohol use using a systematic screening method
Detail Level: Detailed
Quality 265: Biopsy Follow-Up: Biopsy results reviewed, communicated, tracked, and documented
Quality 138: Melanoma: Coordination Of Care: A treatment plan was communicated to the physicians providing continuing care within one month of diagnosis outlining: diagnosis, tumor thickness and a plan for surgery or alternate care.

## 2022-11-15 NOTE — PROCEDURE: REPAIR NOTE
Body Location Override (Optional - Billing Will Still Be Based On Selected Body Map Location If Applicable): left jawline
Referring Provider (Optional): Kenneth Zuniga MD
Anesthesia Volume In Cc: 0
Did You Provide Opioid Counseling: No
Repair Type: Flap
Suturegard Retention Suture: 2-0 Nylon
Retention Suture Bite Size: 3 mm
Length To Time In Minutes Device Was In Place: 10
Number Of Hemigard Strips Per Side: 1
Undermining Type: Entire Wound
Debridement Text: The wound edges were debrided prior to proceeding with the closure to facilitate wound healing.
Helical Rim Text: The closure involved the helical rim.
Vermilion Border Text: The closure involved the vermilion border.
Nostril Rim Text: The closure involved the nostril rim.
Retention Suture Text: Retention sutures were placed to support the closure and prevent dehiscence.
Flap Type: Advancement Flap (Single)
Primary Defect Length (In Cm): 4.9
Primary Defect Width (In Cm): 3.5
Secondary Defect Length (In Cm): 6
Secondary Defect Width (In Cm): 4
Skin Substitute: EpiFix Micronized
Suture Removal: 14 days
Date Of Previous Surgery (Optional): 11/8/22
Pre-Op Size Of Lesion Removed (Optional): 3.9
X Size Of Lesion In Cm (Optional): 2.5
Detail Level: Detailed
Anesthesia Type: 1% lidocaine with epinephrine
Hemostasis: Electrocautery
Estimated Blood Loss (Cc): minimal
Brow Lift Text: A midfrontal incision was made medially to the defect to allow access to the tissues just superior to the left eyebrow. Following careful dissection inferiorly in a supraperiosteal plane to the level of the left eyebrow, several 3-0 monocryl sutures were used to resuspend the eyebrow orbicularis oculi muscular unit to the superior frontal bone periosteum. This resulted in an appropriate reapproximation of static eyebrow symmetry and correction of the left brow ptosis.
Deep Sutures: 5-0 Polysorb
Epidermal Sutures: 5-0 Fast Absorbing Gut
Epidermal Closure: simple interrupted
Wound Care: Petrolatum
Dressing: dry sterile dressing
Unna Boot Text: An Unna boot was placed to help immobilize the limb and facilitate more rapid healing.
Suturegard Intro: Intraoperative tissue expansion was performed, utilizing the SUTUREGARD device, in order to reduce wound tension.
Suturegard Body: The suture ends were repeatedly re-tightened and re-clamped to achieve the desired tissue expansion.
Hemigard Intro: Due to skin fragility and wound tension, it was decided to use HEMIGARD adhesive retention suture devices to permit a linear closure. The skin was cleaned and dried for a 6cm distance away from the wound. Excessive hair, if present, was removed to allow for adhesion.
Hemigard Postcare Instructions: The HEMIGARD strips are to remain completely dry for at least 5-7 days.
Graft Donor Site Bandage (Optional-Leave Blank If You Don't Want In Note): Steri-strips and a pressure bandage were applied to the donor site.
Closure 2 Information: This tab is for additional flaps and grafts, including complex repair and grafts and complex repair and flaps. You can also specify a different location for the additional defect, if the location is the same you do not need to select a new one. We will insert the automated text for the repair you select below just as we do for solitary flaps and grafts. Please note that at this time if you select a location with a different insurance zone you will need to override the ICD10 and CPT if appropriate.
Closure 3 Information: This tab is for additional flaps and grafts above and beyond our usual structured repairs.  Please note if you enter information here it will not currently bill and you will need to add the billing information manually.
Complex Repair Preamble Text (Leave Blank If You Do Not Want): Extensive wide undermining was performed.
Intermediate Repair Preamble Text (Leave Blank If You Do Not Want): Undermining was performed with blunt dissection.
Flap Thinning Complex Repair Preamble Text (Leave Blank If You Do Not Want): An incision was made along the previous flap suture line. Undermining was performed beneath the flap and redundant tissue was removed to restore the normal contour of the skin.
Non-Graft Cartilage Fenestration Text: The cartilage was fenestrated with a 2mm punch biopsy to help facilitate healing.
Graft Cartilage Fenestration Text: The cartilage was fenestrated with a 2mm punch biopsy to help facilitate graft survival and healing.
Preparation Of Recipient Site - Flap: The eschar was removed surgically with sharp dissection to facilitate appropriate wound healing of the following adjacent tissue rearrangement.
Preparation Of Recipient Site - Graft: The eschar was removed surgically with sharp dissection to facilitate appropriate survival of the following graft.
Preparation Of Recipient Site - Flap Takedown: The eschar and granulation tissue was removed surgically with sharp dissection to facilitate appropriate healing after division and inset of the proximal and distal interpolation flap.
Secondary Intention Text (Leave Blank If You Do Not Want): The defect will heal with secondary intention.
No Repair - Repaired With Adjacent Surgical Defect Text (Leave Blank If You Do Not Want): After obtaining clear surgical margins the defect was repaired concurrently with another surgical defect which was in close approximation.
Referred To Oculoplastics For Closure Text (Leave Blank If You Do Not Want): After obtaining clear surgical margins the patient was sent to oculoplastics for surgical repair.  The patient understands they will receive post-surgical care and follow-up from the referring physician's office.
Referred To Otolaryngology For Closure Text (Leave Blank If You Do Not Want): After obtaining clear surgical margins the patient was sent to otolaryngology for surgical repair.  The patient understands they will receive post-surgical care and follow-up from the referring physician's office.
Referred To Plastics For Closure Text (Leave Blank If You Do Not Want): After obtaining clear surgical margins the patient was sent to plastics for surgical repair.  The patient understands they will receive post-surgical care and follow-up from the referring physician's office.
Referred To Asc For Closure Text (Leave Blank If You Do Not Want): After obtaining clear surgical margins the patient was sent to an ASC for surgical repair.  The patient understands they will receive post-surgical care and follow-up from the ASC physician.
Referred To Mid-Level For Closure Text (Leave Blank If You Do Not Want): After obtaining clear surgical margins the patient was sent to a mid-level provider for surgical repair.  The patient understands they will receive post-surgical care and follow-up from the mid-level provider.
Repair Performed By Another Provider Text (Leave Blank If You Do Not Want): After obtaining clear surgical margins the defect was repaired by another provider.
Adjacent Tissue Transfer Text: The defect edges were debeveled with a #15 scalpel blade.  Given the location of the defect and the proximity to free margins an adjacent tissue transfer was deemed most appropriate.  Using a sterile surgical marker, an appropriate flap was drawn incorporating the defect and placing the expected incisions within the relaxed skin tension lines where possible.    The area thus outlined was incised deep to adipose tissue with a #15 scalpel blade.  The skin margins were undermined to an appropriate distance in all directions utilizing iris scissors.
Advancement Flap (Single) Text: The defect edges were debeveled with a #15 scalpel blade.  Given the location of the defect and the proximity to free margins a single advancement flap was deemed most appropriate.  Using a sterile surgical marker, an appropriate advancement flap was drawn incorporating the defect and placing the expected incisions within the relaxed skin tension lines where possible.    The area thus outlined was incised deep to adipose tissue with a #15 scalpel blade.  The skin margins were undermined to an appropriate distance in all directions utilizing iris scissors.
Advancement Flap (Double) Text: The defect edges were debeveled with a #15 scalpel blade.  Given the location of the defect and the proximity to free margins a double advancement flap was deemed most appropriate.  Using a sterile surgical marker, the appropriate advancement flaps were drawn incorporating the defect and placing the expected incisions within the relaxed skin tension lines where possible.    The area thus outlined was incised deep to adipose tissue with a #15 scalpel blade.  The skin margins were undermined to an appropriate distance in all directions utilizing iris scissors.
Burow's Advancement Flap Text: The defect edges were debeveled with a #15 scalpel blade.  Given the location of the defect and the proximity to free margins a Burow's advancement flap was deemed most appropriate.  Using a sterile surgical marker, the appropriate advancement flap was drawn incorporating the defect and placing the expected incisions within the relaxed skin tension lines where possible.    The area thus outlined was incised deep to adipose tissue with a #15 scalpel blade.  The skin margins were undermined to an appropriate distance in all directions utilizing iris scissors.
Chonodrocutaneous Helical Advancement Flap Text: The defect edges were debeveled with a #15 scalpel blade.  Given the location of the defect and the proximity to free margins a chondrocutaneous helical advancement flap was deemed most appropriate.  Using a sterile surgical marker, the appropriate advancement flap was drawn incorporating the defect and placing the expected incisions within the relaxed skin tension lines where possible.    The area thus outlined was incised deep to adipose tissue with a #15 scalpel blade.  The skin margins were undermined to an appropriate distance in all directions utilizing iris scissors.
Crescentic Advancement Flap Text: The defect edges were debeveled with a #15 scalpel blade.  Given the location of the defect and the proximity to free margins a crescentic advancement flap was deemed most appropriate.  Using a sterile surgical marker, the appropriate advancement flap was drawn incorporating the defect and placing the expected incisions within the relaxed skin tension lines where possible.    The area thus outlined was incised deep to adipose tissue with a #15 scalpel blade.  The skin margins were undermined to an appropriate distance in all directions utilizing iris scissors.
A-T Advancement Flap Text: The defect edges were debeveled with a #15 scalpel blade.  Given the location of the defect, shape of the defect and the proximity to free margins an A-T advancement flap was deemed most appropriate.  Using a sterile surgical marker, an appropriate advancement flap was drawn incorporating the defect and placing the expected incisions within the relaxed skin tension lines where possible.    The area thus outlined was incised deep to adipose tissue with a #15 scalpel blade.  The skin margins were undermined to an appropriate distance in all directions utilizing iris scissors.
O-T Advancement Flap Text: The defect edges were debeveled with a #15 scalpel blade.  Given the location of the defect, shape of the defect and the proximity to free margins an O-T advancement flap was deemed most appropriate.  Using a sterile surgical marker, an appropriate advancement flap was drawn incorporating the defect and placing the expected incisions within the relaxed skin tension lines where possible.    The area thus outlined was incised deep to adipose tissue with a #15 scalpel blade.  The skin margins were undermined to an appropriate distance in all directions utilizing iris scissors.
O-L Flap Text: The defect edges were debeveled with a #15 scalpel blade.  Given the location of the defect, shape of the defect and the proximity to free margins an O-L flap was deemed most appropriate.  Using a sterile surgical marker, an appropriate advancement flap was drawn incorporating the defect and placing the expected incisions within the relaxed skin tension lines where possible.    The area thus outlined was incised deep to adipose tissue with a #15 scalpel blade.  The skin margins were undermined to an appropriate distance in all directions utilizing iris scissors.
O-Z Flap Text: The defect edges were debeveled with a #15 scalpel blade.  Given the location of the defect, shape of the defect and the proximity to free margins an O-Z flap was deemed most appropriate.  Using a sterile surgical marker, an appropriate transposition flap was drawn incorporating the defect and placing the expected incisions within the relaxed skin tension lines where possible. The area thus outlined was incised deep to adipose tissue with a #15 scalpel blade.  The skin margins were undermined to an appropriate distance in all directions utilizing iris scissors.
Double O-Z Flap Text: The defect edges were debeveled with a #15 scalpel blade.  Given the location of the defect, shape of the defect and the proximity to free margins a Double O-Z flap was deemed most appropriate.  Using a sterile surgical marker, an appropriate transposition flap was drawn incorporating the defect and placing the expected incisions within the relaxed skin tension lines where possible. The area thus outlined was incised deep to adipose tissue with a #15 scalpel blade.  The skin margins were undermined to an appropriate distance in all directions utilizing iris scissors.
V-Y Flap Text: The defect edges were debeveled with a #15 scalpel blade.  Given the location of the defect, shape of the defect and the proximity to free margins a V-Y flap was deemed most appropriate.  Using a sterile surgical marker, an appropriate advancement flap was drawn incorporating the defect and placing the expected incisions within the relaxed skin tension lines where possible.    The area thus outlined was incised deep to adipose tissue with a #15 scalpel blade.  The skin margins were undermined to an appropriate distance in all directions utilizing iris scissors.
Advancement-Rotation Flap Text: The defect edges were debeveled with a #15 scalpel blade.  Given the location of the defect, shape of the defect and the proximity to free margins an advancement-rotation flap was deemed most appropriate.  Using a sterile surgical marker, an appropriate flap was drawn incorporating the defect and placing the expected incisions within the relaxed skin tension lines where possible. The area thus outlined was incised deep to adipose tissue with a #15 scalpel blade.  The skin margins were undermined to an appropriate distance in all directions utilizing iris scissors.
Mercedes Flap Text: The defect edges were debeveled with a #15 scalpel blade.  Given the location of the defect, shape of the defect and the proximity to free margins a Mercedes flap was deemed most appropriate.  Using a sterile surgical marker, an appropriate advancement flap was drawn incorporating the defect and placing the expected incisions within the relaxed skin tension lines where possible. The area thus outlined was incised deep to adipose tissue with a #15 scalpel blade.  The skin margins were undermined to an appropriate distance in all directions utilizing iris scissors.
Modified Advancement Flap Text: The defect edges were debeveled with a #15 scalpel blade.  Given the location of the defect, shape of the defect and the proximity to free margins a modified advancement flap was deemed most appropriate.  Using a sterile surgical marker, an appropriate advancement flap was drawn incorporating the defect and placing the expected incisions within the relaxed skin tension lines where possible.    The area thus outlined was incised deep to adipose tissue with a #15 scalpel blade.  The skin margins were undermined to an appropriate distance in all directions utilizing iris scissors.
Mucosal Advancement Flap Text: Given the location of the defect, shape of the defect and the proximity to free margins a mucosal advancement flap was deemed most appropriate. Incisions were made with a 15 blade scalpel in the appropriate fashion along the cutaneous vermilion border and the mucosal lip. The remaining actinically damaged mucosal tissue was excised.  The mucosal advancement flap was then elevated to the gingival sulcus with care taken to preserve the neurovascular structures and advanced into the primary defect. Care was taken to ensure that precise realignment of the vermilion border was achieved.
Peng Advancement Flap Text: The defect edges were debeveled with a #15 scalpel blade.  Given the location of the defect, shape of the defect and the proximity to free margins a Peng advancement flap was deemed most appropriate.  Using a sterile surgical marker, an appropriate advancement flap was drawn incorporating the defect and placing the expected incisions within the relaxed skin tension lines where possible. The area thus outlined was incised deep to adipose tissue with a #15 scalpel blade.  The skin margins were undermined to an appropriate distance in all directions utilizing iris scissors.
Hatchet Flap Text: The defect edges were debeveled with a #15 scalpel blade.  Given the location of the defect, shape of the defect and the proximity to free margins a hatchet flap was deemed most appropriate.  Using a sterile surgical marker, an appropriate hatchet flap was drawn incorporating the defect and placing the expected incisions within the relaxed skin tension lines where possible.    The area thus outlined was incised deep to adipose tissue with a #15 scalpel blade.  The skin margins were undermined to an appropriate distance in all directions utilizing iris scissors.
Rotation Flap Text: The defect edges were debeveled with a #15 scalpel blade.  Given the location of the defect, shape of the defect and the proximity to free margins a rotation flap was deemed most appropriate.  Using a sterile surgical marker, an appropriate rotation flap was drawn incorporating the defect and placing the expected incisions within the relaxed skin tension lines where possible.    The area thus outlined was incised deep to adipose tissue with a #15 scalpel blade.  The skin margins were undermined to an appropriate distance in all directions utilizing iris scissors.
Spiral Flap Text: The defect edges were debeveled with a #15 scalpel blade.  Given the location of the defect, shape of the defect and the proximity to free margins a spiral flap was deemed most appropriate.  Using a sterile surgical marker, an appropriate rotation flap was drawn incorporating the defect and placing the expected incisions within the relaxed skin tension lines where possible. The area thus outlined was incised deep to adipose tissue with a #15 scalpel blade.  The skin margins were undermined to an appropriate distance in all directions utilizing iris scissors.
Staged Advancement Flap Text: The defect edges were debeveled with a #15 scalpel blade.  Given the location of the defect, shape of the defect and the proximity to free margins a staged advancement flap was deemed most appropriate.  Using a sterile surgical marker, an appropriate advancement flap was drawn incorporating the defect and placing the expected incisions within the relaxed skin tension lines where possible. The area thus outlined was incised deep to adipose tissue with a #15 scalpel blade.  The skin margins were undermined to an appropriate distance in all directions utilizing iris scissors.
Star Wedge Flap Text: The defect edges were debeveled with a #15 scalpel blade.  Given the location of the defect, shape of the defect and the proximity to free margins a star wedge flap was deemed most appropriate.  Using a sterile surgical marker, an appropriate rotation flap was drawn incorporating the defect and placing the expected incisions within the relaxed skin tension lines where possible. The area thus outlined was incised deep to adipose tissue with a #15 scalpel blade.  The skin margins were undermined to an appropriate distance in all directions utilizing iris scissors.
Transposition Flap Text: The defect edges were debeveled with a #15 scalpel blade.  Given the location of the defect and the proximity to free margins a transposition flap was deemed most appropriate.  Using a sterile surgical marker, an appropriate transposition flap was drawn incorporating the defect.    The area thus outlined was incised deep to adipose tissue with a #15 scalpel blade.  The skin margins were undermined to an appropriate distance in all directions utilizing iris scissors.
Muscle Hinge Flap Text: The defect edges were debeveled with a #15 scalpel blade.  Given the size, depth and location of the defect and the proximity to free margins a muscle hinge flap was deemed most appropriate.  Using a sterile surgical marker, an appropriate hinge flap was drawn incorporating the defect. The area thus outlined was incised with a #15 scalpel blade.  The skin margins were undermined to an appropriate distance in all directions utilizing iris scissors.
Mustarde Flap Text: The defect edges were debeveled with a #15 scalpel blade.  Given the size, depth and location of the defect and the proximity to free margins a Mustarde flap was deemed most appropriate.  Using a sterile surgical marker, an appropriate flap was drawn incorporating the defect. The area thus outlined was incised with a #15 scalpel blade.  The skin margins were undermined to an appropriate distance in all directions utilizing iris scissors.
Nasal Turnover Hinge Flap Text: The defect edges were debeveled with a #15 scalpel blade.  Given the size, depth, location of the defect and the defect being full thickness a nasal turnover hinge flap was deemed most appropriate.  Using a sterile surgical marker, an appropriate hinge flap was drawn incorporating the defect. The area thus outlined was incised with a #15 scalpel blade. The flap was designed to recreate the nasal mucosal lining and the alar rim. The skin margins were undermined to an appropriate distance in all directions utilizing iris scissors.
Nasalis-Muscle-Based Myocutaneous Island Pedicle Flap Text: Using a #15 blade, an incision was made around the donor flap to the level of the nasalis muscle. Wide lateral undermining was then performed in both the subcutaneous plane above the nasalis muscle, and in a submuscular plane just above periosteum. This allowed the formation of a free nasalis muscle axial pedicle (based on the angular artery) which was still attached to the actual cutaneous flap, increasing its mobility and vascular viability. Hemostasis was obtained with pinpoint electrocoagulation. The flap was mobilized into position and the pivotal anchor points positioned and stabilized with buried interrupted sutures. Subcutaneous and dermal tissues were closed in a multilayered fashion with sutures. Tissue redundancies were excised, and the epidermal edges were apposed without significant tension and sutured with sutures.
Orbicularis Oris Muscle Flap Text: The defect edges were debeveled with a #15 scalpel blade.  Given that the defect affected the competency of the oral sphincter an orbicularis oris muscle flap was deemed most appropriate to restore this competency and normal muscle function.  Using a sterile surgical marker, an appropriate flap was drawn incorporating the defect. The area thus outlined was incised with a #15 scalpel blade.
Melolabial Transposition Flap Text: The defect edges were debeveled with a #15 scalpel blade.  Given the location of the defect and the proximity to free margins a melolabial flap was deemed most appropriate.  Using a sterile surgical marker, an appropriate melolabial transposition flap was drawn incorporating the defect.    The area thus outlined was incised deep to adipose tissue with a #15 scalpel blade.  The skin margins were undermined to an appropriate distance in all directions utilizing iris scissors.
Rhombic Flap Text: The defect edges were debeveled with a #15 scalpel blade.  Given the location of the defect and the proximity to free margins a rhombic flap was deemed most appropriate.  Using a sterile surgical marker, an appropriate rhombic flap was drawn incorporating the defect.    The area thus outlined was incised deep to adipose tissue with a #15 scalpel blade.  The skin margins were undermined to an appropriate distance in all directions utilizing iris scissors.
Rhomboid Transposition Flap Text: The defect edges were debeveled with a #15 scalpel blade.  Given the location of the defect and the proximity to free margins a rhomboid transposition flap was deemed most appropriate.  Using a sterile surgical marker, an appropriate rhomboid flap was drawn incorporating the defect.    The area thus outlined was incised deep to adipose tissue with a #15 scalpel blade.  The skin margins were undermined to an appropriate distance in all directions utilizing iris scissors.
Bi-Rhombic Flap Text: The defect edges were debeveled with a #15 scalpel blade.  Given the location of the defect and the proximity to free margins a bi-rhombic flap was deemed most appropriate.  Using a sterile surgical marker, an appropriate rhombic flap was drawn incorporating the defect. The area thus outlined was incised deep to adipose tissue with a #15 scalpel blade.  The skin margins were undermined to an appropriate distance in all directions utilizing iris scissors.
Helical Rim Advancement Flap Text: The defect edges were debeveled with a #15 blade scalpel.  Given the location of the defect and the proximity to free margins (helical rim) a double helical rim advancement flap was deemed most appropriate.  Using a sterile surgical marker, the appropriate advancement flaps were drawn incorporating the defect and placing the expected incisions between the helical rim and antihelix where possible.  The area thus outlined was incised through and through with a #15 scalpel blade.  With a skin hook and iris scissors, the flaps were gently and sharply undermined and freed up.
Bilateral Helical Rim Advancement Flap Text: The defect edges were debeveled with a #15 blade scalpel.  Given the location of the defect and the proximity to free margins (helical rim) a bilateral helical rim advancement flap was deemed most appropriate.  Using a sterile surgical marker, the appropriate advancement flaps were drawn incorporating the defect and placing the expected incisions between the helical rim and antihelix where possible.  The area thus outlined was incised through and through with a #15 scalpel blade.  With a skin hook and iris scissors, the flaps were gently and sharply undermined and freed up.
Ear Star Wedge Flap Text: The defect edges were debeveled with a #15 blade scalpel.  Given the location of the defect and the proximity to free margins (helical rim) an ear star wedge flap was deemed most appropriate.  Using a sterile surgical marker, the appropriate flap was drawn incorporating the defect and placing the expected incisions between the helical rim and antihelix where possible.  The area thus outlined was incised through and through with a #15 scalpel blade.
Banner Transposition Flap Text: The defect edges were debeveled with a #15 scalpel blade.  Given the location of the defect and the proximity to free margins a Banner transposition flap was deemed most appropriate.  Using a sterile surgical marker, an appropriate flap drawn around the defect. The area thus outlined was incised deep to adipose tissue with a #15 scalpel blade.  The skin margins were undermined to an appropriate distance in all directions utilizing iris scissors.
Bilobed Flap Text: The defect edges were debeveled with a #15 scalpel blade.  Given the location of the defect and the proximity to free margins a bilobe flap was deemed most appropriate.  Using a sterile surgical marker, an appropriate bilobe flap drawn around the defect.    The area thus outlined was incised deep to adipose tissue with a #15 scalpel blade.  The skin margins were undermined to an appropriate distance in all directions utilizing iris scissors.
Bilobed Transposition Flap Text: The defect edges were debeveled with a #15 scalpel blade.  Given the location of the defect and the proximity to free margins a bilobed transposition flap was deemed most appropriate.  Using a sterile surgical marker, an appropriate bilobe flap drawn around the defect.    The area thus outlined was incised deep to adipose tissue with a #15 scalpel blade.  The skin margins were undermined to an appropriate distance in all directions utilizing iris scissors.
Trilobed Flap Text: The defect edges were debeveled with a #15 scalpel blade.  Given the location of the defect and the proximity to free margins a trilobed flap was deemed most appropriate.  Using a sterile surgical marker, an appropriate trilobed flap drawn around the defect.    The area thus outlined was incised deep to adipose tissue with a #15 scalpel blade.  The skin margins were undermined to an appropriate distance in all directions utilizing iris scissors.
Dorsal Nasal Flap Text: The defect edges were debeveled with a #15 scalpel blade.  Given the location of the defect and the proximity to free margins a dorsal nasal flap was deemed most appropriate.  Using a sterile surgical marker, an appropriate dorsal nasal flap was drawn around the defect.    The area thus outlined was incised deep to adipose tissue with a #15 scalpel blade.  The skin margins were undermined to an appropriate distance in all directions utilizing iris scissors.
Island Pedicle Flap Text: The defect edges were debeveled with a #15 scalpel blade.  Given the location of the defect, shape of the defect and the proximity to free margins an island pedicle advancement flap was deemed most appropriate.  Using a sterile surgical marker, an appropriate advancement flap was drawn incorporating the defect, outlining the appropriate donor tissue and placing the expected incisions within the relaxed skin tension lines where possible.    The area thus outlined was incised deep to adipose tissue with a #15 scalpel blade.  The skin margins were undermined to an appropriate distance in all directions around the primary defect and laterally outward around the island pedicle utilizing iris scissors.  There was minimal undermining beneath the pedicle flap.
Island Pedicle Flap With Canthal Suspension Text: The defect edges were debeveled with a #15 scalpel blade.  Given the location of the defect, shape of the defect and the proximity to free margins an island pedicle advancement flap was deemed most appropriate.  Using a sterile surgical marker, an appropriate advancement flap was drawn incorporating the defect, outlining the appropriate donor tissue and placing the expected incisions within the relaxed skin tension lines where possible. The area thus outlined was incised deep to adipose tissue with a #15 scalpel blade.  The skin margins were undermined to an appropriate distance in all directions around the primary defect and laterally outward around the island pedicle utilizing iris scissors.  There was minimal undermining beneath the pedicle flap. A suspension suture was placed in the canthal tendon to prevent tension and prevent ectropion.
Alar Island Pedicle Flap Text: The defect edges were debeveled with a #15 scalpel blade.  Given the location of the defect, shape of the defect and the proximity to the alar rim an island pedicle advancement flap was deemed most appropriate.  Using a sterile surgical marker, an appropriate advancement flap was drawn incorporating the defect, outlining the appropriate donor tissue and placing the expected incisions within the nasal ala running parallel to the alar rim. The area thus outlined was incised with a #15 scalpel blade.  The skin margins were undermined minimally to an appropriate distance in all directions around the primary defect and laterally outward around the island pedicle utilizing iris scissors.  There was minimal undermining beneath the pedicle flap.
Double Island Pedicle Flap Text: The defect edges were debeveled with a #15 scalpel blade.  Given the location of the defect, shape of the defect and the proximity to free margins a double island pedicle advancement flap was deemed most appropriate.  Using a sterile surgical marker, an appropriate advancement flap was drawn incorporating the defect, outlining the appropriate donor tissue and placing the expected incisions within the relaxed skin tension lines where possible.    The area thus outlined was incised deep to adipose tissue with a #15 scalpel blade.  The skin margins were undermined to an appropriate distance in all directions around the primary defect and laterally outward around the island pedicle utilizing iris scissors.  There was minimal undermining beneath the pedicle flap.
Island Pedicle Flap-Requiring Vessel Identification Text: The defect edges were debeveled with a #15 scalpel blade.  Given the location of the defect, shape of the defect and the proximity to free margins an island pedicle advancement flap was deemed most appropriate.  Using a sterile surgical marker, an appropriate advancement flap was drawn, based on the axial vessel mentioned above, incorporating the defect, outlining the appropriate donor tissue and placing the expected incisions within the relaxed skin tension lines where possible.    The area thus outlined was incised deep to adipose tissue with a #15 scalpel blade.  The skin margins were undermined to an appropriate distance in all directions around the primary defect and laterally outward around the island pedicle utilizing iris scissors.  There was minimal undermining beneath the pedicle flap.
Keystone Flap Text: The defect edges were debeveled with a #15 scalpel blade.  Given the location of the defect, shape of the defect a keystone flap was deemed most appropriate.  Using a sterile surgical marker, an appropriate keystone flap was drawn incorporating the defect, outlining the appropriate donor tissue and placing the expected incisions within the relaxed skin tension lines where possible. The area thus outlined was incised deep to adipose tissue with a #15 scalpel blade.  The skin margins were undermined to an appropriate distance in all directions around the primary defect and laterally outward around the flap utilizing iris scissors.
O-T Plasty Text: The defect edges were debeveled with a #15 scalpel blade.  Given the location of the defect, shape of the defect and the proximity to free margins an O-T plasty was deemed most appropriate.  Using a sterile surgical marker, an appropriate O-T plasty was drawn incorporating the defect and placing the expected incisions within the relaxed skin tension lines where possible.    The area thus outlined was incised deep to adipose tissue with a #15 scalpel blade.  The skin margins were undermined to an appropriate distance in all directions utilizing iris scissors.
O-Z Plasty Text: The defect edges were debeveled with a #15 scalpel blade.  Given the location of the defect, shape of the defect and the proximity to free margins an O-Z plasty (double transposition flap) was deemed most appropriate.  Using a sterile surgical marker, the appropriate transposition flaps were drawn incorporating the defect and placing the expected incisions within the relaxed skin tension lines where possible.    The area thus outlined was incised deep to adipose tissue with a #15 scalpel blade.  The skin margins were undermined to an appropriate distance in all directions utilizing iris scissors.  Hemostasis was achieved with electrocautery.  The flaps were then transposed into place, one clockwise and the other counterclockwise, and anchored with interrupted buried subcutaneous sutures.
Double O-Z Plasty Text: The defect edges were debeveled with a #15 scalpel blade.  Given the location of the defect, shape of the defect and the proximity to free margins a Double O-Z plasty (double transposition flap) was deemed most appropriate.  Using a sterile surgical marker, the appropriate transposition flaps were drawn incorporating the defect and placing the expected incisions within the relaxed skin tension lines where possible. The area thus outlined was incised deep to adipose tissue with a #15 scalpel blade.  The skin margins were undermined to an appropriate distance in all directions utilizing iris scissors.  Hemostasis was achieved with electrocautery.  The flaps were then transposed into place, one clockwise and the other counterclockwise, and anchored with interrupted buried subcutaneous sutures.
V-Y Plasty Text: The defect edges were debeveled with a #15 scalpel blade.  Given the location of the defect, shape of the defect and the proximity to free margins an V-Y advancement flap was deemed most appropriate.  Using a sterile surgical marker, an appropriate advancement flap was drawn incorporating the defect and placing the expected incisions within the relaxed skin tension lines where possible.    The area thus outlined was incised deep to adipose tissue with a #15 scalpel blade.  The skin margins were undermined to an appropriate distance in all directions utilizing iris scissors.
H Plasty Text: Given the location of the defect, shape of the defect and the proximity to free margins a H-plasty was deemed most appropriate for repair.  Using a sterile surgical marker, the appropriate advancement arms of the H-plasty were drawn incorporating the defect and placing the expected incisions within the relaxed skin tension lines where possible. The area thus outlined was incised deep to adipose tissue with a #15 scalpel blade. The skin margins were undermined to an appropriate distance in all directions utilizing iris scissors.  The opposing advancement arms were then advanced into place in opposite direction and anchored with interrupted buried subcutaneous sutures.
W Plasty Text: The lesion was extirpated to the level of the fat with a #15 scalpel blade.  Given the location of the defect, shape of the defect and the proximity to free margins a W-plasty was deemed most appropriate for repair.  Using a sterile surgical marker, the appropriate transposition arms of the W-plasty were drawn incorporating the defect and placing the expected incisions within the relaxed skin tension lines where possible.    The area thus outlined was incised deep to adipose tissue with a #15 scalpel blade.  The skin margins were undermined to an appropriate distance in all directions utilizing iris scissors.  The opposing transposition arms were then transposed into place in opposite direction and anchored with interrupted buried subcutaneous sutures.
Z Plasty Text: The lesion was extirpated to the level of the fat with a #15 scalpel blade.  Given the location of the defect, shape of the defect and the proximity to free margins a Z-plasty was deemed most appropriate for repair.  Using a sterile surgical marker, the appropriate transposition arms of the Z-plasty were drawn incorporating the defect and placing the expected incisions within the relaxed skin tension lines where possible.    The area thus outlined was incised deep to adipose tissue with a #15 scalpel blade.  The skin margins were undermined to an appropriate distance in all directions utilizing iris scissors.  The opposing transposition arms were then transposed into place in opposite direction and anchored with interrupted buried subcutaneous sutures.
Zygomaticofacial Flap Text: Given the location of the defect, shape of the defect and the proximity to free margins a zygomaticofacial flap was deemed most appropriate for repair.  Using a sterile surgical marker, the appropriate flap was drawn incorporating the defect and placing the expected incisions within the relaxed skin tension lines where possible. The area thus outlined was incised deep to adipose tissue with a #15 scalpel blade with preservation of a vascular pedicle.  The skin margins were undermined to an appropriate distance in all directions utilizing iris scissors.  The flap was then placed into the defect and anchored with interrupted buried subcutaneous sutures.
Cheek Interpolation Flap Text: A decision was made to reconstruct the defect utilizing an interpolation axial flap and a staged reconstruction.  A telfa template was made of the defect.  This telfa template was then used to outline the Cheek Interpolation flap.  The donor area for the pedicle flap was then injected with anesthesia.  The flap was excised through the skin and subcutaneous tissue down to the layer of the underlying musculature.  The interpolation flap was carefully excised within this deep plane to maintain its blood supply.  The edges of the donor site were undermined.   The donor site was closed in a primary fashion.  The pedicle was then rotated into position and sutured.  Once the tube was sutured into place, adequate blood supply was confirmed with blanching and refill.  The pedicle was then wrapped with xeroform gauze and dressed appropriately with a telfa and gauze bandage to ensure continued blood supply and protect the attached pedicle.
Cheek-To-Nose Interpolation Flap Text: A decision was made to reconstruct the defect utilizing an interpolation axial flap and a staged reconstruction.  A telfa template was made of the defect.  This telfa template was then used to outline the Cheek-To-Nose Interpolation flap.  The donor area for the pedicle flap was then injected with anesthesia.  The flap was excised through the skin and subcutaneous tissue down to the layer of the underlying musculature.  The interpolation flap was carefully excised within this deep plane to maintain its blood supply.  The edges of the donor site were undermined.   The donor site was closed in a primary fashion.  The pedicle was then rotated into position and sutured.  Once the tube was sutured into place, adequate blood supply was confirmed with blanching and refill.  The pedicle was then wrapped with xeroform gauze and dressed appropriately with a telfa and gauze bandage to ensure continued blood supply and protect the attached pedicle.
Interpolation Flap Text: A decision was made to reconstruct the defect utilizing an interpolation axial flap and a staged reconstruction.  A telfa template was made of the defect.  This telfa template was then used to outline the interpolation flap.  The donor area for the pedicle flap was then injected with anesthesia.  The flap was excised through the skin and subcutaneous tissue down to the layer of the underlying musculature.  The interpolation flap was carefully excised within this deep plane to maintain its blood supply.  The edges of the donor site were undermined.   The donor site was closed in a primary fashion.  The pedicle was then rotated into position and sutured.  Once the tube was sutured into place, adequate blood supply was confirmed with blanching and refill.  The pedicle was then wrapped with xeroform gauze and dressed appropriately with a telfa and gauze bandage to ensure continued blood supply and protect the attached pedicle.
Melolabial Interpolation Flap Text: A decision was made to reconstruct the defect utilizing an interpolation axial flap and a staged reconstruction.  A telfa template was made of the defect.  This telfa template was then used to outline the melolabial interpolation flap.  The donor area for the pedicle flap was then injected with anesthesia.  The flap was excised through the skin and subcutaneous tissue down to the layer of the underlying musculature.  The pedicle flap was carefully excised within this deep plane to maintain its blood supply.  The edges of the donor site were undermined.   The donor site was closed in a primary fashion.  The pedicle was then rotated into position and sutured.  Once the tube was sutured into place, adequate blood supply was confirmed with blanching and refill.  The pedicle was then wrapped with xeroform gauze and dressed appropriately with a telfa and gauze bandage to ensure continued blood supply and protect the attached pedicle.
Mastoid Interpolation Flap Text: A decision was made to reconstruct the defect utilizing an interpolation axial flap and a staged reconstruction.  A telfa template was made of the defect.  This telfa template was then used to outline the mastoid interpolation flap.  The donor area for the pedicle flap was then injected with anesthesia.  The flap was excised through the skin and subcutaneous tissue down to the layer of the underlying musculature.  The pedicle flap was carefully excised within this deep plane to maintain its blood supply.  The edges of the donor site were undermined.   The donor site was closed in a primary fashion.  The pedicle was then rotated into position and sutured.  Once the tube was sutured into place, adequate blood supply was confirmed with blanching and refill.  The pedicle was then wrapped with xeroform gauze and dressed appropriately with a telfa and gauze bandage to ensure continued blood supply and protect the attached pedicle.
Posterior Auricular Interpolation Flap Text: A decision was made to reconstruct the defect utilizing an interpolation axial flap and a staged reconstruction.  A telfa template was made of the defect.  This telfa template was then used to outline the posterior auricular interpolation flap.  The donor area for the pedicle flap was then injected with anesthesia.  The flap was excised through the skin and subcutaneous tissue down to the layer of the underlying musculature.  The pedicle flap was carefully excised within this deep plane to maintain its blood supply.  The edges of the donor site were undermined.   The donor site was closed in a primary fashion.  The pedicle was then rotated into position and sutured.  Once the tube was sutured into place, adequate blood supply was confirmed with blanching and refill.  The pedicle was then wrapped with xeroform gauze and dressed appropriately with a telfa and gauze bandage to ensure continued blood supply and protect the attached pedicle.
Paramedian Forehead Flap Text: A decision was made to reconstruct the defect utilizing an interpolation axial flap and a staged reconstruction.  A telfa template was made of the defect.  This telfa template was then used to outline the paramedian forehead pedicle flap.  The donor area for the pedicle flap was then injected with anesthesia.  The flap was excised through the skin and subcutaneous tissue down to the layer of the underlying musculature.  The pedicle flap was carefully excised within this deep plane to maintain its blood supply.  The edges of the donor site were undermined.   The donor site was closed in a primary fashion.  The pedicle was then rotated into position and sutured.  Once the tube was sutured into place, adequate blood supply was confirmed with blanching and refill.  The pedicle was then wrapped with xeroform gauze and dressed appropriately with a telfa and gauze bandage to ensure continued blood supply and protect the attached pedicle.
Abbe Flap (Upper To Lower Lip) Text: The defect of the lower lip was assessed and measured.  Given the location and size of the defect, an Abbe flap was deemed most appropriate.  Using a sterile surgical marker, an appropriate Abbe flap was measured and drawn on the upper lip. Local anesthesia was then infiltrated.  A scalpel was then used to incise the upper lip through and through the skin, vermilion, muscle and mucosa, leaving the flap pedicled on the opposite side.  The flap was then rotated and transferred to the lower lip defect.  The flap was then sutured into place with a three layer technique, closing the orbicularis oris muscle layer with subcutaneous buried sutures, followed by a mucosal layer and an epidermal layer.
Abbe Flap (Lower To Upper Lip) Text: The defect of the upper lip was assessed and measured.  Given the location and size of the defect, an Abbe flap was deemed most appropriate.  Using a sterile surgical marker, an appropriate Abbe flap was measured and drawn on the lower lip. Local anesthesia was then infiltrated. A scalpel was then used to incise the upper lip through and through the skin, vermilion, muscle and mucosa, leaving the flap pedicled on the opposite side.  The flap was then rotated and transferred to the lower lip defect.  The flap was then sutured into place with a three layer technique, closing the orbicularis oris muscle layer with subcutaneous buried sutures, followed by a mucosal layer and an epidermal layer.
Estlander Flap (Upper To Lower Lip) Text: The defect of the lower lip was assessed and measured.  Given the location and size of the defect, an Estlander flap was deemed most appropriate.  Using a sterile surgical marker, an appropriate Estlander flap was measured and drawn on the upper lip. Local anesthesia was then infiltrated. A scalpel was then used to incise the lateral aspect of the flap, through skin, muscle and mucosa, leaving the flap pedicled medially.  The flap was then rotated and positioned to fill the lower lip defect.  The flap was then sutured into place with a three layer technique, closing the orbicularis oris muscle layer with subcutaneous buried sutures, followed by a mucosal layer and an epidermal layer.
Cheiloplasty (Less Than 50%) Text: A decision was made to reconstruct the defect with a  cheiloplasty.  The defect was undermined extensively.  Additional orbicularis oris muscle was excised with a 15 blade scalpel.  The defect was converted into a full thickness wedge, of less than 50% of the vertical height of the lip, to facilite a better cosmetic result.  Small vessels were then tied off with 5-0 monocyrl. The orbicularis oris, superficial fascia, adipose and dermis were then reapproximated.  After the deeper layers were approximated the epidermis was reapproximated with particular care given to realign the vermilion border.
Cheiloplasty (Complex) Text: A decision was made to reconstruct the defect with a  cheiloplasty.  The defect was undermined extensively.  Additional orbicularis oris muscle was excised with a 15 blade scalpel.  The defect was converted into a full thickness wedge to facilite a better cosmetic result.  Small vessels were then tied off with 5-0 monocyrl. The orbicularis oris, superficial fascia, adipose and dermis were then reapproximated.  After the deeper layers were approximated the epidermis was reapproximated with particular care given to realign the vermilion border.
Ear Wedge Repair Text: A wedge excision was completed by carrying down an excision through the full thickness of the ear and cartilage with an inward facing Burow's triangle. The wound was then closed in a layered fashion.
Full Thickness Lip Wedge Repair (Flap) Text: Given the location of the defect and the proximity to free margins a full thickness wedge repair was deemed most appropriate.  Using a sterile surgical marker, the appropriate repair was drawn incorporating the defect and placing the expected incisions perpendicular to the vermilion border.  The vermilion border was also meticulously outlined to ensure appropriate reapproximation during the repair.  The area thus outlined was incised through and through with a #15 scalpel blade.  The muscularis and dermis were reaproximated with deep sutures following hemostasis. Care was taken to realign the vermilion border before proceeding with the superficial closure.  Once the vermilion was realigned the superfical and mucosal closure was finished.
Ftsg Text: The defect edges were debeveled with a #15 scalpel blade.  Given the location of the defect, shape of the defect and the proximity to free margins a full thickness skin graft was deemed most appropriate.  Using a sterile surgical marker, the primary defect shape was transferred to the donor site. The area thus outlined was incised deep to adipose tissue with a #15 scalpel blade.  The harvested graft was then trimmed of adipose tissue until only dermis and epidermis was left.  The skin margins of the secondary defect were undermined to an appropriate distance in all directions utilizing iris scissors.  The secondary defect was closed with interrupted buried subcutaneous sutures.  The skin edges were then re-apposed with running  sutures.  The skin graft was then placed in the primary defect and oriented appropriately.
Split-Thickness Skin Graft Text: The defect edges were debeveled with a #15 scalpel blade.  Given the location of the defect, shape of the defect and the proximity to free margins a split thickness skin graft was deemed most appropriate.  Using a sterile surgical marker, the primary defect shape was transferred to the donor site. The split thickness graft was then harvested.  The skin graft was then placed in the primary defect and oriented appropriately.
Burow's Graft Text: The defect edges were debeveled with a #15 scalpel blade.  Given the location of the defect, shape of the defect, the proximity to free margins and the presence of a standing cone deformity a Burow's skin graft was deemed most appropriate. The standing cone was removed and this tissue was then trimmed to the shape of the primary defect. The adipose tissue was also removed until only dermis and epidermis were left.  The skin margins of the secondary defect were undermined to an appropriate distance in all directions utilizing iris scissors.  The secondary defect was closed with interrupted buried subcutaneous sutures.  The skin edges were then re-apposed with running  sutures.  The skin graft was then placed in the primary defect and oriented appropriately.
Cartilage Graft Text: The defect edges were debeveled with a #15 scalpel blade.  Given the location of the defect, shape of the defect, the fact the defect involved a full thickness cartilage defect a cartilage graft was deemed most appropriate.  An appropriate donor site was identified, cleansed, and anesthetized. The cartilage graft was then harvested and transferred to the recipient site, oriented appropriately and then sutured into place.  The secondary defect was then repaired using a primary closure.
Composite Graft Text: The defect edges were debeveled with a #15 scalpel blade.  Given the location of the defect, shape of the defect, the proximity to free margins and the fact the defect was full thickness a composite graft was deemed most appropriate.  The defect was outline and then transferred to the donor site.  A full thickness graft was then excised from the donor site. The graft was then placed in the primary defect, oriented appropriately and then sutured into place.  The secondary defect was then repaired using a primary closure.
Epidermal Autograft Text: The defect edges were debeveled with a #15 scalpel blade.  Given the location of the defect, shape of the defect and the proximity to free margins an epidermal autograft was deemed most appropriate.  Using a sterile surgical marker, the primary defect shape was transferred to the donor site. The epidermal graft was then harvested.  The skin graft was then placed in the primary defect and oriented appropriately.
Dermal Autograft Text: The defect edges were debeveled with a #15 scalpel blade.  Given the location of the defect, shape of the defect and the proximity to free margins a dermal autograft was deemed most appropriate.  Using a sterile surgical marker, the primary defect shape was transferred to the donor site. The area thus outlined was incised deep to adipose tissue with a #15 scalpel blade.  The harvested graft was then trimmed of adipose and epidermal tissue until only dermis was left.  The skin graft was then placed in the primary defect and oriented appropriately.
Skin Substitute Text: The defect edges were debeveled with a #15 scalpel blade.  Given the location of the defect, shape of the defect and the proximity to free margins a skin substitute graft was deemed most appropriate.  The graft material was trimmed to fit the size of the defect. The graft was then placed in the primary defect and oriented appropriately.
Skin Substitute Paste Text: The defect edges were debeveled with a #15 scalpel blade.  Given the location of the defect, shape of the defect and the proximity to free margins a skin substitute micronized graft was deemed most appropriate.  The entire vial contents were admixed with 0.5ccs of sterile saline, formed into a paste and then evenly spread over the entire wound bed.
Skin Substitute Injection Text: The defect edges were debeveled with a #15 scalpel blade.  Given the location of the defect, shape of the defect and the proximity to free margins a skin substitute micronized graft was deemed most appropriate.  The entire vial contents were admixed with 3.0ccs of sterile saline and then injected subcutaneously throughout the entire wound bed.
Tissue Cultured Epidermal Autograft Text: The defect edges were debeveled with a #15 scalpel blade.  Given the location of the defect, shape of the defect and the proximity to free margins a tissue cultured epidermal autograft was deemed most appropriate.  The graft was then trimmed to fit the size of the defect.  The graft was then placed in the primary defect and oriented appropriately.
Xenograft Text: The defect edges were debeveled with a #15 scalpel blade.  Given the location of the defect, shape of the defect and the proximity to free margins a xenograft was deemed most appropriate.  The graft was then trimmed to fit the size of the defect.  The graft was then placed in the primary defect and oriented appropriately.
Purse String (Simple) Text: Given the location of the defect and the characteristics of the surrounding skin a purse string closure was deemed most appropriate.  Undermining was performed circumfirentially around the surgical defect.  A purse string suture was then placed and tightened.
Purse String (Intermediate) Text: Given the location of the defect and the characteristics of the surrounding skin a purse string intermediate closure was deemed most appropriate.  Undermining was performed circumfirentially around the surgical defect.  A purse string suture was then placed and tightened.
Partial Purse String (Simple) Text: Given the location of the defect and the characteristics of the surrounding skin a simple purse string closure was deemed most appropriate.  Undermining was performed circumfirentially around the surgical defect.  A purse string suture was then placed and tightened. Wound tension only allowed a partial closure of the circular defect.
Partial Purse String (Intermediate) Text: Given the location of the defect and the characteristics of the surrounding skin an intermediate purse string closure was deemed most appropriate.  Undermining was performed circumfirentially around the surgical defect.  A purse string suture was then placed and tightened. Wound tension only allowed a partial closure of the circular defect.
Localized Dermabrasion With Wire Brush Text: The patient was draped in routine manner.  Localized dermabrasion using 3 x 17 mm wire brush was performed in routine manner to papillary dermis. This spot dermabrasion is being performed to complete skin cancer reconstruction. It also will eliminate the other sun damaged precancerous cells that are known to be part of the regional effect of a lifetime's worth of sun exposure. This localized dermabrasion is therapeutic and should not be considered cosmetic in any regard.
Tarsorrhaphy Text: A tarsorrhaphy was performed using Frost sutures.
Complex Repair And Flap Additional Text (Will Appearing After The Standard Complex Repair Text): The complex repair was not sufficient to completely close the primary defect. The remaining additional defect was repaired with the flap mentioned below.
Complex Repair And Graft Additional Text (Will Appearing After The Standard Complex Repair Text): The complex repair was not sufficient to completely close the primary defect. The remaining additional defect was repaired with the graft mentioned below.
Cheek Interpolation Flap Division And Inset Text: Division and inset of the cheek interpolation flap was performed to achieve optimal aesthetic result, restore normal anatomic appearance and avoid distortion of normal anatomy, expedite and facilitate wound healing, achieve optimal functional result and because linear closure either not possible or would produce suboptimal result. The patient was prepped and draped in the usual manner. The pedicle was infiltrated with local anesthesia. The pedicle was sectioned with a #15 blade. The pedicle was de-bulked and trimmed to match the shape of the defect. Hemostasis was achieved. The flap donor site and free margin of the flap were secured with deep buried sutures and the wound edges were re-approximated.
Cheek To Nose Interpolation Flap Division And Inset Text: Division and inset of the cheek to nose interpolation flap was performed to achieve optimal aesthetic result, restore normal anatomic appearance and avoid distortion of normal anatomy, expedite and facilitate wound healing, achieve optimal functional result and because linear closure either not possible or would produce suboptimal result. The patient was prepped and draped in the usual manner. The pedicle was infiltrated with local anesthesia. The pedicle was sectioned with a #15 blade. The pedicle was de-bulked and trimmed to match the shape of the defect. Hemostasis was achieved. The flap donor site and free margin of the flap were secured with deep buried sutures and the wound edges were re-approximated.
Melolabial Interpolation Flap Division And Inset Text: Division and inset of the melolabial interpolation flap was performed to achieve optimal aesthetic result, restore normal anatomic appearance and avoid distortion of normal anatomy, expedite and facilitate wound healing, achieve optimal functional result and because linear closure either not possible or would produce suboptimal result. The patient was prepped and draped in the usual manner. The pedicle was infiltrated with local anesthesia. The pedicle was sectioned with a #15 blade. The pedicle was de-bulked and trimmed to match the shape of the defect. Hemostasis was achieved. The flap donor site and free margin of the flap were secured with deep buried sutures and the wound edges were re-approximated.
Mastoid Interpolation Flap Division And Inset Text: Division and inset of the mastoid interpolation flap was performed to achieve optimal aesthetic result, restore normal anatomic appearance and avoid distortion of normal anatomy, expedite and facilitate wound healing, achieve optimal functional result and because linear closure either not possible or would produce suboptimal result. The patient was prepped and draped in the usual manner. The pedicle was infiltrated with local anesthesia. The pedicle was sectioned with a #15 blade. The pedicle was de-bulked and trimmed to match the shape of the defect. Hemostasis was achieved. The flap donor site and free margin of the flap were secured with deep buried sutures and the wound edges were re-approximated.
Paramedian Forehead Flap Division And Inset Text: Division and inset of the paramedian forehead flap was performed to achieve optimal aesthetic result, restore normal anatomic appearance and avoid distortion of normal anatomy, expedite and facilitate wound healing, achieve optimal functional result and because linear closure either not possible or would produce suboptimal result. The patient was prepped and draped in the usual manner. The pedicle was infiltrated with local anesthesia. The pedicle was sectioned with a #15 blade. The pedicle was de-bulked and trimmed to match the shape of the defect. Hemostasis was achieved. The flap donor site and free margin of the flap were secured with deep buried sutures and the wound edges were re-approximated.
Posterior Auricular Interpolation Flap Division And Inset Text: Division and inset of the posterior auricular interpolation flap was performed to achieve optimal aesthetic result, restore normal anatomic appearance and avoid distortion of normal anatomy, expedite and facilitate wound healing, achieve optimal functional result and because linear closure either not possible or would produce suboptimal result. The patient was prepped and draped in the usual manner. The pedicle was infiltrated with local anesthesia. The pedicle was sectioned with a #15 blade. The pedicle was de-bulked and trimmed to match the shape of the defect. Hemostasis was achieved. The flap donor site and free margin of the flap were secured with deep buried sutures and the wound edges were re-approximated.
Manual Repair Warning Statement: We plan on removing the manually selected variable below in favor of our much easier automatic structured text blocks found in the previous tab. We decided to do this to help make the flow better and give you the full power of structured data. Manual selection is never going to be ideal in our platform and I would encourage you to avoid using manual selection from this point on, especially since I will be sunsetting this feature. It is important that you do one of two things with the customized text below. First, you can save all of the text in a word file so you can have it for future reference. Second, transfer the text to the appropriate area in the Library tab. Lastly, if there is a flap or graft type which we do not have you need to let us know right away so I can add it in before the variable is hidden. No need to panic, we plan to give you roughly 6 months to make the change.
Consent: The rationale for the repair was explained to the patient and consent was obtained. The risks, benefits and alternatives to therapy were discussed in detail. Specifically, the risks of infection, scarring, bleeding, prolonged wound healing, incomplete removal, allergy to anesthesia, nerve injury and recurrence were addressed. Prior to the procedure, the treatment site was clearly identified and confirmed by the patient. All components of Universal Protocol/PAUSE Rule completed.
Post-Care Instructions: I reviewed with the patient in detail post-care instructions. Patient is not to engage in any heavy lifting, exercise, or swimming for the next 14 days. Should the patient develop any fevers, chills, bleeding, severe pain patient will contact the office immediately.
Pain Refusal Text: I offered to prescribe pain medication but the patient refused to take this medication.
Show Asc Variables: Yes
Where Do You Want The Question To Include Opioid Counseling Located?: Case Summary Tab
Information: Selecting Yes will display possible errors in your note based on the variables you have selected. This validation is only offered as a suggestion for you. PLEASE NOTE THAT THE VALIDATION TEXT WILL BE REMOVED WHEN YOU FINALIZE YOUR NOTE. IF YOU WANT TO FAX A PRELIMINARY NOTE YOU WILL NEED TO TOGGLE THIS TO 'NO' IF YOU DO NOT WANT IT IN YOUR FAXED NOTE.

## 2022-11-15 NOTE — PROCEDURE: MEDICATION COUNSELING
Elidel Counseling: Patient may experience a mild burning sensation during topical application. Elidel is not approved in children less than 2 years of age. There have been case reports of hematologic and skin malignancies in patients using topical calcineurin inhibitors although causality is questionable.
Azithromycin Counseling:  I discussed with the patient the risks of azithromycin including but not limited to GI upset, allergic reaction, drug rash, diarrhea, and yeast infections.
Xolair Counseling:  Patient informed of potential adverse effects including but not limited to fever, muscle aches, rash and allergic reactions.  The patient verbalized understanding of the proper use and possible adverse effects of Xolair.  All of the patient's questions and concerns were addressed.
Doxepin Counseling:  Patient advised that the medication is sedating and not to drive a car after taking this medication. Patient informed of potential adverse effects including but not limited to dry mouth, urinary retention, and blurry vision.  The patient verbalized understanding of the proper use and possible adverse effects of doxepin.  All of the patient's questions and concerns were addressed.
Simponi Pregnancy And Lactation Text: The risk during pregnancy and breastfeeding is uncertain with this medication.
Wartpeel Pregnancy And Lactation Text: This medication is Pregnancy Category X and contraindicated in pregnancy and in women who may become pregnant. It is unknown if this medication is excreted in breast milk.
Glycopyrrolate Pregnancy And Lactation Text: This medication is Pregnancy Category B and is considered safe during pregnancy. It is unknown if it is excreted breast milk.
Rifampin Pregnancy And Lactation Text: This medication is Pregnancy Category C and it isn't know if it is safe during pregnancy. It is also excreted in breast milk and should not be used if you are breast feeding.
Tazorac Counseling:  Patient advised that medication is irritating and drying.  Patient may need to apply sparingly and wash off after an hour before eventually leaving it on overnight.  The patient verbalized understanding of the proper use and possible adverse effects of tazorac.  All of the patient's questions and concerns were addressed.
Aklief counseling:  Patient advised to apply a pea-sized amount only at bedtime and wait 30 minutes after washing their face before applying.  If too drying, patient may add a non-comedogenic moisturizer.  The most commonly reported side effects including irritation, redness, scaling, dryness, stinging, burning, itching, and increased risk of sunburn.  The patient verbalized understanding of the proper use and possible adverse effects of retinoids.  All of the patient's questions and concerns were addressed.
Itraconazole Counseling:  I discussed with the patient the risks of itraconazole including but not limited to liver damage, nausea/vomiting, neuropathy, and severe allergy.  The patient understands that this medication is best absorbed when taken with acidic beverages such as non-diet cola or ginger ale.  The patient understands that monitoring is required including baseline LFTs and repeat LFTs at intervals.  The patient understands that they are to contact us or the primary physician if concerning signs are noted.
SSKI Counseling:  I discussed with the patient the risks of SSKI including but not limited to thyroid abnormalities, metallic taste, GI upset, fever, headache, acne, arthralgias, paraesthesias, lymphadenopathy, easy bleeding, arrhythmias, and allergic reaction.
Humira Counseling:  I discussed with the patient the risks of adalimumab including but not limited to myelosuppression, immunosuppression, autoimmune hepatitis, demyelinating diseases, lymphoma, and serious infections.  The patient understands that monitoring is required including a PPD at baseline and must alert us or the primary physician if symptoms of infection or other concerning signs are noted.
Cibinqo Pregnancy And Lactation Text: It is unknown if this medication will adversely affect pregnancy or breast feeding.  You should not take this medication if you are currently pregnant or planning a pregnancy or while breastfeeding.
Dutasteride Pregnancy And Lactation Text: This medication is absolutely contraindicated in women, especially during pregnancy and breast feeding. Feminization of male fetuses is possible if taking while pregnant.
Erythromycin Counseling:  I discussed with the patient the risks of erythromycin including but not limited to GI upset, allergic reaction, drug rash, diarrhea, increase in liver enzymes, and yeast infections.
Protopic Pregnancy And Lactation Text: This medication is Pregnancy Category C. It is unknown if this medication is excreted in breast milk when applied topically.
Azithromycin Pregnancy And Lactation Text: This medication is considered safe during pregnancy and is also secreted in breast milk.
Doxepin Pregnancy And Lactation Text: This medication is Pregnancy Category C and it isn't known if it is safe during pregnancy. It is also excreted in breast milk and breast feeding isn't recommended.
Minoxidil Counseling: Minoxidil is a topical medication which can increase blood flow where it is applied. It is uncertain how this medication increases hair growth. Side effects are uncommon and include stinging and allergic reactions.
Winlevi Counseling:  I discussed with the patient the risks of topical clascoterone including but not limited to erythema, scaling, itching, and stinging. Patient voiced their understanding.
Cellcept Counseling:  I discussed with the patient the risks of mycophenolate mofetil including but not limited to infection/immunosuppression, GI upset, hypokalemia, hypercholesterolemia, bone marrow suppression, lymphoproliferative disorders, malignancy, GI ulceration/bleed/perforation, colitis, interstitial lung disease, kidney failure, progressive multifocal leukoencephalopathy, and birth defects.  The patient understands that monitoring is required including a baseline creatinine and regular CBC testing. In addition, patient must alert us immediately if symptoms of infection or other concerning signs are noted.
Xolair Pregnancy And Lactation Text: This medication is Pregnancy Category B and is considered safe during pregnancy. This medication is excreted in breast milk.
Arava Counseling:  Patient counseled regarding adverse effects of Arava including but not limited to nausea, vomiting, abnormalities in liver function tests. Patients may develop mouth sores, rash, diarrhea, and abnormalities in blood counts. The patient understands that monitoring is required including LFTs and blood counts.  There is a rare possibility of scarring of the liver and lung problems that can occur when taking methotrexate. Persistent nausea, loss of appetite, pale stools, dark urine, cough, and shortness of breath should be reported immediately. Patient advised to discontinue Arava treatment and consult with a physician prior to attempting conception. The patient will have to undergo a treatment to eliminate Arava from the body prior to conception.
Acitretin Counseling:  I discussed with the patient the risks of acitretin including but not limited to hair loss, dry lips/skin/eyes, liver damage, hyperlipidemia, depression/suicidal ideation, photosensitivity.  Serious rare side effects can include but are not limited to pancreatitis, pseudotumor cerebri, bony changes, clot formation/stroke/heart attack.  Patient understands that alcohol is contraindicated since it can result in liver toxicity and significantly prolong the elimination of the drug by many years.
Olanzapine Pregnancy And Lactation Text: This medication is pregnancy category C.   There are no adequate and well controlled trials with olanzapine in pregnant females.  Olanzapine should be used during pregnancy only if the potential benefit justifies the potential risk to the fetus.   In a study in lactating healthy women, olanzapine was excreted in breast milk.  It is recommended that women taking olanzapine should not breast feed.
Adbry Pregnancy And Lactation Text: It is unknown if this medication will adversely affect pregnancy or breast feeding.
Skyrizi Counseling: I discussed with the patient the risks of risankizumab-rzaa including but not limited to immunosuppression, and serious infections.  The patient understands that monitoring is required including a PPD at baseline and must alert us or the primary physician if symptoms of infection or other concerning signs are noted.
Hydroxychloroquine Counseling:  I discussed with the patient that a baseline ophthalmologic exam is needed at the start of therapy and every year thereafter while on therapy. A CBC may also be warranted for monitoring.  The side effects of this medication were discussed with the patient, including but not limited to agranulocytosis, aplastic anemia, seizures, rashes, retinopathy, and liver toxicity. Patient instructed to call the office should any adverse effect occur.  The patient verbalized understanding of the proper use and possible adverse effects of Plaquenil.  All the patient's questions and concerns were addressed.
Aklief Pregnancy And Lactation Text: It is unknown if this medication is safe to use during pregnancy.  It is unknown if this medication is excreted in breast milk.  Breastfeeding women should use the topical cream on the smallest area of the skin for the shortest time needed while breastfeeding.  Do not apply to nipple and areola.
Elidel Pregnancy And Lactation Text: This medication is Pregnancy Category C. It is unknown if this medication is excreted in breast milk.
Sarecycline Counseling: Patient advised regarding possible photosensitivity and discoloration of the teeth, skin, lips, tongue and gums.  Patient instructed to avoid sunlight, if possible.  When exposed to sunlight, patients should wear protective clothing, sunglasses, and sunscreen.  The patient was instructed to call the office immediately if the following severe adverse effects occur:  hearing changes, easy bruising/bleeding, severe headache, or vision changes.  The patient verbalized understanding of the proper use and possible adverse effects of sarecycline.  All of the patient's questions and concerns were addressed.
Clofazimine Pregnancy And Lactation Text: This medication is Pregnancy Category C and isn't considered safe during pregnancy. It is excreted in breast milk.
Tazorac Pregnancy And Lactation Text: This medication is not safe during pregnancy. It is unknown if this medication is excreted in breast milk.
Calcipotriene Counseling:  I discussed with the patient the risks of calcipotriene including but not limited to erythema, scaling, itching, and irritation.
Qbrexza Counseling:  I discussed with the patient the risks of Qbrexza including but not limited to headache, mydriasis, blurred vision, dry eyes, nasal dryness, dry mouth, dry throat, dry skin, urinary hesitation, and constipation.  Local skin reactions including erythema, burning, stinging, and itching can also occur.
Sski Pregnancy And Lactation Text: This medication is Pregnancy Category D and isn't considered safe during pregnancy. It is excreted in breast milk.
Itraconazole Pregnancy And Lactation Text: This medication is Pregnancy Category C and it isn't know if it is safe during pregnancy. It is also excreted in breast milk.
Minoxidil Pregnancy And Lactation Text: This medication has not been assigned a Pregnancy Risk Category but animal studies failed to show danger with the topical medication. It is unknown if the medication is excreted in breast milk.
Erythromycin Pregnancy And Lactation Text: This medication is Pregnancy Category B and is considered safe during pregnancy. It is also excreted in breast milk.
Olumiant Counseling: I discussed with the patient the risks of Olumiant therapy including but not limited to upper respiratory tract infections, shingles, cold sores, and nausea. Live vaccines should be avoided.  This medication has been linked to serious infections; higher rate of mortality; malignancy and lymphoproliferative disorders; major adverse cardiovascular events; thrombosis; gastrointestinal perforations; neutropenia; lymphopenia; anemia; liver enzyme elevations; and lipid elevations.
Finasteride Male Counseling: Finasteride Counseling:  I discussed with the patient the risks of use of finasteride including but not limited to decreased libido, decreased ejaculate volume, gynecomastia, and depression. Women should not handle medication.  All of the patient's questions and concerns were addressed.
Cimzia Counseling:  I discussed with the patient the risks of Cimzia including but not limited to immunosuppression, allergic reactions and infections.  The patient understands that monitoring is required including a PPD at baseline and must alert us or the primary physician if symptoms of infection or other concerning signs are noted.
Humira Pregnancy And Lactation Text: This medication is Pregnancy Category B and is considered safe during pregnancy. It is unknown if this medication is excreted in breast milk.
Cellcept Pregnancy And Lactation Text: This medication is Pregnancy Category D and isn't considered safe during pregnancy. It is unknown if this medication is excreted in breast milk.
Hydroxyzine Counseling: Patient advised that the medication is sedating and not to drive a car after taking this medication.  Patient informed of potential adverse effects including but not limited to dry mouth, urinary retention, and blurry vision.  The patient verbalized understanding of the proper use and possible adverse effects of hydroxyzine.  All of the patient's questions and concerns were addressed.
Arava Pregnancy And Lactation Text: This medication is Pregnancy Category X and is absolutely contraindicated during pregnancy. It is unknown if it is excreted in breast milk.
Bactrim Counseling:  I discussed with the patient the risks of sulfa antibiotics including but not limited to GI upset, allergic reaction, drug rash, diarrhea, dizziness, photosensitivity, and yeast infections.  Rarely, more serious reactions can occur including but not limited to aplastic anemia, agranulocytosis, methemoglobinemia, blood dyscrasias, liver or kidney failure, lung infiltrates or desquamative/blistering drug rashes.
Detail Level: Detailed
Oral Minoxidil Counseling- I discussed with the patient the risks of oral minoxidil including but not limited to shortness of breath, swelling of the feet or ankles, dizziness, lightheadedness, unwanted hair growth and allergic reaction.  The patient verbalized understanding of the proper use and possible adverse effects of oral minoxidil.  All of the patient's questions and concerns were addressed.
Acitretin Pregnancy And Lactation Text: This medication is Pregnancy Category X and should not be given to women who are pregnant or may become pregnant in the future. This medication is excreted in breast milk.
Winlevi Pregnancy And Lactation Text: This medication is considered safe during pregnancy and breastfeeding.
Eucrisa Counseling: Patient may experience a mild burning sensation during topical application. Eucrisa is not approved in children less than 2 years of age.
Hydroxychloroquine Pregnancy And Lactation Text: This medication has been shown to cause fetal harm but it isn't assigned a Pregnancy Risk Category. There are small amounts excreted in breast milk.
Calcipotriene Pregnancy And Lactation Text: This medication has not been proven safe during pregnancy. It is unknown if this medication is excreted in breast milk.
Erivedge Counseling- I discussed with the patient the risks of Erivedge including but not limited to nausea, vomiting, diarrhea, constipation, weight loss, changes in the sense of taste, decreased appetite, muscle spasms, and hair loss.  The patient verbalized understanding of the proper use and possible adverse effects of Erivedge.  All of the patient's questions and concerns were addressed.
Sarecycline Pregnancy And Lactation Text: This medication is Pregnancy Category D and not consider safe during pregnancy. It is also excreted in breast milk.
Topical Clindamycin Counseling: Patient counseled that this medication may cause skin irritation or allergic reactions.  In the event of skin irritation, the patient was advised to reduce the amount of the drug applied or use it less frequently.   The patient verbalized understanding of the proper use and possible adverse effects of clindamycin.  All of the patient's questions and concerns were addressed.
Ilumya Counseling: I discussed with the patient the risks of tildrakizumab including but not limited to immunosuppression, malignancy, posterior leukoencephalopathy syndrome, and serious infections.  The patient understands that monitoring is required including a PPD at baseline and must alert us or the primary physician if symptoms of infection or other concerning signs are noted.
Colchicine Counseling:  Patient counseled regarding adverse effects including but not limited to stomach upset (nausea, vomiting, stomach pain, or diarrhea).  Patient instructed to limit alcohol consumption while taking this medication.  Colchicine may reduce blood counts especially with prolonged use.  The patient understands that monitoring of kidney function and blood counts may be required, especially at baseline. The patient verbalized understanding of the proper use and possible adverse effects of colchicine.  All of the patient's questions and concerns were addressed.
Metronidazole Counseling:  I discussed with the patient the risks of metronidazole including but not limited to seizures, nausea/vomiting, a metallic taste in the mouth, nausea/vomiting and severe allergy.
Clofazimine Counseling:  I discussed with the patient the risks of clofazimine including but not limited to skin and eye pigmentation, liver damage, nausea/vomiting, gastrointestinal bleeding and allergy.
Qbrexza Pregnancy And Lactation Text: There is no available data on Qbrexza use in pregnant women.  There is no available data on Qbrexza use in lactation.
Hydroxyzine Pregnancy And Lactation Text: This medication is not safe during pregnancy and should not be taken. It is also excreted in breast milk and breast feeding isn't recommended.
Thalidomide Counseling: I discussed with the patient the risks of thalidomide including but not limited to birth defects, anxiety, weakness, chest pain, dizziness, cough and severe allergy.
Bactrim Pregnancy And Lactation Text: This medication is Pregnancy Category D and is known to cause fetal risk.  It is also excreted in breast milk.
Finasteride Pregnancy And Lactation Text: This medication is absolutely contraindicated during pregnancy. It is unknown if it is excreted in breast milk.
Ketoconazole Counseling:   Patient counseled regarding improving absorption with orange juice.  Adverse effects include but are not limited to breast enlargement, headache, diarrhea, nausea, upset stomach, liver function test abnormalities, taste disturbance, and stomach pain.  There is a rare possibility of liver failure that can occur when taking ketoconazole. The patient understands that monitoring of LFTs may be required, especially at baseline. The patient verbalized understanding of the proper use and possible adverse effects of ketoconazole.  All of the patient's questions and concerns were addressed.
Olumiant Pregnancy And Lactation Text: Based on animal studies, Olumiant may cause embryo-fetal harm when administered to pregnant women.  The medication should not be used in pregnancy.  Breastfeeding is not recommended during treatment.
Oral Minoxidil Pregnancy And Lactation Text: This medication should only be used when clearly needed if you are pregnant, attempting to become pregnant or breast feeding.
Cimzia Pregnancy And Lactation Text: This medication crosses the placenta but can be considered safe in certain situations. Cimzia may be excreted in breast milk.
Bexarotene Counseling:  I discussed with the patient the risks of bexarotene including but not limited to hair loss, dry lips/skin/eyes, liver abnormalities, hyperlipidemia, pancreatitis, depression/suicidal ideation, photosensitivity, drug rash/allergic reactions, hypothyroidism, anemia, leukopenia, infection, cataracts, and teratogenicity.  Patient understands that they will need regular blood tests to check lipid profile, liver function tests, white blood cell count, thyroid function tests and pregnancy test if applicable.
Cyclophosphamide Counseling:  I discussed with the patient the risks of cyclophosphamide including but not limited to hair loss, hormonal abnormalities, decreased fertility, abdominal pain, diarrhea, nausea and vomiting, bone marrow suppression and infection. The patient understands that monitoring is required while taking this medication.
Mirvaso Counseling: Mirvaso is a topical medication which can decrease superficial blood flow where applied. Side effects are uncommon and include stinging, redness and allergic reactions.
VTAMA Counseling: I discussed with the patient that VTAMA is not for use in the eyes, mouth or mouth. They should call the office if they develop any signs of allergic reactions to VTAMA. The patient verbalized understanding of the proper use and possible adverse effects of VTAMA.  All of the patient's questions and concerns were addressed.
Include Pregnancy/Lactation Warning?: No
Low Dose Naltrexone Counseling- I discussed with the patient the potential risks and side effects of low dose naltrexone including but not limited to: more vivid dreams, headaches, nausea, vomiting, abdominal pain, fatigue, dizziness, and anxiety.
Topical Clindamycin Pregnancy And Lactation Text: This medication is Pregnancy Category B and is considered safe during pregnancy. It is unknown if it is excreted in breast milk.
Stelara Counseling:  I discussed with the patient the risks of ustekinumab including but not limited to immunosuppression, malignancy, posterior leukoencephalopathy syndrome, and serious infections.  The patient understands that monitoring is required including a PPD at baseline and must alert us or the primary physician if symptoms of infection or other concerning signs are noted.
Rhofade Counseling: Rhofade is a topical medication which can decrease superficial blood flow where applied. Side effects are uncommon and include stinging, redness and allergic reactions.
Tetracycline Counseling: Patient counseled regarding possible photosensitivity and increased risk for sunburn.  Patient instructed to avoid sunlight, if possible.  When exposed to sunlight, patients should wear protective clothing, sunglasses, and sunscreen.  The patient was instructed to call the office immediately if the following severe adverse effects occur:  hearing changes, easy bruising/bleeding, severe headache, or vision changes.  The patient verbalized understanding of the proper use and possible adverse effects of tetracycline.  All of the patient's questions and concerns were addressed. Patient understands to avoid pregnancy while on therapy due to potential birth defects.
Metronidazole Pregnancy And Lactation Text: This medication is Pregnancy Category B and considered safe during pregnancy.  It is also excreted in breast milk.
Birth Control Pills Counseling: Birth Control Pill Counseling: I discussed with the patient the potential side effects of OCPs including but not limited to increased risk of stroke, heart attack, thrombophlebitis, deep venous thrombosis, hepatic adenomas, breast changes, GI upset, headaches, and depression.  The patient verbalized understanding of the proper use and possible adverse effects of OCPs. All of the patient's questions and concerns were addressed.
Cephalexin Counseling: I counseled the patient regarding use of cephalexin as an antibiotic for prophylactic and/or therapeutic purposes. Cephalexin (commonly prescribed under brand name Keflex) is a cephalosporin antibiotic which is active against numerous classes of bacteria, including most skin bacteria. Side effects may include nausea, diarrhea, gastrointestinal upset, rash, hives, yeast infections, and in rare cases, hepatitis, kidney disease, seizures, fever, confusion, neurologic symptoms, and others. Patients with severe allergies to penicillin medications are cautioned that there is about a 10% incidence of cross-reactivity with cephalosporins. When possible, patients with penicillin allergies should use alternatives to cephalosporins for antibiotic therapy.
Ketoconazole Pregnancy And Lactation Text: This medication is Pregnancy Category C and it isn't know if it is safe during pregnancy. It is also excreted in breast milk and breast feeding isn't recommended.
Bexarotene Pregnancy And Lactation Text: This medication is Pregnancy Category X and should not be given to women who are pregnant or may become pregnant. This medication should not be used if you are breast feeding.
Cyclophosphamide Pregnancy And Lactation Text: This medication is Pregnancy Category D and it isn't considered safe during pregnancy. This medication is excreted in breast milk.
Mirvaso Pregnancy And Lactation Text: This medication has not been assigned a Pregnancy Risk Category. It is unknown if the medication is excreted in breast milk.
Rinvoq Counseling: I discussed with the patient the risks of Rinvoq therapy including but not limited to upper respiratory tract infections, shingles, cold sores, bronchitis, nausea, cough, fever, acne, and headache. Live vaccines should be avoided.  This medication has been linked to serious infections; higher rate of mortality; malignancy and lymphoproliferative disorders; major adverse cardiovascular events; thrombosis; thrombocytopenia, anemia, and neutropenia; lipid elevations; liver enzyme elevations; and gastrointestinal perforations.
Cosentyx Counseling:  I discussed with the patient the risks of Cosentyx including but not limited to worsening of Crohn's disease, immunosuppression, allergic reactions and infections.  The patient understands that monitoring is required including a PPD at baseline and must alert us or the primary physician if symptoms of infection or other concerning signs are noted.
Otezla Counseling: The side effects of Otezla were discussed with the patient, including but not limited to worsening or new depression, weight loss, diarrhea, nausea, upper respiratory tract infection, and headache. Patient instructed to call the office should any adverse effect occur.  The patient verbalized understanding of the proper use and possible adverse effects of Otezla.  All the patient's questions and concerns were addressed.
Vtama Pregnancy And Lactation Text: It is unknown if this medication can cause problems during pregnancy and breastfeeding.
Hydroquinone Counseling:  Patient advised that medication may result in skin irritation, lightening (hypopigmentation), dryness, and burning.  In the event of skin irritation, the patient was advised to reduce the amount of the drug applied or use it less frequently.  Rarely, spots that are treated with hydroquinone can become darker (pseudoochronosis).  Should this occur, patient instructed to stop medication and call the office. The patient verbalized understanding of the proper use and possible adverse effects of hydroquinone.  All of the patient's questions and concerns were addressed.
Low Dose Naltrexone Pregnancy And Lactation Text: Naltrexone is pregnancy category C.  There have been no adequate and well-controlled studies in pregnant women.  It should be used in pregnancy only if the potential benefit justifies the potential risk to the fetus.   Limited data indicates that naltrexone is minimally excreted into breastmilk.
Topical Ketoconazole Counseling: Patient counseled that this medication may cause skin irritation or allergic reactions.  In the event of skin irritation, the patient was advised to reduce the amount of the drug applied or use it less frequently.   The patient verbalized understanding of the proper use and possible adverse effects of ketoconazole.  All of the patient's questions and concerns were addressed.
Cantharidin Pregnancy And Lactation Text: The use of this medication during pregnancy or lactation is not recommended as there is insufficient data.
Minocycline Counseling: Patient advised regarding possible photosensitivity and discoloration of the teeth, skin, lips, tongue and gums.  Patient instructed to avoid sunlight, if possible.  When exposed to sunlight, patients should wear protective clothing, sunglasses, and sunscreen.  The patient was instructed to call the office immediately if the following severe adverse effects occur:  hearing changes, easy bruising/bleeding, severe headache, or vision changes.  The patient verbalized understanding of the proper use and possible adverse effects of minocycline.  All of the patient's questions and concerns were addressed.
Albendazole Counseling:  I discussed with the patient the risks of albendazole including but not limited to cytopenia, kidney damage, nausea/vomiting and severe allergy.  The patient understands that this medication is being used in an off-label manner.
Libtayo Counseling- I discussed with the patient the risks of Libtayo including but not limited to nausea, vomiting, diarrhea, and bone or muscle pain.  The patient verbalized understanding of the proper use and possible adverse effects of Libtayo.  All of the patient's questions and concerns were addressed.
Dapsone Counseling: I discussed with the patient the risks of dapsone including but not limited to hemolytic anemia, agranulocytosis, rashes, methemoglobinemia, kidney failure, peripheral neuropathy, headaches, GI upset, and liver toxicity.  Patients who start dapsone require monitoring including baseline LFTs and weekly CBCs for the first month, then every month thereafter.  The patient verbalized understanding of the proper use and possible adverse effects of dapsone.  All of the patient's questions and concerns were addressed.
Birth Control Pills Pregnancy And Lactation Text: This medication should be avoided if pregnant and for the first 30 days post-partum.
Terbinafine Counseling: Patient counseling regarding adverse effects of terbinafine including but not limited to headache, diarrhea, rash, upset stomach, liver function test abnormalities, itching, taste/smell disturbance, nausea, abdominal pain, and flatulence.  There is a rare possibility of liver failure that can occur when taking terbinafine.  The patient understands that a baseline LFT and kidney function test may be required. The patient verbalized understanding of the proper use and possible adverse effects of terbinafine.  All of the patient's questions and concerns were addressed.
Tranexamic Acid Counseling:  Patient advised of the small risk of bleeding problems with tranexamic acid. They were also instructed to call if they developed any nausea, vomiting or diarrhea. All of the patient's questions and concerns were addressed.
Infliximab Counseling:  I discussed with the patient the risks of infliximab including but not limited to myelosuppression, immunosuppression, autoimmune hepatitis, demyelinating diseases, lymphoma, and serious infections.  The patient understands that monitoring is required including a PPD at baseline and must alert us or the primary physician if symptoms of infection or other concerning signs are noted.
Rinvoq Pregnancy And Lactation Text: Based on animal studies, Rinvoq may cause embryo-fetal harm when administered to pregnant women.  The medication should not be used in pregnancy.  Breastfeeding is not recommended during treatment and for 6 days after the last dose.
Azelaic Acid Counseling: Patient counseled that medicine may cause skin irritation and to avoid applying near the eyes.  In the event of skin irritation, the patient was advised to reduce the amount of the drug applied or use it less frequently.   The patient verbalized understanding of the proper use and possible adverse effects of azelaic acid.  All of the patient's questions and concerns were addressed.
Cephalexin Pregnancy And Lactation Text: This medication is Pregnancy Category B and considered safe during pregnancy.  It is also excreted in breast milk but can be used safely for shorter doses.
Otezla Pregnancy And Lactation Text: This medication is Pregnancy Category C and it isn't known if it is safe during pregnancy. It is unknown if it is excreted in breast milk.
Zyclara Counseling:  I discussed with the patient the risks of imiquimod including but not limited to erythema, scaling, itching, weeping, crusting, and pain.  Patient understands that the inflammatory response to imiquimod is variable from person to person and was educated regarded proper titration schedule.  If flu-like symptoms develop, patient knows to discontinue the medication and contact us.
Opzelura Counseling:  I discussed with the patient the risks of Opzelura including but not limited to nasopharngitis, bronchitis, ear infection, eosinophila, hives, diarrhea, folliculitis, tonsillitis, and rhinorrhea.  Taken orally, this medication has been linked to serious infections; higher rate of mortality; malignancy and lymphoproliferative disorders; major adverse cardiovascular events; thrombosis; thrombocytopenia, anemia, and neutropenia; and lipid elevations.
Cyclosporine Counseling:  I discussed with the patient the risks of cyclosporine including but not limited to hypertension, gingival hyperplasia,myelosuppression, immunosuppression, liver damage, kidney damage, neurotoxicity, lymphoma, and serious infections. The patient understands that monitoring is required including baseline blood pressure, CBC, CMP, lipid panel and uric acid, and then 1-2 times monthly CMP and blood pressure.
Isotretinoin Counseling: Patient should get monthly blood tests, not donate blood, not drive at night if vision affected, not share medication, and not undergo elective surgery for 6 months after tx completed. Side effects reviewed, pt to contact office should one occur.
Taltz Counseling: I discussed with the patient the risks of ixekizumab including but not limited to immunosuppression, serious infections, worsening of inflammatory bowel disease and drug reactions.  The patient understands that monitoring is required including a PPD at baseline and must alert us or the primary physician if symptoms of infection or other concerning signs are noted.
Niacinamide Counseling: I recommended taking niacin or niacinamide, also know as vitamin B3, twice daily. Recent evidence suggests that taking vitamin B3 (500 mg twice daily) can reduce the risk of actinic keratoses and non-melanoma skin cancers. Side effects of vitamin B3 include flushing and headache.
Rituxan Pregnancy And Lactation Text: This medication is Pregnancy Category C and it isn't know if it is safe during pregnancy. It is unknown if this medication is excreted in breast milk but similar antibodies are known to be excreted.
Dapsone Pregnancy And Lactation Text: This medication is Pregnancy Category C and is not considered safe during pregnancy or breast feeding.
5-Fu Counseling: 5-Fluorouracil Counseling:  I discussed with the patient the risks of 5-fluorouracil including but not limited to erythema, scaling, itching, weeping, crusting, and pain.
Albendazole Pregnancy And Lactation Text: This medication is Pregnancy Category C and it isn't known if it is safe during pregnancy. It is also excreted in breast milk.
Libtayo Pregnancy And Lactation Text: This medication is contraindicated in pregnancy and when breast feeding.
Azelaic Acid Pregnancy And Lactation Text: This medication is considered safe during pregnancy and breast feeding.
Tranexamic Acid Pregnancy And Lactation Text: It is unknown if this medication is safe during pregnancy or breast feeding.
Solaraze Counseling:  I discussed with the patient the risks of Solaraze including but not limited to erythema, scaling, itching, weeping, crusting, and pain.
Spironolactone Counseling: Patient advised regarding risks of diarrhea, abdominal pain, hyperkalemia, birth defects (for female patients), liver toxicity and renal toxicity. The patient may need blood work to monitor liver and kidney function and potassium levels while on therapy. The patient verbalized understanding of the proper use and possible adverse effects of spironolactone.  All of the patient's questions and concerns were addressed.
Terbinafine Pregnancy And Lactation Text: This medication is Pregnancy Category B and is considered safe during pregnancy. It is also excreted in breast milk and breast feeding isn't recommended.
Opzelura Pregnancy And Lactation Text: There is insufficient data to evaluate drug-associated risk for major birth defects, miscarriage, or other adverse maternal or fetal outcomes.  There is a pregnancy registry that monitors pregnancy outcomes in pregnant persons exposed to the medication during pregnancy.  It is unknown if this medication is excreted in breast milk.  Do not breastfeed during treatment and for about 4 weeks after the last dose.
Oxybutynin Counseling:  I discussed with the patient the risks of oxybutynin including but not limited to skin rash, drowsiness, dry mouth, difficulty urinating, and blurred vision.
Dupixent Counseling: I discussed with the patient the risks of dupilumab including but not limited to eye infection and irritation, cold sores, injection site reactions, worsening of asthma, allergic reactions and increased risk of parasitic infection.  Live vaccines should be avoided while taking dupilumab. Dupilumab will also interact with certain medications such as warfarin and cyclosporine. The patient understands that monitoring is required and they must alert us or the primary physician if symptoms of infection or other concerning signs are noted.
Sotyktu Counseling:  I discussed the most common side effects of Sotyktu including: common cold, sore throat, sinus infections, cold sores, canker sores, folliculitis, and acne.  I also discussed more serious side effects of Sotyktu including but not limited to: serious allergic reactions; increased risk for infections such as TB; cancers such as lymphomas; rhabdomyolysis and elevated CPK; and elevated triglycerides and liver enzymes. 
Cyclosporine Pregnancy And Lactation Text: This medication is Pregnancy Category C and it isn't know if it is safe during pregnancy. This medication is excreted in breast milk.
Clindamycin Counseling: I counseled the patient regarding use of clindamycin as an antibiotic for prophylactic and/or therapeutic purposes. Clindamycin is active against numerous classes of bacteria, including skin bacteria. Side effects may include nausea, diarrhea, gastrointestinal upset, rash, hives, yeast infections, and in rare cases, colitis.
Isotretinoin Pregnancy And Lactation Text: This medication is Pregnancy Category X and is considered extremely dangerous during pregnancy. It is unknown if it is excreted in breast milk.
Imiquimod Counseling:  I discussed with the patient the risks of imiquimod including but not limited to erythema, scaling, itching, weeping, crusting, and pain.  Patient understands that the inflammatory response to imiquimod is variable from person to person and was educated regarded proper titration schedule.  If flu-like symptoms develop, patient knows to discontinue the medication and contact us.
Fluconazole Counseling:  Patient counseled regarding adverse effects of fluconazole including but not limited to headache, diarrhea, nausea, upset stomach, liver function test abnormalities, taste disturbance, and stomach pain.  There is a rare possibility of liver failure that can occur when taking fluconazole.  The patient understands that monitoring of LFTs and kidney function test may be required, especially at baseline. The patient verbalized understanding of the proper use and possible adverse effects of fluconazole.  All of the patient's questions and concerns were addressed.
Niacinamide Pregnancy And Lactation Text: These medications are considered safe during pregnancy.
Ivermectin Counseling:  Patient instructed to take medication on an empty stomach with a full glass of water.  Patient informed of potential adverse effects including but not limited to nausea, diarrhea, dizziness, itching, and swelling of the extremities or lymph nodes.  The patient verbalized understanding of the proper use and possible adverse effects of ivermectin.  All of the patient's questions and concerns were addressed.
Gabapentin Counseling: I discussed with the patient the risks of gabapentin including but not limited to dizziness, somnolence, fatigue and ataxia.
Odomzo Counseling- I discussed with the patient the risks of Odomzo including but not limited to nausea, vomiting, diarrhea, constipation, weight loss, changes in the sense of taste, decreased appetite, muscle spasms, and hair loss.  The patient verbalized understanding of the proper use and possible adverse effects of Odomzo.  All of the patient's questions and concerns were addressed.
Siliq Counseling:  I discussed with the patient the risks of Siliq including but not limited to new or worsening depression, suicidal thoughts and behavior, immunosuppression, malignancy, posterior leukoencephalopathy syndrome, and serious infections.  The patient understands that monitoring is required including a PPD at baseline and must alert us or the primary physician if symptoms of infection or other concerning signs are noted. There is also a special program designed to monitor depression which is required with Siliq.
Topical Sulfur Applications Counseling: Topical Sulfur Counseling: Patient counseled that this medication may cause skin irritation or allergic reactions.  In the event of skin irritation, the patient was advised to reduce the amount of the drug applied or use it less frequently.   The patient verbalized understanding of the proper use and possible adverse effects of topical sulfur application.  All of the patient's questions and concerns were addressed.
Rituxan Counseling:  I discussed with the patient the risks of Rituxan infusions. Side effects can include infusion reactions, severe drug rashes including mucocutaneous reactions, reactivation of latent hepatitis and other infections and rarely progressive multifocal leukoencephalopathy.  All of the patient's questions and concerns were addressed.
Prednisone Counseling:  I discussed with the patient the risks of prolonged use of prednisone including but not limited to weight gain, insomnia, osteoporosis, mood changes, diabetes, susceptibility to infection, glaucoma and high blood pressure.  In cases where prednisone use is prolonged, patients should be monitored with blood pressure checks, serum glucose levels and an eye exam.  Additionally, the patient may need to be placed on GI prophylaxis, PCP prophylaxis, and calcium and vitamin D supplementation and/or a bisphosphonate.  The patient verbalized understanding of the proper use and the possible adverse effects of prednisone.  All of the patient's questions and concerns were addressed.
Quinolones Counseling:  I discussed with the patient the risks of fluoroquinolones including but not limited to GI upset, allergic reaction, drug rash, diarrhea, dizziness, photosensitivity, yeast infections, liver function test abnormalities, tendonitis/tendon rupture.
Solaraze Pregnancy And Lactation Text: This medication is Pregnancy Category B and is considered safe. There is some data to suggest avoiding during the third trimester. It is unknown if this medication is excreted in breast milk.
Valtrex Counseling: I discussed with the patient the risks of valacyclovir including but not limited to kidney damage, nausea, vomiting and severe allergy.  The patient understands that if the infection seems to be worsening or is not improving, they are to call.
Benzoyl Peroxide Counseling: Patient counseled that medicine may cause skin irritation and bleach clothing.  In the event of skin irritation, the patient was advised to reduce the amount of the drug applied or use it less frequently.   The patient verbalized understanding of the proper use and possible adverse effects of benzoyl peroxide.  All of the patient's questions and concerns were addressed.
Methotrexate Counseling:  Patient counseled regarding adverse effects of methotrexate including but not limited to nausea, vomiting, abnormalities in liver function tests. Patients may develop mouth sores, rash, diarrhea, and abnormalities in blood counts. The patient understands that monitoring is required including LFT's and blood counts.  There is a rare possibility of scarring of the liver and lung problems that can occur when taking methotrexate. Persistent nausea, loss of appetite, pale stools, dark urine, cough, and shortness of breath should be reported immediately. Patient advised to discontinue methotrexate treatment at least three months before attempting to become pregnant.  I discussed the need for folate supplements while taking methotrexate.  These supplements can decrease side effects during methotrexate treatment. The patient verbalized understanding of the proper use and possible adverse effects of methotrexate.  All of the patient's questions and concerns were addressed.
Clindamycin Pregnancy And Lactation Text: This medication can be used in pregnancy if certain situations. Clindamycin is also present in breast milk.
Spironolactone Pregnancy And Lactation Text: This medication can cause feminization of the male fetus and should be avoided during pregnancy. The active metabolite is also found in breast milk.
Picato Counseling:  I discussed with the patient the risks of Picato including but not limited to erythema, scaling, itching, weeping, crusting, and pain.
Sotyktu Pregnancy And Lactation Text: There is insufficient data to evaluate whether or not Sotyktu is safe to use during pregnancy.   It is not known if Sotyktu passes into breast milk and whether or not it is safe to use when breastfeeding.  
Dupixent Pregnancy And Lactation Text: This medication likely crosses the placenta but the risk for the fetus is uncertain. This medication is excreted in breast milk.
Cimetidine Counseling:  I discussed with the patient the risks of Cimetidine including but not limited to gynecomastia, headache, diarrhea, nausea, drowsiness, arrhythmias, pancreatitis, skin rashes, psychosis, bone marrow suppression and kidney toxicity.
High Dose Vitamin A Counseling: Side effects reviewed, pt to contact office should one occur.
Tremfya Counseling: I discussed with the patient the risks of guselkumab including but not limited to immunosuppression, serious infections, and drug reactions.  The patient understands that monitoring is required including a PPD at baseline and must alert us or the primary physician if symptoms of infection or other concerning signs are noted.
Nsaids Counseling: NSAID Counseling: I discussed with the patient that NSAIDs should be taken with food. Prolonged use of NSAIDs can result in the development of stomach ulcers.  Patient advised to stop taking NSAIDs if abdominal pain occurs.  The patient verbalized understanding of the proper use and possible adverse effects of NSAIDs.  All of the patient's questions and concerns were addressed.
Topical Sulfur Applications Pregnancy And Lactation Text: This medication is Pregnancy Category C and has an unknown safety profile during pregnancy. It is unknown if this topical medication is excreted in breast milk.
Drysol Counseling:  I discussed with the patient the risks of drysol/aluminum chloride including but not limited to skin rash, itching, irritation, burning.
Topical Retinoid counseling:  Patient advised to apply a pea-sized amount only at bedtime and wait 30 minutes after washing their face before applying.  If too drying, patient may add a non-comedogenic moisturizer. The patient verbalized understanding of the proper use and possible adverse effects of retinoids.  All of the patient's questions and concerns were addressed.
Benzoyl Peroxide Pregnancy And Lactation Text: This medication is Pregnancy Category C. It is unknown if benzoyl peroxide is excreted in breast milk.
Valtrex Pregnancy And Lactation Text: this medication is Pregnancy Category B and is considered safe during pregnancy. This medication is not directly found in breast milk but it's metabolite acyclovir is present.
Opioid Counseling: I discussed with the patient the potential side effects of opioids including but not limited to addiction, altered mental status, and depression. I stressed avoiding alcohol, benzodiazepines, muscle relaxants and sleep aids unless specifically okayed by a physician. The patient verbalized understanding of the proper use and possible adverse effects of opioids. All of the patient's questions and concerns were addressed. They were instructed to flush the remaining pills down the toilet if they did not need them for pain.
Doxycycline Counseling:  Patient counseled regarding possible photosensitivity and increased risk for sunburn.  Patient instructed to avoid sunlight, if possible.  When exposed to sunlight, patients should wear protective clothing, sunglasses, and sunscreen.  The patient was instructed to call the office immediately if the following severe adverse effects occur:  hearing changes, easy bruising/bleeding, severe headache, or vision changes.  The patient verbalized understanding of the proper use and possible adverse effects of doxycycline.  All of the patient's questions and concerns were addressed.
Methotrexate Pregnancy And Lactation Text: This medication is Pregnancy Category X and is known to cause fetal harm. This medication is excreted in breast milk.
High Dose Vitamin A Pregnancy And Lactation Text: High dose vitamin A therapy is contraindicated during pregnancy and breast feeding.
Xeljanz Counseling: I discussed with the patient the risks of Xeljanz therapy including increased risk of infection, liver issues, headache, diarrhea, or cold symptoms. Live vaccines should be avoided. They were instructed to call if they have any problems.
Griseofulvin Counseling:  I discussed with the patient the risks of griseofulvin including but not limited to photosensitivity, cytopenia, liver damage, nausea/vomiting and severe allergy.  The patient understands that this medication is best absorbed when taken with a fatty meal (e.g., ice cream or french fries).
Propranolol Counseling:  I discussed with the patient the risks of propranolol including but not limited to low heart rate, low blood pressure, low blood sugar, restlessness and increased cold sensitivity. They should call the office if they experience any of these side effects.
Enbrel Counseling:  I discussed with the patient the risks of etanercept including but not limited to myelosuppression, immunosuppression, autoimmune hepatitis, demyelinating diseases, lymphoma, and infections.  The patient understands that monitoring is required including a PPD at baseline and must alert us or the primary physician if symptoms of infection or other concerning signs are noted.
Nsaids Pregnancy And Lactation Text: These medications are considered safe up to 30 weeks gestation. It is excreted in breast milk.
Azathioprine Counseling:  I discussed with the patient the risks of azathioprine including but not limited to myelosuppression, immunosuppression, hepatotoxicity, lymphoma, and infections.  The patient understands that monitoring is required including baseline LFTs, Creatinine, possible TPMP genotyping and weekly CBCs for the first month and then every 2 weeks thereafter.  The patient verbalized understanding of the proper use and possible adverse effects of azathioprine.  All of the patient's questions and concerns were addressed.
Klisyri Counseling:  I discussed with the patient the risks of Klisyri including but not limited to erythema, scaling, itching, weeping, crusting, and pain.
Rifampin Counseling: I discussed with the patient the risks of rifampin including but not limited to liver damage, kidney damage, red-orange body fluids, nausea/vomiting and severe allergy.
Wartpeel Counseling:  I discussed with the patient the risks of Wartpeel including but not limited to erythema, scaling, itching, weeping, crusting, and pain.
Simponi Counseling:  I discussed with the patient the risks of golimumab including but not limited to myelosuppression, immunosuppression, autoimmune hepatitis, demyelinating diseases, lymphoma, and serious infections.  The patient understands that monitoring is required including a PPD at baseline and must alert us or the primary physician if symptoms of infection or other concerning signs are noted.
Glycopyrrolate Counseling:  I discussed with the patient the risks of glycopyrrolate including but not limited to skin rash, drowsiness, dry mouth, difficulty urinating, and blurred vision.
Xeljasmynz Pregnancy And Lactation Text: This medication is Pregnancy Category D and is not considered safe during pregnancy.  The risk during breast feeding is also uncertain.
Opioid Pregnancy And Lactation Text: These medications can lead to premature delivery and should be avoided during pregnancy. These medications are also present in breast milk in small amounts.
Carac Counseling:  I discussed with the patient the risks of Carac including but not limited to erythema, scaling, itching, weeping, crusting, and pain.
Dutasteride Male Counseling: Dustasteride Counseling:  I discussed with the patient the risks of use of dutasteride including but not limited to decreased libido, decreased ejaculate volume, and gynecomastia. Women who can become pregnant should not handle medication.  All of the patient's questions and concerns were addressed.
Doxycycline Pregnancy And Lactation Text: This medication is Pregnancy Category D and not consider safe during pregnancy. It is also excreted in breast milk but is considered safe for shorter treatment courses.
Adbry Counseling: I discussed with the patient the risks of tralokinumab including but not limited to eye infection and irritation, cold sores, injection site reactions, worsening of asthma, allergic reactions and increased risk of parasitic infection.  Live vaccines should be avoided while taking tralokinumab. The patient understands that monitoring is required and they must alert us or the primary physician if symptoms of infection or other concerning signs are noted.
Propranolol Pregnancy And Lactation Text: This medication is Pregnancy Category C and it isn't known if it is safe during pregnancy. It is excreted in breast milk.
Protopic Counseling: Patient may experience a mild burning sensation during topical application. Protopic is not approved in children less than 2 years of age. There have been case reports of hematologic and skin malignancies in patients using topical calcineurin inhibitors although causality is questionable.
Griseofulvin Pregnancy And Lactation Text: This medication is Pregnancy Category X and is known to cause serious birth defects. It is unknown if this medication is excreted in breast milk but breast feeding should be avoided.
Klisyri Pregnancy And Lactation Text: It is unknown if this medication can harm a developing fetus or if it is excreted in breast milk.
Cibinqo Counseling: I discussed with the patient the risks of Cibinqo therapy including but not limited to common cold, nausea, headache, cold sores, increased blood CPK levels, dizziness, UTIs, fatigue, acne, and vomitting. Live vaccines should be avoided.  This medication has been linked to serious infections; higher rate of mortality; malignancy and lymphoproliferative disorders; major adverse cardiovascular events; thrombosis; thrombocytopenia and lymphopenia; lipid elevations; and retinal detachment.
Olanzapine Counseling- I discussed with the patient the common side effects of olanzapine including but are not limited to: lack of energy, dry mouth, increased appetite, sleepiness, tremor, constipation, dizziness, changes in behavior, or restlessness.  Explained that teenagers are more likely to experience headaches, abdominal pain, pain in the arms or legs, tiredness, and sleepiness.  Serious side effects include but are not limited: increased risk of death in elderly patients who are confused, have memory loss, or dementia-related psychosis; hyperglycemia; increased cholesterol and triglycerides; and weight gain.

## 2022-11-16 RX ORDER — HYDROCODONE BITARTRATE AND ACETAMINOPHEN 5; 325 MG/1; MG/1
TABLET ORAL
Qty: 20 | Refills: 0 | Status: ERX | COMMUNITY
Start: 2022-11-15

## 2022-11-22 ENCOUNTER — DOCUMENTATION (OUTPATIENT)
Dept: HEALTH INFORMATION MANAGEMENT | Facility: OTHER | Age: 77
End: 2022-11-22
Payer: MEDICARE

## 2022-11-29 ENCOUNTER — APPOINTMENT (RX ONLY)
Dept: URBAN - METROPOLITAN AREA CLINIC 22 | Facility: CLINIC | Age: 77
Setting detail: DERMATOLOGY
End: 2022-11-29

## 2022-11-29 DIAGNOSIS — Z48.02 ENCOUNTER FOR REMOVAL OF SUTURES: ICD-10-CM

## 2022-11-29 PROCEDURE — ? SUTURE REMOVAL (GLOBAL PERIOD)

## 2022-11-29 PROCEDURE — ? PHOTO-DOCUMENTATION

## 2022-11-29 ASSESSMENT — LOCATION DETAILED DESCRIPTION DERM: LOCATION DETAILED: LEFT LATERAL MANDIBULAR CHEEK

## 2022-11-29 ASSESSMENT — LOCATION ZONE DERM: LOCATION ZONE: FACE

## 2022-11-29 ASSESSMENT — LOCATION SIMPLE DESCRIPTION DERM: LOCATION SIMPLE: LEFT CHEEK

## 2022-11-29 NOTE — PROCEDURE: SUTURE REMOVAL (GLOBAL PERIOD)
Body Location Override (Optional - Billing Will Still Be Based On Selected Body Map Location If Applicable): Left jawline
Detail Level: Detailed
Add 77395 Cpt? (Important Note: In 2017 The Use Of 08897 Is Being Tracked By Cms To Determine Future Global Period Reimbursement For Global Periods): no

## 2022-11-30 ENCOUNTER — TELEPHONE (OUTPATIENT)
Dept: HEALTH INFORMATION MANAGEMENT | Facility: OTHER | Age: 77
End: 2022-11-30

## 2022-11-30 DIAGNOSIS — N28.9 ABNORMAL KIDNEY FUNCTION: ICD-10-CM

## 2022-11-30 NOTE — TELEPHONE ENCOUNTER
1. Caller Name: Selma Avila                          Call Back Number: [phone]        How would the patient prefer to be contacted with a response: Phone call OK to leave a detailed message    2. SPECIFIC Action To Be Taken: Referral pending, please sign.    3. Diagnosis/Clinical Reason for Request: Cysts on Kidney     4. Specialty & Provider Name/Lab/Imaging Location: Nephrology    5. Is appointment scheduled for requested order/referral: No    Patient was informed they will receive a return phone call from the office ONLY if there are any questions before processing their request. Advised to call back if they haven't received a call from the referral department in 5 days.

## 2022-12-09 ENCOUNTER — OFFICE VISIT (OUTPATIENT)
Dept: CARDIOLOGY | Facility: MEDICAL CENTER | Age: 77
End: 2022-12-09
Payer: MEDICARE

## 2022-12-09 VITALS
HEIGHT: 72 IN | OXYGEN SATURATION: 93 % | HEART RATE: 71 BPM | RESPIRATION RATE: 14 BRPM | WEIGHT: 192 LBS | DIASTOLIC BLOOD PRESSURE: 80 MMHG | BODY MASS INDEX: 26.01 KG/M2 | SYSTOLIC BLOOD PRESSURE: 140 MMHG

## 2022-12-09 DIAGNOSIS — I10 ESSENTIAL HYPERTENSION: ICD-10-CM

## 2022-12-09 DIAGNOSIS — I34.0 NON-RHEUMATIC MITRAL REGURGITATION: ICD-10-CM

## 2022-12-09 DIAGNOSIS — E78.5 DYSLIPIDEMIA WITH HIGH LDL AND LOW HDL: ICD-10-CM

## 2022-12-09 DIAGNOSIS — I49.8 BIGEMINY: ICD-10-CM

## 2022-12-09 DIAGNOSIS — I34.1 MITRAL VALVE PROLAPSE: ICD-10-CM

## 2022-12-09 DIAGNOSIS — R09.89 CHEST RALES: ICD-10-CM

## 2022-12-09 DIAGNOSIS — R73.03 PRE-DIABETES: ICD-10-CM

## 2022-12-09 PROCEDURE — 99214 OFFICE O/P EST MOD 30 MIN: CPT | Performed by: INTERNAL MEDICINE

## 2022-12-09 RX ORDER — LOSARTAN POTASSIUM 100 MG/1
100 TABLET ORAL DAILY
Qty: 100 TABLET | Refills: 3 | Status: ON HOLD | OUTPATIENT
Start: 2022-12-09 | End: 2023-07-12

## 2022-12-09 ASSESSMENT — ENCOUNTER SYMPTOMS
EYES NEGATIVE: 1
SPUTUM PRODUCTION: 0
HEMOPTYSIS: 0
NEUROLOGICAL NEGATIVE: 1
GASTROINTESTINAL NEGATIVE: 1
COUGH: 0
CLAUDICATION: 0
BRUISES/BLEEDS EASILY: 0
SORE THROAT: 0
PND: 0
CHILLS: 0
LOSS OF CONSCIOUSNESS: 0
FEVER: 0
MUSCULOSKELETAL NEGATIVE: 1
CONSTITUTIONAL NEGATIVE: 1
WEAKNESS: 0
SHORTNESS OF BREATH: 0
RESPIRATORY NEGATIVE: 1
DIZZINESS: 0
WHEEZING: 0
ORTHOPNEA: 0
PALPITATIONS: 0
STRIDOR: 0
CARDIOVASCULAR NEGATIVE: 1

## 2022-12-09 ASSESSMENT — FIBROSIS 4 INDEX: FIB4 SCORE: 2.13

## 2022-12-09 NOTE — PROGRESS NOTES
"Chief Complaint   Patient presents with    Mitral Valve Prolapse    Hypertension    Dyslipidemia       Subjective:   Jenny Avila is a 75 y.o. male who presents today as a follow-up for his dizziness lightheadedness and bigeminy.  He wore a event recorder that showed short nonsustained runs of SVT as well as bigeminy.      Since he was last seen has been doing well with exception of some minor exertional shortness of breath.  His murmur sounds louder on exam that it did before.  He is having no chest pain.  He has been active and climbing the stairs but admits that he cannot do as much as he used to is recently as a few weeks ago.    Past Medical History:   Diagnosis Date    Dyslipidemia 2018    Essential hypertension 2013    Mitral valve prolapse 2013    Non-rheumatic mitral regurgitation 2013    Obstructive uropathy 2021     Past Surgical History:   Procedure Laterality Date    FISTULA IN ANO REPAIR Left      Family History   Problem Relation Age of Onset    Cancer Mother         ovrian cancer    Lung Disease Father     Heart Disease Neg Hx      Social History     Socioeconomic History    Marital status:      Spouse name: Not on file    Number of children: Not on file    Years of education: Not on file    Highest education level: Not on file   Occupational History    Not on file   Tobacco Use    Smoking status: Former     Types: Cigarettes     Quit date: 1963     Years since quittin.2    Smokeless tobacco: Never   Vaping Use    Vaping Use: Never used   Substance and Sexual Activity    Alcohol use: Not Currently     Alcohol/week: 0.6 oz     Types: 1 Cans of beer per week     Comment: \" about an 1 oz for my health everyday    Drug use: No    Sexual activity: Not on file     Comment:    Other Topics Concern    Not on file   Social History Narrative    Not on file     Social Determinants of Health     Financial Resource Strain: Not on file   Food Insecurity: Not on file "   Transportation Needs: Not on file   Physical Activity: Not on file   Stress: Not on file   Social Connections: Not on file   Intimate Partner Violence: Not on file   Housing Stability: Not on file     No Known Allergies  Outpatient Encounter Medications as of 12/9/2022   Medication Sig Dispense Refill    losartan (COZAAR) 100 MG Tab Take 1 Tablet by mouth every day. 100 Tablet 3    Magnesium 250 MG Tab Take  by mouth.      celecoxib (CELEBREX) 200 MG Cap TAKE 1 CAPSULE BY MOUTH EVERY DAY 60 Capsule 10    DOCUSATE CALCIUM PO Take 200 mg by mouth every day.      VITAMIN D PO Take 6,000 Units by mouth every day.      Omega-3 Fatty Acids (FISH OIL) 1200 MG Cap Take 1,200 mg by mouth every day.      [DISCONTINUED] losartan (COZAAR) 100 MG Tab TAKE 1 TABLET BY MOUTH EVERY  Tablet 0    [DISCONTINUED] amoxicillin-clavulanate (AUGMENTIN) 875-125 MG Tab Take 1 Tablet by mouth 2 times a day. 20 Tablet 0     No facility-administered encounter medications on file as of 12/9/2022.     Review of Systems   Constitutional: Negative.  Negative for chills, fever and malaise/fatigue.   HENT: Negative.  Negative for sore throat.    Eyes: Negative.    Respiratory: Negative.  Negative for cough, hemoptysis, sputum production, shortness of breath, wheezing and stridor.    Cardiovascular: Negative.  Negative for chest pain, palpitations, orthopnea, claudication, leg swelling and PND.   Gastrointestinal: Negative.    Genitourinary: Negative.    Musculoskeletal: Negative.    Skin: Negative.    Neurological: Negative.  Negative for dizziness, loss of consciousness and weakness.   Endo/Heme/Allergies: Negative.  Does not bruise/bleed easily.   All other systems reviewed and are negative.     Objective:   BP (!) 140/80 (BP Location: Left arm, Patient Position: Sitting, BP Cuff Size: Adult)   Pulse 71   Resp 14   Ht 1.829 m (6')   Wt 87.1 kg (192 lb)   SpO2 93%   BMI 26.04 kg/m²     Physical Exam  Vitals and nursing note reviewed.    Constitutional:       General: He is not in acute distress.     Appearance: He is well-developed. He is not diaphoretic.   HENT:      Head: Normocephalic and atraumatic.      Right Ear: External ear normal.      Left Ear: External ear normal.      Nose: Nose normal.      Mouth/Throat:      Pharynx: No oropharyngeal exudate.   Eyes:      General: No scleral icterus.        Right eye: No discharge.         Left eye: No discharge.      Conjunctiva/sclera: Conjunctivae normal.      Pupils: Pupils are equal, round, and reactive to light.   Neck:      Vascular: No JVD.   Cardiovascular:      Rate and Rhythm: Normal rate and regular rhythm.      Heart sounds: Murmur heard.     No friction rub. No gallop.   Pulmonary:      Effort: Pulmonary effort is normal. No respiratory distress.      Breath sounds: No stridor. No wheezing or rales.   Chest:      Chest wall: No tenderness.   Abdominal:      General: There is no distension.      Palpations: Abdomen is soft.      Tenderness: There is no guarding.   Musculoskeletal:         General: No tenderness or deformity. Normal range of motion.      Cervical back: Neck supple.   Skin:     General: Skin is warm and dry.      Coloration: Skin is not pale.      Findings: No erythema or rash.   Neurological:      Mental Status: He is alert.      Cranial Nerves: No cranial nerve deficit.      Motor: No abnormal muscle tone.      Coordination: Coordination normal.      Deep Tendon Reflexes: Reflexes are normal and symmetric. Reflexes normal.   Psychiatric:         Behavior: Behavior normal.         Thought Content: Thought content normal.         Judgment: Judgment normal.   Coronary calcium score: Dated 2019 personally viewed to myself showing a calcium score of 0.    Echocardiogram: Dated 5/6/2020 personally viewed inter myself showing normal LV systolic function without valvular heart disease.    Lab Results   Component Value Date/Time    CHOLSTRLTOT 168 03/29/2022 08:58 AM      (H) 03/29/2022 08:58 AM    HDL 48 03/29/2022 08:58 AM    TRIGLYCERIDE 83 03/29/2022 08:58 AM       Lab Results   Component Value Date/Time    SODIUM 137 10/17/2022 04:15 PM    POTASSIUM 4.3 10/17/2022 04:15 PM    CHLORIDE 102 10/17/2022 04:15 PM    CO2 23 10/17/2022 04:15 PM    GLUCOSE 98 10/17/2022 04:15 PM    BUN 17 10/17/2022 04:15 PM    CREATININE 0.95 10/17/2022 04:15 PM     Lab Results   Component Value Date/Time    ALKPHOSPHAT 82 10/17/2022 04:15 PM    ASTSGOT 25 10/17/2022 04:15 PM    ALTSGPT 18 10/17/2022 04:15 PM    TBILIRUBIN 1.3 10/17/2022 04:15 PM          Assessment:     1. Mitral valve prolapse  losartan (COZAAR) 100 MG Tab    EC-ECHOCARDIOGRAM COMPLETE W/O CONT      2. Non-rheumatic mitral regurgitation  losartan (COZAAR) 100 MG Tab    EC-ECHOCARDIOGRAM COMPLETE W/O CONT      3. Chest rales        4. Essential hypertension  losartan (COZAAR) 100 MG Tab      5. Dyslipidemia with high LDL and low HDL        6. Pre-diabetes        7. Bigeminy            Medical Decision Making:  Today's Assessment / Status / Plan:     76-year-old male with preoperative stratification prior to a revision of his fistula.  He will stay on the magnesium for his bigeminy.  Otherwise for his increasing murmur I will get a stat echocardiogram.  He will stay on the losartan.  I will see him back in 3 months.

## 2022-12-15 ENCOUNTER — OFFICE VISIT (OUTPATIENT)
Dept: NEPHROLOGY | Facility: MEDICAL CENTER | Age: 77
End: 2022-12-15
Payer: MEDICARE

## 2022-12-15 ENCOUNTER — HOSPITAL ENCOUNTER (OUTPATIENT)
Dept: CARDIOLOGY | Facility: MEDICAL CENTER | Age: 77
End: 2022-12-15
Attending: INTERNAL MEDICINE
Payer: MEDICARE

## 2022-12-15 VITALS
OXYGEN SATURATION: 94 % | HEART RATE: 72 BPM | WEIGHT: 194 LBS | RESPIRATION RATE: 14 BRPM | SYSTOLIC BLOOD PRESSURE: 140 MMHG | HEIGHT: 72 IN | BODY MASS INDEX: 26.28 KG/M2 | DIASTOLIC BLOOD PRESSURE: 70 MMHG | TEMPERATURE: 97.4 F

## 2022-12-15 DIAGNOSIS — I34.1 MITRAL VALVE PROLAPSE: ICD-10-CM

## 2022-12-15 DIAGNOSIS — N28.1 CYST OF RIGHT KIDNEY: ICD-10-CM

## 2022-12-15 DIAGNOSIS — N18.2 CKD (CHRONIC KIDNEY DISEASE), STAGE II: ICD-10-CM

## 2022-12-15 DIAGNOSIS — I34.0 NON-RHEUMATIC MITRAL REGURGITATION: ICD-10-CM

## 2022-12-15 DIAGNOSIS — E55.9 VITAMIN D DEFICIENCY: ICD-10-CM

## 2022-12-15 DIAGNOSIS — D64.9 ANEMIA, UNSPECIFIED TYPE: ICD-10-CM

## 2022-12-15 DIAGNOSIS — I10 ESSENTIAL HYPERTENSION: ICD-10-CM

## 2022-12-15 PROCEDURE — 93306 TTE W/DOPPLER COMPLETE: CPT

## 2022-12-15 PROCEDURE — 99204 OFFICE O/P NEW MOD 45 MIN: CPT | Performed by: INTERNAL MEDICINE

## 2022-12-15 ASSESSMENT — ENCOUNTER SYMPTOMS
DIARRHEA: 0
CHILLS: 0
ORTHOPNEA: 0
FEVER: 0
EYES NEGATIVE: 1
ABDOMINAL PAIN: 0
HEMOPTYSIS: 0
WHEEZING: 0
SINUS PAIN: 0
VOMITING: 0
NAUSEA: 0
WEIGHT LOSS: 0
SHORTNESS OF BREATH: 0
COUGH: 0
PALPITATIONS: 0

## 2022-12-15 ASSESSMENT — FIBROSIS 4 INDEX: FIB4 SCORE: 2.13

## 2022-12-16 NOTE — PROGRESS NOTES
"Subjective     Jenny Avila is a 77 y.o. male who presents with New Patient            HPI  Jenny is coming today for initial evaluation of CKD II, right renal cyst  Doing well, no complaints  No dysuria , hematuria, flank pain  HTN: BP controlled  Seen in Urology Clinic   CT abd revealed: dilated right renal pelvis. Bilateral kidneys are enhancing symmetrically. Large right renal cyst, measuring up to 9.5 cm  Nuclear renal with washout -no obious onstruction  Lower GFR of right kidney  CKD II creat baseline 0.9-1.0 -stable  Review of Systems   Constitutional:  Negative for chills, fever, malaise/fatigue and weight loss.   HENT:  Negative for congestion, hearing loss and sinus pain.    Eyes: Negative.    Respiratory:  Negative for cough, hemoptysis, shortness of breath and wheezing.    Cardiovascular:  Negative for chest pain, palpitations, orthopnea and leg swelling.   Gastrointestinal:  Negative for abdominal pain, diarrhea, nausea and vomiting.   Genitourinary:  Negative for dysuria, frequency, hematuria and urgency.   Skin: Negative.    All other systems reviewed and are negative.       Past Medical History:   Diagnosis Date    Dyslipidemia 2018    Essential hypertension 2013    Mitral valve prolapse 2013    Non-rheumatic mitral regurgitation 2013    Obstructive uropathy 2021       Family History   Problem Relation Age of Onset    Cancer Mother         ovrian cancer    Lung Disease Father     Heart Disease Neg Hx        Social History     Socioeconomic History    Marital status:    Tobacco Use    Smoking status: Former     Types: Cigarettes     Quit date: 1963     Years since quittin.2    Smokeless tobacco: Never   Vaping Use    Vaping Use: Never used   Substance and Sexual Activity    Alcohol use: Not Currently     Alcohol/week: 0.6 oz     Types: 1 Cans of beer per week     Comment: \" about an 1 oz for my health everyday    Drug use: No         Objective     BP (!) " 140/70   Pulse 72   Temp 36.3 °C (97.4 °F) (Temporal)   Resp 14   Ht 1.829 m (6')   Wt 88 kg (194 lb)   SpO2 94%   BMI 26.31 kg/m²      Physical Exam  Vitals reviewed.   Constitutional:       General: He is not in acute distress.     Appearance: Normal appearance. He is well-developed. He is not diaphoretic.   HENT:      Head: Normocephalic and atraumatic.      Nose: Nose normal.      Mouth/Throat:      Mouth: Mucous membranes are moist.      Pharynx: Oropharynx is clear.   Eyes:      Extraocular Movements: Extraocular movements intact.      Conjunctiva/sclera: Conjunctivae normal.      Pupils: Pupils are equal, round, and reactive to light.   Cardiovascular:      Rate and Rhythm: Normal rate and regular rhythm.      Pulses: Normal pulses.      Heart sounds: Normal heart sounds.   Pulmonary:      Effort: Pulmonary effort is normal. No respiratory distress.      Breath sounds: Normal breath sounds. No wheezing or rales.   Abdominal:      General: Bowel sounds are normal. There is no distension.      Palpations: Abdomen is soft. There is no mass.      Tenderness: There is no abdominal tenderness. There is no right CVA tenderness or left CVA tenderness.   Musculoskeletal:      Cervical back: Normal range of motion and neck supple.      Right lower leg: No edema.      Left lower leg: No edema.   Skin:     General: Skin is warm.      Coloration: Skin is not pale.      Findings: No erythema or rash.   Neurological:      General: No focal deficit present.      Mental Status: He is alert and oriented to person, place, and time.      Cranial Nerves: No cranial nerve deficit.      Coordination: Coordination normal.   Psychiatric:         Mood and Affect: Mood normal.         Behavior: Behavior normal.         Thought Content: Thought content normal.         Judgment: Judgment normal.          Laboratory results/imaging reviewed  D/w Pt  Lab Results   Component Value Date/Time    CREATININE 0.95 10/17/2022 04:15 PM     POTASSIUM 4.3 10/17/2022 04:15 PM              Assessment & Plan        1. CKD (chronic kidney disease), stage II  Stable -to monitor  - URINALYSIS; Future  - Basic Metabolic Panel; Future  - MICROALBUMIN CREAT RATIO URINE; Future    2. Essential hypertension  BP well controlled -to monitor at home  - URINALYSIS; Future  - Basic Metabolic Panel; Future  - MICROALBUMIN CREAT RATIO URINE; Future    3. Cyst of right kidney      To monitor with US      F/u with Urology    4. Anemia, unspecified type      Hb stable -WNL  - CBC WITHOUT DIFFERENTIAL; Future    5. Vitamin D deficiency     To monitor  - VITAMIN D 25-HYDROXY     Recs:  F/u with Urology  monitor BP  Keep well hydrated  Low salt diet  F/u prn

## 2022-12-18 LAB
LV EJECT FRACT  99904: 65
LV EJECT FRACT MOD 2C 99903: 67.67
LV EJECT FRACT MOD 4C 99902: 64.52
LV EJECT FRACT MOD BP 99901: 64.72

## 2022-12-18 PROCEDURE — 93306 TTE W/DOPPLER COMPLETE: CPT | Mod: 26 | Performed by: INTERNAL MEDICINE

## 2022-12-20 ENCOUNTER — PATIENT MESSAGE (OUTPATIENT)
Dept: CARDIOLOGY | Facility: MEDICAL CENTER | Age: 77
End: 2022-12-20
Payer: MEDICARE

## 2022-12-21 ENCOUNTER — TELEPHONE (OUTPATIENT)
Dept: CARDIOLOGY | Facility: MEDICAL CENTER | Age: 77
End: 2022-12-21
Payer: MEDICARE

## 2022-12-21 DIAGNOSIS — I34.1 MITRAL VALVE PROLAPSE: ICD-10-CM

## 2022-12-21 DIAGNOSIS — I34.0 MITRAL VALVE INSUFFICIENCY, UNSPECIFIED ETIOLOGY: ICD-10-CM

## 2022-12-21 NOTE — TELEPHONE ENCOUNTER
Per RO, received verbal order to have patient receive LOUIS. Order places, routed to [facility] for scheduling. [place] Message sent to patient.

## 2022-12-22 NOTE — TELEPHONE ENCOUNTER
Called patient to schedule LOUIS and patient will call back to schedule after he gets back from [place] to schedule.

## 2023-01-05 NOTE — TELEPHONE ENCOUNTER
Patient scheduled for LOUIS w/anesthesia on 1-10-23 with Dr. Alexander. Patient has been instructed to check in at 6:00 for 8:00 case time. Message sent to authorheather AGUILERA to Dr. Alexander.

## 2023-01-05 NOTE — TELEPHONE ENCOUNTER
Recv voice message from patient. Called and LM on patient's voicemail requesting a call back when he is ready to schedule this procedure.

## 2023-01-06 ENCOUNTER — PRE-ADMISSION TESTING (OUTPATIENT)
Dept: ADMISSIONS | Facility: MEDICAL CENTER | Age: 78
End: 2023-01-06
Attending: INTERNAL MEDICINE
Payer: MEDICARE

## 2023-01-06 DIAGNOSIS — Z01.812 PRE-OPERATIVE LABORATORY EXAMINATION: ICD-10-CM

## 2023-01-06 LAB
ANION GAP SERPL CALC-SCNC: 10 MMOL/L (ref 7–16)
BUN SERPL-MCNC: 17 MG/DL (ref 8–22)
CALCIUM SERPL-MCNC: 9.3 MG/DL (ref 8.5–10.5)
CHLORIDE SERPL-SCNC: 101 MMOL/L (ref 96–112)
CO2 SERPL-SCNC: 27 MMOL/L (ref 20–33)
CREAT SERPL-MCNC: 1.04 MG/DL (ref 0.5–1.4)
GFR SERPLBLD CREATININE-BSD FMLA CKD-EPI: 74 ML/MIN/1.73 M 2
GLUCOSE SERPL-MCNC: 85 MG/DL (ref 65–99)
POTASSIUM SERPL-SCNC: 4.4 MMOL/L (ref 3.6–5.5)
SODIUM SERPL-SCNC: 138 MMOL/L (ref 135–145)

## 2023-01-06 PROCEDURE — 80048 BASIC METABOLIC PNL TOTAL CA: CPT

## 2023-01-06 PROCEDURE — 93005 ELECTROCARDIOGRAM TRACING: CPT

## 2023-01-06 PROCEDURE — 36415 COLL VENOUS BLD VENIPUNCTURE: CPT

## 2023-01-06 ASSESSMENT — FIBROSIS 4 INDEX: FIB4 SCORE: 2.13

## 2023-01-07 LAB — EKG IMPRESSION: NORMAL

## 2023-01-07 PROCEDURE — 93010 ELECTROCARDIOGRAM REPORT: CPT | Performed by: INTERNAL MEDICINE

## 2023-01-10 ENCOUNTER — APPOINTMENT (OUTPATIENT)
Dept: CARDIOLOGY | Facility: MEDICAL CENTER | Age: 78
End: 2023-01-10
Attending: INTERNAL MEDICINE
Payer: MEDICARE

## 2023-01-10 ENCOUNTER — HOSPITAL ENCOUNTER (OUTPATIENT)
Facility: MEDICAL CENTER | Age: 78
End: 2023-01-10
Attending: INTERNAL MEDICINE | Admitting: INTERNAL MEDICINE
Payer: MEDICARE

## 2023-01-10 ENCOUNTER — ANESTHESIA EVENT (OUTPATIENT)
Dept: CARDIOLOGY | Facility: MEDICAL CENTER | Age: 78
End: 2023-01-10
Payer: MEDICARE

## 2023-01-10 ENCOUNTER — ANESTHESIA (OUTPATIENT)
Dept: CARDIOLOGY | Facility: MEDICAL CENTER | Age: 78
End: 2023-01-10
Payer: MEDICARE

## 2023-01-10 VITALS
RESPIRATION RATE: 13 BRPM | DIASTOLIC BLOOD PRESSURE: 83 MMHG | HEART RATE: 80 BPM | SYSTOLIC BLOOD PRESSURE: 140 MMHG | WEIGHT: 190.7 LBS | HEIGHT: 72 IN | BODY MASS INDEX: 25.83 KG/M2 | OXYGEN SATURATION: 93 % | TEMPERATURE: 97.2 F

## 2023-01-10 DIAGNOSIS — I34.0 MITRAL VALVE INSUFFICIENCY, UNSPECIFIED ETIOLOGY: ICD-10-CM

## 2023-01-10 PROCEDURE — 700105 HCHG RX REV CODE 258: Performed by: INTERNAL MEDICINE

## 2023-01-10 PROCEDURE — 700101 HCHG RX REV CODE 250: Performed by: ANESTHESIOLOGY

## 2023-01-10 PROCEDURE — 160046 HCHG PACU - 1ST 60 MINS PHASE II

## 2023-01-10 PROCEDURE — 160035 HCHG PACU - 1ST 60 MINS PHASE I

## 2023-01-10 PROCEDURE — 160002 HCHG RECOVERY MINUTES (STAT)

## 2023-01-10 PROCEDURE — 01922 ANES N-INVAS IMG/RADJ THER: CPT | Performed by: ANESTHESIOLOGY

## 2023-01-10 PROCEDURE — 700111 HCHG RX REV CODE 636 W/ 250 OVERRIDE (IP): Performed by: ANESTHESIOLOGY

## 2023-01-10 PROCEDURE — 93312 ECHO TRANSESOPHAGEAL: CPT | Mod: 26 | Performed by: INTERNAL MEDICINE

## 2023-01-10 PROCEDURE — 4410588 EC-TEE W/O CONT

## 2023-01-10 PROCEDURE — 99100 ANES PT EXTEME AGE<1 YR&>70: CPT | Performed by: ANESTHESIOLOGY

## 2023-01-10 RX ORDER — OXYCODONE HCL 5 MG/5 ML
10 SOLUTION, ORAL ORAL
Status: DISCONTINUED | OUTPATIENT
Start: 2023-01-10 | End: 2023-01-10 | Stop reason: HOSPADM

## 2023-01-10 RX ORDER — HYDROMORPHONE HYDROCHLORIDE 1 MG/ML
0.2 INJECTION, SOLUTION INTRAMUSCULAR; INTRAVENOUS; SUBCUTANEOUS
Status: DISCONTINUED | OUTPATIENT
Start: 2023-01-10 | End: 2023-01-10 | Stop reason: HOSPADM

## 2023-01-10 RX ORDER — ACETAMINOPHEN, DEXTROMETHORPHAN HYDROBROMIDE, DOXYLAMINE SUCCINATE, AND PHENYLEPHRINE HYDROCHLORIDE 10; 12.5; 20; 65 MG/30ML; MG/30ML; MG/30ML; MG/30ML
30 SOLUTION ORAL EVERY 4 HOURS PRN
COMMUNITY
End: 2023-03-15

## 2023-01-10 RX ORDER — DOCUSATE SODIUM 100 MG/1
100 CAPSULE, LIQUID FILLED ORAL DAILY
COMMUNITY

## 2023-01-10 RX ORDER — SODIUM CHLORIDE, SODIUM LACTATE, POTASSIUM CHLORIDE, CALCIUM CHLORIDE 600; 310; 30; 20 MG/100ML; MG/100ML; MG/100ML; MG/100ML
INJECTION, SOLUTION INTRAVENOUS CONTINUOUS
Status: DISCONTINUED | OUTPATIENT
Start: 2023-01-10 | End: 2023-01-10 | Stop reason: HOSPADM

## 2023-01-10 RX ORDER — LIDOCAINE HYDROCHLORIDE 20 MG/ML
INJECTION, SOLUTION EPIDURAL; INFILTRATION; INTRACAUDAL; PERINEURAL PRN
Status: DISCONTINUED | OUTPATIENT
Start: 2023-01-10 | End: 2023-01-10 | Stop reason: SURG

## 2023-01-10 RX ORDER — LABETALOL HYDROCHLORIDE 5 MG/ML
5 INJECTION, SOLUTION INTRAVENOUS
Status: DISCONTINUED | OUTPATIENT
Start: 2023-01-10 | End: 2023-01-10 | Stop reason: HOSPADM

## 2023-01-10 RX ORDER — HALOPERIDOL 5 MG/ML
1 INJECTION INTRAMUSCULAR
Status: DISCONTINUED | OUTPATIENT
Start: 2023-01-10 | End: 2023-01-10 | Stop reason: HOSPADM

## 2023-01-10 RX ORDER — ONDANSETRON 2 MG/ML
4 INJECTION INTRAMUSCULAR; INTRAVENOUS
Status: DISCONTINUED | OUTPATIENT
Start: 2023-01-10 | End: 2023-01-10 | Stop reason: HOSPADM

## 2023-01-10 RX ORDER — MEPERIDINE HYDROCHLORIDE 25 MG/ML
12.5 INJECTION INTRAMUSCULAR; INTRAVENOUS; SUBCUTANEOUS
Status: DISCONTINUED | OUTPATIENT
Start: 2023-01-10 | End: 2023-01-10 | Stop reason: HOSPADM

## 2023-01-10 RX ORDER — HYDRALAZINE HYDROCHLORIDE 20 MG/ML
5 INJECTION INTRAMUSCULAR; INTRAVENOUS
Status: DISCONTINUED | OUTPATIENT
Start: 2023-01-10 | End: 2023-01-10 | Stop reason: HOSPADM

## 2023-01-10 RX ORDER — HYDROMORPHONE HYDROCHLORIDE 1 MG/ML
0.4 INJECTION, SOLUTION INTRAMUSCULAR; INTRAVENOUS; SUBCUTANEOUS
Status: DISCONTINUED | OUTPATIENT
Start: 2023-01-10 | End: 2023-01-10 | Stop reason: HOSPADM

## 2023-01-10 RX ORDER — IPRATROPIUM BROMIDE AND ALBUTEROL SULFATE 2.5; .5 MG/3ML; MG/3ML
3 SOLUTION RESPIRATORY (INHALATION)
Status: DISCONTINUED | OUTPATIENT
Start: 2023-01-10 | End: 2023-01-10 | Stop reason: HOSPADM

## 2023-01-10 RX ORDER — DIPHENHYDRAMINE HYDROCHLORIDE 50 MG/ML
12.5 INJECTION INTRAMUSCULAR; INTRAVENOUS
Status: DISCONTINUED | OUTPATIENT
Start: 2023-01-10 | End: 2023-01-10 | Stop reason: HOSPADM

## 2023-01-10 RX ORDER — OXYCODONE HCL 5 MG/5 ML
5 SOLUTION, ORAL ORAL
Status: DISCONTINUED | OUTPATIENT
Start: 2023-01-10 | End: 2023-01-10 | Stop reason: HOSPADM

## 2023-01-10 RX ORDER — HYDROMORPHONE HYDROCHLORIDE 1 MG/ML
0.1 INJECTION, SOLUTION INTRAMUSCULAR; INTRAVENOUS; SUBCUTANEOUS
Status: DISCONTINUED | OUTPATIENT
Start: 2023-01-10 | End: 2023-01-10 | Stop reason: HOSPADM

## 2023-01-10 RX ADMIN — PROPOFOL 20 MG: 10 INJECTION, EMULSION INTRAVENOUS at 08:28

## 2023-01-10 RX ADMIN — PROPOFOL 30 MG: 10 INJECTION, EMULSION INTRAVENOUS at 08:36

## 2023-01-10 RX ADMIN — LIDOCAINE HYDROCHLORIDE 60 MG: 20 INJECTION, SOLUTION EPIDURAL; INFILTRATION; INTRACAUDAL at 08:24

## 2023-01-10 RX ADMIN — PROPOFOL 80 MG: 10 INJECTION, EMULSION INTRAVENOUS at 08:24

## 2023-01-10 RX ADMIN — PROPOFOL 30 MG: 10 INJECTION, EMULSION INTRAVENOUS at 08:33

## 2023-01-10 RX ADMIN — SODIUM CHLORIDE, POTASSIUM CHLORIDE, SODIUM LACTATE AND CALCIUM CHLORIDE: 600; 310; 30; 20 INJECTION, SOLUTION INTRAVENOUS at 08:14

## 2023-01-10 ASSESSMENT — PAIN DESCRIPTION - PAIN TYPE
TYPE: SURGICAL PAIN

## 2023-01-10 ASSESSMENT — PAIN SCALES - GENERAL: PAIN_LEVEL: 0

## 2023-01-10 ASSESSMENT — FIBROSIS 4 INDEX: FIB4 SCORE: 2.13

## 2023-01-10 NOTE — OR NURSING
Patient recovered well in post-op. AAOx4. VSS, on RA. No visible surgical sites. Declines pain. Patient tolerating fluids without nausea. Spouse updated and discussed POC. Patient belongings retrieved and on [facility]. Report called to MICHELL Alvarez. Patient transported to phase II w/ RN.

## 2023-01-10 NOTE — ANESTHESIA POSTPROCEDURE EVALUATION
Patient: Selma Avila    Procedure Summary     Date: 01/10/23 Room / Location: [facility] - ECHOCARDIOLOGY [facility]    Anesthesia Start: 0814 Anesthesia Stop: 0845    Procedure: EC-LOUIS W/O CONT Diagnosis:       Mitral valve insufficiency, unspecified etiology      Nonrheumatic mitral (valve) insufficiency    Scheduled Providers: Jonny Alexander M.D.; Madina Ahmadi M.D. Responsible Provider: Madina Ahmadi M.D.    Anesthesia Type: MAC ASA Status: 2          Final Anesthesia Type: MAC  Last vitals  BP   Blood Pressure : (!) 154/76    Temp   36.2 °C (97.1 °F)    Pulse   60   Resp   15    SpO2   96 %      Anesthesia Post Evaluation    Patient location during evaluation: PACU  Patient participation: complete - patient participated  Level of consciousness: awake and alert  Pain score: 0    Airway patency: patent  Anesthetic complications: no  Cardiovascular status: hemodynamically stable  Respiratory status: acceptable  Hydration status: euvolemic    PONV: none          No notable events documented.     Nurse Pain Score: 0 (NPRS)

## 2023-01-10 NOTE — H&P
"      CARDIOLOGY H & P NOTE          HPI:  77 M with HTN, MR, MVP, DL, prediabetes, referred to [facility] for better MV assessment.    Past Medical History:   Diagnosis Date    Dyslipidemia 2018    Essential hypertension 2013    Mitral valve prolapse 2013    Non-rheumatic mitral regurgitation 2013    Obstructive uropathy 2021       Social History     Socioeconomic History    Marital status:      Spouse name: Not on file    Number of children: Not on file    Years of education: Not on file    Highest education level: Not on file   Occupational History    Not on file   Tobacco Use    Smoking status: Former     Types: Cigarettes     Quit date: 1963     Years since quittin.3    Smokeless tobacco: Never   Vaping Use    Vaping Use: Never used   Substance and Sexual Activity    Alcohol use: Yes     Alcohol/week: 0.6 oz     Types: 1 Glasses of wine per week     Comment: \" about an 1 oz for my health everyday    Drug use: No    Sexual activity: Not on file     Comment:    Other Topics Concern    Not on file   Social History Narrative    Not on file     Social Determinants of Health     Financial Resource Strain: Not on file   Food Insecurity: Not on file   Transportation Needs: Not on file   Physical Activity: Not on file   Stress: Not on file   Social Connections: Not on file   Intimate Partner Violence: Not on file   Housing Stability: Not on file       No current facility-administered medications on file prior to encounter.     Current Outpatient Medications on File Prior to Encounter   Medication Sig Dispense Refill    docusate sodium (COLACE) 100 MG Cap Take 100 mg by mouth every day.      Phenyleph-Doxylamine-DM-APAP (NYQUIL SEVERE COLD/FLU) 5-6.25- MG/15ML Liquid Take 30 mL by mouth every four hours as needed.      losartan (COZAAR) 100 MG Tab Take 1 Tablet by mouth every day. 100 Tablet 3    Magnesium 250 MG Tab Take 250 mg by mouth every day.      celecoxib " (CELEBREX) 200 MG Cap TAKE 1 CAPSULE BY MOUTH EVERY DAY 60 Capsule 10    VITAMIN D PO Take 6,000 Units by mouth every day.      Omega-3 Fatty Acids (FISH OIL) 1200 MG Cap Take 1,200 mg by mouth every day.         Current Facility-Administered Medications   Medication Dose Frequency Provider Last Rate Last Admin    lidocaine (XYLOCAINE) 1 % injection 0.5 mL  0.5 mL Once PRN Jonny Alexander M.D.        lactated ringers infusion   Continuous Jonny Alexander M.D.       Last reviewed on 1/10/2023  7:00 AM by Jimmy Sheehan R.N.     Patient has no known allergies.    Family History   Problem Relation Age of Onset    Cancer Mother         ovrian cancer    Lung Disease Father     Heart Disease Neg Hx        ROS: As per HPI all other systems reviewed and negative     Physical Exam   Blood pressure (!) 154/76, pulse 60, temperature 36.2 °C (97.1 °F), temperature source Temporal, resp. rate 15, height 1.829 m (6'), weight 86.5 kg (190 lb 11.2 oz), SpO2 96 %.    Constitutional:  Appears well-developed.   HENT: Normocephalic and atraumatic. No scleral icterus.   Neck: No JVD present.   Cardiovascular: Normal rate. Exam reveals no gallop and no friction rub. Holosystolic murmur heard  Pulmonary/Chest: CTAB   Extremities: Exhibits no edema. No clubbing or cyanosis.   Skin: Skin is warm and dry.   Neuro: Non-focal, CN 2-12 intact grossly    No intake or output data in the 24 hours ending 01/10/23 0808                              Imaging reviewed    TTE 12/18/22:  Compared to the prior study on 01/04/2022, the mitral regurgitation   appears changed.  Recommend [facility].  The ejection fraction is measured to be 65% by Pulliam's biplane.  Severe eccentric mitral regurgitation due to the coanda effect. There   is a cyst on the mitral valve leaflet.  Recommend [facility].  Estimated right ventricular systolic pressure is 25 mmHg.       Impressions:  Severe MR    Recommendations:  [facility]    Discussed risks and benefits, he agrees to  proceed.    Nash Delgado MD, MPH FACSaint Elizabeth Edgewood  Interventional Cardiologist  [facility]   of Clinical Internal Medicine - [facility] Arnold STROUD

## 2023-01-10 NOTE — ANESTHESIA TIME REPORT
Anesthesia Start and Stop Event Times     Date Time Event    1/10/2023 0751 Ready for Procedure     0814 Anesthesia Start     0845 Anesthesia Stop        Responsible Staff  01/10/23    Name Role Begin End    Madina Ahmadi M.D. Anesth 0814 0845        Overtime Reason:  no overtime (within assigned shift)    Comments:

## 2023-01-10 NOTE — OR NURSING
Patient from phase 1, aaox4, vss. Denies nausea/vomiting. Discharge instructions given. To car by wheelchair.

## 2023-01-10 NOTE — DISCHARGE INSTRUCTIONS
If any questions arise, call your provider.  If your provider is not available, please feel free to call the Surgical Center at [phone].    MEDICATIONS: Resume taking daily medication.  Take prescribed pain medication with food.  If no medication is prescribed, you may take non-aspirin pain medication if needed.  PAIN MEDICATION CAN BE VERY CONSTIPATING.  Take a stool softener or laxative such as senokot, pericolace, or milk of magnesia if needed.      What to Expect Post Anesthesia    Rest and take it easy for the first 24 hours.  A responsible adult is recommended to remain with you during that time.  It is normal to feel sleepy.  We encourage you to not do anything that requires balance, judgment or coordination.    FOR 24 HOURS DO NOT:  Drive, operate machinery or run household appliances.  Drink beer or alcoholic beverages.  Make important decisions or sign legal documents.    To avoid nausea, slowly advance diet as tolerated, avoiding spicy or greasy foods for the first day.  Add more substantial food to your diet according to your provider's instructions.  Babies can be fed formula or breast milk as soon as they are hungry.  INCREASE FLUIDS AND FIBER TO AVOID CONSTIPATION.    MILD FLU-LIKE SYMPTOMS ARE NORMAL.  YOU MAY EXPERIENCE GENERALIZED MUSCLE ACHES, THROAT IRRITATION, HEADACHE AND/OR SOME NAUSEA.    Diet    Resume your normal diet as tolerated.  A diet low in cholesterol, fat, and sodium is recommended for good health.     ENDOSCOPY HOME CARE INSTRUCTIONS    [facility] - TRANSESOPHAGEAL ECHOCARDIOGRAM  1. Don't drive or drink alcohol for 24 hours. The medication you received will make you too drowsy.  2. If you begin to vomit bloody material, or develop black or bloody stools, call your doctor as soon as possible.  3. If you have any neck, chest, abdominal pain or temp of 100 degrees, call your doctor.      You should call 911 if you develop problems with breathing or chest pain.  If any questions  arise, call your doctor. If your doctor is not available, please feel free to call [phone]. You can also call the HEALTH HOTLINE open 24 hours/day, 7 days/week and speak to a nurse at [phone], or toll free [phone].    I acknowledge receipt and understanding of these Home Care Instructions.        DRIVING:   You may drive whenever you are off pain medications and are able to perform the activities needed to drive, i.e. turning, bending, twisting, wearing a seat belt, etc.    BATHING:   You may get the wound wet at any time after leaving the hospital. You may shower, but do not submerge in a bath or a pool until you after your first postoperative visit.        BOWEL FUNCTION:  Prescription pain medication may cause constipation. If you are having problems, use what you normally would or call your provider for suggestions. It also helps to stay regular by including fiber in your diet (for example: bran or fruits and vegetables) and drink plenty of liquids (water, juice, etc.).    Activity    Resume Your Normal Activity    You may resume your normal activity as tolerated.  Rest as needed.

## 2023-01-10 NOTE — ANESTHESIA PREPROCEDURE EVALUATION
Date/Time: 01/10/23 0800    Scheduled providers: Jonny Alexander M.D.; Madina Ahmadi M.D.    Procedure: [facility] W/O CONT    Diagnosis:       Mitral valve insufficiency, unspecified etiology [I34.0]      Nonrheumatic mitral (valve) insufficiency [I34.0]    Location: [facility] IMAGING - ECHOCARDIOLOGY [facility]          Relevant Problems   CARDIAC   (positive) Bigeminy   (positive) Essential hypertension   (positive) Mitral valve prolapse   (positive) Non-rheumatic mitral regurgitation         (positive) Cyst of right kidney      Other   (positive) Pre-diabetes       Physical Exam    Airway   Mallampati: II  TM distance: >3 FB  Neck ROM: full       Cardiovascular - normal exam  Rhythm: regular  Rate: normal  (-) murmur     Dental - normal exam           Pulmonary - normal exam  Breath sounds clear to auscultation     Abdominal    Neurological - normal exam                 Anesthesia Plan    ASA 2       Plan - MAC               Induction: intravenous    Postoperative Plan: Postoperative administration of opioids is intended.    Pertinent diagnostic labs and testing reviewed    Informed Consent:    Anesthetic plan and risks discussed with patient.    Use of blood products discussed with: patient whom consented to blood products.

## 2023-01-22 ENCOUNTER — PATIENT MESSAGE (OUTPATIENT)
Dept: CARDIOLOGY | Facility: MEDICAL CENTER | Age: 78
End: 2023-01-22
Payer: MEDICARE

## 2023-01-22 DIAGNOSIS — I34.0 MITRAL VALVE INSUFFICIENCY, UNSPECIFIED ETIOLOGY: ICD-10-CM

## 2023-01-22 DIAGNOSIS — I34.0 NON-RHEUMATIC MITRAL REGURGITATION: ICD-10-CM

## 2023-02-09 ENCOUNTER — TELEMEDICINE (OUTPATIENT)
Dept: CARDIOLOGY | Facility: MEDICAL CENTER | Age: 78
End: 2023-02-09
Payer: MEDICARE

## 2023-02-09 VITALS
OXYGEN SATURATION: 97 % | HEART RATE: 73 BPM | SYSTOLIC BLOOD PRESSURE: 152 MMHG | DIASTOLIC BLOOD PRESSURE: 88 MMHG | WEIGHT: 190 LBS | HEIGHT: 72 IN | BODY MASS INDEX: 25.73 KG/M2

## 2023-02-09 DIAGNOSIS — I34.0 NON-RHEUMATIC MITRAL REGURGITATION: ICD-10-CM

## 2023-02-09 DIAGNOSIS — E78.5 DYSLIPIDEMIA WITH HIGH LDL AND LOW HDL: ICD-10-CM

## 2023-02-09 DIAGNOSIS — I49.8 BIGEMINY: ICD-10-CM

## 2023-02-09 DIAGNOSIS — I10 ESSENTIAL HYPERTENSION: ICD-10-CM

## 2023-02-09 DIAGNOSIS — I34.1 MITRAL VALVE PROLAPSE: ICD-10-CM

## 2023-02-09 PROCEDURE — 99214 OFFICE O/P EST MOD 30 MIN: CPT | Mod: 95 | Performed by: INTERNAL MEDICINE

## 2023-02-09 ASSESSMENT — FIBROSIS 4 INDEX: FIB4 SCORE: 2.13

## 2023-02-09 NOTE — PROGRESS NOTES
"Chief Complaint   Patient presents with    HTN (Controlled)    Mitral Valve Prolapse    Premature Ventricular Contractions (PVCs)       Subjective     Jenny Avila is a 77 y.o. male who presents today as a follow-up for his mitral valve regurgitation.  His transthoracic echocardiogram showed severe MR.  He had a follow-up [facility] that also showed severe eccentric MR.  He walks 3 to 4 miles per day with no functional imitations.  His blood pressure is borderline high.  He has no lower extremity edema.    Past Medical History:   Diagnosis Date    Dyslipidemia 2018    Essential hypertension 2013    Mitral valve prolapse 2013    Non-rheumatic mitral regurgitation 2013    Obstructive uropathy 2021     Past Surgical History:   Procedure Laterality Date    FISTULA IN ANO REPAIR Left 2019     Family History   Problem Relation Age of Onset    Cancer Mother         ovrian cancer    Lung Disease Father     Heart Disease Neg Hx      Social History     Socioeconomic History    Marital status:      Spouse name: Not on file    Number of children: Not on file    Years of education: Not on file    Highest education level: Not on file   Occupational History    Not on file   Tobacco Use    Smoking status: Former     Types: Cigarettes     Quit date: 1963     Years since quittin.4    Smokeless tobacco: Never   Vaping Use    Vaping Use: Never used   Substance and Sexual Activity    Alcohol use: Yes     Alcohol/week: 4.2 oz     Types: 7 Glasses of wine per week     Comment: \" about an 1 oz for my health everyday    Drug use: No    Sexual activity: Not on file     Comment:    Other Topics Concern    Not on file   Social History Narrative    Not on file     Social Determinants of Health     Financial Resource Strain: Not on file   Food Insecurity: Not on file   Transportation Needs: Not on file   Physical Activity: Not on file   Stress: Not on file   Social Connections: Not on file   Intimate " Partner Violence: Not on file   Housing Stability: Not on file     No Known Allergies  Outpatient Encounter Medications as of 2/9/2023   Medication Sig Dispense Refill    docusate sodium (COLACE) 100 MG Cap Take 100 mg by mouth every day.      losartan (COZAAR) 100 MG Tab Take 1 Tablet by mouth every day. 100 Tablet 3    Magnesium 250 MG Tab Take 250 mg by mouth every day.      celecoxib (CELEBREX) 200 MG Cap TAKE 1 CAPSULE BY MOUTH EVERY DAY 60 Capsule 10    VITAMIN D PO Take 6,000 Units by mouth every day.      Omega-3 Fatty Acids (FISH OIL) 1200 MG Cap Take 1,200 mg by mouth every day.      Phenyleph-Doxylamine-DM-APAP (NYQUIL SEVERE COLD/FLU) 5-6.25- MG/15ML Liquid Take 30 mL by mouth every four hours as needed.       No facility-administered encounter medications on file as of 2/9/2023.     ROS           Objective     BP (!) 152/88 (BP Location: Left arm, Patient Position: Sitting, BP Cuff Size: Adult)   Pulse 73   Ht 1.829 m (6')   Wt 86.2 kg (190 lb)   SpO2 97%   BMI 25.77 kg/m²     Physical Exam  Vitals and nursing note reviewed.   Constitutional:       General: He is not in acute distress.     Appearance: He is well-developed. He is not diaphoretic.   HENT:      Head: Normocephalic and atraumatic.      Right Ear: External ear normal.      Left Ear: External ear normal.      Nose: Nose normal.      Mouth/Throat:      Pharynx: No oropharyngeal exudate.   Eyes:      General: No scleral icterus.        Right eye: No discharge.         Left eye: No discharge.      Conjunctiva/sclera: Conjunctivae normal.      Pupils: Pupils are equal, round, and reactive to light.   Neck:      Vascular: No JVD.      Trachea: No tracheal deviation.   Cardiovascular:      Rate and Rhythm: Normal rate and regular rhythm.      Heart sounds: No murmur heard.    No friction rub. No gallop.   Pulmonary:      Effort: Pulmonary effort is normal. No respiratory distress.      Breath sounds: Normal breath sounds. No stridor. No  wheezing or rales.   Chest:      Chest wall: No tenderness.   Abdominal:      General: Bowel sounds are normal. There is no distension.      Palpations: Abdomen is soft.      Tenderness: There is no guarding.   Musculoskeletal:         General: No tenderness or deformity. Normal range of motion.      Cervical back: Normal range of motion and neck supple.   Skin:     General: Skin is warm and dry.      Coloration: Skin is not pale.      Findings: No erythema or rash.   Neurological:      Mental Status: He is alert and oriented to person, place, and time.      Cranial Nerves: No cranial nerve deficit.      Motor: No abnormal muscle tone.      Coordination: Coordination normal.      Deep Tendon Reflexes: Reflexes are normal and symmetric. Reflexes normal.   Psychiatric:         Behavior: Behavior normal.         Thought Content: Thought content normal.         Judgment: Judgment normal.          Coronary calcium score: Dated 2019 personally viewed to myself showing a calcium score of 0.     Echocardiogram: Dated 5/6/2020 personally viewed inter myself showing normal LV systolic function without valvular heart disease.    EEG: Dated 1/10/2023 personally viewed inter myself showing  Echocardiography Laboratory  CONCLUSIONS  [facility] to assess MV disease  Severley prolapsed and probably flail A2 of the anterior MV leaflet,   with severe eccentric posteriorly directed mitral regurgitation.   Recommend heart team CTS-Structural cardiology consultations for MV   repair or Mitraclip consideration  No R-L shunt per color doppler assessment.  No EMILI thrombus.    Assessment & Plan     1. Essential hypertension        2. Non-rheumatic mitral regurgitation        3. Mitral valve prolapse        4. Dyslipidemia with high LDL and low HDL        5. Bigeminy            Medical Decision Making: Today's Assessment/Status/Plan:        77-year-old male with normal creatinine but severe eccentric MR with no symptoms.  He is reasonable to  continued watchful waiting with no further work-up.  I have given him anticipatory guidance regarding the symptoms to look for.  Given that he has nothing going on for now we will see him back in 6 months and repeat an echocardiogram.  He will follow-up with nephrology for his renal cyst.      Start time: 9:45  Stop time: 10:00     This evaluation was conducted via Zoom using secure and encrypted videoconferencing technology. The patient was in their home in the [facility].    The patient's identity was confirmed and verbal consent was obtained for this virtual visit.

## 2023-02-10 NOTE — TELEPHONE ENCOUNTER
Edgar Perez M.D.  Wanda Tuttle R.N. Yesterday (7:20 AM)     Please refer them back to the structural heart program.

## 2023-02-13 ENCOUNTER — TELEPHONE (OUTPATIENT)
Dept: CARDIOLOGY | Facility: MEDICAL CENTER | Age: 78
End: 2023-02-13
Payer: MEDICARE

## 2023-02-13 NOTE — TELEPHONE ENCOUNTER
Referral from: Dr. Perez for Severe MR    Patient called on 02/13/2023.    Discussed referral and recommendation for consult appointment. Patient scheduled to see Dr. Raphael 03/15/2023.    Phone number given to patient for [facility] for any further questions or concerns.

## 2023-03-15 ENCOUNTER — DOCUMENTATION (OUTPATIENT)
Dept: CARDIOLOGY | Facility: MEDICAL CENTER | Age: 78
End: 2023-03-15

## 2023-03-15 ENCOUNTER — OFFICE VISIT (OUTPATIENT)
Dept: CARDIOLOGY | Facility: MEDICAL CENTER | Age: 78
End: 2023-03-15
Payer: MEDICARE

## 2023-03-15 ENCOUNTER — TELEPHONE (OUTPATIENT)
Dept: CARDIOLOGY | Facility: MEDICAL CENTER | Age: 78
End: 2023-03-15

## 2023-03-15 ENCOUNTER — RESEARCH ENCOUNTER (OUTPATIENT)
Dept: CARDIOLOGY | Facility: MEDICAL CENTER | Age: 78
End: 2023-03-15
Payer: MEDICARE

## 2023-03-15 VITALS
HEART RATE: 84 BPM | OXYGEN SATURATION: 94 % | RESPIRATION RATE: 16 BRPM | DIASTOLIC BLOOD PRESSURE: 70 MMHG | HEIGHT: 72 IN | WEIGHT: 184 LBS | BODY MASS INDEX: 24.92 KG/M2 | SYSTOLIC BLOOD PRESSURE: 110 MMHG

## 2023-03-15 DIAGNOSIS — E78.5 DYSLIPIDEMIA WITH HIGH LDL AND LOW HDL: ICD-10-CM

## 2023-03-15 DIAGNOSIS — Z00.6 RESEARCH STUDY PATIENT: ICD-10-CM

## 2023-03-15 DIAGNOSIS — I34.0 SEVERE MITRAL REGURGITATION: ICD-10-CM

## 2023-03-15 DIAGNOSIS — Z01.810 PRE-PROCEDURAL CARDIOVASCULAR EXAMINATION: ICD-10-CM

## 2023-03-15 DIAGNOSIS — I34.0 NON-RHEUMATIC MITRAL REGURGITATION: ICD-10-CM

## 2023-03-15 DIAGNOSIS — I10 ESSENTIAL HYPERTENSION: ICD-10-CM

## 2023-03-15 PROCEDURE — 99214 OFFICE O/P EST MOD 30 MIN: CPT | Performed by: INTERNAL MEDICINE

## 2023-03-15 RX ORDER — ACETAMINOPHEN 500 MG
250 TABLET ORAL EVERY 6 HOURS PRN
Status: ON HOLD | COMMUNITY
End: 2023-07-12

## 2023-03-15 RX ORDER — IBUPROFEN 200 MG
325 TABLET ORAL EVERY 6 HOURS PRN
Status: ON HOLD | COMMUNITY
End: 2023-07-12

## 2023-03-15 ASSESSMENT — FIBROSIS 4 INDEX: FIB4 SCORE: 2.13

## 2023-03-15 ASSESSMENT — PATIENT HEALTH QUESTIONNAIRE - PHQ9: CLINICAL INTERPRETATION OF PHQ2 SCORE: 0

## 2023-03-15 NOTE — PROGRESS NOTES
Cardiology Initial Consultation Note    Date of note:    3/15/2023      Patient Name: Selma Avila   YOB: 1945  MRN:              [id number]    Chief Complaint: Mitral regurgitation    Selma Avila is a 77 y.o. male  patient presented today for consultation regarding mitral regurgitation.  He has hypertension, dyslipidemia, preop cardiovascular examination revealed a loud murmur, underwent echocardiogram which showed severe mitral regurgitation.  Initially symptomatic but he notices dyspnea on exertion, not able to work throughout the day like he used to before.  However his symptoms are not lifestyle limiting but noticeable.        Past Medical History:   Diagnosis Date    Dyslipidemia 12/14/2018    Essential hypertension 9/12/2013    Mitral valve prolapse 9/12/2013    Non-rheumatic mitral regurgitation 9/12/2013    Obstructive uropathy 1/25/2021             Current Outpatient Medications   Medication Sig Dispense Refill    ibuprofen (MOTRIN) 200 MG Tab Take 325 mg by mouth every 6 hours as needed.      acetaminophen (TYLENOL) 500 MG Tab Take 250 mg by mouth every 6 hours as needed.      docusate sodium (COLACE) 100 MG Cap Take 100 mg by mouth every day.      losartan (COZAAR) 100 MG Tab Take 1 Tablet by mouth every day. 100 Tablet 3    Magnesium 250 MG Tab Take 125 mg by mouth every day.      VITAMIN D PO Take 6,000 Units by mouth every day.      Omega-3 Fatty Acids (FISH OIL) 1200 MG Cap Take 1,200 mg by mouth every day.      Phenyleph-Doxylamine-DM-APAP (NYQUIL SEVERE COLD/FLU) 5-6.25- MG/15ML Liquid Take 30 mL by mouth every four hours as needed.      celecoxib (CELEBREX) 200 MG Cap TAKE 1 CAPSULE BY MOUTH EVERY DAY 60 Capsule 10     No current facility-administered medications for this visit.             Physical Exam:  Ambulatory Vitals  /70 (BP Location: Left arm, Patient Position: Sitting, BP Cuff Size: Adult)   Pulse 84   Resp 16   Ht 1.829 m (6')   Wt 83.5 kg  (184 lb)   SpO2 94%    Oxygen Therapy:  Pulse Oximetry: 94 %  BP Readings from Last 4 Encounters:   03/15/23 110/70   02/09/23 (!) 152/88   01/10/23 (!) 140/83   12/15/22 (!) 140/70       Weight/BMI: Body mass index is 24.95 kg/m².  Wt Readings from Last 4 Encounters:   03/15/23 83.5 kg (184 lb)   02/09/23 86.2 kg (190 lb)   01/10/23 86.5 kg (190 lb 11.2 oz)   12/15/22 88 kg (194 lb)           General: Well appearing and in no apparent distress  Neck: carotid bruits absent  Lungs: respiratory sounds  normal  Heart: Regular rhythm,  No palpable thrills on palpation, murmurs present, no rubs,   [place]e extremity edema absent.     Transthoracic, transesophageal echocardiogram reviewed.  Severe A2 segment prolapse with flail, severe eccentric mitral regurgitation      Medical Decision Making:  Problem List Items Addressed This Visit       Essential hypertension    Dyslipidemia with high LDL and low HDL     Other Visit Diagnoses       Severe mitral regurgitation        Relevant Orders    CL-LEFT HEART CATHETERIZATION WITH POSSIBLE INTERVENTION          He has symptomatic severe mitral regurgitation NYHA class II stage C.  Discussed treatment options.  We will perform coronary angiogram, refer to CT surgery.   Mitral valve repair with open heart surgery, mitral valve clip procedure discussed.  Discussed the treatment decision would be based on his surgical risk, heart team approach.  His questions were answered to satisfaction.    Plan discussed with our surgeon Dr. De Leon.    This note was dictated using Dragon speech recognition software.    Shakir HEATH  Interventional cardiologist  [facility] for [facility], [facility].  [address] [address]  Phone: [phone]  Fax: [phone]

## 2023-03-15 NOTE — PROGRESS NOTES
[facility]ott Mitral JAVIER review:   Suitable for Implant: Yes  Any Additional Comments: PMR. A2 prolapse w/ small flail tip. Posteriorly directed jet. Leaflet length adequate to grasp. Mitral valve leaflets appear clipable. Recommend 22mm XTW clip. 2nd 22mm clip may be required.

## 2023-03-15 NOTE — RESEARCH NOTE
[employer] [place] [employer] enrollment complete. Saliva sample collected onsite. CABRERA Identified consented and enrolled.

## 2023-03-15 NOTE — PROGRESS NOTES
Valve Program Consultation: 3/15/2023 for tentative Mitral JAVIER 4/4/2023    Mental Status Assessment:  Appearance: normal  Behavior: normal  Mood/Affect: flat  Thought process/content: normal  Cognition: normal  Functional ability: normal, [place]  Dental Concerns: natural teeth, patient denies s/s of active oral infection  Further mental assessment needed: no    Post-op Plan of Care:  Support systems: patient presents alone during consultation, wife Ricardo  Patient understands discharge plan: yes  DME: none  Home health warranted prior to procedure: no  Social concerns for discharge: none  PCP alerted of social concerns: n/a  POLST: none  Advance directive: not on file, patient does not wish to provide copy for chart  Flu/PNA/COVID vaccines completed: UTD  Post-op goal: improve symptoms of SOB, fatigue  [place]  Medication instructions: none    Concerns prior to procedure: Possible MVR candidate    Met with patient during New Mitral JAVIER consult.     All patient's questions and concerns were addressed during this visit. They understood pre-operative and post-operative plan of care.    Reviewed patient Mitral JAVIER education packet explaining that information is provided regarding preparation for Mitral JAVIER the night prior, what to expect during the hospital stay, average LOS, and what to look out for post Mitral JAVIER discharge. Explained that patient will require SBE prophylactic antibiotic prior to any dental treatment post Mitral JAVIER.     Explained that patient should not eat or drink anything past midnight the day of the procedure. Encouraged patient to wear something clean and comfortable, easy to get on/off to check in Tuesday morning, time TBD. Patient may have friends and family in the pre-operational area until patient is taken to the operating room, at which time family and friends will be asked to wait on floor 1 [facility] surgical waiting area. On completion of Mitral JAVIER, heart team will  update family. Once update is given, there is some time before family/friends may visit patient in their assigned room. Length of stay on average is one night, however patient may stay longer depending on specific needs at that time. Patient given printed instructions sheet with all above stated instructions. Patient states understanding of all material and education presented today and has no further questions at this time. Encouraged patient again, to contact me with any questions or concerns during this work-up process. Patient states understanding.

## 2023-03-16 ENCOUNTER — TELEPHONE (OUTPATIENT)
Dept: CARDIOLOGY | Facility: MEDICAL CENTER | Age: 78
End: 2023-03-16
Payer: MEDICARE

## 2023-03-16 NOTE — TELEPHONE ENCOUNTER
Patient is scheduled on 3-28-23 for a Pre mitral clip R&L hrt with . No meds to stop and patient to check in 6:00 for a 7:30 procedure. H&P was done on 3-15-23 by Dr. Raphael. Pre admit to call patient.

## 2023-03-17 ENCOUNTER — APPOINTMENT (OUTPATIENT)
Dept: ADMISSIONS | Facility: MEDICAL CENTER | Age: 78
End: 2023-03-17
Attending: INTERNAL MEDICINE
Payer: MEDICARE

## 2023-03-20 ENCOUNTER — DOCUMENTATION (OUTPATIENT)
Dept: CARDIOLOGY | Facility: MEDICAL CENTER | Age: 78
End: 2023-03-20
Payer: MEDICARE

## 2023-03-20 ENCOUNTER — PRE-ADMISSION TESTING (OUTPATIENT)
Dept: ADMISSIONS | Facility: MEDICAL CENTER | Age: 78
End: 2023-03-20
Attending: INTERNAL MEDICINE
Payer: MEDICARE

## 2023-03-20 NOTE — PROGRESS NOTES
REFERRING PHYSICIAN: Shakir Raphael MD    CONSULTING PHYSICIAN: Jacqueline Neri MD, FACS    CHIEF COMPLAINT: Shortness of breath    HISTORY OF PRESENT ILLNESS: The patient is a 77 y.o. male with past medical history of mitral valve prolapse, mitral regurgitation, hypertension, and dyslipidemia who presents to clinic with dyspnea and fatigue. He reports these symptoms have been worsening over the last year. He feels feels more worn out than he used to but is still able to ride a stationary bike for 30 minutes and climb up and down stairs for 20 minutes without getting shortness of breath. He states his dyspnea comes intermittently and he is unable to identify exacerbating and alleviating factors. He denies chest pain, orthopnea, PND, dizziness, and syncope.    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    Pre-diabetes 03/31/2022    BMI 27.0-27.9,adult 03/31/2022    Primary osteoarthritis of both first carpometacarpal joints 03/31/2022    Degenerative disc disease, lumbar 03/31/2022    Peripheral neuropathy 03/31/2022    Actinic keratoses 03/31/2022    Cyst of right kidney 10/24/2022     Resolved Ambulatory Problems     Diagnosis Date Noted    No Resolved Ambulatory Problems     Past Medical History:   Diagnosis Date    Arrhythmia 2017    Arthritis 1989    Breath shortness 2022    Cancer (HCC) 2022    Dyslipidemia 12/14/2018    Heart murmur 1980    Snoring        PAST SURGICAL HISTORY:   Past Surgical History:   Procedure Laterality Date    FISTULA IN ANO REPAIR Left 2019    OTHER  September 2019    Fistula        ALLERGIES: No Known Allergies     CURRENT MEDICATIONS:  "  Current Outpatient Medications:     multivitamin Tab, Take 1 Tablet by mouth every day., Disp: , Rfl:     Ascorbic Acid (VITAMIN C) 500 MG Chew Tab, Chew 500 mg every day., Disp: , Rfl:     ibuprofen (MOTRIN) 200 MG Tab, Take 325 mg by mouth every 6 hours as needed., Disp: , Rfl:     acetaminophen (TYLENOL) 500 MG Tab, Take 250 mg by mouth every 6 hours as needed., Disp: , Rfl:     docusate sodium (COLACE) 100 MG Cap, Take 100 mg by mouth every day., Disp: , Rfl:     losartan (COZAAR) 100 MG Tab, Take 1 Tablet by mouth every day., Disp: 100 Tablet, Rfl: 3    Magnesium 250 MG Tab, Take 125 mg by mouth every day., Disp: , Rfl:     VITAMIN D PO, Take 6,000 Units by mouth every day., Disp: , Rfl:     Omega-3 Fatty Acids (FISH OIL) 1200 MG Cap, Take 1,200 mg by mouth every day., Disp: , Rfl:     FAMILY HISTORY:   Family History   Problem Relation Age of Onset    Cancer Mother         Ovarian cancer    Lung Disease Father         Microbacterium intercellulare avium    Heart Disease Neg Hx         SOCIAL HISTORY:   Social History     Socioeconomic History    Marital status:      Spouse name: Not on file    Number of children: Not on file    Years of education: Not on file    Highest education level: Not on file   Occupational History    Not on file   Tobacco Use    Smoking status: Former     Years: 0.00     Types: Cigarettes     Start date: 1966     Quit date: 1966     Years since quittin.5     Passive exposure: Never    Smokeless tobacco: Never   Vaping Use    Vaping Use: Never used   Substance and Sexual Activity    Alcohol use: Not Currently     Comment: \" about an 1 oz red wine for my health everyday    Drug use: No    Sexual activity: Not on file     Comment:    Other Topics Concern    Not on file   Social History Narrative    Not on file     Social Determinants of Health     Financial Resource Strain: Not on file   Food Insecurity: Not on file   Transportation Needs: Not on file "   Physical Activity: Not on file   Stress: Not on file   Social Connections: Not on file   Intimate Partner Violence: Not on file   Housing Stability: Not on file       REVIEW OF SYSTEMS:  Review of Systems   Constitutional:  Positive for malaise/fatigue.   HENT: Negative.     Respiratory:  Positive for shortness of breath. Negative for cough.    Cardiovascular:  Positive for chest pain. Negative for orthopnea and leg swelling.   Gastrointestinal: Negative.    Genitourinary: Negative.    Musculoskeletal: Negative.    Skin: Negative.    Neurological: Negative.  Negative for dizziness.   Endo/Heme/Allergies: Negative.    Psychiatric/Behavioral: Negative.         PHYSICAL EXAMINATION:    /72 (BP Location: Left arm, Patient Position: Sitting, BP Cuff Size: Adult)   Pulse 69   Temp 36.4 °C (97.6 °F) (Temporal)   Ht 1.829 m (6')   Wt 87.5 kg (192 lb 12.8 oz)   SpO2 95%   BMI 26.15 kg/m²    Physical Exam  Vitals reviewed.   Constitutional:       General: He is not in acute distress.  HENT:      Head: Normocephalic and atraumatic.      Right Ear: External ear normal.      Left Ear: External ear normal.      Nose: Nose normal.      Mouth/Throat:      Mouth: Mucous membranes are moist.      Pharynx: Oropharynx is clear.   Eyes:      Extraocular Movements: Extraocular movements intact.      Pupils: Pupils are equal, round, and reactive to light.   Cardiovascular:      Rate and Rhythm: Normal rate and regular rhythm.      Heart sounds: Murmur heard.   Pulmonary:      Effort: Pulmonary effort is normal.      Breath sounds: Normal breath sounds.   Abdominal:      General: There is no distension.      Palpations: Abdomen is soft.      Tenderness: There is no abdominal tenderness.   Musculoskeletal:      Cervical back: Normal range of motion and neck supple. No tenderness.      Right lower leg: No edema.      Left lower leg: No edema.   Skin:     General: Skin is warm and dry.      Capillary Refill: Capillary refill  takes less than 2 seconds.   Neurological:      General: No focal deficit present.      Mental Status: He is alert and oriented to person, place, and time. Mental status is at baseline.   Psychiatric:         Mood and Affect: Mood normal.         Behavior: Behavior normal.         Thought Content: Thought content normal.         Judgment: Judgment normal.         LABS REVIEWED:  Lab Results   Component Value Date/Time    SODIUM 138 03/24/2023 09:01 AM    POTASSIUM 4.3 03/24/2023 09:01 AM    CHLORIDE 104 03/24/2023 09:01 AM    CO2 25 03/24/2023 09:01 AM    GLUCOSE 104 (H) 03/24/2023 09:01 AM    BUN 25 (H) 03/24/2023 09:01 AM    CREATININE 1.10 03/24/2023 09:01 AM      Lab Results   Component Value Date/Time    PROTHROMBTM 13.9 03/24/2023 09:01 AM    INR 1.09 03/24/2023 09:01 AM      Lab Results   Component Value Date/Time    WBC 6.1 03/24/2023 09:01 AM    RBC 5.56 03/24/2023 09:01 AM    HEMOGLOBIN 16.8 03/24/2023 09:01 AM    HEMATOCRIT 50.2 03/24/2023 09:01 AM    MCV 90.3 03/24/2023 09:01 AM    MCH 30.2 03/24/2023 09:01 AM    MCHC 33.5 (L) 03/24/2023 09:01 AM    MPV 10.9 03/24/2023 09:01 AM    NEUTSPOLYS 73.80 (H) 10/17/2022 04:15 PM    LYMPHOCYTES 16.00 (L) 10/17/2022 04:15 PM    MONOCYTES 8.60 10/17/2022 04:15 PM    EOSINOPHILS 0.80 10/17/2022 04:15 PM    BASOPHILS 0.50 10/17/2022 04:15 PM    HYPOCHROMIA 1+ 07/30/2013 09:01 AM        IMAGING REVIEWED AND INTERPRETED:    ECHOCARDIOGRAM: 12/15/22  Prolapse of the anterior mitral leaflet. Severe eccentric mitral   regurgitation due to the coanda effect.There   is a cyst on the mitral valve leaflet.    LOUIS: 1/10/23  Severley prolapsed and probably flail A2 of the anterior MV leaflet,   with severe eccentric posteriorly directed mitral regurgitation.     ANGIOGRAM:   3/28/23 [facility]  Findings:  1.  Left main coronary artery:   Normal.  2.  Left anterior descending artery:  Normal.   3.  Left circumflex coronary artery:  Normal.    4.  Right coronary artery:  Tortuous but  normal.  This is a right dominant system.  5. LVEDP: 13  mmHg  6. LVgram showed ejection fraction of 60% , no significant gradient across aortic valve     Right heart cath pressures in mmHg:  RA 13  RV 29/12, EDP 14  PA 28/18, mean 21  PCW 15       IMPRESSION:  Severe mitral regurgitation (4+, degenerative), anterior mitral valve leaflet prolapse, hypertension, dyslipidemia    PLAN:  I recommend that he undergo mitral valve repair or replacement and intraoperative transesophageal echocardiography.    The procedure, its risks, benefits, potential complications and alternative treatments were discussed with the patient in detail including the risks should he decide not to undergo my recommended treatment. All of his questions were answered to his satisfaction and he is willing to proceed with the operation, however, he would like to discuss it with his wife prior to scheduling the operation. The risks include death, stroke, infection: to include a rare bacterial infection related to the use of the heart/lung machine, juanjo-operative myocardial infarction, dysrhythmias, diaphragmatic paralysis, chest wall paresthesia, tracheostomy, kidney or other organ failure, possible return to the operating room for bleeding, bleeding requiring transfusion with its attendant risks including AIDS or hepatitis, dehiscence of surgical incisions, respiratory complications including the need for prolonged ventilator support, Protamine or other drug reaction, peripheral neuropathy, loss of limb, and miscount of surgical items. The operative mortality risk is approximately 1%. The STS mortality risk score for MV repair is 0.6% and the morbidity and mortality risk score is 6.3%. The STS mortality risk score for isolated MVR is 1.2% and the morbidity and mortality risk score is 9.7%. The scores were discussed with patient.  He will inform us of his decision after he discusses it with his wife.    Findings and recommendations have been  discussed with the patient’s cardiologist, Shakir Raphael MD.  Thank you for this very challenging consultation and participation in the patient’s care.  I will keep you apprised of all future developments.    Sincerely,    Jacqueline Neri MD, FACS

## 2023-03-20 NOTE — PROGRESS NOTES
Valve Program Functional Assessment: Pre JAVIER     KCCQ12   1a) Showering/bathin  1b) Walking 1 block on ground: 5  1c) Hurrying or joggin  2) Swellin  3) Fatigue: 7  4) Shortness of breath: 7  5) Sleep sitting up: 5  6) Limited enjoyment of life: 5  7) Spend the rest of your life with HF: 5  8a) Hobbies, recreational activities:5  8b) Working or doing household chores:5  8c) Visiting family or friends: 5     Strength   1) _32_____ kg  2) _32_____ kg  3) _32_____ kg  AVG:__32____    VEGA ADLs  Patient independently preforms...   - Bathing: Yes   - Dressing: Yes   - Toileting: Yes   - Transferring: Yes   - Continence: Yes   - Feeding: Yes   Total Score: _6_/6    Living Situation  Patient lives:  with spouse    Mobility Aids   Patient uses: none      FRAILTY SCORE: _0_/ 3

## 2023-03-22 ENCOUNTER — HOSPITAL ENCOUNTER (OUTPATIENT)
Facility: MEDICAL CENTER | Age: 78
End: 2023-03-22
Attending: INTERNAL MEDICINE | Admitting: INTERNAL MEDICINE
Payer: MEDICARE

## 2023-03-22 DIAGNOSIS — Z00.6 EXAMINATION OF PARTICIPANT IN CLINICAL TRIAL: ICD-10-CM

## 2023-03-22 DIAGNOSIS — I34.0 NON-RHEUMATIC MITRAL REGURGITATION: ICD-10-CM

## 2023-03-22 DIAGNOSIS — I34.0 SEVERE MITRAL REGURGITATION: ICD-10-CM

## 2023-03-24 ENCOUNTER — HOSPITAL ENCOUNTER (OUTPATIENT)
Dept: LAB | Facility: MEDICAL CENTER | Age: 78
End: 2023-03-24
Attending: PATHOLOGY
Payer: MEDICARE

## 2023-03-24 ENCOUNTER — HOSPITAL ENCOUNTER (OUTPATIENT)
Facility: MEDICAL CENTER | Age: 78
End: 2023-03-24
Attending: PATHOLOGY
Payer: COMMERCIAL

## 2023-03-24 ENCOUNTER — PRE-ADMISSION TESTING (OUTPATIENT)
Dept: ADMISSIONS | Facility: MEDICAL CENTER | Age: 78
End: 2023-03-24
Attending: INTERNAL MEDICINE
Payer: MEDICARE

## 2023-03-24 DIAGNOSIS — Z01.812 PRE-OPERATIVE LABORATORY EXAMINATION: ICD-10-CM

## 2023-03-24 DIAGNOSIS — Z01.810 PRE-OPERATIVE CARDIOVASCULAR EXAMINATION: ICD-10-CM

## 2023-03-24 LAB
ALBUMIN SERPL BCP-MCNC: 4.2 G/DL (ref 3.2–4.9)
ALBUMIN/GLOB SERPL: 1.6 G/DL
ALP SERPL-CCNC: 77 U/L (ref 30–99)
ALT SERPL-CCNC: 17 U/L (ref 2–50)
ANION GAP SERPL CALC-SCNC: 9 MMOL/L (ref 7–16)
APTT PPP: 31.6 SEC (ref 24.7–36)
AST SERPL-CCNC: 24 U/L (ref 12–45)
BILIRUB SERPL-MCNC: 1 MG/DL (ref 0.1–1.5)
BUN SERPL-MCNC: 25 MG/DL (ref 8–22)
CALCIUM ALBUM COR SERPL-MCNC: 9.1 MG/DL (ref 8.5–10.5)
CALCIUM SERPL-MCNC: 9.3 MG/DL (ref 8.5–10.5)
CHLORIDE SERPL-SCNC: 104 MMOL/L (ref 96–112)
CO2 SERPL-SCNC: 25 MMOL/L (ref 20–33)
CREAT SERPL-MCNC: 1.1 MG/DL (ref 0.5–1.4)
EKG IMPRESSION: NORMAL
ERYTHROCYTE [DISTWIDTH] IN BLOOD BY AUTOMATED COUNT: 46.2 FL (ref 35.9–50)
GFR SERPLBLD CREATININE-BSD FMLA CKD-EPI: 69 ML/MIN/1.73 M 2
GLOBULIN SER CALC-MCNC: 2.7 G/DL (ref 1.9–3.5)
GLUCOSE SERPL-MCNC: 104 MG/DL (ref 65–99)
HCT VFR BLD AUTO: 50.2 % (ref 42–52)
HGB BLD-MCNC: 16.8 G/DL (ref 14–18)
INR PPP: 1.09 (ref 0.87–1.13)
MCH RBC QN AUTO: 30.2 PG (ref 27–33)
MCHC RBC AUTO-ENTMCNC: 33.5 G/DL (ref 33.7–35.3)
MCV RBC AUTO: 90.3 FL (ref 81.4–97.8)
PLATELET # BLD AUTO: 196 K/UL (ref 164–446)
PMV BLD AUTO: 10.9 FL (ref 9–12.9)
POTASSIUM SERPL-SCNC: 4.3 MMOL/L (ref 3.6–5.5)
PROT SERPL-MCNC: 6.9 G/DL (ref 6–8.2)
PROTHROMBIN TIME: 13.9 SEC (ref 12–14.6)
RBC # BLD AUTO: 5.56 M/UL (ref 4.7–6.1)
SODIUM SERPL-SCNC: 138 MMOL/L (ref 135–145)
WBC # BLD AUTO: 6.1 K/UL (ref 4.8–10.8)

## 2023-03-24 PROCEDURE — 85027 COMPLETE CBC AUTOMATED: CPT

## 2023-03-24 PROCEDURE — 93010 ELECTROCARDIOGRAM REPORT: CPT | Performed by: INTERNAL MEDICINE

## 2023-03-24 PROCEDURE — 36415 COLL VENOUS BLD VENIPUNCTURE: CPT

## 2023-03-24 PROCEDURE — 85610 PROTHROMBIN TIME: CPT

## 2023-03-24 PROCEDURE — 85730 THROMBOPLASTIN TIME PARTIAL: CPT

## 2023-03-24 PROCEDURE — 80053 COMPREHEN METABOLIC PANEL: CPT

## 2023-03-24 PROCEDURE — 93005 ELECTROCARDIOGRAM TRACING: CPT

## 2023-03-27 DIAGNOSIS — Z00.6 RESEARCH STUDY PATIENT: ICD-10-CM

## 2023-03-28 ENCOUNTER — APPOINTMENT (OUTPATIENT)
Dept: CARDIOLOGY | Facility: MEDICAL CENTER | Age: 78
End: 2023-03-28
Attending: INTERNAL MEDICINE
Payer: MEDICARE

## 2023-03-28 ENCOUNTER — HOSPITAL ENCOUNTER (OUTPATIENT)
Facility: MEDICAL CENTER | Age: 78
End: 2023-03-28
Attending: INTERNAL MEDICINE | Admitting: INTERNAL MEDICINE
Payer: MEDICARE

## 2023-03-28 VITALS
HEART RATE: 63 BPM | WEIGHT: 192.24 LBS | DIASTOLIC BLOOD PRESSURE: 67 MMHG | TEMPERATURE: 97.5 F | OXYGEN SATURATION: 95 % | SYSTOLIC BLOOD PRESSURE: 138 MMHG | HEIGHT: 72 IN | BODY MASS INDEX: 26.04 KG/M2 | RESPIRATION RATE: 16 BRPM

## 2023-03-28 DIAGNOSIS — I34.0 NON-RHEUMATIC MITRAL REGURGITATION: ICD-10-CM

## 2023-03-28 DIAGNOSIS — Z01.810 PRE-PROCEDURAL CARDIOVASCULAR EXAMINATION: ICD-10-CM

## 2023-03-28 DIAGNOSIS — I34.0 SEVERE MITRAL REGURGITATION: ICD-10-CM

## 2023-03-28 PROCEDURE — 99153 MOD SED SAME PHYS/QHP EA: CPT

## 2023-03-28 PROCEDURE — 160036 HCHG PACU - EA ADDL 30 MINS PHASE I

## 2023-03-28 PROCEDURE — 160046 HCHG PACU - 1ST 60 MINS PHASE II

## 2023-03-28 PROCEDURE — 700111 HCHG RX REV CODE 636 W/ 250 OVERRIDE (IP)

## 2023-03-28 PROCEDURE — 93460 R&L HRT ART/VENTRICLE ANGIO: CPT | Mod: 26 | Performed by: INTERNAL MEDICINE

## 2023-03-28 PROCEDURE — 160035 HCHG PACU - 1ST 60 MINS PHASE I

## 2023-03-28 PROCEDURE — 160002 HCHG RECOVERY MINUTES (STAT)

## 2023-03-28 PROCEDURE — 700101 HCHG RX REV CODE 250

## 2023-03-28 PROCEDURE — 99152 MOD SED SAME PHYS/QHP 5/>YRS: CPT | Performed by: INTERNAL MEDICINE

## 2023-03-28 PROCEDURE — 700117 HCHG RX CONTRAST REV CODE 255: Performed by: INTERNAL MEDICINE

## 2023-03-28 PROCEDURE — 700105 HCHG RX REV CODE 258: Performed by: INTERNAL MEDICINE

## 2023-03-28 RX ORDER — VERAPAMIL HYDROCHLORIDE 2.5 MG/ML
INJECTION, SOLUTION INTRAVENOUS
Status: COMPLETED
Start: 2023-03-28 | End: 2023-03-28

## 2023-03-28 RX ORDER — SODIUM CHLORIDE 9 MG/ML
1000 INJECTION, SOLUTION INTRAVENOUS CONTINUOUS
Status: DISCONTINUED | OUTPATIENT
Start: 2023-03-28 | End: 2023-03-28 | Stop reason: HOSPADM

## 2023-03-28 RX ORDER — HEPARIN SODIUM 1000 [USP'U]/ML
INJECTION, SOLUTION INTRAVENOUS; SUBCUTANEOUS
Status: COMPLETED
Start: 2023-03-28 | End: 2023-03-28

## 2023-03-28 RX ORDER — MIDAZOLAM HYDROCHLORIDE 1 MG/ML
INJECTION INTRAMUSCULAR; INTRAVENOUS
Status: COMPLETED
Start: 2023-03-28 | End: 2023-03-28

## 2023-03-28 RX ORDER — LIDOCAINE HYDROCHLORIDE 20 MG/ML
INJECTION, SOLUTION INFILTRATION; PERINEURAL
Status: COMPLETED
Start: 2023-03-28 | End: 2023-03-28

## 2023-03-28 RX ORDER — HEPARIN SODIUM 200 [USP'U]/100ML
INJECTION, SOLUTION INTRAVENOUS
Status: COMPLETED
Start: 2023-03-28 | End: 2023-03-28

## 2023-03-28 RX ORDER — SODIUM CHLORIDE 9 MG/ML
INJECTION, SOLUTION INTRAVENOUS CONTINUOUS
Status: ACTIVE | OUTPATIENT
Start: 2023-03-28 | End: 2023-03-28

## 2023-03-28 RX ADMIN — NITROGLYCERIN 10 ML: 20 INJECTION INTRAVENOUS at 07:36

## 2023-03-28 RX ADMIN — IOHEXOL 47 ML: 350 INJECTION, SOLUTION INTRAVENOUS at 08:41

## 2023-03-28 RX ADMIN — HEPARIN SODIUM: 1000 INJECTION, SOLUTION INTRAVENOUS; SUBCUTANEOUS at 07:36

## 2023-03-28 RX ADMIN — SODIUM CHLORIDE 1000 ML: 9 INJECTION, SOLUTION INTRAVENOUS at 06:22

## 2023-03-28 RX ADMIN — LIDOCAINE HYDROCHLORIDE: 20 INJECTION, SOLUTION INFILTRATION; PERINEURAL at 07:36

## 2023-03-28 RX ADMIN — MIDAZOLAM HYDROCHLORIDE 1 MG: 1 INJECTION, SOLUTION INTRAMUSCULAR; INTRAVENOUS at 08:41

## 2023-03-28 RX ADMIN — MIDAZOLAM HYDROCHLORIDE 2 MG: 1 INJECTION, SOLUTION INTRAMUSCULAR; INTRAVENOUS at 08:20

## 2023-03-28 RX ADMIN — VERAPAMIL HYDROCHLORIDE 2.5 MG: 2.5 INJECTION, SOLUTION INTRAVENOUS at 07:36

## 2023-03-28 RX ADMIN — HEPARIN SODIUM 2000 UNITS: 200 INJECTION, SOLUTION INTRAVENOUS at 07:36

## 2023-03-28 RX ADMIN — FENTANYL CITRATE 100 MCG: 50 INJECTION, SOLUTION INTRAMUSCULAR; INTRAVENOUS at 08:23

## 2023-03-28 ASSESSMENT — PAIN DESCRIPTION - PAIN TYPE
TYPE: SURGICAL PAIN
TYPE: SURGICAL PAIN;REFERRED PAIN
TYPE: SURGICAL PAIN
TYPE: SURGICAL PAIN;REFERRED PAIN
TYPE: SURGICAL PAIN

## 2023-03-28 ASSESSMENT — FIBROSIS 4 INDEX: FIB4 SCORE: 2.26

## 2023-03-28 NOTE — DISCHARGE INSTRUCTIONS
HOME CARE INSTRUCTIONS    ACTIVITY: Rest and take it easy for the first 24 hours.  A responsible adult is recommended to remain with you during that time.  It is normal to feel sleepy.  We encourage you to not do anything that requires balance, judgment or coordination.    FOR 24 HOURS DO NOT:  Drive, operate machinery or run household appliances.  Drink beer or alcoholic beverages.  Make important decisions or sign legal documents.    SPECIAL INSTRUCTIONS:   POST ANGIOGRAM  General Care Instructions  Maintain a bandage over the incision site for 24 hours.  It's normal to find a small bruise or dime-sized lump at the insertion site. This should disappear within a few weeks.  Do not apply lotions or powders to the site.  Do not immerse the catheter insertion site in water (bathtub/swimming) for five days. It is ok to shower 24 hours after the procedure.  You may resume your normal diet immediately; on the day of your procedure, drink 6-10 glasses of water to help flush the contrast liquid out of your system.  If the doctor inserted the catheter in through your groin:  Walking short distances on a flat surface is OK. Limit going up/down stairs for the first 2 days.  DO NOT do yard work, drive, squat, lift heavy objects, or play sports for 2 days; or until your health care provider tells you it is OK.  If the doctor inserted the catheter in your arm:  For 24 hours, DO NOT lift anything heavier than 10 pounds (approximately a gallon of milk). DO NOT do any heavy pushing, pulling, or twisting.    Medications  If your current medications need to be changed, you will be provided with an updated list of your medications prior to discharge.  If you take warfarin (Coumadin), resume taking your usual dose the evening after the procedure.  DO NOT STOP taking prescribed blood thinning (anti-platelet) medications unless instructed by your cardiologist.  These medications include:  Aspirin, Clopidogrel (Plavix), Ticagrelor  (Brilinta), or Prasugrel (Effient)   If you take one of the following anticoagulants, RESUME 24 HOURS after your procedure:  Apixiban (Eliquis), Rivaroxaban (Xarelto), Dabigatran (Pradaxa), Edoxaban (Savaysa)  If you take metformin (Glucophage), RESUME 48 HOURS after your procedure.    When to call your healthcare provider  Call your cardiologist right away at [phone] if you have any of the following:   Problems/Concerns taking any of your prescribed heart medicines.   The insertion site has increasing pain, swelling, redness, bleeding, or drainage.   Your arm or leg below where the insertion site changes color, is cool, or is numb.   You have chest pain or shortness of breath that does not go away with rest.   Your pulse feels irregular -- very slow (less than 60 beats/minute) or very fast (over 100 beats/minute).   You have dizziness, fainting, or you are very tired.   You are coughing up blood or yellow or green mucus.   You have chills or a fever over 101°F (38.3°C).    If there is bleeding at the catheter insertion site, apply pressure for 10 minutes.  If bleeding persists, call 911, and continue to hold pressure until advanced medical support arrives.      DIET: To avoid nausea, slowly advance diet as tolerated, avoiding spicy or greasy foods for the first day.  Add more substantial food to your diet according to your physician's instructions.  Babies can be fed formula or breast milk as soon as they are hungry.  INCREASE FLUIDS AND FIBER TO AVOID CONSTIPATION.    SURGICAL DRESSING/BATHING: keep dressing on for 24 hours    MEDICATIONS: Resume taking daily medication.  Take prescribed pain medication with food.  If no medication is prescribed, you may take non-aspirin pain medication if needed.  PAIN MEDICATION CAN BE VERY CONSTIPATING.  Take a stool softener or laxative such as senokot, pericolace, or milk of magnesia if needed.    A follow-up appointment should be arranged with your doctor; call to  schedule.    You should CALL YOUR PHYSICIAN if you develop:  Fever greater than 101 degrees F.  Pain not relieved by medication, or persistent nausea or vomiting.  Excessive bleeding (blood soaking through dressing) or unexpected drainage from the wound.  Extreme redness or swelling around the incision site, drainage of pus or foul smelling drainage.  Inability to urinate or empty your bladder within 8 hours.  Problems with breathing or chest pain.    You should call 911 if you develop problems with breathing or chest pain.  If you are unable to contact your doctor or surgical center, you should go to the nearest emergency room or urgent care center.    Physician's telephone #: [phone] Dr Raphael    MILD FLU-LIKE SYMPTOMS ARE NORMAL.  YOU MAY EXPERIENCE GENERALIZED MUSCLE ACHES, THROAT IRRITATION, HEADACHE AND/OR SOME NAUSEA.    If any questions arise, call your doctor.  If your doctor is not available, please feel free to call the Surgical Center at [phone].  The Center is open Monday through Friday from 7AM to 7PM.      A registered nurse may call you a few days after your surgery to see how you are doing after your procedure.    You may also receive a survey in the mail within the next two weeks and we ask that you take a few moments to complete the survey and return it to us.  Our goal is to provide you with very good care and we value your comments.     Depression / Suicide Risk    As you are discharged from this [facility] facility, it is important to learn how to keep safe from harming yourself.    Recognize the warning signs:  Abrupt changes in personality, positive or negative- including increase in energy   Giving away possessions  Change in eating patterns- significant weight changes-  positive or negative  Change in sleeping patterns- unable to sleep or sleeping all the time   Unwillingness or inability to communicate  Depression  Unusual sadness, discouragement and loneliness  Talk of  wanting to die  Neglect of personal appearance   Rebelliousness- reckless behavior  Withdrawal from people/activities they love  Confusion- inability to concentrate     If you or a loved one observes any of these behaviors or has concerns about self-harm, here's what you can do:  Talk about it- your feelings and reasons for harming yourself  Remove any means that you might use to hurt yourself (examples: pills, rope, extension cords, firearm)  Get professional help from the community (Mental Health, Substance Abuse, psychological counseling)  Do not be alone:Call your Safe Contact- someone whom you trust who will be there for you.  Call your local CRISIS HOTLINE 195-8194 or 598-612-9768  Call your local Children's Mobile Crisis Response Team [place] (393) 345-6347 or www.Snoobe  Call the toll free National Suicide Prevention Hotlines   National Suicide Prevention Lifeline 801-168-ECCH (0350)  National Hope Line Network 800-SUICIDE (019-7942)    I acknowledge receipt and understanding of these Home Care instructions.

## 2023-03-28 NOTE — OR NURSING
TR band to RRA, air release Q15 min until decompressed. No bleeding or swelling to site. Dry gauze and transparent dressing placed to site and patient instructed on limitations to wrist movement.

## 2023-03-28 NOTE — OR NURSING
Patient arrived to unit at 1109. Patient is AOx4. Transferred to chair appropriately. Patient's pain is 0/10. Declines pain medication at this time. Patient's dressings to right upper extremity are CDI. Tolerating liquids at this time. Discharge instructions discussed with the patient. Patient denies any further questions at this time. Patient discharged home to responsible adult at 1132.

## 2023-03-28 NOTE — PROCEDURES
Cardiac Catheterization report    3/28/2023  8:49 AM      Primary Care Provider: Temitope Gilbert M.D.    Indication for procedure: Severe Aortic stenosis    Procedures:  Coronary arteriograms  Right and left heart catheterization  Left ventriculogram      Final Impression:  Angiographically normal appearing coronary arteries.  Normal pulmonary pressures      Findings:  1.  Left main coronary artery:   Normal.  2.  Left anterior descending artery:  Normal.   3.  Left circumflex coronary artery:  Normal.    4.  Right coronary artery:  Tortuous but normal.  This is a right dominant system.  5. LVEDP: 13  mmHg  6. LVgram showed ejection fraction of 60% , no significant gradient across aortic valve    Right heart cath pressures in mmHg:  RA 13  RV 29/12, EDP 14  PA 28/18, mean 21  PCW 15      Procedure details:  The risks and benefits of cardiac catheterization and possible intervention were explained to the patient including death, heart attack, stroke, and emergency surgery.  The patient verbalized understanding and wished to proceed.  The patient was brought to the cardiac catheterization laboratory in the fasting state and prepped and draped in the usual sterile fashion.  The right wrist was locally anesthetized with lidocaine and the right radial artery was cannulated with 5/6-Telugu equipment and standard radial cocktail was given. Right brachial vein was cannulated with 6 Fr sheath and right heart cath was performed with 6 Fr swan in usual fashion, results mentioned below.  Coronary angiography was performed using JR 4 and JL 3.5  diagnostic catheters in the usual fashion, results mentioned below.   JR 4 catheter was used to cross aortic valve and performed left ventriculogram and left heart catheterization.    Once all the views were obtained, all wires and catheters were removed from the patient without difficulty.  A Vasc-Band was placed over the right radial artery and the radial artery sheath was  removed without difficulty.      Complications:  None.    EBL: <10 CC    Specimens: None      Sedation time:  I supervised moderate sedation over a trained independent nursing staff,  Sedation start time: 08:10 sedation stop time:08:42      KUMAR Bender  [facility]

## 2023-03-28 NOTE — OR NURSING
Arrived to PACU in stable condition. Site to RRA with TR band in place. Right brachial vein was accessed for right heart cath. Pressure was held in CVL, site soft and pulses are palpable. Explained recovery with patient and he understands. Ordered patient breakfast and is tolerating po well. VSS

## 2023-03-29 ENCOUNTER — TELEPHONE (OUTPATIENT)
Dept: CARDIOLOGY | Facility: MEDICAL CENTER | Age: 78
End: 2023-03-29
Payer: MEDICARE

## 2023-03-29 NOTE — TELEPHONE ENCOUNTER
Patient notified of plan of care. Discussed procedure date/time and check in process should he be proceeding with Mitral JAVIER. Advised I would send him a Alliqua message on Monday after reviewing his appt with Dr. Neri tomorrow and confirm the plan.     All questions were answered. Patient has direct extension for any further questions/concerns prior to the procedure.

## 2023-03-29 NOTE — TELEPHONE ENCOUNTER
Valve Conference Plan of Care: 3/29/2023           Mitral JAVIER Candidate: Yes vs MVR. Patient consults with Dr. Neri 3/30 and decision will be made at that time.   Access: n/a  Valve Size: n/a  Anesthesia or Moderate Sedation: GA with LOUIS  Unit: Telemetry  Incidental findings to be discussed with PCP: n/a   Clearance needed prior to procedure: No    (If proceeding with Mitral JAVIER) Check in time of  0930 4/4/2023.     Pre-op appointment completed: 3/24/2023    NPO after midnight. No medications the morning of the procedure. Hold vitamin C, omega 3, and ibuprofen x5 days.

## 2023-03-30 ENCOUNTER — OFFICE VISIT (OUTPATIENT)
Dept: CARDIOTHORACIC SURGERY | Facility: MEDICAL CENTER | Age: 78
End: 2023-03-30
Payer: MEDICARE

## 2023-03-30 VITALS
SYSTOLIC BLOOD PRESSURE: 136 MMHG | OXYGEN SATURATION: 95 % | BODY MASS INDEX: 26.11 KG/M2 | TEMPERATURE: 97.6 F | HEART RATE: 69 BPM | WEIGHT: 192.8 LBS | HEIGHT: 72 IN | DIASTOLIC BLOOD PRESSURE: 72 MMHG

## 2023-03-30 DIAGNOSIS — I34.0 SEVERE MITRAL REGURGITATION: ICD-10-CM

## 2023-03-30 PROCEDURE — 99205 OFFICE O/P NEW HI 60 MIN: CPT | Performed by: THORACIC SURGERY (CARDIOTHORACIC VASCULAR SURGERY)

## 2023-03-30 ASSESSMENT — ENCOUNTER SYMPTOMS
MUSCULOSKELETAL NEGATIVE: 1
PSYCHIATRIC NEGATIVE: 1
NEUROLOGICAL NEGATIVE: 1
GASTROINTESTINAL NEGATIVE: 1
ORTHOPNEA: 0
COUGH: 0
SHORTNESS OF BREATH: 1
DIZZINESS: 0

## 2023-03-30 ASSESSMENT — FIBROSIS 4 INDEX: FIB4 SCORE: 2.26

## 2023-03-30 NOTE — PROGRESS NOTES
Patient wants to go home and discuss with his wife first.  He is amenable to come back to see me with his wife present for pre surgery optimization and education materials.    My card was provided.  He will call me when he is ready to schedule.

## 2023-04-03 ENCOUNTER — TELEPHONE (OUTPATIENT)
Dept: CARDIOLOGY | Facility: MEDICAL CENTER | Age: 78
End: 2023-04-03
Payer: MEDICARE

## 2023-04-03 NOTE — TELEPHONE ENCOUNTER
Received VM from patient stating he had his appt with Dr. Neri, and he recommended a surgical MVR.    Called patient back and advised we would cancel his Mitral JAVIER procedure for tomorrow. Patient appreciative for the call.

## 2023-04-03 NOTE — TELEPHONE ENCOUNTER
AK    Caller: Selma Avila     Topic/issue: pt called in regards to his procedure tomorrow pt wants to make sure its cancelled, I was unable to to tell because I was unable to see the appt on future or past but I did see the information for the appt on the chart. Please advise.     Callback Number: [phone] (home)     Thank you,    Tj HERNANDEZ

## 2023-04-03 NOTE — TELEPHONE ENCOUNTER
Phone Number Called: [phone]    Call outcome: Spoke to patient regarding message below.    Message: Patient cancelled patient at this time.   Patient stated he is not a candidate for this procedure.

## 2023-04-09 LAB
APOB+LDLR+PCSK9 GENE MUT ANL BLD/T: NOT DETECTED
BRCA1+BRCA2 DEL+DUP + FULL MUT ANL BLD/T: NOT DETECTED
MLH1+MSH2+MSH6+PMS2 GN DEL+DUP+FUL M: NOT DETECTED

## 2023-04-10 LAB
ELF SCORE: 10.5
RELATIVE RISK: ABNORMAL
RISK GROUP: ABNORMAL
RISK: 23.6 %

## 2023-04-12 ENCOUNTER — NON-PROVIDER VISIT (OUTPATIENT)
Dept: CARDIOTHORACIC SURGERY | Facility: MEDICAL CENTER | Age: 78
End: 2023-04-12
Payer: MEDICARE

## 2023-04-12 NOTE — PROGRESS NOTES
Problem: Prehabilitation    Goal: optimize identified modifiable risk factors prior to cardiac surgery.     Intervention: Screening and interventions for the following  risk factors with educational materials provided if indicated  and patient demonstrates readiness to participate.  Dentition, malnutrition, CAD and dietary cholesterol,  obesity, alcohol and tobacco abuse, illegal drug use, and   social support system for post discharge planning.    Additionally, home exercise regimen initiation or continuation  (if appropriate), and teaching of and participation of  inspiratory muscle training via incentive spirometer (provided).    Review of post-surgical physical limitations, upcoming  appointments & testing, ordered diagnostics, and medication review  to identify and educate on anticoagulant and antihypertensives  that need cessation in the days prior to surgery.    The cardiac surgery prehabilitation sheet, surgery instruction sheet,  MAP, education booklet, vitals logbook, normals after heart surgery,  and cardiac rehab flier was provided for patient to read and review.    Surgical CHG wipes were also provided with instructions on use.    DENTITION:  Routine dental appointment prior to surgery is  indicated for valve procedure.    he was not encouraged to get a dental   cleaning as he  does get regular  dental cleaning and denies current dental   infection or issues that should be  addressed prior to surgery.    INCENTIVE SPIROMETRY:  Discussed importance of incentive spirometry (IS) use,  20 times a day AT MINIMUM but more often if possible to  optimize cardio-pulmonary function prior to surgery.  They were instructed to allow a 5 minute rest in  between uses to achieve max IS volume.  Education with return demonstration performed.   Patient is effective, coaching was not needed.    Prehabilitation IS baseline is 2750.    HOME EXERCISE REGIMEN:  We discussed importance of preventing deconditioning and  muscle  wasting in the time preceding surgery as this will occur  post surgery.    > 50 % left main disease present? NO  EF-- 60%  Active sub lingual nitro use? NO  he is appropriate for initiation  of a home exercise regimen.    he is physically active; current  exercise tolerance/level is high due to no  Symptoms.    he  was educated to continue his current exercise regimen of push ups, chin ups, sit ups, biking, an stair climbing 3-5 times a week.     he was educated  that if chest pain or SOB occurs patient is to stop  immediately and if symptoms do not  resolve after stopping to call 911.    he was also encouraged to practice sit to stand  from a chair with one arm to assist or no arm assistance  12 times a day to strengthen quadriceps and improve balance.    CAD:  Patient does not have known CAD; education on  cholesterol, diet, and the heart are not indicated  and were not provided.    OBESITY:  Patient's BMI is not over 30.  Education regarding portion sizing  and diet are not indicated and were not provided.    MALNUTRITION:  Malnutrition screening tool (MST) shows he is not at risk  with a score of 0. (0-1 not at risk; greater or equal to 2 is at risk).    Given age alone, it was  recommended they increase their protein  intake to 1.2 to 2.5 gram/kg/day    This was calculated and provided to them at   105 grams per day. Assessment of implementation  During educational phone appointment will be done.    SMOKING:  Patient denies current smoking history.  Smoking risks  and cessation education not indicated and was not provided.    ALCOHOL ABUSE:  Patient denies alcohol abuse.  Alcohol risks and cessation  education not indicated and was not provided.    ILLEGAL DRUG USE:  Patient denies illicit drug use.  Illicit drug use risks  and cessation education not indicated and was not provided    SOCIAL SUPPORT SYSTEM FOR DISCHARGE NEEDS:    Patient does have family,  his wife Noy to stay for  1-week post discharge to  assist with ADL's. No barriers  to hospital discharge anticipated.    PSYCHOLOGICAL PREPARATION:  We discussed the basics of physical limitation post op to include:               No driving for 4 weeks               No lifting, pushing or pulling > 10 lbs for 6 weeks  Sternal precautions to include moving within the tube and  safe mobility in and out of bed and the chair.    ANTIBIOTIC STEWARDSHIP:  MRSA swab will be collected by [facility] during pre-admit testing.    MEDICATION AN UPCOMING APPOINTMENTS REVIEW:  Current medications were reviewed that may need  specific stop dates prior to surgery. The patient was instructed   to stop the following medications after the indicated date.    No surgery date yet; meds not reviewed    Vascular scheduling sheet was not provided and explained as no surgery has been set.    Walk test Times: 3 3 3    A phone appointment for pre op education was not made  to review all provided education materials.  I will call him in a month to check in on their social situation (96 y.o. mother in law in [place]) and willingness to proceed forward.    Education time 1 hour.

## 2023-05-17 NOTE — H&P
REFERRING PHYSICIAN: Shakir Raphael MD    CONSULTING PHYSICIAN: Jacqueline Neri MD, FACS    CHIEF COMPLAINT: Shortness of breath    HISTORY OF PRESENT ILLNESS: The patient is a 78 y.o. male with past medical history of mitral valve prolapse, mitral regurgitation, hypertension, and dyslipidemia who presents to clinic with dyspnea and fatigue. He reports these symptoms have been worsening over the last year. He feels feels more worn out than he used to but is still able to ride a stationary bike for 30 minutes and climb up and down stairs for 20 minutes without getting shortness of breath. He states his dyspnea comes intermittently and he is unable to identify exacerbating and alleviating factors.  He reports that he can walk 3 miles without stopping.  He complains of arthritic pain in his neck and his joints. He denies chest pain, orthopnea, PND, dizziness, and syncope.  Today he returns to clinic with his wife with follow-up questions about recommended mitral valve surgery.    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    Pre-diabetes 03/31/2022    BMI 27.0-27.9,adult 03/31/2022    Primary osteoarthritis of both first carpometacarpal joints 03/31/2022    Degenerative disc disease, lumbar 03/31/2022    Peripheral neuropathy 03/31/2022    Actinic keratoses 03/31/2022    Cyst of right kidney 10/24/2022     Resolved Ambulatory Problems     Diagnosis Date Noted    No Resolved Ambulatory Problems     Past Medical History:   Diagnosis Date    Arrhythmia 2017    Arthritis 1989    Breath shortness 2022    Cancer (HCC) 2022    Dyslipidemia 12/14/2018    Heart murmur 1980     "Snoring      PAST SURGICAL HISTORY:   Past Surgical History:   Procedure Laterality Date    FISTULA IN ANO REPAIR Left 2019    OTHER  2019    Fistula     ALLERGIES: No Known Allergies     CURRENT MEDICATIONS:   Current Outpatient Medications:     multivitamin Tab, Take 1 Tablet by mouth every day., Disp: , Rfl:     Ascorbic Acid (VITAMIN C) 500 MG Chew Tab, Chew 500 mg every day., Disp: , Rfl:     ibuprofen (MOTRIN) 200 MG Tab, Take 325 mg by mouth every 6 hours as needed., Disp: , Rfl:     acetaminophen (TYLENOL) 500 MG Tab, Take 250 mg by mouth every 6 hours as needed., Disp: , Rfl:     docusate sodium (COLACE) 100 MG Cap, Take 100 mg by mouth every day., Disp: , Rfl:     losartan (COZAAR) 100 MG Tab, Take 1 Tablet by mouth every day., Disp: 100 Tablet, Rfl: 3    Magnesium 250 MG Tab, Take 125 mg by mouth every day., Disp: , Rfl:     VITAMIN D PO, Take 6,000 Units by mouth every day., Disp: , Rfl:     Omega-3 Fatty Acids (FISH OIL) 1200 MG Cap, Take 1,200 mg by mouth every day., Disp: , Rfl:     FAMILY HISTORY:   Family History   Problem Relation Age of Onset    Cancer Mother         Ovarian cancer    Lung Disease Father         Microbacterium intercellulare avium    Heart Disease Neg Hx      SOCIAL HISTORY:   Social History     Socioeconomic History    Marital status:      Spouse name: Not on file    Number of children: Not on file    Years of education: Not on file    Highest education level: Not on file   Occupational History    Not on file   Tobacco Use    Smoking status: Former     Years: 0.00     Types: Cigarettes     Start date: 1966     Quit date: 1966     Years since quittin.7     Passive exposure: Never    Smokeless tobacco: Never   Vaping Use    Vaping Use: Never used   Substance and Sexual Activity    Alcohol use: Not Currently     Comment: \" about an 1 oz red wine for my health everyday    Drug use: No    Sexual activity: Not on file     Comment:    Other Topics " "Concern    Not on file   Social History Narrative    Not on file     Social Determinants of Health     Financial Resource Strain: Not on file   Food Insecurity: Not on file   Transportation Needs: Not on file   Physical Activity: Not on file   Stress: Not on file   Social Connections: Not on file   Intimate Partner Violence: Not on file   Housing Stability: Not on file     REVIEW OF SYSTEMS:  Review of Systems   Constitutional:  Positive for malaise/fatigue.   HENT: Negative.     Eyes: Negative.    Respiratory:  Positive for shortness of breath.    Cardiovascular: Negative.    Gastrointestinal: Negative.    Genitourinary: Negative.    Musculoskeletal: Negative.    Skin: Negative.    Neurological: Negative.    Endo/Heme/Allergies: Negative.    Psychiatric/Behavioral: Negative.       PHYSICAL EXAMINATION:    /70 (BP Location: Left arm, Patient Position: Sitting, BP Cuff Size: Adult)   Pulse 71   Temp 36.7 °C (98 °F) (Temporal)   Ht 1.803 m (5' 11\")   Wt 86.4 kg (190 lb 7.6 oz)   SpO2 92%   BMI 26.57 kg/m²     Physical Exam  Constitutional:       General: He is not in acute distress.  HENT:      Head: Normocephalic.   Eyes:      Pupils: Pupils are equal, round, and reactive to light.   Cardiovascular:      Rate and Rhythm: Normal rate and regular rhythm.      Heart sounds: Murmur heard.      Systolic murmur is present with a grade of 3/6.      No gallop.   Pulmonary:      Effort: Pulmonary effort is normal. No respiratory distress.      Breath sounds: Normal breath sounds. No wheezing or rales.   Abdominal:      General: Bowel sounds are normal. There is no distension.      Palpations: Abdomen is soft.      Tenderness: There is no abdominal tenderness.   Musculoskeletal:         General: Normal range of motion.      Cervical back: Neck supple.   Skin:     General: Skin is warm and dry.   Neurological:      Mental Status: He is alert and oriented to person, place, and time.   Psychiatric:         Mood and " Affect: Mood and affect normal.         Cognition and Memory: Memory normal.         Judgment: Judgment normal.     LABS REVIEWED:  Lab Results   Component Value Date/Time    SODIUM 138 03/24/2023 09:01 AM    POTASSIUM 4.3 03/24/2023 09:01 AM    CHLORIDE 104 03/24/2023 09:01 AM    CO2 25 03/24/2023 09:01 AM    GLUCOSE 104 (H) 03/24/2023 09:01 AM    BUN 25 (H) 03/24/2023 09:01 AM    CREATININE 1.10 03/24/2023 09:01 AM      Lab Results   Component Value Date/Time    PROTHROMBTM 13.9 03/24/2023 09:01 AM    INR 1.09 03/24/2023 09:01 AM      Lab Results   Component Value Date/Time    WBC 6.1 03/24/2023 09:01 AM    RBC 5.56 03/24/2023 09:01 AM    HEMOGLOBIN 16.8 03/24/2023 09:01 AM    HEMATOCRIT 50.2 03/24/2023 09:01 AM    MCV 90.3 03/24/2023 09:01 AM    MCH 30.2 03/24/2023 09:01 AM    MCHC 33.5 (L) 03/24/2023 09:01 AM    MPV 10.9 03/24/2023 09:01 AM    NEUTSPOLYS 73.80 (H) 10/17/2022 04:15 PM    LYMPHOCYTES 16.00 (L) 10/17/2022 04:15 PM    MONOCYTES 8.60 10/17/2022 04:15 PM    EOSINOPHILS 0.80 10/17/2022 04:15 PM    BASOPHILS 0.50 10/17/2022 04:15 PM    HYPOCHROMIA 1+ 07/30/2013 09:01 AM        IMAGING REVIEWED AND INTERPRETED:    ECHOCARDIOGRAM: 12/15/22  Prolapse of the anterior mitral leaflet. Severe eccentric mitral   regurgitation due to the coanda effect.There   is a cyst on the mitral valve leaflet.     LOUIS: 1/10/23  Severley prolapsed and probably flail A2 of the anterior MV leaflet,   with severe eccentric posteriorly directed mitral regurgitation.      [place] [facility] [place]:  1.  Left main coronary artery:   Normal.  2.  Left anterior descending artery:  Normal.   3.  Left circumflex coronary artery:  Normal.    4.  Right coronary artery:  Tortuous but normal.  This is a right dominant system.  5. LVEDP: 13  mmHg  6. LVgram showed ejection fraction of 60% , no significant gradient across aortic valve     Right heart cath pressures in mmHg:  RA 13  RV 29/12, EDP 14  PA 28/18, mean 21  PCW  15    IMPRESSION:  Severe mitral regurgitation (4+, degenerative), anterior mitral valve leaflet prolapse, bigeminy, hypertension, dyslipidemia     PLAN:  I recommend that he undergo mitral valve repair or replacement and intraoperative transesophageal echocardiography.     The procedure, its risks, benefits, potential complications and alternative treatments were discussed with the patient in detail including the risks should he decide not to undergo my recommended treatment. All of his questions were answered to his satisfaction and he is willing to proceed with the operation. The risks include death, stroke, infection: to include a rare bacterial infection related to the use of the heart/lung machine, juanjo-operative myocardial infarction, dysrhythmias, diaphragmatic paralysis, chest wall paresthesia, tracheostomy, kidney or other organ failure, possible return to the operating room for bleeding, bleeding requiring transfusion with its attendant risks including AIDS or hepatitis, dehiscence of surgical incisions, respiratory complications including the need for prolonged ventilator support, Protamine or other drug reaction, peripheral neuropathy, loss of limb, and miscount of surgical items. The operative mortality risk is approximately 1%. The STS mortality risk score for MV repair is 0.6% and the morbidity and mortality risk score is 6.3%. The STS mortality risk score for isolated MVR is 1.2% and the morbidity and mortality risk score is 9.7%. The scores were discussed with patient and his wife.    The operation is scheduled for Thursday, July 6, 2023 at 7:30 AM at [facility].      Findings and recommendations have been discussed with the patient’s cardiologist, Shakir Raphael MD.  Thank you for this very challenging consultation and participation in the patient’s care.  I will keep you apprised of all future developments.    Sincerely,    Jacqueline Neri MD, FACS

## 2023-05-18 ENCOUNTER — TELEPHONE (OUTPATIENT)
Dept: CARDIOTHORACIC SURGERY | Facility: MEDICAL CENTER | Age: 78
End: 2023-05-18
Payer: MEDICARE

## 2023-05-18 NOTE — TELEPHONE ENCOUNTER
Patient wanted a call to follow up on social situation resolution and possible surgery scheduling one month after our meeting.    I called and left a MV on both his cell and home number with my call back.

## 2023-05-24 ENCOUNTER — OFFICE VISIT (OUTPATIENT)
Dept: CARDIOTHORACIC SURGERY | Facility: MEDICAL CENTER | Age: 78
End: 2023-05-24
Payer: MEDICARE

## 2023-05-24 VITALS
HEART RATE: 71 BPM | OXYGEN SATURATION: 92 % | WEIGHT: 190.48 LBS | TEMPERATURE: 98 F | BODY MASS INDEX: 26.67 KG/M2 | SYSTOLIC BLOOD PRESSURE: 106 MMHG | DIASTOLIC BLOOD PRESSURE: 70 MMHG | HEIGHT: 71 IN

## 2023-05-24 DIAGNOSIS — I34.1 MITRAL VALVE PROLAPSE: ICD-10-CM

## 2023-05-24 DIAGNOSIS — I34.0 NON-RHEUMATIC MITRAL REGURGITATION: ICD-10-CM

## 2023-05-24 DIAGNOSIS — I34.1 MITRAL VALVE PROLAPSE: Primary | ICD-10-CM

## 2023-05-24 PROCEDURE — 3074F SYST BP LT 130 MM HG: CPT | Performed by: THORACIC SURGERY (CARDIOTHORACIC VASCULAR SURGERY)

## 2023-05-24 PROCEDURE — 99214 OFFICE O/P EST MOD 30 MIN: CPT | Performed by: THORACIC SURGERY (CARDIOTHORACIC VASCULAR SURGERY)

## 2023-05-24 PROCEDURE — 3078F DIAST BP <80 MM HG: CPT | Performed by: THORACIC SURGERY (CARDIOTHORACIC VASCULAR SURGERY)

## 2023-05-24 ASSESSMENT — ENCOUNTER SYMPTOMS
PSYCHIATRIC NEGATIVE: 1
GASTROINTESTINAL NEGATIVE: 1
MUSCULOSKELETAL NEGATIVE: 1
CARDIOVASCULAR NEGATIVE: 1
SHORTNESS OF BREATH: 1
EYES NEGATIVE: 1
NEUROLOGICAL NEGATIVE: 1

## 2023-05-24 ASSESSMENT — FIBROSIS 4 INDEX: FIB4 SCORE: 2.29

## 2023-05-25 ENCOUNTER — HOSPITAL ENCOUNTER (OUTPATIENT)
Dept: RADIOLOGY | Facility: MEDICAL CENTER | Age: 78
End: 2023-05-25
Payer: MEDICARE

## 2023-05-25 ENCOUNTER — APPOINTMENT (OUTPATIENT)
Dept: ADMISSIONS | Facility: MEDICAL CENTER | Age: 78
DRG: 219 | End: 2023-05-25
Attending: THORACIC SURGERY (CARDIOTHORACIC VASCULAR SURGERY)
Payer: MEDICARE

## 2023-05-25 DIAGNOSIS — I34.1 MITRAL VALVE PROLAPSE: ICD-10-CM

## 2023-05-25 PROCEDURE — 93880 EXTRACRANIAL BILAT STUDY: CPT

## 2023-05-25 PROCEDURE — 93880 EXTRACRANIAL BILAT STUDY: CPT | Mod: 26 | Performed by: INTERNAL MEDICINE

## 2023-06-19 ENCOUNTER — PRE-ADMISSION TESTING (OUTPATIENT)
Dept: ADMISSIONS | Facility: MEDICAL CENTER | Age: 78
DRG: 219 | End: 2023-06-19
Attending: THORACIC SURGERY (CARDIOTHORACIC VASCULAR SURGERY)
Payer: MEDICARE

## 2023-06-21 ENCOUNTER — TELEPHONE (OUTPATIENT)
Dept: HEALTH INFORMATION MANAGEMENT | Facility: OTHER | Age: 78
End: 2023-06-21

## 2023-06-28 ENCOUNTER — TELEPHONE (OUTPATIENT)
Dept: CARDIOTHORACIC SURGERY | Facility: MEDICAL CENTER | Age: 78
End: 2023-06-28
Payer: MEDICARE

## 2023-06-28 NOTE — TELEPHONE ENCOUNTER
Patient seen in office again on 5/24 for follow up questions and surgery scheduling. Called patient for pre surgery review.    Patient was reminded that MV repair vs replacement surgery with  Dr. Neri is on 7/6 at 0730 in the morning. he   are aware check in is at 0515 am.    Baseline IS was 2750. he has been compliant   with the IS. Volume has improved to 3250.    he was prescribed a walking regimen or  told to continue he current regimen and  He has been compliant.   he has been practicing sit to stand.    The CHG wipes instructions were reviewed and understood.    PAT date and time was reviewed with patient and  verified it is within 72 hours of surgery.    Vascular studies and procedures: carotids  were scheduled, completed,  and reviewed by myself for concerning  results did not need escalation to the [facility]OVANI/MD.    he did not need a dental check/work.    fish oil was stopped  he was reminded that multivitamin needs to stop after today.  Losartan needs to stop 2 full days before surgery; stop after 7/3    he was told that no medication(s) are okay to  take the morning of surgery prior to check in.     he was reminded no food after midnight.    Call time 8 minutes

## 2023-07-03 ENCOUNTER — HOSPITAL ENCOUNTER (OUTPATIENT)
Dept: RADIOLOGY | Facility: MEDICAL CENTER | Age: 78
DRG: 219 | End: 2023-07-03
Attending: THORACIC SURGERY (CARDIOTHORACIC VASCULAR SURGERY) | Admitting: THORACIC SURGERY (CARDIOTHORACIC VASCULAR SURGERY)
Payer: MEDICARE

## 2023-07-03 ENCOUNTER — PRE-ADMISSION TESTING (OUTPATIENT)
Dept: ADMISSIONS | Facility: MEDICAL CENTER | Age: 78
DRG: 219 | End: 2023-07-03
Attending: THORACIC SURGERY (CARDIOTHORACIC VASCULAR SURGERY)
Payer: MEDICARE

## 2023-07-03 DIAGNOSIS — Z01.812 PRE-OPERATIVE LABORATORY EXAMINATION: ICD-10-CM

## 2023-07-03 DIAGNOSIS — Z01.810 PRE-OPERATIVE CARDIOVASCULAR EXAMINATION: ICD-10-CM

## 2023-07-03 DIAGNOSIS — Z01.811 PRE-OPERATIVE RESPIRATORY EXAMINATION: ICD-10-CM

## 2023-07-03 LAB
ABO GROUP BLD: NORMAL
ALBUMIN SERPL BCP-MCNC: 4.4 G/DL (ref 3.2–4.9)
ALBUMIN/GLOB SERPL: 1.8 G/DL
ALP SERPL-CCNC: 86 U/L (ref 30–99)
ALT SERPL-CCNC: 10 U/L (ref 2–50)
AMPHET UR QL SCN: NEGATIVE
ANION GAP SERPL CALC-SCNC: 10 MMOL/L (ref 7–16)
APPEARANCE UR: CLEAR
APTT PPP: 30.3 SEC (ref 24.7–36)
AST SERPL-CCNC: 15 U/L (ref 12–45)
BARBITURATES UR QL SCN: NEGATIVE
BASOPHILS # BLD AUTO: 1 % (ref 0–1.8)
BASOPHILS # BLD: 0.06 K/UL (ref 0–0.12)
BENZODIAZ UR QL SCN: NEGATIVE
BILIRUB SERPL-MCNC: 0.8 MG/DL (ref 0.1–1.5)
BILIRUB UR QL STRIP.AUTO: NEGATIVE
BLD GP AB SCN SERPL QL: NORMAL
BUN SERPL-MCNC: 24 MG/DL (ref 8–22)
BZE UR QL SCN: NEGATIVE
CALCIUM ALBUM COR SERPL-MCNC: 8.9 MG/DL (ref 8.5–10.5)
CALCIUM SERPL-MCNC: 9.2 MG/DL (ref 8.5–10.5)
CANNABINOIDS UR QL SCN: NEGATIVE
CHLORIDE SERPL-SCNC: 103 MMOL/L (ref 96–112)
CO2 SERPL-SCNC: 26 MMOL/L (ref 20–33)
COLOR UR: YELLOW
CREAT SERPL-MCNC: 1.05 MG/DL (ref 0.5–1.4)
EKG IMPRESSION: NORMAL
EOSINOPHIL # BLD AUTO: 0.1 K/UL (ref 0–0.51)
EOSINOPHIL NFR BLD: 1.6 % (ref 0–6.9)
ERYTHROCYTE [DISTWIDTH] IN BLOOD BY AUTOMATED COUNT: 46.5 FL (ref 35.9–50)
EST. AVERAGE GLUCOSE BLD GHB EST-MCNC: 105 MG/DL
FENTANYL UR QL: NEGATIVE
GFR SERPLBLD CREATININE-BSD FMLA CKD-EPI: 73 ML/MIN/1.73 M 2
GLOBULIN SER CALC-MCNC: 2.4 G/DL (ref 1.9–3.5)
GLUCOSE SERPL-MCNC: 77 MG/DL (ref 65–99)
GLUCOSE UR STRIP.AUTO-MCNC: NEGATIVE MG/DL
HBA1C MFR BLD: 5.3 % (ref 4–5.6)
HCT VFR BLD AUTO: 52.6 % (ref 42–52)
HGB BLD-MCNC: 16.9 G/DL (ref 14–18)
IMM GRANULOCYTES # BLD AUTO: 0.02 K/UL (ref 0–0.11)
IMM GRANULOCYTES NFR BLD AUTO: 0.3 % (ref 0–0.9)
INR PPP: 1.04 (ref 0.87–1.13)
KETONES UR STRIP.AUTO-MCNC: NEGATIVE MG/DL
LEUKOCYTE ESTERASE UR QL STRIP.AUTO: NEGATIVE
LYMPHOCYTES # BLD AUTO: 2.08 K/UL (ref 1–4.8)
LYMPHOCYTES NFR BLD: 33.1 % (ref 22–41)
MCH RBC QN AUTO: 29.8 PG (ref 27–33)
MCHC RBC AUTO-ENTMCNC: 32.1 G/DL (ref 32.3–36.5)
MCV RBC AUTO: 92.6 FL (ref 81.4–97.8)
METHADONE UR QL SCN: NEGATIVE
MICRO URNS: NORMAL
MONOCYTES # BLD AUTO: 0.46 K/UL (ref 0–0.85)
MONOCYTES NFR BLD AUTO: 7.3 % (ref 0–13.4)
NEUTROPHILS # BLD AUTO: 3.56 K/UL (ref 1.82–7.42)
NEUTROPHILS NFR BLD: 56.7 % (ref 44–72)
NITRITE UR QL STRIP.AUTO: NEGATIVE
NRBC # BLD AUTO: 0 K/UL
NRBC BLD-RTO: 0 /100 WBC (ref 0–0.2)
OPIATES UR QL SCN: NEGATIVE
OXYCODONE UR QL SCN: NEGATIVE
PCP UR QL SCN: NEGATIVE
PH UR STRIP.AUTO: 6 [PH] (ref 5–8)
PLATELET # BLD AUTO: 235 K/UL (ref 164–446)
PMV BLD AUTO: 11.3 FL (ref 9–12.9)
POTASSIUM SERPL-SCNC: 4 MMOL/L (ref 3.6–5.5)
PROPOXYPH UR QL SCN: NEGATIVE
PROT SERPL-MCNC: 6.8 G/DL (ref 6–8.2)
PROT UR QL STRIP: NEGATIVE MG/DL
PROTHROMBIN TIME: 13.5 SEC (ref 12–14.6)
RBC # BLD AUTO: 5.68 M/UL (ref 4.7–6.1)
RBC UR QL AUTO: NEGATIVE
RH BLD: NORMAL
SCCMEC + MECA PNL NOSE NAA+PROBE: NEGATIVE
SCCMEC + MECA PNL NOSE NAA+PROBE: POSITIVE
SODIUM SERPL-SCNC: 139 MMOL/L (ref 135–145)
SP GR UR STRIP.AUTO: 1.02
UROBILINOGEN UR STRIP.AUTO-MCNC: 0.2 MG/DL
WBC # BLD AUTO: 6.3 K/UL (ref 4.8–10.8)

## 2023-07-03 PROCEDURE — 81003 URINALYSIS AUTO W/O SCOPE: CPT

## 2023-07-03 PROCEDURE — 83036 HEMOGLOBIN GLYCOSYLATED A1C: CPT

## 2023-07-03 PROCEDURE — 80307 DRUG TEST PRSMV CHEM ANLYZR: CPT

## 2023-07-03 PROCEDURE — 86900 BLOOD TYPING SEROLOGIC ABO: CPT

## 2023-07-03 PROCEDURE — 87641 MR-STAPH DNA AMP PROBE: CPT

## 2023-07-03 PROCEDURE — 85025 COMPLETE CBC W/AUTO DIFF WBC: CPT

## 2023-07-03 PROCEDURE — 86850 RBC ANTIBODY SCREEN: CPT

## 2023-07-03 PROCEDURE — 87640 STAPH A DNA AMP PROBE: CPT

## 2023-07-03 PROCEDURE — 93010 ELECTROCARDIOGRAM REPORT: CPT | Performed by: INTERNAL MEDICINE

## 2023-07-03 PROCEDURE — 85730 THROMBOPLASTIN TIME PARTIAL: CPT

## 2023-07-03 PROCEDURE — 93005 ELECTROCARDIOGRAM TRACING: CPT

## 2023-07-03 PROCEDURE — 85610 PROTHROMBIN TIME: CPT

## 2023-07-03 PROCEDURE — 80053 COMPREHEN METABOLIC PANEL: CPT

## 2023-07-03 PROCEDURE — 36415 COLL VENOUS BLD VENIPUNCTURE: CPT

## 2023-07-03 PROCEDURE — 86901 BLOOD TYPING SEROLOGIC RH(D): CPT

## 2023-07-03 PROCEDURE — 71046 X-RAY EXAM CHEST 2 VIEWS: CPT

## 2023-07-05 ENCOUNTER — ANESTHESIA EVENT (OUTPATIENT)
Dept: SURGERY | Facility: MEDICAL CENTER | Age: 78
DRG: 219 | End: 2023-07-05
Payer: MEDICARE

## 2023-07-06 ENCOUNTER — HOSPITAL ENCOUNTER (INPATIENT)
Facility: MEDICAL CENTER | Age: 78
LOS: 6 days | DRG: 219 | End: 2023-07-12
Attending: THORACIC SURGERY (CARDIOTHORACIC VASCULAR SURGERY) | Admitting: THORACIC SURGERY (CARDIOTHORACIC VASCULAR SURGERY)
Payer: MEDICARE

## 2023-07-06 ENCOUNTER — APPOINTMENT (OUTPATIENT)
Dept: CARDIOLOGY | Facility: MEDICAL CENTER | Age: 78
DRG: 219 | End: 2023-07-06
Attending: ANESTHESIOLOGY
Payer: MEDICARE

## 2023-07-06 ENCOUNTER — APPOINTMENT (OUTPATIENT)
Dept: RADIOLOGY | Facility: MEDICAL CENTER | Age: 78
DRG: 219 | End: 2023-07-06
Attending: THORACIC SURGERY (CARDIOTHORACIC VASCULAR SURGERY)
Payer: MEDICARE

## 2023-07-06 ENCOUNTER — ANESTHESIA (OUTPATIENT)
Dept: SURGERY | Facility: MEDICAL CENTER | Age: 78
DRG: 219 | End: 2023-07-06
Payer: MEDICARE

## 2023-07-06 DIAGNOSIS — J96.01 ACUTE RESPIRATORY FAILURE WITH HYPOXIA (HCC): ICD-10-CM

## 2023-07-06 DIAGNOSIS — I34.1 MITRAL VALVE PROLAPSE: ICD-10-CM

## 2023-07-06 DIAGNOSIS — Z95.2 S/P MITRAL VALVE REPLACEMENT: ICD-10-CM

## 2023-07-06 LAB
ABO + RH BLD: NORMAL
ACT BLD: 113 SEC (ref 74–137)
ACT BLD: 137 SEC (ref 74–137)
ACT BLD: 552 SEC (ref 74–137)
ACT BLD: 558 SEC (ref 74–137)
ACT BLD: 600 SEC (ref 74–137)
ACT BLD: 660 SEC (ref 74–137)
ANION GAP SERPL CALC-SCNC: 29 MMOL/L (ref 7–16)
APTT PPP: 36.1 SEC (ref 24.7–36)
BASE EXCESS BLDA CALC-SCNC: -10 MMOL/L (ref -4–3)
BASE EXCESS BLDA CALC-SCNC: -2 MMOL/L (ref -4–3)
BASE EXCESS BLDA CALC-SCNC: -2 MMOL/L (ref -4–3)
BASE EXCESS BLDA CALC-SCNC: -3 MMOL/L (ref -4–3)
BASE EXCESS BLDA CALC-SCNC: -4 MMOL/L (ref -4–3)
BASE EXCESS BLDA CALC-SCNC: -6 MMOL/L (ref -4–3)
BASE EXCESS BLDA CALC-SCNC: -6 MMOL/L (ref -4–3)
BASE EXCESS BLDA CALC-SCNC: -7 MMOL/L (ref -4–3)
BASE EXCESS BLDA CALC-SCNC: -7 MMOL/L (ref -4–3)
BASE EXCESS BLDA CALC-SCNC: -8 MMOL/L (ref -4–3)
BASE EXCESS BLDA CALC-SCNC: -8 MMOL/L (ref -4–3)
BASE EXCESS BLDA CALC-SCNC: 0 MMOL/L (ref -4–3)
BASE EXCESS BLDA CALC-SCNC: 0 MMOL/L (ref -4–3)
BASE EXCESS BLDV CALC-SCNC: -2 MMOL/L (ref -4–3)
BODY TEMPERATURE: ABNORMAL DEGREES
BUN SERPL-MCNC: 21 MG/DL (ref 8–22)
CA-I BLD ISE-SCNC: 0.98 MMOL/L (ref 1.1–1.3)
CA-I BLD ISE-SCNC: 1.01 MMOL/L (ref 1.1–1.3)
CA-I BLD ISE-SCNC: 1.03 MMOL/L (ref 1.1–1.3)
CA-I BLD ISE-SCNC: 1.16 MMOL/L (ref 1.1–1.3)
CA-I BLD ISE-SCNC: 1.18 MMOL/L (ref 1.1–1.3)
CA-I BLD ISE-SCNC: 1.25 MMOL/L (ref 1.1–1.3)
CA-I BLD ISE-SCNC: 1.26 MMOL/L (ref 1.1–1.3)
CA-I SERPL-SCNC: 1 MMOL/L (ref 1.1–1.3)
CALCIUM SERPL-MCNC: 7.6 MG/DL (ref 8.5–10.5)
CHLORIDE SERPL-SCNC: 109 MMOL/L (ref 96–112)
CO2 BLDA-SCNC: 17 MMOL/L (ref 20–33)
CO2 BLDA-SCNC: 18 MMOL/L (ref 20–33)
CO2 BLDA-SCNC: 18 MMOL/L (ref 20–33)
CO2 BLDA-SCNC: 19 MMOL/L (ref 20–33)
CO2 BLDA-SCNC: 20 MMOL/L (ref 20–33)
CO2 BLDA-SCNC: 20 MMOL/L (ref 20–33)
CO2 BLDA-SCNC: 21 MMOL/L (ref 20–33)
CO2 BLDA-SCNC: 24 MMOL/L (ref 20–33)
CO2 BLDA-SCNC: 25 MMOL/L (ref 20–33)
CO2 BLDA-SCNC: 27 MMOL/L (ref 20–33)
CO2 BLDA-SCNC: 27 MMOL/L (ref 20–33)
CO2 BLDA-SCNC: 28 MMOL/L (ref 20–33)
CO2 BLDA-SCNC: 29 MMOL/L (ref 20–33)
CO2 BLDV-SCNC: 25 MMOL/L (ref 20–33)
CO2 SERPL-SCNC: 16 MMOL/L (ref 20–33)
CREAT SERPL-MCNC: 1.4 MG/DL (ref 0.5–1.4)
DELSYS IDSYS: ABNORMAL
EKG IMPRESSION: NORMAL
GFR SERPLBLD CREATININE-BSD FMLA CKD-EPI: 52 ML/MIN/1.73 M 2
GLUCOSE SERPL-MCNC: 226 MG/DL (ref 65–99)
HCO3 BLDA-SCNC: 15.8 MMOL/L (ref 17–25)
HCO3 BLDA-SCNC: 16.8 MMOL/L (ref 17–25)
HCO3 BLDA-SCNC: 17.3 MMOL/L (ref 17–25)
HCO3 BLDA-SCNC: 17.7 MMOL/L (ref 17–25)
HCO3 BLDA-SCNC: 18.6 MMOL/L (ref 17–25)
HCO3 BLDA-SCNC: 18.9 MMOL/L (ref 17–25)
HCO3 BLDA-SCNC: 19.9 MMOL/L (ref 17–25)
HCO3 BLDA-SCNC: 22.7 MMOL/L (ref 17–25)
HCO3 BLDA-SCNC: 24.1 MMOL/L (ref 17–25)
HCO3 BLDA-SCNC: 24.9 MMOL/L (ref 17–25)
HCO3 BLDA-SCNC: 25 MMOL/L (ref 17–25)
HCO3 BLDA-SCNC: 26.5 MMOL/L (ref 17–25)
HCO3 BLDA-SCNC: 27.4 MMOL/L (ref 17–25)
HCO3 BLDV-SCNC: 23.8 MMOL/L (ref 24–28)
HCT VFR BLD AUTO: 41.5 % (ref 42–52)
HCT VFR BLD CALC: 35 % (ref 42–52)
HCT VFR BLD CALC: 36 % (ref 42–52)
HCT VFR BLD CALC: 37 % (ref 42–52)
HCT VFR BLD CALC: 40 % (ref 42–52)
HCT VFR BLD CALC: 46 % (ref 42–52)
HCT VFR BLD CALC: 50 % (ref 42–52)
HGB BLD-MCNC: 11.9 G/DL (ref 14–18)
HGB BLD-MCNC: 12.2 G/DL (ref 14–18)
HGB BLD-MCNC: 12.6 G/DL (ref 14–18)
HGB BLD-MCNC: 13.6 G/DL (ref 14–18)
HGB BLD-MCNC: 13.8 G/DL (ref 14–18)
HGB BLD-MCNC: 15.6 G/DL (ref 14–18)
HGB BLD-MCNC: 17 G/DL (ref 14–18)
HOROWITZ INDEX BLDA+IHG-RTO: 195 MM[HG]
HOROWITZ INDEX BLDA+IHG-RTO: 208 MM[HG]
HOROWITZ INDEX BLDA+IHG-RTO: 208 MM[HG]
HOROWITZ INDEX BLDA+IHG-RTO: 218 MM[HG]
HOROWITZ INDEX BLDA+IHG-RTO: 225 MM[HG]
HOROWITZ INDEX BLDA+IHG-RTO: 240 MM[HG]
HOROWITZ INDEX BLDA+IHG-RTO: 250 MM[HG]
HOROWITZ INDEX BLDA+IHG-RTO: 255 MM[HG]
INR PPP: 1.72 (ref 0.87–1.13)
LV EJECT FRACT  99904: 65
MAGNESIUM SERPL-MCNC: 3.2 MG/DL (ref 1.5–2.5)
MODE IMODE: ABNORMAL
O2/TOTAL GAS SETTING VFR VENT: 100 %
O2/TOTAL GAS SETTING VFR VENT: 40 %
PATHOLOGY CONSULT NOTE: NORMAL
PCO2 BLDA: 26.6 MMHG (ref 26–37)
PCO2 BLDA: 32.2 MMHG (ref 26–37)
PCO2 BLDA: 32.9 MMHG (ref 26–37)
PCO2 BLDA: 34.8 MMHG (ref 26–37)
PCO2 BLDA: 38 MMHG (ref 26–37)
PCO2 BLDA: 38.4 MMHG (ref 26–37)
PCO2 BLDA: 40 MMHG (ref 26–37)
PCO2 BLDA: 43.5 MMHG (ref 26–37)
PCO2 BLDA: 47.1 MMHG (ref 26–37)
PCO2 BLDA: 51.1 MMHG (ref 26–37)
PCO2 BLDA: 52.7 MMHG (ref 26–37)
PCO2 BLDA: 52.9 MMHG (ref 26–37)
PCO2 BLDA: 59.2 MMHG (ref 26–37)
PCO2 BLDV: 46.4 MMHG (ref 41–51)
PCO2 TEMP ADJ BLDA: 27.1 MMHG (ref 26–37)
PCO2 TEMP ADJ BLDA: 31.4 MMHG (ref 26–37)
PCO2 TEMP ADJ BLDA: 32.9 MMHG (ref 26–37)
PCO2 TEMP ADJ BLDA: 35.2 MMHG (ref 26–37)
PCO2 TEMP ADJ BLDA: 36 MMHG (ref 26–37)
PCO2 TEMP ADJ BLDA: 36.3 MMHG (ref 26–37)
PCO2 TEMP ADJ BLDA: 37.6 MMHG (ref 26–37)
PCO2 TEMP ADJ BLDA: 42.5 MMHG (ref 26–37)
PCO2 TEMP ADJ BLDA: 45.5 MMHG (ref 26–37)
PCO2 TEMP ADJ BLDA: 48.9 MMHG (ref 26–37)
PCO2 TEMP ADJ BLDA: 51.5 MMHG (ref 26–37)
PCO2 TEMP ADJ BLDA: 51.8 MMHG (ref 26–37)
PCO2 TEMP ADJ BLDA: 54.3 MMHG (ref 26–37)
PCO2 TEMP ADJ BLDV: 42.5 MMHG (ref 41–51)
PEEP END EXPIRATORY PRESSURE IPEEP: 8 CMH20
PERCENT MINUTE VOLUME IPMV: 100
PERCENT MINUTE VOLUME IPMV: 130
PERCENT MINUTE VOLUME IPMV: 160
PERCENT MINUTE VOLUME IPMV: 160
PH BLDA: 7.27 [PH] (ref 7.4–7.5)
PH BLDA: 7.28 [PH] (ref 7.4–7.5)
PH BLDA: 7.29 [PH] (ref 7.4–7.5)
PH BLDA: 7.3 [PH] (ref 7.4–7.5)
PH BLDA: 7.3 [PH] (ref 7.4–7.5)
PH BLDA: 7.32 [PH] (ref 7.4–7.5)
PH BLDA: 7.32 [PH] (ref 7.4–7.5)
PH BLDA: 7.33 [PH] (ref 7.4–7.5)
PH BLDA: 7.35 [PH] (ref 7.4–7.5)
PH BLDA: 7.42 [PH] (ref 7.4–7.5)
PH BLDV: 7.32 [PH] (ref 7.31–7.45)
PH TEMP ADJ BLDA: 7.29 [PH] (ref 7.4–7.5)
PH TEMP ADJ BLDA: 7.3 [PH] (ref 7.4–7.5)
PH TEMP ADJ BLDA: 7.3 [PH] (ref 7.4–7.5)
PH TEMP ADJ BLDA: 7.31 [PH] (ref 7.4–7.5)
PH TEMP ADJ BLDA: 7.31 [PH] (ref 7.4–7.5)
PH TEMP ADJ BLDA: 7.32 [PH] (ref 7.4–7.5)
PH TEMP ADJ BLDA: 7.32 [PH] (ref 7.4–7.5)
PH TEMP ADJ BLDA: 7.33 [PH] (ref 7.4–7.5)
PH TEMP ADJ BLDA: 7.34 [PH] (ref 7.4–7.5)
PH TEMP ADJ BLDA: 7.36 [PH] (ref 7.4–7.5)
PH TEMP ADJ BLDA: 7.42 [PH] (ref 7.4–7.5)
PH TEMP ADJ BLDV: 7.35 [PH] (ref 7.31–7.45)
PLATELET # BLD AUTO: 142 K/UL (ref 164–446)
PO2 BLDA: 100 MMHG (ref 64–87)
PO2 BLDA: 102 MMHG (ref 64–87)
PO2 BLDA: 208 MMHG (ref 64–87)
PO2 BLDA: 247 MMHG (ref 64–87)
PO2 BLDA: 331 MMHG (ref 64–87)
PO2 BLDA: 336 MMHG (ref 64–87)
PO2 BLDA: 360 MMHG (ref 64–87)
PO2 BLDA: 379 MMHG (ref 64–87)
PO2 BLDA: 78 MMHG (ref 64–87)
PO2 BLDA: 83 MMHG (ref 64–87)
PO2 BLDA: 87 MMHG (ref 64–87)
PO2 BLDA: 90 MMHG (ref 64–87)
PO2 BLDA: 96 MMHG (ref 64–87)
PO2 BLDV: 55 MMHG (ref 25–40)
PO2 TEMP ADJ BLDA: 204 MMHG (ref 64–87)
PO2 TEMP ADJ BLDA: 244 MMHG (ref 64–87)
PO2 TEMP ADJ BLDA: 326 MMHG (ref 64–87)
PO2 TEMP ADJ BLDA: 326 MMHG (ref 64–87)
PO2 TEMP ADJ BLDA: 357 MMHG (ref 64–87)
PO2 TEMP ADJ BLDA: 377 MMHG (ref 64–87)
PO2 TEMP ADJ BLDA: 79 MMHG (ref 64–87)
PO2 TEMP ADJ BLDA: 82 MMHG (ref 64–87)
PO2 TEMP ADJ BLDA: 85 MMHG (ref 64–87)
PO2 TEMP ADJ BLDA: 87 MMHG (ref 64–87)
PO2 TEMP ADJ BLDA: 90 MMHG (ref 64–87)
PO2 TEMP ADJ BLDA: 92 MMHG (ref 64–87)
PO2 TEMP ADJ BLDA: 98 MMHG (ref 64–87)
PO2 TEMP ADJ BLDV: 48 MMHG (ref 25–40)
POTASSIUM BLD-SCNC: 3.6 MMOL/L (ref 3.6–5.5)
POTASSIUM BLD-SCNC: 3.9 MMOL/L (ref 3.6–5.5)
POTASSIUM BLD-SCNC: 4.1 MMOL/L (ref 3.6–5.5)
POTASSIUM BLD-SCNC: 4.5 MMOL/L (ref 3.6–5.5)
POTASSIUM BLD-SCNC: 4.8 MMOL/L (ref 3.6–5.5)
POTASSIUM BLD-SCNC: 5.3 MMOL/L (ref 3.6–5.5)
POTASSIUM BLD-SCNC: 5.5 MMOL/L (ref 3.6–5.5)
POTASSIUM SERPL-SCNC: 2.7 MMOL/L (ref 3.6–5.5)
POTASSIUM SERPL-SCNC: 2.8 MMOL/L (ref 3.6–5.5)
POTASSIUM SERPL-SCNC: 4.5 MMOL/L (ref 3.6–5.5)
PRESSURE SUPPORT SETTING VENT: 5 CM[H2O]
PROTHROMBIN TIME: 19.8 SEC (ref 12–14.6)
SAO2 % BLDA: 100 % (ref 93–99)
SAO2 % BLDA: 94 % (ref 93–99)
SAO2 % BLDA: 96 % (ref 93–99)
SAO2 % BLDA: 96 % (ref 93–99)
SAO2 % BLDA: 97 % (ref 93–99)
SAO2 % BLDV: 85 %
SODIUM BLD-SCNC: 137 MMOL/L (ref 135–145)
SODIUM BLD-SCNC: 138 MMOL/L (ref 135–145)
SODIUM BLD-SCNC: 138 MMOL/L (ref 135–145)
SODIUM BLD-SCNC: 139 MMOL/L (ref 135–145)
SODIUM BLD-SCNC: 140 MMOL/L (ref 135–145)
SODIUM BLD-SCNC: 141 MMOL/L (ref 135–145)
SODIUM BLD-SCNC: 142 MMOL/L (ref 135–145)
SODIUM SERPL-SCNC: 154 MMOL/L (ref 135–145)
SPECIMEN DRAWN FROM PATIENT: ABNORMAL

## 2023-07-06 PROCEDURE — 88304 TISSUE EXAM BY PATHOLOGIST: CPT

## 2023-07-06 PROCEDURE — 88305 TISSUE EXAM BY PATHOLOGIST: CPT | Mod: 59

## 2023-07-06 PROCEDURE — 82803 BLOOD GASES ANY COMBINATION: CPT | Mod: 91

## 2023-07-06 PROCEDURE — 99100 ANES PT EXTEME AGE<1 YR&>70: CPT | Performed by: ANESTHESIOLOGY

## 2023-07-06 PROCEDURE — 94002 VENT MGMT INPAT INIT DAY: CPT

## 2023-07-06 PROCEDURE — 700111 HCHG RX REV CODE 636 W/ 250 OVERRIDE (IP): Performed by: THORACIC SURGERY (CARDIOTHORACIC VASCULAR SURGERY)

## 2023-07-06 PROCEDURE — 700111 HCHG RX REV CODE 636 W/ 250 OVERRIDE (IP): Performed by: NURSE PRACTITIONER

## 2023-07-06 PROCEDURE — 94003 VENT MGMT INPAT SUBQ DAY: CPT

## 2023-07-06 PROCEDURE — 93320 DOPPLER ECHO COMPLETE: CPT | Mod: 26 | Performed by: ANESTHESIOLOGY

## 2023-07-06 PROCEDURE — 93319 3D ECHO IMG CGEN CAR ANOMAL: CPT | Performed by: ANESTHESIOLOGY

## 2023-07-06 PROCEDURE — 85018 HEMOGLOBIN: CPT

## 2023-07-06 PROCEDURE — 82962 GLUCOSE BLOOD TEST: CPT | Mod: 91

## 2023-07-06 PROCEDURE — C1889 IMPLANT/INSERT DEVICE, NOC: HCPCS | Performed by: THORACIC SURGERY (CARDIOTHORACIC VASCULAR SURGERY)

## 2023-07-06 PROCEDURE — 83735 ASSAY OF MAGNESIUM: CPT

## 2023-07-06 PROCEDURE — 02RG08Z REPLACEMENT OF MITRAL VALVE WITH ZOOPLASTIC TISSUE, OPEN APPROACH: ICD-10-PCS | Performed by: THORACIC SURGERY (CARDIOTHORACIC VASCULAR SURGERY)

## 2023-07-06 PROCEDURE — C9248 INJ, CLEVIDIPINE BUTYRATE: HCPCS | Performed by: ANESTHESIOLOGY

## 2023-07-06 PROCEDURE — 700105 HCHG RX REV CODE 258: Performed by: ANESTHESIOLOGY

## 2023-07-06 PROCEDURE — 160042 HCHG SURGERY MINUTES - EA ADDL 1 MIN LEVEL 5: Performed by: THORACIC SURGERY (CARDIOTHORACIC VASCULAR SURGERY)

## 2023-07-06 PROCEDURE — 5A1221Z PERFORMANCE OF CARDIAC OUTPUT, CONTINUOUS: ICD-10-PCS | Performed by: THORACIC SURGERY (CARDIOTHORACIC VASCULAR SURGERY)

## 2023-07-06 PROCEDURE — 36415 COLL VENOUS BLD VENIPUNCTURE: CPT

## 2023-07-06 PROCEDURE — 160009 HCHG ANES TIME/MIN: Performed by: THORACIC SURGERY (CARDIOTHORACIC VASCULAR SURGERY)

## 2023-07-06 PROCEDURE — 770022 HCHG ROOM/CARE - ICU (200)

## 2023-07-06 PROCEDURE — 99291 CRITICAL CARE FIRST HOUR: CPT | Performed by: INTERNAL MEDICINE

## 2023-07-06 PROCEDURE — C1894 INTRO/SHEATH, NON-LASER: HCPCS | Performed by: THORACIC SURGERY (CARDIOTHORACIC VASCULAR SURGERY)

## 2023-07-06 PROCEDURE — 700111 HCHG RX REV CODE 636 W/ 250 OVERRIDE (IP): Performed by: ANESTHESIOLOGY

## 2023-07-06 PROCEDURE — 700105 HCHG RX REV CODE 258: Performed by: INTERNAL MEDICINE

## 2023-07-06 PROCEDURE — C1751 CATH, INF, PER/CENT/MIDLINE: HCPCS | Performed by: THORACIC SURGERY (CARDIOTHORACIC VASCULAR SURGERY)

## 2023-07-06 PROCEDURE — C1898 LEAD, PMKR, OTHER THAN TRANS: HCPCS | Performed by: THORACIC SURGERY (CARDIOTHORACIC VASCULAR SURGERY)

## 2023-07-06 PROCEDURE — 93312 ECHO TRANSESOPHAGEAL: CPT | Mod: 26,59 | Performed by: ANESTHESIOLOGY

## 2023-07-06 PROCEDURE — 503001 HCHG PERFUSION: Performed by: THORACIC SURGERY (CARDIOTHORACIC VASCULAR SURGERY)

## 2023-07-06 PROCEDURE — C1768 GRAFT, VASCULAR: HCPCS | Performed by: THORACIC SURGERY (CARDIOTHORACIC VASCULAR SURGERY)

## 2023-07-06 PROCEDURE — 93005 ELECTROCARDIOGRAM TRACING: CPT | Performed by: NURSE PRACTITIONER

## 2023-07-06 PROCEDURE — 85014 HEMATOCRIT: CPT | Mod: 91

## 2023-07-06 PROCEDURE — 700101 HCHG RX REV CODE 250

## 2023-07-06 PROCEDURE — 700101 HCHG RX REV CODE 250: Performed by: ANESTHESIOLOGY

## 2023-07-06 PROCEDURE — 94150 VITAL CAPACITY TEST: CPT

## 2023-07-06 PROCEDURE — 85610 PROTHROMBIN TIME: CPT

## 2023-07-06 PROCEDURE — 37799 UNLISTED PX VASCULAR SURGERY: CPT

## 2023-07-06 PROCEDURE — 93503 INSERT/PLACE HEART CATHETER: CPT | Performed by: ANESTHESIOLOGY

## 2023-07-06 PROCEDURE — 700111 HCHG RX REV CODE 636 W/ 250 OVERRIDE (IP)

## 2023-07-06 PROCEDURE — 82330 ASSAY OF CALCIUM: CPT | Mod: 91

## 2023-07-06 PROCEDURE — 00562 ANES PX HRT W/PUMP AGE 1YR+: CPT | Performed by: ANESTHESIOLOGY

## 2023-07-06 PROCEDURE — 36620 INSERTION CATHETER ARTERY: CPT | Performed by: ANESTHESIOLOGY

## 2023-07-06 PROCEDURE — C1713 ANCHOR/SCREW BN/BN,TIS/BN: HCPCS | Performed by: THORACIC SURGERY (CARDIOTHORACIC VASCULAR SURGERY)

## 2023-07-06 PROCEDURE — 700105 HCHG RX REV CODE 258: Performed by: NURSE PRACTITIONER

## 2023-07-06 PROCEDURE — 700105 HCHG RX REV CODE 258: Mod: JZ | Performed by: THORACIC SURGERY (CARDIOTHORACIC VASCULAR SURGERY)

## 2023-07-06 PROCEDURE — 700111 HCHG RX REV CODE 636 W/ 250 OVERRIDE (IP): Mod: JZ | Performed by: INTERNAL MEDICINE

## 2023-07-06 PROCEDURE — 93319 3D ECHO IMG CGEN CAR ANOMAL: CPT

## 2023-07-06 PROCEDURE — 160031 HCHG SURGERY MINUTES - 1ST 30 MINS LEVEL 5: Performed by: THORACIC SURGERY (CARDIOTHORACIC VASCULAR SURGERY)

## 2023-07-06 PROCEDURE — 71045 X-RAY EXAM CHEST 1 VIEW: CPT

## 2023-07-06 PROCEDURE — 84132 ASSAY OF SERUM POTASSIUM: CPT | Mod: 91

## 2023-07-06 PROCEDURE — 02B70ZK EXCISION OF LEFT ATRIAL APPENDAGE, OPEN APPROACH: ICD-10-PCS | Performed by: THORACIC SURGERY (CARDIOTHORACIC VASCULAR SURGERY)

## 2023-07-06 PROCEDURE — P9045 ALBUMIN (HUMAN), 5%, 250 ML: HCPCS | Performed by: NURSE PRACTITIONER

## 2023-07-06 PROCEDURE — 33430 REPLACEMENT OF MITRAL VALVE: CPT | Performed by: THORACIC SURGERY (CARDIOTHORACIC VASCULAR SURGERY)

## 2023-07-06 PROCEDURE — 160048 HCHG OR STATISTICAL LEVEL 1-5: Performed by: THORACIC SURGERY (CARDIOTHORACIC VASCULAR SURGERY)

## 2023-07-06 PROCEDURE — C1729 CATH, DRAINAGE: HCPCS | Performed by: THORACIC SURGERY (CARDIOTHORACIC VASCULAR SURGERY)

## 2023-07-06 PROCEDURE — 700102 HCHG RX REV CODE 250 W/ 637 OVERRIDE(OP): Performed by: THORACIC SURGERY (CARDIOTHORACIC VASCULAR SURGERY)

## 2023-07-06 PROCEDURE — 84295 ASSAY OF SERUM SODIUM: CPT | Mod: 91

## 2023-07-06 PROCEDURE — 85049 AUTOMATED PLATELET COUNT: CPT

## 2023-07-06 PROCEDURE — 85730 THROMBOPLASTIN TIME PARTIAL: CPT

## 2023-07-06 PROCEDURE — 93010 ELECTROCARDIOGRAM REPORT: CPT | Performed by: INTERNAL MEDICINE

## 2023-07-06 PROCEDURE — 80048 BASIC METABOLIC PNL TOTAL CA: CPT

## 2023-07-06 PROCEDURE — 33430 REPLACEMENT OF MITRAL VALVE: CPT | Mod: AS | Performed by: NURSE PRACTITIONER

## 2023-07-06 PROCEDURE — 700101 HCHG RX REV CODE 250: Performed by: NURSE PRACTITIONER

## 2023-07-06 PROCEDURE — 94799 UNLISTED PULMONARY SVC/PX: CPT

## 2023-07-06 PROCEDURE — 700105 HCHG RX REV CODE 258

## 2023-07-06 PROCEDURE — A9270 NON-COVERED ITEM OR SERVICE: HCPCS | Performed by: THORACIC SURGERY (CARDIOTHORACIC VASCULAR SURGERY)

## 2023-07-06 PROCEDURE — 85347 COAGULATION TIME ACTIVATED: CPT | Mod: 91

## 2023-07-06 PROCEDURE — 700101 HCHG RX REV CODE 250: Performed by: THORACIC SURGERY (CARDIOTHORACIC VASCULAR SURGERY)

## 2023-07-06 DEVICE — IMPLANTABLE DEVICE: Type: IMPLANTABLE DEVICE | Site: HEART | Status: FUNCTIONAL

## 2023-07-06 DEVICE — BONE PUTTY HEMOSTATIC ABSORBABLE (12/BX): Type: IMPLANTABLE DEVICE | Site: HEART | Status: FUNCTIONAL

## 2023-07-06 RX ORDER — METHADONE HYDROCHLORIDE 10 MG/1
10 TABLET ORAL ONCE
Status: COMPLETED | OUTPATIENT
Start: 2023-07-06 | End: 2023-07-06

## 2023-07-06 RX ORDER — ALUMINA, MAGNESIA, AND SIMETHICONE 2400; 2400; 240 MG/30ML; MG/30ML; MG/30ML
30 SUSPENSION ORAL EVERY 4 HOURS PRN
Status: DISCONTINUED | OUTPATIENT
Start: 2023-07-06 | End: 2023-07-12 | Stop reason: HOSPADM

## 2023-07-06 RX ORDER — SODIUM CHLORIDE, SODIUM GLUCONATE, SODIUM ACETATE, POTASSIUM CHLORIDE AND MAGNESIUM CHLORIDE 526; 502; 368; 37; 30 MG/100ML; MG/100ML; MG/100ML; MG/100ML; MG/100ML
INJECTION, SOLUTION INTRAVENOUS
Status: DISCONTINUED | OUTPATIENT
Start: 2023-07-06 | End: 2023-07-06 | Stop reason: SURG

## 2023-07-06 RX ORDER — TRAMADOL HYDROCHLORIDE 50 MG/1
50 TABLET ORAL EVERY 4 HOURS PRN
Status: DISCONTINUED | OUTPATIENT
Start: 2023-07-06 | End: 2023-07-12 | Stop reason: HOSPADM

## 2023-07-06 RX ORDER — MIDAZOLAM HYDROCHLORIDE 1 MG/ML
2 INJECTION INTRAMUSCULAR; INTRAVENOUS
Status: DISCONTINUED | OUTPATIENT
Start: 2023-07-06 | End: 2023-07-07

## 2023-07-06 RX ORDER — NITROGLYCERIN 20 MG/100ML
0-100 INJECTION INTRAVENOUS CONTINUOUS
Status: DISCONTINUED | OUTPATIENT
Start: 2023-07-06 | End: 2023-07-07

## 2023-07-06 RX ORDER — ASPIRIN 81 MG/1
81 TABLET ORAL DAILY
Status: DISCONTINUED | OUTPATIENT
Start: 2023-07-07 | End: 2023-07-12 | Stop reason: HOSPADM

## 2023-07-06 RX ORDER — SODIUM CHLORIDE 9 MG/ML
INJECTION, SOLUTION INTRAVENOUS CONTINUOUS
Status: DISCONTINUED | OUTPATIENT
Start: 2023-07-06 | End: 2023-07-08 | Stop reason: HOSPADM

## 2023-07-06 RX ORDER — MAGNESIUM HYDROXIDE 1200 MG/15ML
LIQUID ORAL
Status: COMPLETED | OUTPATIENT
Start: 2023-07-06 | End: 2023-07-06

## 2023-07-06 RX ORDER — AMOXICILLIN 250 MG
2 CAPSULE ORAL 2 TIMES DAILY
Status: DISCONTINUED | OUTPATIENT
Start: 2023-07-06 | End: 2023-07-12 | Stop reason: HOSPADM

## 2023-07-06 RX ORDER — METOCLOPRAMIDE HYDROCHLORIDE 5 MG/ML
INJECTION INTRAMUSCULAR; INTRAVENOUS PRN
Status: DISCONTINUED | OUTPATIENT
Start: 2023-07-06 | End: 2023-07-06 | Stop reason: SURG

## 2023-07-06 RX ORDER — OMEPRAZOLE 20 MG/1
20 CAPSULE, DELAYED RELEASE ORAL DAILY
Status: DISCONTINUED | OUTPATIENT
Start: 2023-07-07 | End: 2023-07-12 | Stop reason: HOSPADM

## 2023-07-06 RX ORDER — PROTAMINE SULFATE 10 MG/ML
INJECTION, SOLUTION INTRAVENOUS PRN
Status: DISCONTINUED | OUTPATIENT
Start: 2023-07-06 | End: 2023-07-06 | Stop reason: SURG

## 2023-07-06 RX ORDER — MAGNESIUM SULFATE 1 G/100ML
1 INJECTION INTRAVENOUS DAILY
Status: COMPLETED | OUTPATIENT
Start: 2023-07-06 | End: 2023-07-08

## 2023-07-06 RX ORDER — OXYCODONE HYDROCHLORIDE 5 MG/1
5 TABLET ORAL
Status: DISCONTINUED | OUTPATIENT
Start: 2023-07-06 | End: 2023-07-12 | Stop reason: HOSPADM

## 2023-07-06 RX ORDER — CEFAZOLIN SODIUM 1 G/3ML
INJECTION, POWDER, FOR SOLUTION INTRAMUSCULAR; INTRAVENOUS PRN
Status: DISCONTINUED | OUTPATIENT
Start: 2023-07-06 | End: 2023-07-06 | Stop reason: SURG

## 2023-07-06 RX ORDER — SODIUM CHLORIDE, SODIUM LACTATE, POTASSIUM CHLORIDE, CALCIUM CHLORIDE 600; 310; 30; 20 MG/100ML; MG/100ML; MG/100ML; MG/100ML
INJECTION, SOLUTION INTRAVENOUS
Status: DISCONTINUED | OUTPATIENT
Start: 2023-07-06 | End: 2023-07-06 | Stop reason: SURG

## 2023-07-06 RX ORDER — DIPHENHYDRAMINE HCL 25 MG
25 TABLET ORAL
Status: DISCONTINUED | OUTPATIENT
Start: 2023-07-06 | End: 2023-07-12 | Stop reason: HOSPADM

## 2023-07-06 RX ORDER — ACETAMINOPHEN 500 MG
1000 TABLET ORAL ONCE
Status: COMPLETED | OUTPATIENT
Start: 2023-07-06 | End: 2023-07-06

## 2023-07-06 RX ORDER — DEXMEDETOMIDINE HYDROCHLORIDE 4 UG/ML
0-1.5 INJECTION, SOLUTION INTRAVENOUS CONTINUOUS
Status: DISCONTINUED | OUTPATIENT
Start: 2023-07-06 | End: 2023-07-06

## 2023-07-06 RX ORDER — SODIUM CHLORIDE 9 MG/ML
INJECTION, SOLUTION INTRAVENOUS
Status: DISCONTINUED | OUTPATIENT
Start: 2023-07-06 | End: 2023-07-06 | Stop reason: SURG

## 2023-07-06 RX ORDER — ONDANSETRON 2 MG/ML
8 INJECTION INTRAMUSCULAR; INTRAVENOUS EVERY 6 HOURS PRN
Status: DISCONTINUED | OUTPATIENT
Start: 2023-07-06 | End: 2023-07-12 | Stop reason: HOSPADM

## 2023-07-06 RX ORDER — POLYETHYLENE GLYCOL 3350 17 G/17G
1 POWDER, FOR SOLUTION ORAL DAILY
Status: DISCONTINUED | OUTPATIENT
Start: 2023-07-07 | End: 2023-07-12 | Stop reason: HOSPADM

## 2023-07-06 RX ORDER — POTASSIUM CHLORIDE 7.45 MG/ML
10 INJECTION INTRAVENOUS ONCE
Status: COMPLETED | OUTPATIENT
Start: 2023-07-07 | End: 2023-07-07

## 2023-07-06 RX ORDER — NOREPINEPHRINE BITARTRATE 0.03 MG/ML
0-1 INJECTION, SOLUTION INTRAVENOUS CONTINUOUS
Status: DISCONTINUED | OUTPATIENT
Start: 2023-07-06 | End: 2023-07-06

## 2023-07-06 RX ORDER — DEXMEDETOMIDINE HYDROCHLORIDE 4 UG/ML
0-1.5 INJECTION, SOLUTION INTRAVENOUS CONTINUOUS
Status: DISCONTINUED | OUTPATIENT
Start: 2023-07-06 | End: 2023-07-07

## 2023-07-06 RX ORDER — ACETAMINOPHEN 500 MG
1000 TABLET ORAL EVERY 6 HOURS PRN
Status: DISCONTINUED | OUTPATIENT
Start: 2023-07-11 | End: 2023-07-12 | Stop reason: HOSPADM

## 2023-07-06 RX ORDER — ACETAMINOPHEN 500 MG
1000 TABLET ORAL EVERY 6 HOURS
Status: DISPENSED | OUTPATIENT
Start: 2023-07-06 | End: 2023-07-11

## 2023-07-06 RX ORDER — SODIUM CHLORIDE, SODIUM GLUCONATE, SODIUM ACETATE, POTASSIUM CHLORIDE AND MAGNESIUM CHLORIDE 526; 502; 368; 37; 30 MG/100ML; MG/100ML; MG/100ML; MG/100ML; MG/100ML
INJECTION, SOLUTION INTRAVENOUS PRN
Status: DISCONTINUED | OUTPATIENT
Start: 2023-07-06 | End: 2023-07-07

## 2023-07-06 RX ORDER — PROCHLORPERAZINE EDISYLATE 5 MG/ML
10 INJECTION INTRAMUSCULAR; INTRAVENOUS EVERY 6 HOURS PRN
Status: DISCONTINUED | OUTPATIENT
Start: 2023-07-06 | End: 2023-07-12 | Stop reason: HOSPADM

## 2023-07-06 RX ORDER — SODIUM CHLORIDE, SODIUM GLUCONATE, SODIUM ACETATE, POTASSIUM CHLORIDE AND MAGNESIUM CHLORIDE 526; 502; 368; 37; 30 MG/100ML; MG/100ML; MG/100ML; MG/100ML; MG/100ML
1000 INJECTION, SOLUTION INTRAVENOUS ONCE
Status: COMPLETED | OUTPATIENT
Start: 2023-07-06 | End: 2023-07-06

## 2023-07-06 RX ORDER — HEPARIN SODIUM,PORCINE 1000/ML
VIAL (ML) INJECTION PRN
Status: DISCONTINUED | OUTPATIENT
Start: 2023-07-06 | End: 2023-07-06 | Stop reason: SURG

## 2023-07-06 RX ORDER — ALBUMIN, HUMAN INJ 5% 5 %
25 SOLUTION INTRAVENOUS ONCE
Status: COMPLETED | OUTPATIENT
Start: 2023-07-06 | End: 2023-07-06

## 2023-07-06 RX ORDER — BISACODYL 10 MG
10 SUPPOSITORY, RECTAL RECTAL
Status: DISCONTINUED | OUTPATIENT
Start: 2023-07-06 | End: 2023-07-12 | Stop reason: HOSPADM

## 2023-07-06 RX ORDER — INSULIN LISPRO 100 [IU]/ML
0-14 INJECTION, SOLUTION INTRAVENOUS; SUBCUTANEOUS
Status: ACTIVE | OUTPATIENT
Start: 2023-07-06 | End: 2023-07-07

## 2023-07-06 RX ORDER — EPINEPHRINE HCL IN 0.9 % NACL 4MG/250ML
0-.5 PLASTIC BAG, INJECTION (ML) INTRAVENOUS CONTINUOUS
Status: DISCONTINUED | OUTPATIENT
Start: 2023-07-06 | End: 2023-07-06

## 2023-07-06 RX ORDER — POTASSIUM CHLORIDE 29.8 MG/ML
40 INJECTION INTRAVENOUS ONCE
Status: COMPLETED | OUTPATIENT
Start: 2023-07-06 | End: 2023-07-06

## 2023-07-06 RX ORDER — EPINEPHRINE HCL IN 0.9 % NACL 4MG/250ML
0-.5 PLASTIC BAG, INJECTION (ML) INTRAVENOUS CONTINUOUS
Status: DISCONTINUED | OUTPATIENT
Start: 2023-07-06 | End: 2023-07-07

## 2023-07-06 RX ORDER — POTASSIUM CHLORIDE 7.45 MG/ML
10 INJECTION INTRAVENOUS ONCE
Status: COMPLETED | OUTPATIENT
Start: 2023-07-06 | End: 2023-07-06

## 2023-07-06 RX ORDER — ENOXAPARIN SODIUM 100 MG/ML
40 INJECTION SUBCUTANEOUS DAILY
Status: DISCONTINUED | OUTPATIENT
Start: 2023-07-07 | End: 2023-07-12 | Stop reason: HOSPADM

## 2023-07-06 RX ORDER — OXYCODONE HYDROCHLORIDE 10 MG/1
10 TABLET ORAL
Status: DISCONTINUED | OUTPATIENT
Start: 2023-07-06 | End: 2023-07-12 | Stop reason: HOSPADM

## 2023-07-06 RX ORDER — ONDANSETRON 2 MG/ML
INJECTION INTRAMUSCULAR; INTRAVENOUS PRN
Status: DISCONTINUED | OUTPATIENT
Start: 2023-07-06 | End: 2023-07-06 | Stop reason: SURG

## 2023-07-06 RX ORDER — LIDOCAINE HYDROCHLORIDE 20 MG/ML
INJECTION, SOLUTION EPIDURAL; INFILTRATION; INTRACAUDAL; PERINEURAL PRN
Status: DISCONTINUED | OUTPATIENT
Start: 2023-07-06 | End: 2023-07-06 | Stop reason: SURG

## 2023-07-06 RX ORDER — DEXAMETHASONE SODIUM PHOSPHATE 4 MG/ML
INJECTION, SOLUTION INTRA-ARTICULAR; INTRALESIONAL; INTRAMUSCULAR; INTRAVENOUS; SOFT TISSUE PRN
Status: DISCONTINUED | OUTPATIENT
Start: 2023-07-06 | End: 2023-07-06 | Stop reason: SURG

## 2023-07-06 RX ORDER — POTASSIUM CHLORIDE 14.9 MG/ML
20 INJECTION INTRAVENOUS ONCE
Status: COMPLETED | OUTPATIENT
Start: 2023-07-07 | End: 2023-07-07

## 2023-07-06 RX ADMIN — FENTANYL CITRATE 100 MCG: 50 INJECTION, SOLUTION INTRAMUSCULAR; INTRAVENOUS at 08:14

## 2023-07-06 RX ADMIN — POTASSIUM CHLORIDE 10 MEQ: 7.46 INJECTION, SOLUTION INTRAVENOUS at 12:40

## 2023-07-06 RX ADMIN — ACETAMINOPHEN 1000 MG: 500 TABLET, FILM COATED ORAL at 06:36

## 2023-07-06 RX ADMIN — SODIUM CHLORIDE, SODIUM GLUCONATE, SODIUM ACETATE, POTASSIUM CHLORIDE AND MAGNESIUM CHLORIDE: 526; 502; 368; 37; 30 INJECTION, SOLUTION INTRAVENOUS at 07:55

## 2023-07-06 RX ADMIN — SODIUM CHLORIDE: 9 INJECTION, SOLUTION INTRAVENOUS at 07:55

## 2023-07-06 RX ADMIN — DEXAMETHASONE SODIUM PHOSPHATE 4 MG: 4 INJECTION INTRA-ARTICULAR; INTRALESIONAL; INTRAMUSCULAR; INTRAVENOUS; SOFT TISSUE at 07:29

## 2023-07-06 RX ADMIN — PROTAMINE SULFATE 400 MG: 10 INJECTION, SOLUTION INTRAVENOUS at 10:35

## 2023-07-06 RX ADMIN — POTASSIUM CHLORIDE 40 MEQ: 29.8 INJECTION, SOLUTION INTRAVENOUS at 19:12

## 2023-07-06 RX ADMIN — FENTANYL CITRATE 50 MCG: 50 INJECTION, SOLUTION INTRAMUSCULAR; INTRAVENOUS at 17:49

## 2023-07-06 RX ADMIN — SODIUM BICARBONATE 50 MEQ: 84 INJECTION, SOLUTION INTRAVENOUS at 18:12

## 2023-07-06 RX ADMIN — EPINEPHRINE 0.06 MCG/KG/MIN: 1 INJECTION INTRAMUSCULAR; INTRAVENOUS; SUBCUTANEOUS at 14:48

## 2023-07-06 RX ADMIN — CEFAZOLIN 2 G: 1 INJECTION, POWDER, FOR SOLUTION INTRAMUSCULAR; INTRAVENOUS at 07:58

## 2023-07-06 RX ADMIN — CALCIUM CHLORIDE 1000 MG: 100 INJECTION, SOLUTION INTRAVENOUS at 19:36

## 2023-07-06 RX ADMIN — AMINOCAPROIC ACID 8.64 G: 250 INJECTION, SOLUTION INTRAVENOUS at 07:55

## 2023-07-06 RX ADMIN — Medication 1 APPLICATOR: at 22:03

## 2023-07-06 RX ADMIN — SODIUM BICARBONATE 50 MEQ: 84 INJECTION, SOLUTION INTRAVENOUS at 15:00

## 2023-07-06 RX ADMIN — SODIUM CHLORIDE: 9 INJECTION, SOLUTION INTRAVENOUS at 12:33

## 2023-07-06 RX ADMIN — METHADONE HYDROCHLORIDE 10 MG: 10 TABLET ORAL at 06:36

## 2023-07-06 RX ADMIN — CLEVIPIDINE 500 MCG: 0.5 EMULSION INTRAVENOUS at 07:56

## 2023-07-06 RX ADMIN — AMINOCAPROIC ACID 5 G: 250 INJECTION, SOLUTION INTRAVENOUS at 08:30

## 2023-07-06 RX ADMIN — SODIUM CHLORIDE: 9 INJECTION, SOLUTION INTRAVENOUS at 12:41

## 2023-07-06 RX ADMIN — MIDAZOLAM 2 MG: 1 INJECTION, SOLUTION INTRAMUSCULAR; INTRAVENOUS at 18:12

## 2023-07-06 RX ADMIN — SODIUM CHLORIDE, SODIUM GLUCONATE, SODIUM ACETATE, POTASSIUM CHLORIDE AND MAGNESIUM CHLORIDE 1000 ML: 526; 502; 368; 37; 30 INJECTION, SOLUTION INTRAVENOUS at 15:27

## 2023-07-06 RX ADMIN — EPINEPHRINE 0.02 MCG/KG/MIN: 1 INJECTION INTRAMUSCULAR; INTRAVENOUS; SUBCUTANEOUS at 10:15

## 2023-07-06 RX ADMIN — CLEVIPIDINE 2 MG/HR: 0.5 EMULSION INTRAVENOUS at 10:28

## 2023-07-06 RX ADMIN — ROCURONIUM BROMIDE 50 MG: 10 INJECTION, SOLUTION INTRAVENOUS at 08:31

## 2023-07-06 RX ADMIN — PROCHLORPERAZINE EDISYLATE 10 MG: 5 INJECTION INTRAMUSCULAR; INTRAVENOUS at 20:46

## 2023-07-06 RX ADMIN — LIDOCAINE HYDROCHLORIDE 100 MG: 20 INJECTION, SOLUTION EPIDURAL; INFILTRATION; INTRACAUDAL at 07:29

## 2023-07-06 RX ADMIN — SODIUM BICARBONATE 50 MEQ: 84 INJECTION, SOLUTION INTRAVENOUS at 15:59

## 2023-07-06 RX ADMIN — HEPARIN SODIUM 35000 UNITS: 1000 INJECTION, SOLUTION INTRAVENOUS; SUBCUTANEOUS at 08:19

## 2023-07-06 RX ADMIN — PROPOFOL 150 MG: 10 INJECTION, EMULSION INTRAVENOUS at 07:29

## 2023-07-06 RX ADMIN — Medication 1 APPLICATOR: at 12:36

## 2023-07-06 RX ADMIN — EPINEPHRINE 0.12 MCG/KG/MIN: 1 INJECTION INTRAMUSCULAR; INTRAVENOUS; SUBCUTANEOUS at 17:22

## 2023-07-06 RX ADMIN — CLEVIPIDINE 500 MCG: 0.5 EMULSION INTRAVENOUS at 08:25

## 2023-07-06 RX ADMIN — FENTANYL CITRATE 100 MCG: 50 INJECTION, SOLUTION INTRAMUSCULAR; INTRAVENOUS at 07:24

## 2023-07-06 RX ADMIN — ONDANSETRON 8 MG: 2 INJECTION INTRAMUSCULAR; INTRAVENOUS at 17:47

## 2023-07-06 RX ADMIN — FENTANYL CITRATE 50 MCG: 50 INJECTION, SOLUTION INTRAMUSCULAR; INTRAVENOUS at 10:38

## 2023-07-06 RX ADMIN — DEXMEDETOMIDINE 0.4 MCG/KG/HR: 100 INJECTION, SOLUTION INTRAVENOUS at 10:20

## 2023-07-06 RX ADMIN — SODIUM BICARBONATE 50 MEQ: 84 INJECTION, SOLUTION INTRAVENOUS at 14:02

## 2023-07-06 RX ADMIN — SODIUM BICARBONATE 50 MEQ: 84 INJECTION, SOLUTION INTRAVENOUS at 17:07

## 2023-07-06 RX ADMIN — CEFAZOLIN 2 G: 2 INJECTION, POWDER, FOR SOLUTION INTRAMUSCULAR; INTRAVENOUS at 17:32

## 2023-07-06 RX ADMIN — METOCLOPRAMIDE 10 MG: 5 INJECTION, SOLUTION INTRAMUSCULAR; INTRAVENOUS at 10:52

## 2023-07-06 RX ADMIN — SODIUM CHLORIDE 2 UNITS/HR: 9 INJECTION, SOLUTION INTRAVENOUS at 12:55

## 2023-07-06 RX ADMIN — SODIUM BICARBONATE 50 MEQ: 84 INJECTION, SOLUTION INTRAVENOUS at 19:21

## 2023-07-06 RX ADMIN — DEXMEDETOMIDINE 0.2 MCG/KG/HR: 100 INJECTION, SOLUTION INTRAVENOUS at 21:18

## 2023-07-06 RX ADMIN — MIDAZOLAM 2 MG: 1 INJECTION, SOLUTION INTRAMUSCULAR; INTRAVENOUS at 07:24

## 2023-07-06 RX ADMIN — SODIUM CHLORIDE, SODIUM GLUCONATE, SODIUM ACETATE, POTASSIUM CHLORIDE AND MAGNESIUM CHLORIDE 1000 ML: 526; 502; 368; 37; 30 INJECTION, SOLUTION INTRAVENOUS at 13:01

## 2023-07-06 RX ADMIN — ALBUMIN (HUMAN) 25 G: 12.5 SOLUTION INTRAVENOUS at 14:09

## 2023-07-06 RX ADMIN — CLEVIPIDINE 500 MCG: 0.5 EMULSION INTRAVENOUS at 08:14

## 2023-07-06 RX ADMIN — VASOPRESSIN 0.03 UNITS/MIN: 20 INJECTION INTRAVENOUS at 17:27

## 2023-07-06 RX ADMIN — SODIUM CHLORIDE, POTASSIUM CHLORIDE, SODIUM LACTATE AND CALCIUM CHLORIDE: 600; 310; 30; 20 INJECTION, SOLUTION INTRAVENOUS at 07:24

## 2023-07-06 RX ADMIN — METOPROLOL TARTRATE 12.5 MG: 25 TABLET, FILM COATED ORAL at 06:36

## 2023-07-06 RX ADMIN — MAGNESIUM SULFATE IN DEXTROSE 1 G: 10 INJECTION, SOLUTION INTRAVENOUS at 12:34

## 2023-07-06 RX ADMIN — SODIUM CHLORIDE, SODIUM GLUCONATE, SODIUM ACETATE, POTASSIUM CHLORIDE AND MAGNESIUM CHLORIDE 1000 ML: 526; 502; 368; 37; 30 INJECTION, SOLUTION INTRAVENOUS at 11:45

## 2023-07-06 RX ADMIN — ONDANSETRON 4 MG: 2 INJECTION INTRAMUSCULAR; INTRAVENOUS at 10:52

## 2023-07-06 RX ADMIN — EPINEPHRINE 0.11 MCG/KG/MIN: 1 INJECTION INTRAMUSCULAR; INTRAVENOUS; SUBCUTANEOUS at 20:39

## 2023-07-06 RX ADMIN — ROCURONIUM BROMIDE 100 MG: 10 INJECTION, SOLUTION INTRAVENOUS at 07:29

## 2023-07-06 ASSESSMENT — COGNITIVE AND FUNCTIONAL STATUS - GENERAL
DAILY ACTIVITIY SCORE: 10
TOILETING: TOTAL
STANDING UP FROM CHAIR USING ARMS: TOTAL
HELP NEEDED FOR BATHING: TOTAL
CLIMB 3 TO 5 STEPS WITH RAILING: TOTAL
DRESSING REGULAR LOWER BODY CLOTHING: TOTAL
TURNING FROM BACK TO SIDE WHILE IN FLAT BAD: UNABLE
MOVING TO AND FROM BED TO CHAIR: UNABLE
PERSONAL GROOMING: A LITTLE
SUGGESTED CMS G CODE MODIFIER MOBILITY: CN
DRESSING REGULAR UPPER BODY CLOTHING: TOTAL
SUGGESTED CMS G CODE MODIFIER DAILY ACTIVITY: CL
MOBILITY SCORE: 6
WALKING IN HOSPITAL ROOM: TOTAL
EATING MEALS: A LITTLE
MOVING FROM LYING ON BACK TO SITTING ON SIDE OF FLAT BED: UNABLE

## 2023-07-06 ASSESSMENT — FIBROSIS 4 INDEX: FIB4 SCORE: 1.55

## 2023-07-06 ASSESSMENT — PAIN DESCRIPTION - PAIN TYPE
TYPE: ACUTE PAIN;SURGICAL PAIN

## 2023-07-06 ASSESSMENT — ENCOUNTER SYMPTOMS
NERVOUS/ANXIOUS: 0
NAUSEA: 0
COUGH: 0
PND: 0
DIZZINESS: 0
BLURRED VISION: 0
SHORTNESS OF BREATH: 1
DOUBLE VISION: 0
PALPITATIONS: 0
WEAKNESS: 0
ABDOMINAL PAIN: 0

## 2023-07-06 ASSESSMENT — PULMONARY FUNCTION TESTS: FVC: 0.98

## 2023-07-06 ASSESSMENT — LIFESTYLE VARIABLES: SUBSTANCE_ABUSE: 0

## 2023-07-06 NOTE — CONSULTS
Critical Care Consultation    Date of consult: 7/6/2023    Referring Physician  Jacqueline Neri M.D.    Reason for Consultation  Postoperative critical care assistance    History of Presenting Illness  78 y.o. male with past medical history of atrial fibrillation, dyslipidemia, hypertension, severe mitral regurg who presented 7/6/2023 for an elective mitral valve replacement with Dr. De Leon.  Per anesthesia his laryngoscopy was moderately difficult with a grade 2 a view.  Preoperatively his LVEF was 65% and postoperatively while on epinephrine infusion this was preserved.  He arrives in the ICU on mechanical ventilatory support, sedated on and epinephrine.    Code Status  Full Code    Review of Systems  Review of Systems   Unable to perform ROS: Critical illness       Past Medical History   has a past medical history of Arrhythmia (2017), Arthritis (1989), Breath shortness (2022), Cancer (HCC) (2022), Dyslipidemia (12/14/2018), Essential hypertension (09/12/2013), Heart murmur (1980), Mitral valve prolapse (09/12/2013), Non-rheumatic mitral regurgitation (09/12/2013), Obstructive uropathy (01/25/2021), Renal disorder, and Snoring.    Surgical History   has a past surgical history that includes fistula in ano repair (Left, 2019); other (September 2019); and other.    Family History  family history includes Cancer in his mother; Lung Disease in his father.    Social History   reports that he quit smoking about 56 years ago. His smoking use included cigarettes. He started smoking about 57 years ago. He has never been exposed to tobacco smoke. He has never used smokeless tobacco. He reports that he does not currently use alcohol. He reports that he does not use drugs.    Medications  Home Medications       Reviewed by Bhavya Santos R.N. (Registered Nurse) on 07/06/23 at 0634  Med List Status: Complete     Medication Last Dose Status   acetaminophen (TYLENOL) 500 MG Tab  Active   acetaminophen (Tylenol) tablet 1,000  mg  Active   aminocaproic acid (Amicar) 5 g in  mL Infusion  Active   aminocaproic acid (Amicar) 8.64 g in  mL IV Bolus  Active   Ascorbic Acid (VITAMIN C) 500 MG Chew Tab 7/5/2023 Active   Cardiac Surgery Prophylactic Antibiotics per Pharmacy  Active   ceFAZolin (Ancef) 2 g in  mL IVPB  Active   dexmedetomidine (PRECEDEX) 400 mcg/100mL NS premix infusion  Active   docusate sodium (COLACE) 100 MG Cap 7/5/2023 Active   EPINEPHrine (Adrenalin) infusion 4 mg/250 mL (premix)  Active   ibuprofen (MOTRIN) 200 MG Tab > 2 weeks Active   insulin regular (Humulin R) 100 Units in  mL Infusion  Active   lidocaine (Xylocaine) 1 % injection 0.5 mL  Active   losartan (COZAAR) 100 MG Tab 7/4/2023 Active   Magnesium 250 MG Tab 7/5/2023 Active   methadone (Dolophine) tablet 10 mg  Active   metoprolol tartrate (Lopressor) tablet 12.5 mg  Active   multivitamin Tab > 2 weeks Active   norepinephrine (Levophed) 8 mg in 250 mL NS infusion (premix)  Active   Omega-3 Fatty Acids (FISH OIL) 1200 MG Cap > 2 weeks Active   VITAMIN D PO 7/5/2023 Active                  Current Facility-Administered Medications   Medication Dose Route Frequency Provider Last Rate Last Admin    EPINEPHrine (Adrenalin) infusion 4 mg/250 mL (premix)  0-0.5 mcg/kg/min (Ideal) Intravenous Continuous Jacqueline Neri M.D. 2.8 mL/hr at 07/06/23 1155 0.01 mcg/kg/min at 07/06/23 1155    Respiratory Therapy Consult   Nebulization Continuous RT BRYANT LeggettP.RShenN.        NS infusion   Intravenous Continuous BRYANT LeggettPShenRShenNShen        NS infusion   Intravenous Continuous BRYANT LeggettPShenRShenN. 30 mL/hr at 07/06/23 1159 Associate Infusion Pump at 07/06/23 1159    electrolyte-A (Plasmalyte-A) infusion   Intravenous PRN BRYANT LeggettP.R.NShen        [START ON 7/7/2023] enoxaparin (Lovenox) inj 40 mg  40 mg Subcutaneous DAILY AT 1800 BRYANT LeggettP.R.N.        ceFAZolin (Ancef) 2 g in  mL IVPB  2 g Intravenous Once Erika  CHUCK Millard nasal  swab  1 Applicator Each Nostril BID CHUCK Leggett        calcium CHLORIDE 1,000 mg in  mL IVPB  1,000 mg Intravenous Once PRN CHUCK Leggett        magnesium sulfate in D5W IVPB premix 1 g  1 g Intravenous DAILY CHUCK Leggett        K+ Scale: Goal of 4.5  1 Each Intravenous Q6HRS SAMANTHA Leggett.   1 Each at 07/06/23 1200    [START ON 7/7/2023] aspirin EC tablet 81 mg  81 mg Oral DAILY JANET LeggettRMUSHTAQ.        clevidipine (Cleviprex) IV emulsion  0-21 mg/hr Intravenous Continuous CHUCK Leggett   Dose not Required at 07/06/23 1145    nitroglycerin 50 mg in D5W 250 ml infusion  0-100 mcg/min Intravenous Continuous JANET LeggettRMARKEL   Dose not Required at 07/06/23 1145    Pharmacy Consult Request ...Pain Management Review 1 Each  1 Each Other PHARMACY TO DOSE CHUCK Leggett        acetaminophen (Tylenol) tablet 1,000 mg  1,000 mg Oral Q6HRS BRYANT LeggettPShenRMARKEL        Followed by    [START ON 7/11/2023] acetaminophen (Tylenol) tablet 1,000 mg  1,000 mg Oral Q6HRS PRN BRYANT LeggettPShenRMUSHTAQ.        oxyCODONE immediate-release (Roxicodone) tablet 5 mg  5 mg Oral Q3HRS PRN BRYANT LeggettPShenRMARKEL        Or    oxyCODONE immediate release (Roxicodone) tablet 10 mg  10 mg Oral Q3HRS PRN BRYANT LeggettPShenRMARKEL        Or    fentaNYL (Sublimaze) injection 50 mcg  50 mcg Intravenous Q3HRS PRN BRYANT LeggettPShenRMUSHTAQ.        traMADol (Ultram) 50 MG tablet 50 mg  50 mg Oral Q4HRS PRN BRYANT LeggettP.R.N.        midazolam (Versed) injection 2 mg  2 mg Intravenous Q HOUR PRN BRYANT LeggettPShenRShenN.        dexmedetomidine (PRECEDEX) 400 mcg/100mL NS premix infusion  0-1.5 mcg/kg/hr (Ideal) Intravenous Continuous BRYANT LeggettP.R.N. 7.5 mL/hr at 07/06/23 1020 0.4 mcg/kg/hr at 07/06/23 1020    sodium bicarbonate 8.4 % injection 50 mEq  50 mEq Intravenous Q  HOUR PRN BRYANT LeggettPShenRMUSHTAQ.        morphine 10 mg/mL injection 4 mg  4 mg Intravenous Q HOUR PRN BRYANT LeggettPShenRMUSHTAQ.        ondansetron (Zofran) syringe/vial injection 8 mg  8 mg Intravenous Q6HRS PRN BRYANT LeggettPShenR.JOSE G.        Or    prochlorperazine (Compazine) injection 10 mg  10 mg Intravenous Q6HRS PRN BRYANT LeggettPShenRMUSHTAQ.        senna-docusate (Pericolace Or Senokot S) 8.6-50 MG per tablet 2 Tablet  2 Tablet Oral BID BRYANT LeggettP.R.JOSE G.        And    [START ON 7/7/2023] polyethylene glycol/lytes (Miralax) PACKET 1 Packet  1 Packet Oral DAILY BRYANT LeggettP.R.JOSE G.        And    [START ON 7/8/2023] magnesium hydroxide (Milk Of Magnesia) suspension 30 mL  30 mL Oral DAILY ALESHA Leggett.P.R.ALEJANDRINA        And    bisacodyl (Dulcolax) suppository 10 mg  10 mg Rectal QDAY PRN BRYANT LeggettP.R.JOSE G.        [START ON 7/7/2023] omeprazole (PriLOSEC) capsule 20 mg  20 mg Oral DAILY BRYANT LeggettP.RMUSHTAQ.        mag hydrox-al hydrox-simeth (Maalox Plus Es Or Mylanta Ds) suspension 30 mL  30 mL Oral Q4HRS PRN BRYANT LeggettPShenRMUSHTAQ.        diphenhydrAMINE (Benadryl) tablet/capsule 25 mg  25 mg Oral HS PRN - MR X 1 BRYANT LeggettP.R.N.        [START ON 7/7/2023] metoprolol tartrate (Lopressor) tablet 12.5 mg  12.5 mg Oral BID ALESHA Leggett.P.R.N.        Followed by    [START ON 7/8/2023] metoprolol tartrate (Lopressor) tablet 25 mg  25 mg Oral BID ALESHA Leggett.P.R.N.        [START ON 7/7/2023] MD Alert...Warfarin per Pharmacy   Other PHARMACY TO DOSE CHUCK Leggett        potassium chloride (Kcl) ivpb 10 mEq  10 mEq Intravenous Once Jacqueline Neri M.D.           Allergies  No Known Allergies    Vital Signs last 24 hours  Temp:  [36.6 °C (97.8 °F)] 36.6 °C (97.8 °F)  Pulse:  [70-80] 80  Resp:  [14-18] 18  BP: (129-149)/(75-83) 149/83  SpO2:  [96 %-97 %] 96 %    Physical Exam  Physical Exam  Vitals and nursing note reviewed.   Constitutional:        General: He is in acute distress.      Appearance: He is well-developed. He is ill-appearing.      Interventions: He is intubated.   HENT:      Head: Normocephalic and atraumatic.      Right Ear: External ear normal.      Left Ear: External ear normal.      Mouth/Throat:      Comments: 8.5 ET tube in place  Eyes:      Conjunctiva/sclera: Conjunctivae normal.      Pupils: Pupils are equal, round, and reactive to light.   Neck:      Vascular: No JVD.      Trachea: No tracheal deviation.   Cardiovascular:      Rate and Rhythm: Normal rate and regular rhythm.      Pulses: Normal pulses.   Pulmonary:      Effort: He is intubated.      Breath sounds: Rales present. No wheezing.   Chest:       Abdominal:      General: Bowel sounds are normal. There is no distension.      Palpations: Abdomen is soft.   Musculoskeletal:         General: No tenderness.      Cervical back: Neck supple.   Skin:     General: Skin is warm and dry.      Capillary Refill: Capillary refill takes less than 2 seconds.      Findings: No rash.   Neurological:      General: No focal deficit present.      Mental Status: He is alert.      Cranial Nerves: No cranial nerve deficit.      Sensory: No sensory deficit.      Motor: No weakness.      Comments: Follows commands   Psychiatric:      Comments: Unable to assess         Fluids    Intake/Output Summary (Last 24 hours) at 7/6/2023 1231  Last data filed at 7/6/2023 1155  Gross per 24 hour   Intake 2427.45 ml   Output 1000 ml   Net 1427.45 ml       Laboratory  Recent Results (from the past 48 hour(s))   ABO Rh Confirm    Collection Time: 07/06/23  6:36 AM   Result Value Ref Range    ABO Rh Confirm O POS    Histology Request    Collection Time: 07/06/23  7:15 AM   Result Value Ref Range    Pathology Request Sent to Histo    POCT activated clotting time device results    Collection Time: 07/06/23  7:39 AM   Result Value Ref Range    Istat Activated Clotting Time 137 74 - 137 sec   POCT arterial blood gas  device results    Collection Time: 07/06/23  7:40 AM   Result Value Ref Range    Ph 7.352 (L) 7.400 - 7.500    Pco2 43.5 (H) 26.0 - 37.0 mmHg    Po2 247 (H) 64 - 87 mmHg    Tco2 25 20 - 33 mmol/L    S02 100 (H) 93 - 99 %    Hco3 24.1 17.0 - 25.0 mmol/L    BE -2 -4 - 3 mmol/L    Body Temp 36.5 C degrees    Ph Temp Yaa 7.359 (L) 7.400 - 7.500    Pco2 Temp Co 42.5 (H) 26.0 - 37.0 mmHg    Po2 Temp Cor 244 (H) 64 - 87 mmHg    Specimen Arterial    POCT sodium device results    Collection Time: 07/06/23  7:40 AM   Result Value Ref Range    Istat Sodium 142 135 - 145 mmol/L   POCT potassium device results    Collection Time: 07/06/23  7:40 AM   Result Value Ref Range    Istat Potassium 3.6 3.6 - 5.5 mmol/L   POCT ionized CA device results    Collection Time: 07/06/23  7:40 AM   Result Value Ref Range    Istat Ionized Calcium 1.25 1.10 - 1.30 mmol/L   POCT hematocrit and hemoglobin device results    Collection Time: 07/06/23  7:40 AM   Result Value Ref Range    Istat Hematocrit 50 42 - 52 %    Istat Hemoglobin 17.0 14.0 - 18.0 g/dL   POCT activated clotting time device results    Collection Time: 07/06/23  8:25 AM   Result Value Ref Range    Istat Activated Clotting Time 552 (H) 74 - 137 sec   POCT arterial blood gas device results    Collection Time: 07/06/23  8:25 AM   Result Value Ref Range    Ph 7.281 (LL) 7.400 - 7.500    Pco2 52.9 (HH) 26.0 - 37.0 mmHg    Po2 360 (H) 64 - 87 mmHg    Tco2 27 20 - 33 mmol/L    S02 100 (H) 93 - 99 %    Hco3 24.9 17.0 - 25.0 mmol/L    BE -3 -4 - 3 mmol/L    Body Temp 36.5 C degrees    Ph Temp Yaa 7.288 (LL) 7.400 - 7.500    Pco2 Temp Co 51.8 (HH) 26.0 - 37.0 mmHg    Po2 Temp Cor 357 (H) 64 - 87 mmHg    Specimen Arterial    POCT sodium device results    Collection Time: 07/06/23  8:25 AM   Result Value Ref Range    Istat Sodium 140 135 - 145 mmol/L   POCT potassium device results    Collection Time: 07/06/23  8:25 AM   Result Value Ref Range    Istat Potassium 3.9 3.6 - 5.5 mmol/L   POCT  ionized CA device results    Collection Time: 07/06/23  8:25 AM   Result Value Ref Range    Istat Ionized Calcium 1.18 1.10 - 1.30 mmol/L   POCT hematocrit and hemoglobin device results    Collection Time: 07/06/23  8:25 AM   Result Value Ref Range    Istat Hematocrit 46 42 - 52 %    Istat Hemoglobin 15.6 14.0 - 18.0 g/dL   POCT activated clotting time device results    Collection Time: 07/06/23  8:55 AM   Result Value Ref Range    Istat Activated Clotting Time 600 (H) 74 - 137 sec   POCT venous blood gas device results    Collection Time: 07/06/23  8:56 AM   Result Value Ref Range    Ph 7.319 7.310 - 7.450    Pco2 46.4 41.0 - 51.0 mmHg    Po2 55 (H) 25 - 40 mmHg    Tco2 25 20 - 33 mmol/L    SO2 85 %    Hco3 23.8 (L) 24.0 - 28.0 mmol/L    BE -2 -4 - 3 mmol/L    Body Temp 35.0 C degrees    Ph Temp Correc 7.347 7.310 - 7.450    Pco2 Temp Yaa 42.5 41.0 - 51.0 mmHg    Po2 Temp Corre 48 (H) 25 - 40 mmHg    Specimen Venous    POCT sodium device results    Collection Time: 07/06/23  8:56 AM   Result Value Ref Range    Istat Sodium 138 135 - 145 mmol/L   POCT potassium device results    Collection Time: 07/06/23  8:56 AM   Result Value Ref Range    Istat Potassium 4.8 3.6 - 5.5 mmol/L   POCT ionized CA device results    Collection Time: 07/06/23  8:56 AM   Result Value Ref Range    Istat Ionized Calcium 0.98 (L) 1.10 - 1.30 mmol/L   POCT hematocrit and hemoglobin device results    Collection Time: 07/06/23  8:56 AM   Result Value Ref Range    Istat Hematocrit 35 (L) 42 - 52 %    Istat Hemoglobin 11.9 (L) 14.0 - 18.0 g/dL   POCT activated clotting time device results    Collection Time: 07/06/23  9:29 AM   Result Value Ref Range    Istat Activated Clotting Time 660 (H) 74 - 137 sec   POCT arterial blood gas device results    Collection Time: 07/06/23  9:29 AM   Result Value Ref Range    Ph 7.274 (LL) 7.400 - 7.500    Pco2 59.2 (HH) 26.0 - 37.0 mmHg    Po2 336 (H) 64 - 87 mmHg    Tco2 29 20 - 33 mmol/L    S02 100 (H) 93 - 99  %    Hco3 27.4 (H) 17.0 - 25.0 mmol/L    BE 0 -4 - 3 mmol/L    Body Temp 35.0 C degrees    Ph Temp Yaa 7.301 (L) 7.400 - 7.500    Pco2 Temp Co 54.3 (HH) 26.0 - 37.0 mmHg    Po2 Temp Cor 326 (H) 64 - 87 mmHg    Specimen Arterial    POCT sodium device results    Collection Time: 07/06/23  9:29 AM   Result Value Ref Range    Istat Sodium 138 135 - 145 mmol/L   POCT potassium device results    Collection Time: 07/06/23  9:29 AM   Result Value Ref Range    Istat Potassium 5.3 3.6 - 5.5 mmol/L   POCT ionized CA device results    Collection Time: 07/06/23  9:29 AM   Result Value Ref Range    Istat Ionized Calcium 1.03 (L) 1.10 - 1.30 mmol/L   POCT hematocrit and hemoglobin device results    Collection Time: 07/06/23  9:29 AM   Result Value Ref Range    Istat Hematocrit 37 (L) 42 - 52 %    Istat Hemoglobin 12.6 (L) 14.0 - 18.0 g/dL   POCT activated clotting time device results    Collection Time: 07/06/23  9:58 AM   Result Value Ref Range    Istat Activated Clotting Time 558 (H) 74 - 137 sec   POCT arterial blood gas device results    Collection Time: 07/06/23  9:59 AM   Result Value Ref Range    Ph 7.324 (L) 7.400 - 7.500    Pco2 51.1 (HH) 26.0 - 37.0 mmHg    Po2 331 (H) 64 - 87 mmHg    Tco2 28 20 - 33 mmol/L    S02 100 (H) 93 - 99 %    Hco3 26.5 (H) 17.0 - 25.0 mmol/L    BE 0 -4 - 3 mmol/L    Body Temp 36.0 C degrees    Ph Temp Yaa 7.338 (L) 7.400 - 7.500    Pco2 Temp Co 48.9 (H) 26.0 - 37.0 mmHg    Po2 Temp Cor 326 (H) 64 - 87 mmHg    Specimen Arterial    POCT sodium device results    Collection Time: 07/06/23  9:59 AM   Result Value Ref Range    Istat Sodium 137 135 - 145 mmol/L   POCT potassium device results    Collection Time: 07/06/23  9:59 AM   Result Value Ref Range    Istat Potassium 5.5 3.6 - 5.5 mmol/L   POCT ionized CA device results    Collection Time: 07/06/23  9:59 AM   Result Value Ref Range    Istat Ionized Calcium 1.01 (L) 1.10 - 1.30 mmol/L   POCT hematocrit and hemoglobin device results    Collection  Time: 07/06/23  9:59 AM   Result Value Ref Range    Istat Hematocrit 36 (L) 42 - 52 %    Istat Hemoglobin 12.2 (L) 14.0 - 18.0 g/dL   POCT activated clotting time device results    Collection Time: 07/06/23 10:47 AM   Result Value Ref Range    Istat Activated Clotting Time 113 74 - 137 sec   POCT arterial blood gas device results    Collection Time: 07/06/23 10:48 AM   Result Value Ref Range    Ph 7.285 (LL) 7.400 - 7.500    Pco2 52.7 (HH) 26.0 - 37.0 mmHg    Po2 379 (H) 64 - 87 mmHg    Tco2 27 20 - 33 mmol/L    S02 100 (H) 93 - 99 %    Hco3 25.0 17.0 - 25.0 mmol/L    BE -2 -4 - 3 mmol/L    Body Temp 36.5 C degrees    Ph Temp Yaa 7.292 (LL) 7.400 - 7.500    Pco2 Temp Co 51.5 (HH) 26.0 - 37.0 mmHg    Po2 Temp Cor 377 (H) 64 - 87 mmHg    Specimen Arterial    POCT sodium device results    Collection Time: 07/06/23 10:48 AM   Result Value Ref Range    Istat Sodium 139 135 - 145 mmol/L   POCT potassium device results    Collection Time: 07/06/23 10:48 AM   Result Value Ref Range    Istat Potassium 4.5 3.6 - 5.5 mmol/L   POCT ionized CA device results    Collection Time: 07/06/23 10:48 AM   Result Value Ref Range    Istat Ionized Calcium 1.26 1.10 - 1.30 mmol/L   POCT hematocrit and hemoglobin device results    Collection Time: 07/06/23 10:48 AM   Result Value Ref Range    Istat Hematocrit 40 (L) 42 - 52 %    Istat Hemoglobin 13.6 (L) 14.0 - 18.0 g/dL   Potassium Serum (K)    Collection Time: 07/06/23 11:35 AM   Result Value Ref Range    Potassium 4.5 3.6 - 5.5 mmol/L   Platelet count    Collection Time: 07/06/23 11:35 AM   Result Value Ref Range    Platelet Count 142 (L) 164 - 446 K/uL   Magnesium    Collection Time: 07/06/23 11:35 AM   Result Value Ref Range    Magnesium 3.2 (H) 1.5 - 2.5 mg/dL   Hemoglobin and Hematocrit upon arrival to unit    Collection Time: 07/06/23 11:35 AM   Result Value Ref Range    Hemoglobin 13.8 (L) 14.0 - 18.0 g/dL    Hematocrit 41.5 (L) 42.0 - 52.0 %       Imaging  DX-CHEST-PORTABLE (1  VIEW)   Final Result      1.  No acute cardiac or pulmonary abnormalities are identified.      2.  Tubes and lines in good position.      [facility] W/O CONT    (Results Pending)       Assessment/Plan  Dyslipidemia with high LDL and low HDL- (present on admission)  Assessment & Plan  Not on treatment at home    Essential hypertension- (present on admission)  Assessment & Plan  Currently on epinephrine, clevidipine available if needed  Start beta-blocker on postop day 1    Non-rheumatic mitral regurgitation- (present on admission)  Assessment & Plan  Status post mitral valve replacement on 7/6/2023 by Dr. De Leon  POD #0  Wean ventilator per protocol based on ABG, goal to wean from ventilator in <4-6 hours  Wean vasopressor support as guided by hemodynamics  Post-operative pain control per CVS  Chest tube and pacemaker management per CVS  Optimize acid-base balance with vent and bicarbonate PRN  Insulin gtt  Optimize K and Mg  PT/OT consult        Discussed patient condition and risk of morbidity and/or mortality with RN, RT, Pharmacy, Code status disscussed, Charge nurse / hot rounds, Patient, and CVS and anesthesia    The patient remains critically ill.  Critical care time = 35 minutes in directly providing and coordinating critical care and extensive data review.  No time overlap and excludes procedures.    Please note that this dictation was created using voice recognition software. The accuracy of the dictation is limited to the abilities of the software. I have made every reasonable attempt to correct obvious errors, but I expect that there are errors of grammar and possibly content that I did not discover before finalizing the note.

## 2023-07-06 NOTE — ANESTHESIA PREPROCEDURE EVALUATION
Case: [id number] Anesthesia Start Date/Time: 07/06/23 0724    Procedures:       MITRAL VALVE REPAIR (Chest)      REPLACEMENT, MITRAL VALVE (Chest)      ECHOCARDIOGRAM, TRANSESOPHAGEAL, INTRAOPERATIVE (Esophagus)    Anesthesia type: general    Pre-op diagnosis: MITRAL VALVE PROLAPSE, MITRAL REGURGITATION    Location: [facility] OR 02 / SURGERY [facility]    Surgeons: Jacqueline Neri M.D.          Relevant Problems   CARDIAC   (positive) Essential hypertension   (positive) Mitral valve prolapse   (positive) Non-rheumatic mitral regurgitation         (positive) Cyst of right kidney      Other   (positive) Dyslipidemia with high LDL and low HDL   (positive) Peripheral neuropathy       Physical Exam    Airway   Mallampati: II  TM distance: >3 FB  Neck ROM: full       Cardiovascular - normal exam  Rhythm: regular  Rate: normal  (+) murmur     Dental - normal exam           Pulmonary - normal exam  Breath sounds clear to auscultation     Abdominal    Neurological - normal exam                 Anesthesia Plan    ASA 4 (Severe cardiac valve dysfunction)   ASA physical status 4 criteria: other (comment)    Plan - general       Airway plan will be ETT  [facility]OU[facility] Planned        Induction: intravenous    Postoperative Plan: Postoperative administration of opioids is intended.    Pertinent diagnostic labs and testing reviewed    Informed Consent:    Anesthetic plan and risks discussed with patient and spouse.    Use of blood products discussed with: patient and spouse whom.

## 2023-07-06 NOTE — H&P
REFERRING PHYSICIAN: Shakir Raphael MD    CONSULTING PHYSICIAN: Jacqueline Neri MD, FACS    CHIEF COMPLAINT: Shortness of breath    HISTORY OF PRESENT ILLNESS: The patient is a 78 y.o. male with past medical history of mitral valve prolapse, mitral regurgitation, hypertension, and dyslipidemia who presents with dyspnea and fatigue.  He reports the symptoms have been worsening over the last year.  He feels more worn out than he used to but is still able to ride a stationary bike for 30 minutes and climb up and down stairs for 20 minutes without getting shortness of breath.  He states his dyspnea comes intermittently and he is unable to identify exacerbating and alleviating factors.  He complains of arthritic pain in his neck and joints.  He denies chest pain, orthopnea, PND, dizziness, and syncope.    PAST MEDICAL HISTORY:   Past Medical History:   Diagnosis Date    Arrhythmia 2017    Arthritis 1989    thumbs spine neck    Breath shortness 2022    fatigued after 4 miles    Cancer (HCC) 2022    skin melanoma left jaw    Dyslipidemia 12/14/2018    Essential hypertension 09/12/2013    Heart murmur 1980    Mitral valve prolapse 09/12/2013    Non-rheumatic mitral regurgitation 09/12/2013    Obstructive uropathy 01/25/2021    Renal disorder     imnpaired function pt states two benign cysts on right kidney    Snoring      PAST SURGICAL HISTORY:   Past Surgical History:   Procedure Laterality Date    FISTULA IN ANO REPAIR Left 2019    OTHER  September 2019    Fistula    OTHER      tonsils       FAMILY HISTORY:   Family History   Problem Relation Age of Onset    Cancer Mother         Ovarian cancer    Lung Disease Father         Microbacterium intercellulare avium    Heart Disease Neg Hx      SOCIAL HISTORY:   Social History     Socioeconomic History    Marital status:      Spouse name: Not on file    Number of children: Not on file    Years of education: Not on file    Highest education level: Not on file  "  Occupational History    Not on file   Tobacco Use    Smoking status: Former     Years: 0.00     Types: Cigarettes     Start date: 1966     Quit date: 1966     Years since quittin.8     Passive exposure: Never    Smokeless tobacco: Never   Vaping Use    Vaping Use: Never used   Substance and Sexual Activity    Alcohol use: Not Currently     Comment: \" about an 1 oz red wine for my health everyday    Drug use: No    Sexual activity: Not on file     Comment:    Other Topics Concern    Not on file   Social History Narrative    Not on file     Social Determinants of Health     Financial Resource Strain: Not on file   Food Insecurity: Not on file   Transportation Needs: Not on file   Physical Activity: Not on file   Stress: Not on file   Social Connections: Not on file   Intimate Partner Violence: Not on file   Housing Stability: Not on file     ALLERGIES:   No Known Allergies     CURRENT MEDICATIONS:     Current Facility-Administered Medications:     Cardiac Surgery Prophylactic Antibiotics per Pharmacy, , Other, PHARMACY TO DOSE, Jacqueline Neri M.D.    lidocaine (Xylocaine) 1 % injection 0.5 mL, 0.5 mL, Intradermal, Once PRN, Jacqueline Neri M.D.    insulin regular (Humulin R) 100 Units in  mL Infusion, 1-6 Units/hr, Intravenous, Continuous, Jacqueline Neri M.D.    EPINEPHrine (Adrenalin) infusion 4 mg/250 mL (premix), 0-0.5 mcg/kg/min (Ideal), Intravenous, Continuous, Jacqueline Neri M.D.    norepinephrine (Levophed) 8 mg in 250 mL NS infusion (premix), 0-1 mcg/kg/min (Ideal), Intravenous, Continuous, Jacqueline Neri M.D.    dexmedetomidine (PRECEDEX) 400 mcg/100mL NS premix infusion, 0-1.5 mcg/kg/hr (Ideal), Intravenous, Continuous, Jacqueline Neri M.D.    aminocaproic acid (Amicar) 8.64 g in  mL IV Bolus, 100 mg/kg, Intravenous, Once, Jacqueline Neri M.D.    aminocaproic acid (Amicar) 5 g in  mL Infusion, 2 g/hr, Intravenous, Once, Jacqueline Neri M.D.    ceFAZolin (Ancef) 2 " g in  mL IVPB, 2 g, Intravenous, Once, Jacqueline Neri M.D.     LABS REVIEWED:  Lab Results   Component Value Date/Time    SODIUM 139 07/03/2023 08:35 AM    POTASSIUM 4.0 07/03/2023 08:35 AM    CHLORIDE 103 07/03/2023 08:35 AM    CO2 26 07/03/2023 08:35 AM    GLUCOSE 77 07/03/2023 08:35 AM    BUN 24 (H) 07/03/2023 08:35 AM    CREATININE 1.05 07/03/2023 08:35 AM      Lab Results   Component Value Date/Time    PROTHROMBTM 13.5 07/03/2023 08:35 AM    INR 1.04 07/03/2023 08:35 AM      Lab Results   Component Value Date/Time    WBC 6.3 07/03/2023 08:35 AM    RBC 5.68 07/03/2023 08:35 AM    HEMOGLOBIN 16.9 07/03/2023 08:35 AM    HEMATOCRIT 52.6 (H) 07/03/2023 08:35 AM    MCV 92.6 07/03/2023 08:35 AM    MCH 29.8 07/03/2023 08:35 AM    MCHC 32.1 (L) 07/03/2023 08:35 AM    MPV 11.3 07/03/2023 08:35 AM    NEUTSPOLYS 56.70 07/03/2023 08:35 AM    LYMPHOCYTES 33.10 07/03/2023 08:35 AM    MONOCYTES 7.30 07/03/2023 08:35 AM    EOSINOPHILS 1.60 07/03/2023 08:35 AM    BASOPHILS 1.00 07/03/2023 08:35 AM    HYPOCHROMIA 1+ 07/30/2013 09:01 AM     IMAGING REVIEWED AND INTERPRETED:    ECHOCARDIOGRAM: 12/15/22  Prolapse of the anterior mitral leaflet. Severe eccentric mitral   regurgitation due to the coanda effect.There   is a cyst on the mitral valve leaflet.     LOUIS: 1/10/23  Severley prolapsed and probably flail A2 of the anterior MV leaflet,   with severe eccentric posteriorly directed mitral regurgitation.      ANGIOGRAM:   3/28/23 [facility]  Findings:  1.  Left main coronary artery:   Normal.  2.  Left anterior descending artery:  Normal.   3.  Left circumflex coronary artery:  Normal.    4.  Right coronary artery:  Tortuous but normal.  This is a right dominant system.  5. LVEDP: 13  mmHg  6. LVgram showed ejection fraction of 60% , no significant gradient across aortic valve     Right heart cath pressures in mmHg:  RA 13  RV 29/12, EDP 14  PA 28/18, mean 21  PCW 15    REVIEW OF SYSTEMS:   Review of Systems   Constitutional:   "Positive for malaise/fatigue.   Eyes:  Negative for blurred vision and double vision.   Respiratory:  Positive for shortness of breath. Negative for cough.    Cardiovascular:  Negative for chest pain, palpitations, leg swelling and PND.   Gastrointestinal:  Negative for abdominal pain and nausea.   Musculoskeletal:  Negative for joint pain.   Neurological:  Negative for dizziness and weakness.   Psychiatric/Behavioral:  Negative for substance abuse. The patient is not nervous/anxious.      PHYSICAL EXAMINATION:   BP (!) 149/83   Pulse 70   Temp 36.6 °C (97.8 °F) (Temporal)   Resp 14   Ht 1.803 m (5' 11\")   Wt 82.1 kg (181 lb)   SpO2 97%   BMI 25.24 kg/m²      Physical Exam  Vitals reviewed.   Constitutional:       General: He is not in acute distress.  Cardiovascular:      Rate and Rhythm: Normal rate and regular rhythm.      Pulses: Normal pulses.      Heart sounds: Murmur heard.      Systolic murmur is present with a grade of 3/6.   Pulmonary:      Effort: Pulmonary effort is normal.      Breath sounds: Normal breath sounds.   Abdominal:      General: Bowel sounds are normal. There is no distension.      Palpations: Abdomen is soft.      Tenderness: There is no abdominal tenderness.   Musculoskeletal:      Cervical back: Neck supple. No tenderness.      Right lower leg: No edema.      Left lower leg: No edema.   Skin:     General: Skin is warm and dry.      Capillary Refill: Capillary refill takes less than 2 seconds.   Neurological:      General: No focal deficit present.      Mental Status: He is alert and oriented to person, place, and time. Mental status is at baseline.   Psychiatric:         Mood and Affect: Mood normal.         Behavior: Behavior normal.       IMPRESSION:  Severe mitral regurgitation (4+, degenerative), anterior mitral valve leaflet prolapse, bigeminy, hypertension, dyslipidemia    PLAN:  I recommend that he undergo mitral valve repair or replacement and intraoperative transesophageal " echocardiography.    The procedure, its risks, benefits, potential complications and alternative treatments were discussed with the patient in detail including the risks should he decide not to undergo my recommended treatment. All of his questions were answered to his satisfaction and he is willing to proceed with the operation. The risks include death, stroke,  infection: to include a rare bacterial infection related to the use of the heart/lung machine, juanjo-operative myocardial infarction, dysrhythmias, diaphragmatic paralysis, chest wall paresthesia, tracheostomy, kidney or other organ failure, possible return to the operating room for bleeding, bleeding requiring transfusion with its attendant risks including AIDS or hepatitis, dehiscence of surgical incisions, respiratory complications including the need for prolonged ventilator support, Protamine or other drug reaction, peripheral neuropathy, loss of limb, and miscount of surgical items. The operative mortality risk is approximately 1%. The STS mortality risk score for MV repair is 0.6% and the morbidity and mortality risk score is 6.3%.  The STS mortality score for isolated MVR is 1.2% and the morbidity mortality risk score is 9.7%. The scores were discussed with patient.    The operation is scheduled for Thursday, July 6, 2023 at 7:30 AM at [facility].    Findings and recommendations have been discussed with the patient’s cardiologist Shakir Raphael MD.  Thank you for this very challenging consultation and participation in the patient’s care.  I will keep you apprised of all future developments.      Sincerely,    Jacqueline Neri MD, FACS

## 2023-07-06 NOTE — ANESTHESIA PROCEDURE NOTES
LOUIS    Date/Time: 7/6/2023 8:00 AM    Performed by: Robin Hagen M.D.  Authorized by: Robin Hagen M.D.    Start Time:7/6/2023 8:00 AM  Preanesthetic Checklist: patient identified, risks and benefits discussed, surgical consent, monitors and equipment checked, pre-op evaluation and timeout performed    Indication for LOUIS: diagnostic   Patient Location: OR  Intubated: Yes  Bite Block: No  Heart Visualized: Yes  Insertion: atraumatic    **See FULL LOUIS report in patient's chart via CV Synapse**

## 2023-07-06 NOTE — PROGRESS NOTES
Pt arrived to room at 1130.  Accompanied by OR team.  Bedside report received from Dr. CABRERA.  Pt connected to monitor.  Labs sent. Chest tube connected to suction. Initial I&Os recorded.  Initial blood gas and blood sugar obtained.  EKG done. SCDs applied. Discussed pt condition and POC.  Questions answered.

## 2023-07-06 NOTE — ANESTHESIA PROCEDURE NOTES
Arterial Line    Performed by: Robin Hagen M.D.  Authorized by: Robin Hagen M.D.    Start Time:  7/6/2023 7:36 AM  End Time:  7/6/2023 7:38 AM  Localization: surface landmarks    Patient Location:  OR  Indication: continuous blood pressure monitoring and blood sampling needed        Catheter Size:  20 G  Seldinger Technique?: Yes    Laterality:  Left  Site:  Radial artery  Line Secured:  Antimicrobial disc, tape and transparent dressing  Events: patient tolerated procedure well with no complications

## 2023-07-06 NOTE — PROGRESS NOTES
Updated APRN on increasing pressor support. Patient has received 2L plasmalyte, CVP 7, , new orders per MAR

## 2023-07-06 NOTE — ANESTHESIA PROCEDURE NOTES
Central Venous Line    Performed by: Robin Hagen M.D.  Authorized by: Robin Hagen M.D.    Start Time:  7/6/2023 7:50 AM  End Time:  7/6/2023 7:55 AM  Patient Location:  OR  Indication: central venous access and hemodynamic monitoring        provider hand hygiene performed prior to central venous catheter insertion, all 5 sterile barriers used (gloves, gown, cap, mask, large sterile drape) during central venous catheter insertion and skin prep agent completely dried prior to procedure    Patient Position:  Trendelenburg  Laterality:  Right  Site:  Internal jugular  Prep:  Chlorhexidine  Catheter Size:  8.5 Fr  Catheter Length (cm):  10  Catheter Type:  Introducer  Number of Lumens:  Single lumen  target vein identified, needle advanced into vein and blood aspirated and guidewire advanced into vein    Seldinger Technique?: Yes    Ultrasound-Guided: ultrasound-guided  Image captured, interpreted and electronically stored.  Sterile Gel and Probe Cover Used for Ultrasound?: Yes    Intravenous Verification: verified by ultrasound, venous blood return and chest x-ray pending    all ports aspirated, all ports flushed easily, guidewire was removed intact, biopatch was applied, line was sutured in place and dressing was applied    Events: patient tolerated procedure well with no complications    PA Catheter Placed?: Yes    PA Catheter Type:  Oximetric  PA Catheter Size:  7  Laterality:  Right  Site:  Through introducer  Events: patient tolerated procedure well with no complications and unable to wedge

## 2023-07-06 NOTE — OR SURGEON
OPERATIVE REPORT    Operation date 7/6/2023    Referring physician: Shakir Raphael MD    PreOp Diagnosis: Severe mitral regurgitation (4+, degenerative), anterior mitral valve leaflet prolapse, bigeminy, hypertension, dyslipidemia    PostOp Diagnosis: Severe mitral regurgitation (4+, degenerative), anterior mitral valve leaflet prolapse, bigeminy, hypertension, dyslipidemia    Procedure(s):  MITRAL VALVE REPLACEMENT (31 mm Mitris Cagle bovine pericardial valve), left atrial appendage ligation/excision and intraoperative transesophageal echocardiography    Surgeon(s):  Jacqueline Neri M.D.    Assistant:  SAGAR Brooke    Anesthesiologist/Type of Anesthesia:  Anesthesiologist: Robin Hagen M.D./General    Surgical Staff:  Assistant: CHUCK Leggett  Circulator: Bhavya Santos R.N.  Perfusionist: Christy Saleh Circulator: Angelina Peralta R.N.  Scrub Person: Nicole Ceja; Amena Joseph    Specimens removed if any:  ID Type Source Tests Collected by Time Destination   A : Left atrial appendage Tissue Heart PATHOLOGY SPECIMEN Jacqueline Neri M.D. 7/6/2023  8:47 AM    B : anterior mitral leaflets Tissue Heart Valve PATHOLOGY SPECIMEN Jacqueline Neri M.D. 7/6/2023  9:04 AM    C : Mitral Valve Tissue Heart Valve PATHOLOGY SPECIMEN Jacqueline Neri M.D. 7/6/2023  9:19 AM        Estimated Blood Loss: Minimal    Findings: Severe mitral regurgitation, anterior mitral valve leaflet prolapse, sclerotic mitral valve leaflets, LVEF approximately 60%    Complications: None    Indications:  Mr. Avila is a 78-year-old male with severe symptomatic mitral regurgitation.    Procedure:  The patient was brought to the operating room and placed on the operating room table in the supine position.  After successful induction of general anesthesia endotracheal intubation, the patient was prepped and draped in regular sterile fashion.  SAGAR Brooke assisted with retraction and exposure during the  operation and closed the sternal wound.  Intraoperative transesophageal echocardiography showed severe mitral regurgitation, anterior mitral valve leaflet prolapse, a large myxomatous mitral valve and good left ventricular ejection fraction of approximately 60%.    An incision was made from the sternal notch to the xiphoid.  The sternum was opened longitudinally with a sternal saw.  Hemostasis was obtained with electrocautery at the sternal edges.  The patient was systemically heparinized.  The pericardium was opened longitudinally and tented anteriorly with Ethibond stay stitches.  The aortic cannula was inserted first followed by a dual stage venous cannula.  An antegrade cardioplegia cannula was placed in the ascending aorta.  Cardiopulmonary bypass was instituted.  The aorta was crossclamped and the patient was given 1 L of cold [place] cardioplegia in an antegrade fashion.  There was prompt cardiac arrest.  Ice slush was placed on the heart for further myocardial protection.  A phrenic nerve protector pad was used.  An additional 300 mL dose of [place] cardioplegia was given after approximately 1 hour and 20 minutes.  The left atrial appendage was ligated at its base and excised.  The stump was oversewn in 2 layers using #4-0 Prolene sutures.  A left atriotomy was performed just below the interatrial groove.  The mitral valve leaflets were myxomatous and sclerotic.  An attempt at leaflet repair was made.  A small triangular resection of A2 was performed and the edges were reapproximated using #4-0 Ethibond stitches in a figure-of-eight fashion.  A large primary cord was reimplanted.  Testing of the repair with cold saline still showed a large amount of regurgitation.  The anterior mitral valve leaflet was excised and the posterior horn was preserved and plicated with the Carson annular sutures.  Pledgeted #2-0 Ethibond stitches were then placed around the mitral valve annulus in a horizontal mattress fashion  with the pledgets in the subannular position.  A 31 mm Mitris Cagle bovine pericardial valve was then placed in the mitral valve annulus and secured in place with the Ethibond stitches utilizing the CorKnot device.  The valve was properly positioned and there was free movement and coaptation of the bioprosthetic valve leaflets.  Rewarming of the patient was initiated.  The left atriotomy was closed in 2 layers using #4-0 Prolene sutures.  Approximately 300 mL of warm blood was given in an antegrade fashion and the aortic cross-clamp was removed.  Aortic cross-clamp time was 101 minutes and total cardiopulmonary bypass time was 115 minutes.  The left ventricle was de-aired in the usual fashion.  The carbon dioxide which can be released over the operative field during the operation was discontinued.  A straight and an angled 32 Turkmen chest tubes were placed in the mediastinum.  Temporary epicardial ventricular pacemaker wires were inserted.  There was spontaneous conversion into sinus rhythm.  The antegrade cardioplegia cannula was removed.  When he was adequately warmed, he was slowly taken off cardiopulmonary bypass which he tolerated well.  The dual stage venous cannula was removed.  Protamine was given to reverse the effects of the heparin.  The aortic cannula was removed.  When adequate hemostasis had been obtained, the sternum was reapproximated using size 5 sternal wires and the remainder of the incision was closed in several layers using Vicryl sutures.    Intraoperative transesophageal echocardiography showed a properly positioned and functioning mitral valve bioprosthesis without any paravalvular or central leaks.  The mean mitral transvalvular gradient was 4 mmHg.  His left ventricular ejection fraction remained normal at approximately 60%.  There were no apparent complications.  The patient tolerated the procedure well and left the operating room in guarded condition.      7/6/2023 11:10 AM Jacqueline  MD Daron, FACS

## 2023-07-06 NOTE — ANESTHESIA PROCEDURE NOTES
Central Venous Line    Performed by: Robin Hagen M.D.  Authorized by: Robin Hagen M.D.    Start Time:  7/6/2023 7:43 AM  End Time:  7/6/2023 7:50 AM  Patient Location:  OR  Indication: central venous access        provider hand hygiene performed prior to central venous catheter insertion, all 5 sterile barriers used (gloves, gown, cap, mask, large sterile drape) during central venous catheter insertion and skin prep agent completely dried prior to procedure    Patient Position:  Trendelenburg  Laterality:  Right  Site:  Internal jugular  Prep:  Chlorhexidine  Catheter Size:  8 Fr  Catheter Length (cm):  16  Number of Lumens:  Double lumen  target vein identified, needle advanced into vein and blood aspirated and guidewire advanced into vein    Seldinger Technique?: Yes    Ultrasound-Guided: ultrasound-guided  Image captured, interpreted and electronically stored.  Sterile Gel and Probe Cover Used for Ultrasound?: Yes    Intravenous Verification: verified by ultrasound, venous blood return and chest x-ray pending    all ports aspirated, all ports flushed easily, guidewire was removed intact, biopatch was applied, line was sutured in place and dressing was applied    Events: patient tolerated procedure well with no complications

## 2023-07-06 NOTE — ANESTHESIA PROCEDURE NOTES
Airway    Date/Time: 7/6/2023 7:30 AM    Performed by: Robin Hagen M.D.  Authorized by: Robin Hagen M.D.    Location:  OR  Urgency:  Elective  Indications for Airway Management:  Anesthesia      Spontaneous Ventilation: absent    Sedation Level:  Deep  Preoxygenated: Yes    Patient Position:  Sniffing  Mask Difficulty Assessment:  0 - not attempted  Final Airway Type:  Endotracheal airway  Final Endotracheal Airway:  ETT  Cuffed: Yes    Technique Used for Successful ETT Placement:  Direct laryngoscopy  Devices/Methods Used in Placement:  Intubating stylet and cricoid pressure    Insertion Site:  Oral  Blade Type:  Cedric  Laryngoscope Blade/Videolaryngoscope Blade Size:  4  ETT Size (mm):  8.5  Measured from:  Teeth  ETT to Teeth (cm):  23  Placement Verified by: auscultation and capnometry    Cormack-Lehane Classification:  Grade IIb - view of arytenoids or posterior of glottis only  Number of Attempts at Approach:  1   Not a difficult intubation, but not the easiest either.  Grade 2b with cricoid.

## 2023-07-06 NOTE — ASSESSMENT & PLAN NOTE
Status post mitral valve replacement on 7/6/2023 by Dr. De Leon. Extubated 7/6 PM - on 4L NC    - weaned off all vasopressors  - Post-operative pain control per [facility]  - Chest tube and pacemaker management per [facility]  - transition off insulin gtt per [facility]  - Optimize K and Mg  - PT/OT consult

## 2023-07-06 NOTE — ANESTHESIA TIME REPORT
Anesthesia Start and Stop Event Times     Date Time Event    7/6/2023 0638 Ready for Procedure     0724 Anesthesia Start     1135 Anesthesia Stop        Responsible Staff  07/06/23    Name Role Begin End    Robin Hagen M.D. Anesth 0713 1134        Overtime Reason:  no overtime (within assigned shift)    Comments:

## 2023-07-07 ENCOUNTER — HOME HEALTH ADMISSION (OUTPATIENT)
Dept: HOME HEALTH SERVICES | Facility: HOME HEALTHCARE | Age: 78
End: 2023-07-07
Payer: MEDICARE

## 2023-07-07 ENCOUNTER — HOSPITAL ENCOUNTER (OUTPATIENT)
Dept: RADIOLOGY | Facility: MEDICAL CENTER | Age: 78
End: 2023-07-07
Attending: NURSE PRACTITIONER
Payer: MEDICARE

## 2023-07-07 PROBLEM — J96.00 ACUTE RESPIRATORY FAILURE (HCC): Status: ACTIVE | Noted: 2023-07-07

## 2023-07-07 LAB
ANION GAP SERPL CALC-SCNC: 15 MMOL/L (ref 7–16)
BASE EXCESS BLDA CALC-SCNC: -10 MMOL/L (ref -4–3)
BODY TEMPERATURE: ABNORMAL DEGREES
BUN SERPL-MCNC: 24 MG/DL (ref 8–22)
CALCIUM SERPL-MCNC: 8.5 MG/DL (ref 8.5–10.5)
CHLORIDE SERPL-SCNC: 111 MMOL/L (ref 96–112)
CO2 BLDA-SCNC: 17 MMOL/L (ref 20–33)
CO2 SERPL-SCNC: 25 MMOL/L (ref 20–33)
CREAT SERPL-MCNC: 1.15 MG/DL (ref 0.5–1.4)
DELSYS IDSYS: ABNORMAL
EKG IMPRESSION: NORMAL
ERYTHROCYTE [DISTWIDTH] IN BLOOD BY AUTOMATED COUNT: 47.7 FL (ref 35.9–50)
GFR SERPLBLD CREATININE-BSD FMLA CKD-EPI: 65 ML/MIN/1.73 M 2
GLUCOSE BLD STRIP.AUTO-MCNC: 103 MG/DL (ref 65–99)
GLUCOSE BLD STRIP.AUTO-MCNC: 115 MG/DL (ref 65–99)
GLUCOSE BLD STRIP.AUTO-MCNC: 123 MG/DL (ref 65–99)
GLUCOSE BLD STRIP.AUTO-MCNC: 124 MG/DL (ref 65–99)
GLUCOSE BLD STRIP.AUTO-MCNC: 126 MG/DL (ref 65–99)
GLUCOSE BLD STRIP.AUTO-MCNC: 136 MG/DL (ref 65–99)
GLUCOSE BLD STRIP.AUTO-MCNC: 155 MG/DL (ref 65–99)
GLUCOSE BLD STRIP.AUTO-MCNC: 169 MG/DL (ref 65–99)
GLUCOSE BLD STRIP.AUTO-MCNC: 172 MG/DL (ref 65–99)
GLUCOSE BLD STRIP.AUTO-MCNC: 194 MG/DL (ref 65–99)
GLUCOSE BLD STRIP.AUTO-MCNC: 196 MG/DL (ref 65–99)
GLUCOSE BLD STRIP.AUTO-MCNC: 196 MG/DL (ref 65–99)
GLUCOSE BLD STRIP.AUTO-MCNC: 203 MG/DL (ref 65–99)
GLUCOSE BLD STRIP.AUTO-MCNC: 204 MG/DL (ref 65–99)
GLUCOSE BLD STRIP.AUTO-MCNC: 209 MG/DL (ref 65–99)
GLUCOSE BLD STRIP.AUTO-MCNC: 211 MG/DL (ref 65–99)
GLUCOSE BLD STRIP.AUTO-MCNC: 211 MG/DL (ref 65–99)
GLUCOSE BLD STRIP.AUTO-MCNC: 217 MG/DL (ref 65–99)
GLUCOSE BLD STRIP.AUTO-MCNC: 217 MG/DL (ref 65–99)
GLUCOSE BLD STRIP.AUTO-MCNC: 218 MG/DL (ref 65–99)
GLUCOSE BLD STRIP.AUTO-MCNC: 86 MG/DL (ref 65–99)
GLUCOSE BLD STRIP.AUTO-MCNC: 88 MG/DL (ref 65–99)
GLUCOSE SERPL-MCNC: 159 MG/DL (ref 65–99)
HCO3 BLDA-SCNC: 15.6 MMOL/L (ref 17–25)
HCT VFR BLD AUTO: 37.6 % (ref 42–52)
HGB BLD-MCNC: 12.5 G/DL (ref 14–18)
HOROWITZ INDEX BLDA+IHG-RTO: 245 MM[HG]
INR PPP: 1.35 (ref 0.87–1.13)
MCH RBC QN AUTO: 30.2 PG (ref 27–33)
MCHC RBC AUTO-ENTMCNC: 33.2 G/DL (ref 32.3–36.5)
MCV RBC AUTO: 90.8 FL (ref 81.4–97.8)
MODE IMODE: ABNORMAL
O2/TOTAL GAS SETTING VFR VENT: 40 %
PCO2 BLDA: 31.7 MMHG (ref 26–37)
PCO2 TEMP ADJ BLDA: 31.3 MMHG (ref 26–37)
PEEP END EXPIRATORY PRESSURE IPEEP: 8 CMH20
PERCENT MINUTE VOLUME IPMV: 100
PH BLDA: 7.3 [PH] (ref 7.4–7.5)
PH TEMP ADJ BLDA: 7.3 [PH] (ref 7.4–7.5)
PLATELET # BLD AUTO: 134 K/UL (ref 164–446)
PMV BLD AUTO: 11.4 FL (ref 9–12.9)
PO2 BLDA: 98 MMHG (ref 64–87)
PO2 TEMP ADJ BLDA: 96 MMHG (ref 64–87)
POTASSIUM SERPL-SCNC: 3.2 MMOL/L (ref 3.6–5.5)
POTASSIUM SERPL-SCNC: 4.2 MMOL/L (ref 3.6–5.5)
PROCALCITONIN SERPL-MCNC: 4.33 NG/ML
PROTHROMBIN TIME: 16.5 SEC (ref 12–14.6)
RBC # BLD AUTO: 4.14 M/UL (ref 4.7–6.1)
SAO2 % BLDA: 97 % (ref 93–99)
SODIUM SERPL-SCNC: 151 MMOL/L (ref 135–145)
SPECIMEN DRAWN FROM PATIENT: ABNORMAL
WBC # BLD AUTO: 20.6 K/UL (ref 4.8–10.8)

## 2023-07-07 PROCEDURE — 71045 X-RAY EXAM CHEST 1 VIEW: CPT

## 2023-07-07 PROCEDURE — 84132 ASSAY OF SERUM POTASSIUM: CPT

## 2023-07-07 PROCEDURE — A9270 NON-COVERED ITEM OR SERVICE: HCPCS | Performed by: NURSE PRACTITIONER

## 2023-07-07 PROCEDURE — 93005 ELECTROCARDIOGRAM TRACING: CPT | Performed by: NURSE PRACTITIONER

## 2023-07-07 PROCEDURE — 770022 HCHG ROOM/CARE - ICU (200)

## 2023-07-07 PROCEDURE — 82962 GLUCOSE BLOOD TEST: CPT | Mod: 91

## 2023-07-07 PROCEDURE — 99291 CRITICAL CARE FIRST HOUR: CPT | Performed by: STUDENT IN AN ORGANIZED HEALTH CARE EDUCATION/TRAINING PROGRAM

## 2023-07-07 PROCEDURE — A9270 NON-COVERED ITEM OR SERVICE: HCPCS | Performed by: THORACIC SURGERY (CARDIOTHORACIC VASCULAR SURGERY)

## 2023-07-07 PROCEDURE — 80048 BASIC METABOLIC PNL TOTAL CA: CPT

## 2023-07-07 PROCEDURE — 85610 PROTHROMBIN TIME: CPT

## 2023-07-07 PROCEDURE — 700111 HCHG RX REV CODE 636 W/ 250 OVERRIDE (IP): Performed by: NURSE PRACTITIONER

## 2023-07-07 PROCEDURE — 99024 POSTOP FOLLOW-UP VISIT: CPT | Performed by: NURSE PRACTITIONER

## 2023-07-07 PROCEDURE — 700102 HCHG RX REV CODE 250 W/ 637 OVERRIDE(OP): Performed by: THORACIC SURGERY (CARDIOTHORACIC VASCULAR SURGERY)

## 2023-07-07 PROCEDURE — 94669 MECHANICAL CHEST WALL OSCILL: CPT

## 2023-07-07 PROCEDURE — 93010 ELECTROCARDIOGRAM REPORT: CPT | Performed by: INTERNAL MEDICINE

## 2023-07-07 PROCEDURE — 85027 COMPLETE CBC AUTOMATED: CPT

## 2023-07-07 PROCEDURE — 84145 PROCALCITONIN (PCT): CPT

## 2023-07-07 PROCEDURE — 700102 HCHG RX REV CODE 250 W/ 637 OVERRIDE(OP): Performed by: NURSE PRACTITIONER

## 2023-07-07 RX ORDER — POTASSIUM CHLORIDE 7.45 MG/ML
10 INJECTION INTRAVENOUS ONCE
Status: COMPLETED | OUTPATIENT
Start: 2023-07-07 | End: 2023-07-07

## 2023-07-07 RX ORDER — POTASSIUM CHLORIDE 20 MEQ/1
40 TABLET, EXTENDED RELEASE ORAL 2 TIMES DAILY
Status: DISCONTINUED | OUTPATIENT
Start: 2023-07-07 | End: 2023-07-11

## 2023-07-07 RX ORDER — WARFARIN SODIUM 3 MG/1
3 TABLET ORAL DAILY
Status: DISCONTINUED | OUTPATIENT
Start: 2023-07-07 | End: 2023-07-09

## 2023-07-07 RX ORDER — FUROSEMIDE 10 MG/ML
40 INJECTION INTRAMUSCULAR; INTRAVENOUS
Status: DISCONTINUED | OUTPATIENT
Start: 2023-07-07 | End: 2023-07-11

## 2023-07-07 RX ADMIN — ASPIRIN 81 MG: 81 TABLET, COATED ORAL at 06:09

## 2023-07-07 RX ADMIN — POTASSIUM CHLORIDE 40 MEQ: 1500 TABLET, EXTENDED RELEASE ORAL at 08:35

## 2023-07-07 RX ADMIN — ENOXAPARIN SODIUM 40 MG: 100 INJECTION SUBCUTANEOUS at 17:33

## 2023-07-07 RX ADMIN — WARFARIN SODIUM 3 MG: 3 TABLET ORAL at 17:33

## 2023-07-07 RX ADMIN — SENNOSIDES AND DOCUSATE SODIUM 2 TABLET: 50; 8.6 TABLET ORAL at 17:34

## 2023-07-07 RX ADMIN — ONDANSETRON 8 MG: 2 INJECTION INTRAMUSCULAR; INTRAVENOUS at 04:14

## 2023-07-07 RX ADMIN — ACETAMINOPHEN 1000 MG: 500 TABLET, FILM COATED ORAL at 23:59

## 2023-07-07 RX ADMIN — Medication 1 APPLICATOR: at 08:48

## 2023-07-07 RX ADMIN — MAGNESIUM SULFATE IN DEXTROSE 1 G: 10 INJECTION, SOLUTION INTRAVENOUS at 05:58

## 2023-07-07 RX ADMIN — ACETAMINOPHEN 1000 MG: 500 TABLET, FILM COATED ORAL at 06:09

## 2023-07-07 RX ADMIN — POLYETHYLENE GLYCOL 3350 1 PACKET: 17 POWDER, FOR SOLUTION ORAL at 06:08

## 2023-07-07 RX ADMIN — PROCHLORPERAZINE EDISYLATE 10 MG: 5 INJECTION INTRAMUSCULAR; INTRAVENOUS at 13:06

## 2023-07-07 RX ADMIN — OMEPRAZOLE 20 MG: 20 CAPSULE, DELAYED RELEASE ORAL at 06:09

## 2023-07-07 RX ADMIN — POTASSIUM CHLORIDE 10 MEQ: 7.46 INJECTION, SOLUTION INTRAVENOUS at 07:08

## 2023-07-07 RX ADMIN — OXYCODONE HYDROCHLORIDE 5 MG: 5 TABLET ORAL at 08:36

## 2023-07-07 RX ADMIN — POTASSIUM CHLORIDE 20 MEQ: 14.9 INJECTION, SOLUTION INTRAVENOUS at 00:02

## 2023-07-07 RX ADMIN — Medication 1 APPLICATOR: at 21:03

## 2023-07-07 RX ADMIN — OXYCODONE HYDROCHLORIDE 5 MG: 5 TABLET ORAL at 13:07

## 2023-07-07 RX ADMIN — ACETAMINOPHEN 1000 MG: 500 TABLET, FILM COATED ORAL at 17:34

## 2023-07-07 RX ADMIN — FUROSEMIDE 40 MG: 10 INJECTION INTRAMUSCULAR; INTRAVENOUS at 08:35

## 2023-07-07 RX ADMIN — FUROSEMIDE 40 MG: 10 INJECTION INTRAMUSCULAR; INTRAVENOUS at 17:37

## 2023-07-07 RX ADMIN — ONDANSETRON 8 MG: 2 INJECTION INTRAMUSCULAR; INTRAVENOUS at 10:49

## 2023-07-07 RX ADMIN — POTASSIUM CHLORIDE 10 MEQ: 7.46 INJECTION, SOLUTION INTRAVENOUS at 02:22

## 2023-07-07 RX ADMIN — OXYCODONE HYDROCHLORIDE 5 MG: 5 TABLET ORAL at 17:43

## 2023-07-07 RX ADMIN — METOPROLOL TARTRATE 12.5 MG: 25 TABLET, FILM COATED ORAL at 17:33

## 2023-07-07 RX ADMIN — ACETAMINOPHEN 1000 MG: 500 TABLET, FILM COATED ORAL at 11:01

## 2023-07-07 RX ADMIN — SENNOSIDES AND DOCUSATE SODIUM 2 TABLET: 50; 8.6 TABLET ORAL at 06:08

## 2023-07-07 RX ADMIN — METOPROLOL TARTRATE 12.5 MG: 25 TABLET, FILM COATED ORAL at 06:09

## 2023-07-07 RX ADMIN — POTASSIUM CHLORIDE 40 MEQ: 1500 TABLET, EXTENDED RELEASE ORAL at 17:33

## 2023-07-07 ASSESSMENT — ENCOUNTER SYMPTOMS
WEAKNESS: 1
SHORTNESS OF BREATH: 0
COUGH: 1
ABDOMINAL PAIN: 0
FALLS: 0
FOCAL WEAKNESS: 0
FEVER: 0
VOMITING: 0

## 2023-07-07 ASSESSMENT — FIBROSIS 4 INDEX: FIB4 SCORE: 2.73

## 2023-07-07 ASSESSMENT — PAIN DESCRIPTION - PAIN TYPE
TYPE: ACUTE PAIN
TYPE: ACUTE PAIN;SURGICAL PAIN

## 2023-07-07 NOTE — THERAPY
Occupational Therapy Contact Note    Patient Name: Selma Avila  Age:  78 y.o., Sex:  male  Medical Record #: [id number]  Today's Date: 7/7/2023 07/07/23 0810   Interdisciplinary Plan of Care Collaboration   Collaboration Comments OT referral received. Pt is POD #1. Will complete OT eval on or after POD #2.

## 2023-07-07 NOTE — ASSESSMENT & PLAN NOTE
From atelectasis post op and not taking very deep breaths and fluid overload vs potential pneumonia w/this elevated WBC  - lasix 40mg IV BID per [facility]  - f/u procal  - f/u respiratory culture  - pain control and mobility and IS use  - monitor for clinical signs of infection - will hold off on abx but low threshold

## 2023-07-07 NOTE — PROGRESS NOTES
Extubation Note:    BP, HR, Cardiac rhythm, bleeding, pain meds, spontaneous breathing and patient's oxygenation have all been considered. Patient is stable and all the following extubation parameters are met:    Extubation parameters:     Rapid shallow breathing index: 24  Pinsp: 5   RR: 17  PEEP: 8  FiO2: 40%  NIF: -25  VC: 0.98  PACO2: 27.1  PH: 7.4  SpO2: 95%  Hemodynamically stable: Yes    Pt is on two vasoactive medications (Vasopressin and Epinephrine). Updated Dr. Rosa and RENETTA Brooke. Received permission from both practitioners to extubate.    A joint decision has been made between the RN and respiratory therapist. Pt will be extubated.     Total Intubation Time: 8 hours 57 minutes.

## 2023-07-07 NOTE — DISCHARGE PLANNING
ATTN: Case Management  RE: Referral for Home Health    As of 7/7/2023, we have accepted the Home Health referral for the patient listed above.    A [facility] clinician will be out to see the patient within 48 hours. If you have any questions or concerns regarding the patient's transition to Home Health, please do not hesitate to contact us at [phone].      We look forward to collaborating with you,  [facility] Team

## 2023-07-07 NOTE — FLOWSHEET NOTE
07/06/23 2027   Events/Summary/Plan   Events/Summary/Plan pt extubated, on 6L nc   Ventiliation   Vent Clock Discontinued Yes   Spontaneous Breathing Trial (SBT)   Spontaneous breathing trial (SBT) outcome Pt weaned for 1 hour and returned to rest settings per protocol - SBT Pass  (MD notified of parameter)   Length of Weaning Trial (Hours) 1hr   Patient Extubated Yes   Weaning Parameters   RR (bpm) 17   $ FVC / Vital Capacity (liters)  0.98   NIF (cm H2O)  -25   Rapid Shallow Breathing Index (RR/VT) 24   Spontaneous VE 11.6   Spontaneous

## 2023-07-07 NOTE — PROGRESS NOTES
Cardiovascular Surgery Progress Note    Name: Selma Avila  MRN: [id number]  : 1945  Admit Date: 2023  5:03 AM  1 Day Post-Op     Procedure:  Procedure(s) and Anesthesia Type:     * REPLACEMENT, MITRAL VALVE, LEFT ATRIAL APPENDAGE LIGATION - General     * ECHOCARDIOGRAM, TRANSESOPHAGEAL, INTRAOPERATIVE - General    Vitals:  Vitals:    23 0545 23 0600 23 0615 23 0630   BP: 119/60 99/54 91/54    Pulse:  95 92    Resp:       Temp:  37.4 °C (99.3 °F)     TempSrc:  Bladder     SpO2:  96% 96%    Weight:    85.6 kg (188 lb 11.4 oz)   Height:          Temp (24hrs), Av °C (98.6 °F), Min:35.5 °C (95.9 °F), Max:37.7 °C (99.9 °F)      Respiratory:  Vent Mode: Spont, PEEP/CPAP: 8, PIP: 15, MAP: 10 Respiration: (!) 26, Pulse Oximetry: 96 %       Fluids:    Intake/Output Summary (Last 24 hours) at 2023 0725  Last data filed at 2023 0658  Gross per 24 hour   Intake 67347.48 ml   Output 3495 ml   Net 7619.48 ml     Admit weight: Weight: 82.1 kg (181 lb)  Current weight: Weight: 85.6 kg (188 lb 11.4 oz) (23)    Labs:  Recent Labs     23  1135 23  0110   WBC  --  20.6*   RBC  --  4.14*   HEMOGLOBIN 13.8* 12.5*   HEMATOCRIT 41.5* 37.6*   MCV  --  90.8   MCH  --  30.2   MCHC  --  33.2   RDW  --  47.7   PLATELETCT 142* 134*   MPV  --  11.4     Recent Labs     23  1802 23  2300 23  0110 23  0500   SODIUM 154*  --  151*  --    POTASSIUM 2.7* 2.8* 3.2* 4.2   CHLORIDE 109  --  111  --    CO2 16*  --  25  --    GLUCOSE 226*  --  159*  --    BUN 21  --  24*  --    CREATININE 1.40  --  1.15  --    CALCIUM 7.6*  --  8.5  --      Recent Labs     23  1135 23  0500   APTT 36.1*  --    INR 1.72* 1.35*           Medications:  Scheduled Medications   Medication Dose Frequency    potassium chloride  10 mEq Once    enoxaparin (LOVENOX) injection  40 mg DAILY AT 1800    Nozin nasal  swab  1 Applicator BID    magnesium sulfate  1 g DAILY     aspirin  81 mg DAILY    Pharmacy Consult Request  1 Each PHARMACY TO DOSE    acetaminophen  1,000 mg Q6HRS    senna-docusate  2 Tablet BID    And    polyethylene glycol/lytes  1 Packet DAILY    And    [START ON 7/8/2023] magnesium hydroxide  30 mL DAILY    omeprazole  20 mg DAILY    metoprolol tartrate  12.5 mg BID    Followed by    [START ON 7/8/2023] metoprolol tartrate  25 mg BID    MD Alert...Warfarin per Pharmacy   PHARMACY TO DOSE    insulin regular  0-14 Units Once    insulin lispro  0-14 Units TID AC        Exam:   Physical Exam  Vitals and nursing note reviewed.   Constitutional:       General: He is not in acute distress.     Appearance: Normal appearance.   HENT:      Head: Normocephalic.      Right Ear: External ear normal.      Left Ear: External ear normal.      Nose: Nose normal. No congestion.      Mouth/Throat:      Mouth: Mucous membranes are moist.      Pharynx: Oropharynx is clear.   Eyes:      Extraocular Movements: Extraocular movements intact.      Pupils: Pupils are equal, round, and reactive to light.   Cardiovascular:      Rate and Rhythm: Normal rate and regular rhythm.      Pulses: Normal pulses.      Heart sounds: Normal heart sounds.   Pulmonary:      Effort: Pulmonary effort is normal.      Breath sounds: Decreased breath sounds present.   Abdominal:      General: Bowel sounds are decreased. There is no distension.      Palpations: Abdomen is soft.   Musculoskeletal:      Cervical back: Normal range of motion and neck supple. No tenderness.      Right lower leg: Edema present.      Left lower leg: Edema present.   Skin:     General: Skin is warm and dry.      Comments: Midline surgical incision   Neurological:      General: No focal deficit present.      Mental Status: He is alert and oriented to person, place, and time. Mental status is at baseline.   Psychiatric:         Mood and Affect: Mood normal.         Behavior: Behavior normal.         Thought Content: Thought content normal.          Cardiac Medications:    ASA - Yes    Plavix - No; contraindicated because of On Coumadin / NOAC    Post-operative Beta Blockers - Yes    Ace/ARB- No; contraindicated because of Normal EF    Statin - No; contraindicated because of No CAD    Aldactone- No; contraindicated because of Normal EF    SGLT2i-  No contraindicated because of hypotension    Ejection Fraction:  65%    Telemetry:   7/7 SR    Assessment/Plan:  POD 1  HDS- off pressors, SR, neuro intact, wounds intact, abdomen soft, fluid balance positive, wt up,  4L NC. Cr 1.15, H/H   12.5/37.6. 400 out of chest tubes last night, 1165 total. Plan:  Keep chest tubes and pacing wires. Diurese. Remove matos, IS/ambulate.     Disposition:  D

## 2023-07-07 NOTE — CARE PLAN
Patient is calling because she had labs and tests done in September and sates her PCP Dr. Shoshana Pillai in [facility] has not received them as of yet. The patient is Watcher - Medium risk of patient condition declining or worsening    Shift Goals  Clinical Goals: Hemodynamic stability, pain control, nausea control, titrate off vasopressor support, extubate.  Patient Goals: Pain control, nausea control, rest/ sleep.  Family Goals: Stay until extubation, call for any updates.    Progress made toward(s) clinical / shift goals:  Initiated Precedex to help with pt's pain and nausea. Pt was given time throughout the night to rest/ sleep. Pt's spouse was able to stay at bedside until pt was extubated. RN did not call for updates as there were no updates to give.     Problem: Pain - Standard  Goal: Alleviation of pain or a reduction in pain to the patient’s comfort goal  Outcome: Progressing  Note: PRN pain medication administered when pt reports pain within administration parameters.      Problem: Day of surgery post CABG/Heart valve replacement  Goal: Stabilization in immediate post op period  Intervention: Titrate and wean off vasoactive drips per patient's condition and per MD order while maintaining SBP  mmHg per MD order  Note: Weaning off vasoactive drips as pt's vital signs allow.   Intervention: Wean from Vent per protocol (see protocol), extubation goal within 6 hours post op  Note: Pt weaned from vent and extubated.   Intervention: Dangle within 4 hours post extubation  Note: Pt dangled within 4 hours of extubation. Pt tolerated well.   Intervention: Clear liquids post extubation, order carbohydrate free (post cardiac surgery) diet, advance as tolerated  Note: Pt passes swallow evaluation. Clear liquid, CHO free diet ordered.        Patient is not progressing towards the following goals:      Problem: Respiratory  Goal: Patient will achieve/maintain optimum respiratory ventilation and gas exchange  Outcome: Not Progressing  Note: Unable to titrate down on liters of O2 requirements via nasal cannula. Pt satting in mid to low 90%'s.      Problem:  Nutrition  Goal: Patient's nutritional and fluid intake will be adequate or improve  Outcome: Not Progressing  Note: Pt has remained nauseas and vomiting throughout the night despite PRN antiemetics and non-pharmacologic interventions to help relieve nausea/ vomiting.

## 2023-07-07 NOTE — ANESTHESIA POSTPROCEDURE EVALUATION
Patient: Selma Avila    Procedure Summary     Date: 07/06/23 Room / Location: [facility] 02 / SURGERY [facility]    Anesthesia Start: 0724 Anesthesia Stop: 1135    Procedures:       REPLACEMENT, MITRAL VALVE, LEFT ATRIAL APPENDAGE LIGATION (Chest)      ECHOCARDIOGRAM, TRANSESOPHAGEAL, INTRAOPERATIVE (Esophagus) Diagnosis: (MITRAL VALVE PROLAPSE, MITRAL REGURGITATION)    Surgeons: Jacqueline Neri M.D. Responsible Provider: Robin Hagen M.D.    Anesthesia Type: general ASA Status: 4          Final Anesthesia Type: general  Last vitals  BP   Blood Pressure : 91/54, Arterial BP: 104/49    Temp   37.4 °C (99.3 °F)    Pulse   92   Resp   (!) 26    SpO2   96 %      Anesthesia Post Evaluation    Patient location during evaluation: ICU  Level of consciousness: awake and alert    Airway patency: patent  Anesthetic complications: no  Cardiovascular status: hemodynamically stable  Respiratory status: acceptable  Hydration status: euvolemic    PONV: none          No notable events documented.     Nurse Pain Score: 3 (NPRS)

## 2023-07-07 NOTE — DIETARY
Nutrition Services: Cardiac Rehab Diet Education Consult   Day 1 of admit.  Selma Avila is a 78 y.o. male with admitting DX of Nonrheumatic mitral (valve) prolapse [I34.1]  Nonrheumatic mitral (valve) insufficiency [I34.0]  Severe mitral valve regurgitation [I34.0]    RD able to visit pt and spouse at bedside to provide heart healthy diet education. Pt said that he did not want diet ed. Pt's wife agreed to education. RD and pt's spouse discussed healthy/unhealthy fats, high sodium foods and high fiber foods. RD provided handout reinforcing topics discussed. Per pt's spouse, she and pt eat a relatively healthy diet. Based on discussion w/ wife, pt's indulgences include butter on his toast and cheese. Pt's wife does not think diet was the cause of current illness. She accepted handout. Pt's wife demonstrated appropriate readiness and expressed evidence of learning. RD able to answer all questions to patient's satisfaction.     No other education needs identified at this time. Consider referral to outpatient nutrition services for continuation of education as indicated or per pt preferences.     Please re-consult RD as indicated.

## 2023-07-07 NOTE — CARE PLAN
The patient is Watcher - Medium risk of patient condition declining or worsening    Shift Goals  Clinical Goals: Hemodynamic stability post- op, wean ventilator settings  Patient Goals: CHITO  Family Goals: CHITO    Progress made toward(s) clinical / shift goals:    Problem: Knowledge Deficit - Standard  Goal: Patient and family/care givers will demonstrate understanding of plan of care, disease process/condition, diagnostic tests and medications  Outcome: Progressing     Problem: Pain - Standard  Goal: Alleviation of pain or a reduction in pain to the patient’s comfort goal  Outcome: Progressing     Problem: Day of surgery post CABG/Heart valve replacement  Goal: Stabilization in immediate post op period  Outcome: Progressing  Intervention: If radial artery used, elevate arm, no BP checks or needle sticks from affected arm, monitor ulnar pulse and capillary refill  Note: BP cuff placed on right arm.  Intervention: First post op hour labs and EKG per order  Note: Completed and uploaded per EPIC  Intervention: Serum K q 6 hours x 24 hours.  ABG and CBC prn.  Note: In progress and being uploaded per Epic.   Intervention: Initiate post cardiac insulin infusion protocol orders for FSBS greater than 140 and check frequency per protocol  Note: Insulin drip started per Protocol.   Intervention: FSBS frequency as per Cardiac Surgery Insulin Drip Protocol  Note: FSBS every 1 hour per protocol.   Intervention: For patients on Beta Blockers: verify dose given prior to surgery or within 6 hours after arrival to the unit  Note: NA    Intervention: Chest tube to 20 cm suction, record CT drainage with VS, and check for air leak  Note: Total output for this RN. Marked at 1900 with total output at 740  Intervention: For CT drainage >300 mL in first hour post op and/or 150 mL in subsequent hours: Stat platelets, PT, INR, TEG, iSTAT, and H&H per order  Note: NA   Intervention: Titrate and wean off vasoactive drips per patient's condition  and per MD order while maintaining SBP  mmHg per MD order  Note: Patient medicated per MAR  Intervention: VAP protocol in place  Note: Oral care provided by this RN and RT  Intervention: Wean from Vent per protocol (see protocol), extubation goal within 6 hours post op  Note: Patient vent setting weaned apporpriatly  Intervention: IS q 1 hour while awake post extubation  Note: CHITO  Intervention: Bedrest until extubated and groin lines out  Note: CHITO  Intervention: Dangle within 4 hours post extubation  Note: CHITO  Intervention: Up in chair 4 hours, day of extubation  Note: CHITO  Intervention: Maintain all original surgical dressings per provider orders and specifications  Note: This RN changed surgical dressing on sternum after 1 episode of emesis.   Intervention: Clear liquids post extubation, order carbohydrate free (post cardiac surgery) diet, advance as tolerated  Note: NA  Intervention: Discontinue [place] naye and arterial line 12-18 hours post op if hemodynamically stable and off vasoactive drips  Note: NA       Patient is not progressing towards the following goals:

## 2023-07-07 NOTE — DISCHARGE PLANNING
Received choice form at: [id number]  Agency/Facility name: [facility]  Referral sent per choice form at:  [phone]

## 2023-07-07 NOTE — PROGRESS NOTES
Discharge Appointments/outpatient referrals/ and HH set up:    Cardiac surgery follow up appointment made for 4-5 weeks out    Hospital discharge team/schedulers called for cardiology f/u appointment for MD or APRN within 3-4 weeks if possible.    Aortic surveillance program/vascular medicine referral not needed, orders not placed.    Anticoagulation referral/ coumadin clinic referral needed and orders placed .    INR draws are indicated for MVR procedure.    I have spoken with patient and family regarding INR draw plan. Patient and family do want HH. CM and APRN notified, face to face and Epic orders will be placed.

## 2023-07-07 NOTE — PROGRESS NOTES
"Critical Care Progress Note    Date of admission  7/6/2023    Chief Complaint/Hospital Course  \"78 y.o. male with past medical history of atrial fibrillation, dyslipidemia, hypertension, severe mitral regurg who presented 7/6/2023 for an elective mitral valve replacement with Dr. De Leon.  Per anesthesia his laryngoscopy was moderately difficult with a grade 2 a view.  Preoperatively his LVEF was 65% and postoperatively while on epinephrine infusion this was preserved.  He arrives in the ICU on mechanical ventilatory support, sedated on and epinephrine.\" Dr. Berrios      Interval Problem Update  Reviewed last 24 hour events:    Cardiac: sbp 100s off pressors, HR 80s  Pulm: 4L O2  Heme: stable  /renal: Cr impoving  GI/endo: PO diet  ID:  WBC 20, got one dose ancef  I/O: +7.5L  Additional Labs/Imaging:   - Na 154 -> 151  - f/u procal, respiratory culture  - CXR w/b/l lower lobe infiltrates c/f atelectasis vs infection      Review of Systems  Review of Systems   Constitutional:  Positive for malaise/fatigue. Negative for fever.   Respiratory:  Positive for cough. Negative for shortness of breath.    Cardiovascular:         Pain w/deep breaths   Gastrointestinal:  Negative for abdominal pain and vomiting.   Musculoskeletal:  Negative for falls.   Neurological:  Positive for weakness. Negative for focal weakness.        Vital Signs for last 24 hours   Temp:  [35.5 °C (95.9 °F)-37.7 °C (99.9 °F)] 37.4 °C (99.3 °F)  Pulse:  [] 79  Resp:  [11-36] 22  BP: ()/(48-70) 97/56  SpO2:  [92 %-99 %] 97 %    Hemodynamic parameters for last 24 hours  CVP:  [0 MM HG-8 MM HG] 7 MM HG    Respiratory Information for the last 24 hours  Vent Mode: Spont  PEEP/CPAP: 8  MAP: 10  Length of Weaning Trial (Hours): 1hr  Control VTE (exp VT): 766    Physical Exam   Physical Exam  Constitutional:       General: He is not in acute distress.     Appearance: He is ill-appearing.   HENT:      Mouth/Throat:      Mouth: Mucous membranes are " moist.   Eyes:      Conjunctiva/sclera: Conjunctivae normal.   Cardiovascular:      Rate and Rhythm: Normal rate and regular rhythm.      Comments: Midline surgical incision w/dressing  Pulmonary:      Effort: Pulmonary effort is normal. No respiratory distress.   Abdominal:      Palpations: Abdomen is soft.   Musculoskeletal:         General: No deformity.   Skin:     General: Skin is warm and dry.   Neurological:      General: No focal deficit present.      Mental Status: He is alert and oriented to person, place, and time.   Psychiatric:         Mood and Affect: Mood normal.         Behavior: Behavior normal.         Medications  Current Facility-Administered Medications   Medication Dose Route Frequency Provider Last Rate Last Admin    furosemide (Lasix) injection 40 mg  40 mg Intravenous BID DIURETIC BRYANT LeggettP.R.N.   40 mg at 07/07/23 0835    potassium chloride SA (Kdur) tablet 40 mEq  40 mEq Oral BID BRYANT LeggettPShenR.N.   40 mEq at 07/07/23 0835    insulin regular (HumuLIN R,NovoLIN R) injection  2-9 Units Subcutaneous 4X/DAY ACHS Lucila Wallace M.D.        And    dextrose 10 % BOLUS 25 g  25 g Intravenous Q15 MIN PRN Lucila Wallace M.D.        Respiratory Therapy Consult   Nebulization Continuous RT BRYANT LeggettP.R.NShen        NS infusion   Intravenous Continuous BRYANT LeggettP.R.N. 10 mL/hr at 07/06/23 2134 Rate Verify at 07/06/23 2134    NS infusion   Intravenous Continuous BRYANT LeggettP.R.N. 30 mL/hr at 07/07/23 0020 Rate Verify at 07/07/23 0020    enoxaparin (Lovenox) inj 40 mg  40 mg Subcutaneous DAILY AT 1800 BRYANT LeggettP.R.N.        Nozin nasal  swab  1 Applicator Each Nostril BID JANET LeggettR.N.   1 Applicator at 07/07/23 0848    magnesium sulfate in D5W IVPB premix 1 g  1 g Intravenous DAILY BRYANT LeggettPShenR.N.   Stopped at 07/07/23 0658    aspirin EC tablet 81 mg  81 mg Oral DAILY BRYANT LeggettP.R.N.   81 mg  at 07/07/23 0609    Pharmacy Consult Request ...Pain Management Review 1 Each  1 Each Other PHARMACY TO DOSE BRYANT LeggettP.R.N.        acetaminophen (Tylenol) tablet 1,000 mg  1,000 mg Oral Q6HRS ALESHA Leggett.P.R.N.   1,000 mg at 07/07/23 0609    Followed by    [START ON 7/11/2023] acetaminophen (Tylenol) tablet 1,000 mg  1,000 mg Oral Q6HRS PRN ALESHA Leggett.P.R.N.        oxyCODONE immediate-release (Roxicodone) tablet 5 mg  5 mg Oral Q3HRS PRN ALESHA Leggett.P.R.N.   5 mg at 07/07/23 0836    Or    oxyCODONE immediate release (Roxicodone) tablet 10 mg  10 mg Oral Q3HRS PRN Erika Macias A.P.R.N.        Or    fentaNYL (Sublimaze) injection 50 mcg  50 mcg Intravenous Q3HRS PRN Erika Macias A.P.R.N.   50 mcg at 07/06/23 1749    traMADol (Ultram) 50 MG tablet 50 mg  50 mg Oral Q4HRS PRN Erika Macias A.P.R.N.        ondansetron (Zofran) syringe/vial injection 8 mg  8 mg Intravenous Q6HRS PRN Erika Macias A.P.R.N.   8 mg at 07/07/23 0414    Or    prochlorperazine (Compazine) injection 10 mg  10 mg Intravenous Q6HRS PRN Erika Macias A.P.R.N.   10 mg at 07/06/23 2046    senna-docusate (Pericolace Or Senokot S) 8.6-50 MG per tablet 2 Tablet  2 Tablet Oral BID ALESHA Leggett.P.R.N.   2 Tablet at 07/07/23 0608    And    polyethylene glycol/lytes (Miralax) PACKET 1 Packet  1 Packet Oral DAILY ALESHA Leggett.P.R.N.   1 Packet at 07/07/23 0608    And    [START ON 7/8/2023] magnesium hydroxide (Milk Of Magnesia) suspension 30 mL  30 mL Oral DAILY Erika Macias, ALESHA.P.R.N.        And    bisacodyl (Dulcolax) suppository 10 mg  10 mg Rectal QDAY PRN ALESHA Leggett.P.R.N.        omeprazole (PriLOSEC) capsule 20 mg  20 mg Oral DAILY ALESHA Leggett.P.R.N.   20 mg at 07/07/23 0609    mag hydrox-al hydrox-simeth (Maalox Plus Es Or Mylanta Ds) suspension 30 mL  30 mL Oral Q4HRS PRN ALESHA Leggett.P.R.N.        diphenhydrAMINE (Benadryl) tablet/capsule 25 mg  25 mg  Oral HS PRN - MR X 1 CHUCK Leggett        metoprolol tartrate (Lopressor) tablet 12.5 mg  12.5 mg Oral BID CHUCK Leggett   12.5 mg at 07/07/23 0609    Followed by    [START ON 7/8/2023] metoprolol tartrate (Lopressor) tablet 25 mg  25 mg Oral BID CHUCK Leggett MD Alert...Warfarin per Pharmacy   Other PHARMACY TO DOSE CHUCK Leggett        insulin lispro (HumaLOG,AdmeLOG) injection  0-14 Units Subcutaneous TID ALANA Neri M.D.        insulin regular (Humulin R) 100 Units in  mL Infusion  0-29 Units/hr Intravenous Continuous CHUCK Leggett   Stopped. (Insulin or Heparin) at 07/07/23 0306       Fluids    Intake/Output Summary (Last 24 hours) at 7/7/2023 0956  Last data filed at 7/7/2023 0945  Gross per 24 hour   Intake 90307.48 ml   Output 3565 ml   Net 7549.48 ml       Laboratory  Recent Labs     07/06/23  1806 07/06/23  1914 07/06/23 2006   ISTATAPH 7.290* 7.315* 7.422   ISTATAPCO2 32.9 34.8 26.6   ISTATAPO2 87 78 83   ISTATATCO2 17* 19* 18*   [id number] 96 94 97   ISTATARTHCO3 15.8* 17.7 17.3   ISTATARTBE -10* -8* -6*   ISTATTEMP 37.0 C 37.3 C 37.4 C   ISTATFIO2 40 40 40   ISTATSPEC Arterial Arterial Arterial   ISTATAPHTC 7.290* 7.311* 7.416   [id number] 87 79 85         Recent Labs     07/06/23  1135 07/06/23  1802 07/06/23  2300 07/07/23  0110 07/07/23  0500   SODIUM  --  154*  --  151*  --    POTASSIUM 4.5 2.7* 2.8* 3.2* 4.2   CHLORIDE  --  109  --  111  --    CO2  --  16*  --  25  --    BUN  --  21  --  24*  --    CREATININE  --  1.40  --  1.15  --    MAGNESIUM 3.2*  --   --   --   --    CALCIUM  --  7.6*  --  8.5  --      Recent Labs     07/06/23  1802 07/07/23  0110   GLUCOSE 226* 159*     Recent Labs     07/07/23  0110   WBC 20.6*     Recent Labs     07/06/23  1135 07/07/23  0110 07/07/23  0500   RBC  --  4.14*  --    HEMOGLOBIN 13.8* 12.5*  --    HEMATOCRIT 41.5* 37.6*  --    PLATELETCT 142* 134*  --    PROTHROMBTM 19.8*   --  16.5*   APTT 36.1*  --   --    INR 1.72*  --  1.35*       Imaging  Reviewed     Assessment/Plan  * Non-rheumatic mitral regurgitation- (present on admission)  Assessment & Plan  Status post mitral valve replacement on 7/6/2023 by Dr. De Leon. Extubated 7/6 PM - on 4L NC    - weaned off all vasopressors  - Post-operative pain control per CVS  - Chest tube and pacemaker management per CVS  - transition off insulin gtt per CVS  - Optimize K and Mg  - PT/OT consult    Acute respiratory failure (HCC)  Assessment & Plan  From atelectasis post op and not taking very deep breaths and fluid overload vs potential pneumonia w/this elevated WBC  - lasix 40mg IV BID per CVS  - f/u procal  - f/u respiratory culture  - pain control and mobility and IS use  - monitor for clinical signs of infection - will hold off on abx but low threshold    Dyslipidemia with high LDL and low HDL- (present on admission)  Assessment & Plan  Not on treatment at home    Essential hypertension- (present on admission)  Assessment & Plan  Currently on epinephrine, clevidipine available if needed  Start beta-blocker on postop day 1         VTE:  Lovenox  Ulcer: Not Indicated  Lines: Central Line  Ongoing indication addressed and Arterial Line  Ongoing indication addressed    I have performed a physical exam and reviewed and updated ROS and Plan today (7/7/2023). In review of yesterday's note (7/6/2023), there are no changes except as documented above.     Discussed patient condition and risk of morbidity and/or mortality with RN, RT, Pharmacy, Charge nurse / hot rounds, and Patient  The patient remains critically ill.  Critical care time = 40 minutes in directly providing and coordinating critical care and extensive data review.  No time overlap and excludes procedures.        Lucila Wallace MD  Pulmonary and Critical Care Medicine  [facility]

## 2023-07-07 NOTE — FACE TO FACE
Face to Face Supporting Documentation - Home Health    The encounter with this patient was in whole or in part the primary reason for home health admission.    Date of encounter:   Patient:                    MRN:                       YOB: 2022  Selma Avila  [id number]  1945     Home health to see patient for:  Skilled Nursing care for assessment, interventions & education    Skilled need for:  Surgical Aftercare MVR    Skilled nursing interventions to include:  Comment: INR draws    Homebound status evidenced by:  Needs the assistance of another person in order to leave the home. Leaving home requires a considerable and taxing effort. There is a normal inability to leave the home.    Community Physician to provide follow up care: Temitope Gilbert M.D.     Optional Interventions? No      I certify the face to face encounter for this home health care referral meets the CMS requirements and the encounter/clinical assessment with the patient was, in whole, or in part, for the medical condition(s) listed above, which is the primary reason for home health care. Based on my clinical findings: the service(s) are medically necessary, support the need for home health care, and the homebound criteria are met.  I certify that this patient has had a face to face encounter by myself.  Erika Macias A.P.R.N. - NPI: [id number]

## 2023-07-07 NOTE — DISCHARGE PLANNING
Care Transition Team Assessment    RNCM met with patient and spouse at bedside to collect HH choice. Patient lives in [place] with spouse. Patient does not use O2 or DME. Patient was independent with ADL's and IADLS prior to this hospitalization. HH choice was obtained for [facility] and faxed to ELIUD Kent. HCM will continue to follow for any DC planning needs.     Information Source  Orientation Level: Unable to assess  Information Given By: Spouse  Informant's Name: Noy  Who is responsible for making decisions for patient? : Patient (Spouse)    Readmission Evaluation  Is this a readmission?: No    Elopement Risk  Legal Hold: No  Ambulatory or Self Mobile in Wheelchair: Yes  Disoriented: No  Psychiatric Symptoms: None  History of Wandering: No  Elopement this Admit: No  Vocalizing Wanting to Leave: No  Displays Behaviors, Body Language Wanting to Leave: No-Not at Risk for Elopement  Elopement Risk: Not at Risk for Elopement         Discharge Preparedness  What is your plan after discharge?: Home health care  What are your discharge supports?: Spouse  Prior Functional Level: Ambulatory, Independent with Activities of Daily Living, Independent with Medication Management  Difficulity with ADLs: None  Difficulity with IADLs: None    Functional Assesment  Prior Functional Level: Ambulatory, Independent with Activities of Daily Living, Independent with Medication Management    Finances  Financial Barriers to Discharge: No  Prescription Coverage: Yes    Vision / Hearing Impairment  Right Eye Vision: Impaired, Wears Glasses  Left Eye Vision: Impaired, Wears Glasses  Hearing Impairment: Hearing Device(s) Available, Both Ears         Advance Directive  Advance Directive?: None    Domestic Abuse  Physical Abuse or Sexual Abuse: No  Verbal Abuse or Emotional Abuse: No  Possible Abuse/Neglect Reported to:: Not Applicable    Psychological Assessment  History of Substance Abuse: None  History of Psychiatric Problems:  No  Non-compliant with Treatment: No  Newly Diagnosed Illness: Yes    Discharge Risks or Barriers  Discharge risks or barriers?: No  Patient risk factors: Vulnerable adult    Anticipated Discharge Information  Discharge Disposition: Discharged to home/self care (01)

## 2023-07-08 LAB
ANION GAP SERPL CALC-SCNC: 9 MMOL/L (ref 7–16)
BUN SERPL-MCNC: 29 MG/DL (ref 8–22)
CALCIUM SERPL-MCNC: 8.1 MG/DL (ref 8.5–10.5)
CHLORIDE SERPL-SCNC: 102 MMOL/L (ref 96–112)
CO2 SERPL-SCNC: 29 MMOL/L (ref 20–33)
CREAT SERPL-MCNC: 1.12 MG/DL (ref 0.5–1.4)
EKG IMPRESSION: NORMAL
ERYTHROCYTE [DISTWIDTH] IN BLOOD BY AUTOMATED COUNT: 48.9 FL (ref 35.9–50)
GFR SERPLBLD CREATININE-BSD FMLA CKD-EPI: 68 ML/MIN/1.73 M 2
GLUCOSE BLD STRIP.AUTO-MCNC: 119 MG/DL (ref 65–99)
GLUCOSE BLD STRIP.AUTO-MCNC: 135 MG/DL (ref 65–99)
GLUCOSE SERPL-MCNC: 162 MG/DL (ref 65–99)
GRAM STN SPEC: NORMAL
HCT VFR BLD AUTO: 32.4 % (ref 42–52)
HGB BLD-MCNC: 10.6 G/DL (ref 14–18)
INR PPP: 1.49 (ref 0.87–1.13)
MCH RBC QN AUTO: 30.6 PG (ref 27–33)
MCHC RBC AUTO-ENTMCNC: 32.7 G/DL (ref 32.3–36.5)
MCV RBC AUTO: 93.6 FL (ref 81.4–97.8)
PLATELET # BLD AUTO: 98 K/UL (ref 164–446)
PLATELETS.RETICULATED NFR BLD AUTO: 11.6 % (ref 0.6–13.1)
PMV BLD AUTO: 11.4 FL (ref 9–12.9)
POTASSIUM SERPL-SCNC: 4.5 MMOL/L (ref 3.6–5.5)
PROTHROMBIN TIME: 17.7 SEC (ref 12–14.6)
RBC # BLD AUTO: 3.46 M/UL (ref 4.7–6.1)
SIGNIFICANT IND 70042: NORMAL
SITE SITE: NORMAL
SODIUM SERPL-SCNC: 140 MMOL/L (ref 135–145)
SOURCE SOURCE: NORMAL
WBC # BLD AUTO: 24.7 K/UL (ref 4.8–10.8)

## 2023-07-08 PROCEDURE — 87070 CULTURE OTHR SPECIMN AEROBIC: CPT

## 2023-07-08 PROCEDURE — 93010 ELECTROCARDIOGRAM REPORT: CPT | Performed by: INTERNAL MEDICINE

## 2023-07-08 PROCEDURE — 700102 HCHG RX REV CODE 250 W/ 637 OVERRIDE(OP): Performed by: THORACIC SURGERY (CARDIOTHORACIC VASCULAR SURGERY)

## 2023-07-08 PROCEDURE — 85610 PROTHROMBIN TIME: CPT

## 2023-07-08 PROCEDURE — 85027 COMPLETE CBC AUTOMATED: CPT

## 2023-07-08 PROCEDURE — 82962 GLUCOSE BLOOD TEST: CPT

## 2023-07-08 PROCEDURE — A9270 NON-COVERED ITEM OR SERVICE: HCPCS | Performed by: NURSE PRACTITIONER

## 2023-07-08 PROCEDURE — 94669 MECHANICAL CHEST WALL OSCILL: CPT

## 2023-07-08 PROCEDURE — 87205 SMEAR GRAM STAIN: CPT

## 2023-07-08 PROCEDURE — 700102 HCHG RX REV CODE 250 W/ 637 OVERRIDE(OP): Performed by: NURSE PRACTITIONER

## 2023-07-08 PROCEDURE — 770020 HCHG ROOM/CARE - TELE (206)

## 2023-07-08 PROCEDURE — 99024 POSTOP FOLLOW-UP VISIT: CPT | Performed by: NURSE PRACTITIONER

## 2023-07-08 PROCEDURE — 80048 BASIC METABOLIC PNL TOTAL CA: CPT

## 2023-07-08 PROCEDURE — 85055 RETICULATED PLATELET ASSAY: CPT

## 2023-07-08 PROCEDURE — A9270 NON-COVERED ITEM OR SERVICE: HCPCS | Performed by: THORACIC SURGERY (CARDIOTHORACIC VASCULAR SURGERY)

## 2023-07-08 PROCEDURE — 93005 ELECTROCARDIOGRAM TRACING: CPT | Performed by: THORACIC SURGERY (CARDIOTHORACIC VASCULAR SURGERY)

## 2023-07-08 PROCEDURE — 700111 HCHG RX REV CODE 636 W/ 250 OVERRIDE (IP): Mod: JZ | Performed by: NURSE PRACTITIONER

## 2023-07-08 RX ADMIN — OMEPRAZOLE 20 MG: 20 CAPSULE, DELAYED RELEASE ORAL at 05:48

## 2023-07-08 RX ADMIN — POTASSIUM CHLORIDE 40 MEQ: 1500 TABLET, EXTENDED RELEASE ORAL at 17:56

## 2023-07-08 RX ADMIN — Medication 1 APPLICATOR: at 20:26

## 2023-07-08 RX ADMIN — POLYETHYLENE GLYCOL 3350 1 PACKET: 17 POWDER, FOR SOLUTION ORAL at 05:49

## 2023-07-08 RX ADMIN — FUROSEMIDE 40 MG: 10 INJECTION INTRAMUSCULAR; INTRAVENOUS at 17:53

## 2023-07-08 RX ADMIN — MAGNESIUM SULFATE IN DEXTROSE 1 G: 10 INJECTION, SOLUTION INTRAVENOUS at 05:52

## 2023-07-08 RX ADMIN — POTASSIUM CHLORIDE 40 MEQ: 1500 TABLET, EXTENDED RELEASE ORAL at 05:48

## 2023-07-08 RX ADMIN — METOPROLOL TARTRATE 25 MG: 25 TABLET, FILM COATED ORAL at 17:51

## 2023-07-08 RX ADMIN — Medication 1 APPLICATOR: at 08:10

## 2023-07-08 RX ADMIN — MAGNESIUM HYDROXIDE 30 ML: 1200 LIQUID ORAL at 05:48

## 2023-07-08 RX ADMIN — SENNOSIDES AND DOCUSATE SODIUM 2 TABLET: 50; 8.6 TABLET ORAL at 05:49

## 2023-07-08 RX ADMIN — METOPROLOL TARTRATE 25 MG: 25 TABLET, FILM COATED ORAL at 05:48

## 2023-07-08 RX ADMIN — ACETAMINOPHEN 1000 MG: 500 TABLET, FILM COATED ORAL at 17:51

## 2023-07-08 RX ADMIN — WARFARIN SODIUM 3 MG: 3 TABLET ORAL at 17:56

## 2023-07-08 RX ADMIN — FUROSEMIDE 40 MG: 10 INJECTION INTRAMUSCULAR; INTRAVENOUS at 05:49

## 2023-07-08 RX ADMIN — ENOXAPARIN SODIUM 40 MG: 100 INJECTION SUBCUTANEOUS at 17:53

## 2023-07-08 RX ADMIN — ACETAMINOPHEN 1000 MG: 500 TABLET, FILM COATED ORAL at 12:28

## 2023-07-08 RX ADMIN — TRAMADOL HYDROCHLORIDE 50 MG: 50 TABLET ORAL at 10:25

## 2023-07-08 RX ADMIN — ACETAMINOPHEN 1000 MG: 500 TABLET, FILM COATED ORAL at 05:49

## 2023-07-08 RX ADMIN — SENNOSIDES AND DOCUSATE SODIUM 2 TABLET: 50; 8.6 TABLET ORAL at 17:51

## 2023-07-08 RX ADMIN — ASPIRIN 81 MG: 81 TABLET, COATED ORAL at 05:48

## 2023-07-08 ASSESSMENT — COGNITIVE AND FUNCTIONAL STATUS - GENERAL
STANDING UP FROM CHAIR USING ARMS: A LITTLE
TOILETING: A LITTLE
MOVING TO AND FROM BED TO CHAIR: A LITTLE
SUGGESTED CMS G CODE MODIFIER MOBILITY: CK
DAILY ACTIVITIY SCORE: 20
HELP NEEDED FOR BATHING: A LITTLE
DRESSING REGULAR UPPER BODY CLOTHING: A LITTLE
MOBILITY SCORE: 19
SUGGESTED CMS G CODE MODIFIER DAILY ACTIVITY: CJ
CLIMB 3 TO 5 STEPS WITH RAILING: A LOT
WALKING IN HOSPITAL ROOM: A LITTLE
DRESSING REGULAR LOWER BODY CLOTHING: A LITTLE

## 2023-07-08 ASSESSMENT — COPD QUESTIONNAIRES
DURING THE PAST 4 WEEKS HOW MUCH DID YOU FEEL SHORT OF BREATH: NONE/LITTLE OF THE TIME
COPD SCREENING SCORE: 4
DO YOU EVER COUGH UP ANY MUCUS OR PHLEGM?: NO/ONLY WITH OCCASIONAL COLDS OR INFECTIONS
HAVE YOU SMOKED AT LEAST 100 CIGARETTES IN YOUR ENTIRE LIFE: YES

## 2023-07-08 ASSESSMENT — PAIN DESCRIPTION - PAIN TYPE
TYPE: ACUTE PAIN
TYPE: SURGICAL PAIN
TYPE: ACUTE PAIN
TYPE: SURGICAL PAIN
TYPE: ACUTE PAIN
TYPE: ACUTE PAIN

## 2023-07-08 ASSESSMENT — FIBROSIS 4 INDEX: FIB4 SCORE: 2.73

## 2023-07-08 NOTE — CARE PLAN
Problem: Pain - Standard  Goal: Alleviation of pain or a reduction in pain to the patient’s comfort goal  Outcome: Progressing     Problem: Post Op Day 1 CABG/Heart Valve Replacement  Goal: Optimal care of the post op CABG/heart valve replacement Post Op Day 1  Intervention: EKG and CXR completed  Note: Completed  Intervention: All valve patients: PT/INR daily  Note: Completed  Intervention: Antibiotics are discontinued within 24 hours of anesthesia end time unless indication documented for continuation beyond 24 hours  Note: Completed  Intervention: Daily weights in the morning  Note: Completed  Intervention: Up in chair for all meals  Note: Completed  Intervention: Ambulate in am if stable. First ambulation 25 feet. Repeat x 3 as tolerated  Note: Completed  Intervention: Discontinue matos catheter unless documented reason for continuation  Note: Strict I&O  Intervention: Assess surgical dressing and check provider orders for potential removal  Note: Completed  Intervention: Ensure referal to intensive cardiac rehab is ordered, and smoking cessation education if appropriate  Note: PT/OT ordered  Intervention: [facility] trained RN to remove chest tubes if ordered by provider  Note: Not applicable  Intervention: IS q 1 hour while awake and record best IS volume  Note: Patient needed encouragement.  Intervention: Knee high SINDI hose, on during the day, off at night  Note: Completed  Intervention: Saline lock IV  Note: Completed  Intervention: Transfer to [facility] status, begin VS q 4 hours  Note: Not yet done.   The patient is Stable - Low risk of patient condition declining or worsening    Shift Goals  Clinical Goals: Hemodynamic stability, pain control, nausea control, titrate off vasopressor support, extubate.  Patient Goals: Pain control, nausea control, rest/ sleep.  Family Goals: Stay until extubation, call for any updates.    Progress made toward(s) clinical / shift goals:  pt reports tolerable pain

## 2023-07-08 NOTE — PROGRESS NOTES
Report called to MICHELL James. Patient transported via WC with personal belongings.The patients wife at the bedside. Patient stable and showing no signs or symptoms of distress. All needs met. Chest tubes and matos DC's prior to transfer per orders.

## 2023-07-08 NOTE — PROGRESS NOTES
4 Eyes Skin Assessment Completed by MICHELL James and MICHELL Tanner.    Head WDL  Ears WDL  Nose WDL  Mouth WDL  Neck x, RIJ in place  Breast/Chest Incision and pacer wires in place  Shoulder Blades WDL  Spine WDL  (R) Arm/Elbow/Hand WDL  (L) Arm/Elbow/Hand WDL  Abdomen Incision mediastinal tube removal site, dressing in place  Groin WDL  Scrotum/Coccyx/Buttocks Redness  (R) Leg WDL  (L) Leg WDL  (R) Heel/Foot/Toe WDL  (L) Heel/Foot/Toe WDL          Devices In Places Tele Box, Pulse Ox, Pacer, and Nasal Cannula      Interventions In Place Gray Ear Foams    Possible Skin Injury No    Pictures Uploaded Into Epic N/A  Wound Consult Placed N/A  RN Wound Prevention Protocol Ordered No

## 2023-07-08 NOTE — PROGRESS NOTES
Pharmacy Warfarin Monitoring     Date: 7/8/2023  Reason for Anticoagulation: Bioprosthetic Valve Replacement   Target INR: 2.0 to 3.0      Hemoglobin Value: (!) 10.6  Hematocrit Value: (!) 32.4  Lab Platelet Value: (!) 98    INR from last 7 days       Date/Time INR Value    07/08/23 0420 1.49    07/07/23 0500 1.35    07/06/23 1135 1.72          Dose from last 7 days       Date/Time Dose (mg)    07/08/23 1425 3    07/07/23 1551 3          Home dose: New start  Significant Interactions: Antiplatelet Medications  Bridge Therapy: Yes (prophylactic lovenox)    Comments: New start warfarin following valve replacement. Medistinal chest tubes removed. Starting with 3 mg daily and daily INR until stable.    Education Material Provided?: No (will attempt before discharge)  Pharmacist suggested discharge dosing: TBD pending INR trends     Thank you!  Tea Davis, PharmD, BCCCP

## 2023-07-08 NOTE — CARE PLAN
The patient is Watcher - Medium risk of patient condition declining or worsening    Shift Goals  Clinical Goals: Nausea control, pain control  Patient Goals: Rest  Family Goals: Education    Problem: Pain - Standard  Goal: Alleviation of pain or a reduction in pain to the patient’s comfort goal  Description: Target End Date:  Prior to discharge or change in level of care    Document on Vitals flowsheet    1.  Document pain using the appropriate pain scale per order or unit policy  2.  Educate and implement non-pharmacologic comfort measures (i.e. relaxation, distraction, massage, cold/heat therapy, etc.)  3.  Pain management medications as ordered  4.  Reassess pain after pain med administration per policy  5.  If opiods administered assess patient's response to pain medication is appropriate per POSS sedation scale  6.  Follow pain management plan developed in collaboration with patient and interdisciplinary team (including palliative care or pain specialists if applicable)  Outcome: Progressing     Problem: Post Op Day 1 CABG/Heart Valve Replacement  Goal: Optimal care of the post op CABG/heart valve replacement Post Op Day 1  Outcome: Progressing  Intervention: Daily weights in the morning  Note: 87.3kg  Intervention: Up in chair for all meals  Note: Pt. Will get up to chair for breakfast.   Intervention: IS q 1 hour while awake and record best IS volume  Note: 1000 IS   Intervention: Knee high SINDI hose, on during the day, off at night  Note: SINDI hose off while pt. Is in bed

## 2023-07-08 NOTE — PROGRESS NOTES
Patient arrived to floor. Assumed care at 1105. Assessment complete, A&Ox 4  Admission record complete, Patient on monitor, Monitor room notified. Pt oriented to room, educated on call light/extension/phone system, RN and CNA extension numbers provided to pt, white board updated. Fall assessment complete, patient educated to call for assistance, pt verbalizes understanding. Pt denies pain or any additional needs at this time. Questions and concerns addressed. Call light, phone, and personal belongings within reach.

## 2023-07-08 NOTE — PROGRESS NOTES
Cardiovascular Surgery Progress Note    Name: Selma Avila  MRN: [id number]  : 1945  Admit Date: 2023  5:03 AM  2 Days Post-Op     Procedure:  Procedure(s) and Anesthesia Type:     * REPLACEMENT, MITRAL VALVE, LEFT ATRIAL APPENDAGE LIGATION - General     * ECHOCARDIOGRAM, TRANSESOPHAGEAL, INTRAOPERATIVE - General    Vitals:  Vitals:    23 0300 23 0400 23 0500 23 0600   BP: 115/69 109/58 107/58 111/66   Pulse: 89 83 90 95   Resp:       Temp:  36.2 °C (97.2 °F)     TempSrc:  Temporal     SpO2: 93% 92% 93% 92%   Weight: 87.3 kg (192 lb 7.4 oz)      Height:          Temp (24hrs), Av.3 °C (97.4 °F), Min:36 °C (96.8 °F), Max:36.9 °C (98.4 °F)      Respiratory:    Respiration: 20, Pulse Oximetry: 92 %       Fluids:    Intake/Output Summary (Last 24 hours) at 2023 0732  Last data filed at 2023 0600  Gross per 24 hour   Intake 1239.25 ml   Output 2780 ml   Net -1540.75 ml       Admit weight: Weight: 82.1 kg (181 lb)  Current weight: Weight: 87.3 kg (192 lb 7.4 oz) (23 0300)    Labs:  Recent Labs     23  1135 23  0110 23  0420   WBC  --  20.6* 24.7*   RBC  --  4.14* 3.46*   HEMOGLOBIN 13.8* 12.5* 10.6*   HEMATOCRIT 41.5* 37.6* 32.4*   MCV  --  90.8 93.6   MCH  --  30.2 30.6   MCHC  --  33.2 32.7   RDW  --  47.7 48.9   PLATELETCT 142* 134* 98*   MPV  --  11.4 11.4       Recent Labs     23  1802 23  2300 23  0110 23  0500 23  0420   SODIUM 154*  --  151*  --  140   POTASSIUM 2.7*   < > 3.2* 4.2 4.5   CHLORIDE 109  --  111  --  102   CO2 16*  --  25  --  29   GLUCOSE 226*  --  159*  --  162*   BUN 21  --  24*  --  29*   CREATININE 1.40  --  1.15  --  1.12   CALCIUM 7.6*  --  8.5  --  8.1*    < > = values in this interval not displayed.       Recent Labs     23  1135 23  0500 23  0420   APTT 36.1*  --   --    INR 1.72* 1.35* 1.49*             Medications:  Scheduled Medications   Medication Dose Frequency     furosemide  40 mg BID DIURETIC    potassium chloride SA  40 mEq BID    insulin regular  2-9 Units 4X/DAY ACHS    warfarin  3 mg DAILY AT 1800    enoxaparin (LOVENOX) injection  40 mg DAILY AT 1800    Nozin nasal  swab  1 Applicator BID    aspirin  81 mg DAILY    Pharmacy Consult Request  1 Each PHARMACY TO DOSE    acetaminophen  1,000 mg Q6HRS    senna-docusate  2 Tablet BID    And    polyethylene glycol/lytes  1 Packet DAILY    And    magnesium hydroxide  30 mL DAILY    omeprazole  20 mg DAILY    metoprolol tartrate  25 mg BID    MD Alert...Warfarin per Pharmacy   PHARMACY TO DOSE        Exam:   Physical Exam  Vitals and nursing note reviewed.   Constitutional:       General: He is not in acute distress.     Appearance: Normal appearance.   HENT:      Head: Normocephalic.      Right Ear: External ear normal.      Left Ear: External ear normal.      Nose: Nose normal. No congestion.      Mouth/Throat:      Mouth: Mucous membranes are moist.      Pharynx: Oropharynx is clear.   Eyes:      Extraocular Movements: Extraocular movements intact.      Pupils: Pupils are equal, round, and reactive to light.   Cardiovascular:      Rate and Rhythm: Normal rate and regular rhythm.      Pulses: Normal pulses.      Heart sounds: Normal heart sounds.   Pulmonary:      Effort: Pulmonary effort is normal.      Breath sounds: Decreased breath sounds present.   Abdominal:      General: Bowel sounds are decreased. There is no distension.      Palpations: Abdomen is soft.   Musculoskeletal:      Cervical back: Normal range of motion and neck supple. No tenderness.      Right lower leg: Edema present.      Left lower leg: Edema present.   Skin:     General: Skin is warm and dry.      Comments: Midline surgical incision   Neurological:      General: No focal deficit present.      Mental Status: He is alert and oriented to person, place, and time. Mental status is at baseline.   Psychiatric:         Mood and Affect: Mood normal.          Behavior: Behavior normal.         Thought Content: Thought content normal.         Cardiac Medications:    ASA - Yes    Plavix - No; contraindicated because of On Coumadin / NOAC    Post-operative Beta Blockers - Yes    Ace/ARB- No; contraindicated because of Normal EF    Statin - No; contraindicated because of No CAD    Aldactone- No; contraindicated because of Normal EF    SGLT2i-  No contraindicated because of hypotension    Ejection Fraction:  65%    Telemetry:   7/7 SR   7/8 SR    Assessment/Plan:  POD 1  HDS- off pressors, SR, neuro intact, wounds intact, abdomen soft, fluid balance positive, wt up,  4L NC. Cr 1.15, H/H   12.5/37.6. 400 out of chest tubes last night, 1165 total. Plan:  Keep chest tubes and pacing wires. Diurese. Remove matos, IS/ambulate.     POD 2 HDS. SR. Neuro intact.  Hard of hearing.  Wounds CDI.  Cr 1.12 Hgb 10.6. Plan: DC mediastinal tubes and matos. Wean oxygen as tolerated.  Encourage IS/ambulation. Transfer to [facility].  Disposition:  TBD

## 2023-07-09 ENCOUNTER — APPOINTMENT (OUTPATIENT)
Dept: RADIOLOGY | Facility: MEDICAL CENTER | Age: 78
DRG: 219 | End: 2023-07-09
Attending: THORACIC SURGERY (CARDIOTHORACIC VASCULAR SURGERY)
Payer: MEDICARE

## 2023-07-09 LAB
ANION GAP SERPL CALC-SCNC: 8 MMOL/L (ref 7–16)
BUN SERPL-MCNC: 25 MG/DL (ref 8–22)
CALCIUM SERPL-MCNC: 8.2 MG/DL (ref 8.5–10.5)
CHLORIDE SERPL-SCNC: 98 MMOL/L (ref 96–112)
CO2 SERPL-SCNC: 29 MMOL/L (ref 20–33)
CREAT SERPL-MCNC: 1.03 MG/DL (ref 0.5–1.4)
EKG IMPRESSION: NORMAL
ERYTHROCYTE [DISTWIDTH] IN BLOOD BY AUTOMATED COUNT: 47.6 FL (ref 35.9–50)
GFR SERPLBLD CREATININE-BSD FMLA CKD-EPI: 75 ML/MIN/1.73 M 2
GLUCOSE SERPL-MCNC: 102 MG/DL (ref 65–99)
HCT VFR BLD AUTO: 31.2 % (ref 42–52)
HGB BLD-MCNC: 10.4 G/DL (ref 14–18)
INR PPP: 1.93 (ref 0.87–1.13)
MAGNESIUM SERPL-MCNC: 1.8 MG/DL (ref 1.5–2.5)
MCH RBC QN AUTO: 30.9 PG (ref 27–33)
MCHC RBC AUTO-ENTMCNC: 33.3 G/DL (ref 32.3–36.5)
MCV RBC AUTO: 92.6 FL (ref 81.4–97.8)
PLATELET # BLD AUTO: 98 K/UL (ref 164–446)
PLATELETS.RETICULATED NFR BLD AUTO: 8.8 % (ref 0.6–13.1)
PMV BLD AUTO: 11.4 FL (ref 9–12.9)
POTASSIUM SERPL-SCNC: 3.9 MMOL/L (ref 3.6–5.5)
POTASSIUM SERPL-SCNC: 4.1 MMOL/L (ref 3.6–5.5)
PROTHROMBIN TIME: 21.6 SEC (ref 12–14.6)
RBC # BLD AUTO: 3.37 M/UL (ref 4.7–6.1)
SODIUM SERPL-SCNC: 135 MMOL/L (ref 135–145)
WBC # BLD AUTO: 18.9 K/UL (ref 4.8–10.8)

## 2023-07-09 PROCEDURE — 770020 HCHG ROOM/CARE - TELE (206)

## 2023-07-09 PROCEDURE — 93010 ELECTROCARDIOGRAM REPORT: CPT | Performed by: STUDENT IN AN ORGANIZED HEALTH CARE EDUCATION/TRAINING PROGRAM

## 2023-07-09 PROCEDURE — 97535 SELF CARE MNGMENT TRAINING: CPT

## 2023-07-09 PROCEDURE — 97163 PT EVAL HIGH COMPLEX 45 MIN: CPT

## 2023-07-09 PROCEDURE — 94669 MECHANICAL CHEST WALL OSCILL: CPT

## 2023-07-09 PROCEDURE — 71046 X-RAY EXAM CHEST 2 VIEWS: CPT

## 2023-07-09 PROCEDURE — 700102 HCHG RX REV CODE 250 W/ 637 OVERRIDE(OP): Performed by: NURSE PRACTITIONER

## 2023-07-09 PROCEDURE — 85610 PROTHROMBIN TIME: CPT

## 2023-07-09 PROCEDURE — 85055 RETICULATED PLATELET ASSAY: CPT

## 2023-07-09 PROCEDURE — 93005 ELECTROCARDIOGRAM TRACING: CPT | Performed by: THORACIC SURGERY (CARDIOTHORACIC VASCULAR SURGERY)

## 2023-07-09 PROCEDURE — 80048 BASIC METABOLIC PNL TOTAL CA: CPT

## 2023-07-09 PROCEDURE — 85027 COMPLETE CBC AUTOMATED: CPT

## 2023-07-09 PROCEDURE — 84132 ASSAY OF SERUM POTASSIUM: CPT

## 2023-07-09 PROCEDURE — 700102 HCHG RX REV CODE 250 W/ 637 OVERRIDE(OP): Performed by: THORACIC SURGERY (CARDIOTHORACIC VASCULAR SURGERY)

## 2023-07-09 PROCEDURE — 99024 POSTOP FOLLOW-UP VISIT: CPT | Performed by: NURSE PRACTITIONER

## 2023-07-09 PROCEDURE — 97165 OT EVAL LOW COMPLEX 30 MIN: CPT

## 2023-07-09 PROCEDURE — 83735 ASSAY OF MAGNESIUM: CPT

## 2023-07-09 PROCEDURE — A9270 NON-COVERED ITEM OR SERVICE: HCPCS | Performed by: NURSE PRACTITIONER

## 2023-07-09 PROCEDURE — A9270 NON-COVERED ITEM OR SERVICE: HCPCS | Performed by: THORACIC SURGERY (CARDIOTHORACIC VASCULAR SURGERY)

## 2023-07-09 PROCEDURE — 700111 HCHG RX REV CODE 636 W/ 250 OVERRIDE (IP): Performed by: NURSE PRACTITIONER

## 2023-07-09 PROCEDURE — 700111 HCHG RX REV CODE 636 W/ 250 OVERRIDE (IP): Mod: JZ | Performed by: NURSE PRACTITIONER

## 2023-07-09 RX ORDER — AMIODARONE HYDROCHLORIDE 200 MG/1
400 TABLET ORAL 2 TIMES DAILY
Status: DISCONTINUED | OUTPATIENT
Start: 2023-07-09 | End: 2023-07-12 | Stop reason: HOSPADM

## 2023-07-09 RX ORDER — METOLAZONE 2.5 MG/1
2.5 TABLET ORAL
Status: DISCONTINUED | OUTPATIENT
Start: 2023-07-09 | End: 2023-07-12

## 2023-07-09 RX ORDER — WARFARIN SODIUM 3 MG/1
1.5 TABLET ORAL DAILY
Status: DISCONTINUED | OUTPATIENT
Start: 2023-07-09 | End: 2023-07-11

## 2023-07-09 RX ADMIN — AMIODARONE HYDROCHLORIDE 400 MG: 200 TABLET ORAL at 21:40

## 2023-07-09 RX ADMIN — POTASSIUM CHLORIDE 40 MEQ: 1500 TABLET, EXTENDED RELEASE ORAL at 05:55

## 2023-07-09 RX ADMIN — ACETAMINOPHEN 1000 MG: 500 TABLET, FILM COATED ORAL at 05:55

## 2023-07-09 RX ADMIN — ACETAMINOPHEN 1000 MG: 500 TABLET, FILM COATED ORAL at 00:50

## 2023-07-09 RX ADMIN — AMIODARONE HYDROCHLORIDE 1 MG/MIN: 1.8 INJECTION, SOLUTION INTRAVENOUS at 10:56

## 2023-07-09 RX ADMIN — ACETAMINOPHEN 1000 MG: 500 TABLET, FILM COATED ORAL at 17:14

## 2023-07-09 RX ADMIN — Medication 1 APPLICATOR: at 09:47

## 2023-07-09 RX ADMIN — OMEPRAZOLE 20 MG: 20 CAPSULE, DELAYED RELEASE ORAL at 05:54

## 2023-07-09 RX ADMIN — FUROSEMIDE 40 MG: 10 INJECTION INTRAMUSCULAR; INTRAVENOUS at 17:20

## 2023-07-09 RX ADMIN — ENOXAPARIN SODIUM 40 MG: 100 INJECTION SUBCUTANEOUS at 17:14

## 2023-07-09 RX ADMIN — WARFARIN SODIUM 1.5 MG: 3 TABLET ORAL at 17:15

## 2023-07-09 RX ADMIN — METOPROLOL TARTRATE 25 MG: 25 TABLET, FILM COATED ORAL at 05:55

## 2023-07-09 RX ADMIN — ASPIRIN 81 MG: 81 TABLET, COATED ORAL at 05:55

## 2023-07-09 RX ADMIN — Medication 1 APPLICATOR: at 21:40

## 2023-07-09 RX ADMIN — SENNOSIDES AND DOCUSATE SODIUM 2 TABLET: 50; 8.6 TABLET ORAL at 05:54

## 2023-07-09 RX ADMIN — ACETAMINOPHEN 1000 MG: 500 TABLET, FILM COATED ORAL at 11:36

## 2023-07-09 RX ADMIN — FUROSEMIDE 40 MG: 10 INJECTION INTRAMUSCULAR; INTRAVENOUS at 05:54

## 2023-07-09 RX ADMIN — POTASSIUM CHLORIDE 40 MEQ: 1500 TABLET, EXTENDED RELEASE ORAL at 17:14

## 2023-07-09 RX ADMIN — MAGNESIUM HYDROXIDE 30 ML: 1200 LIQUID ORAL at 05:53

## 2023-07-09 RX ADMIN — METOPROLOL TARTRATE 25 MG: 25 TABLET, FILM COATED ORAL at 17:15

## 2023-07-09 RX ADMIN — AMIODARONE HYDROCHLORIDE 150 MG: 1.5 INJECTION, SOLUTION INTRAVENOUS at 10:42

## 2023-07-09 RX ADMIN — METOLAZONE 2.5 MG: 2.5 TABLET ORAL at 10:36

## 2023-07-09 RX ADMIN — POLYETHYLENE GLYCOL 3350 1 PACKET: 17 POWDER, FOR SOLUTION ORAL at 05:54

## 2023-07-09 ASSESSMENT — COGNITIVE AND FUNCTIONAL STATUS - GENERAL
STANDING UP FROM CHAIR USING ARMS: A LITTLE
HELP NEEDED FOR BATHING: A LITTLE
MOVING FROM LYING ON BACK TO SITTING ON SIDE OF FLAT BED: A LITTLE
SUGGESTED CMS G CODE MODIFIER MOBILITY: CK
MOVING TO AND FROM BED TO CHAIR: A LITTLE
DRESSING REGULAR LOWER BODY CLOTHING: A LITTLE
TOILETING: A LITTLE
SUGGESTED CMS G CODE MODIFIER DAILY ACTIVITY: CJ
TURNING FROM BACK TO SIDE WHILE IN FLAT BAD: A LITTLE
CLIMB 3 TO 5 STEPS WITH RAILING: A LOT
WALKING IN HOSPITAL ROOM: A LITTLE
DAILY ACTIVITIY SCORE: 21
MOBILITY SCORE: 17

## 2023-07-09 ASSESSMENT — PAIN DESCRIPTION - PAIN TYPE
TYPE: ACUTE PAIN;SURGICAL PAIN
TYPE: ACUTE PAIN;SURGICAL PAIN

## 2023-07-09 ASSESSMENT — GAIT ASSESSMENTS
GAIT LEVEL OF ASSIST: STANDBY ASSIST
DEVIATION: BRADYKINETIC;DECREASED BASE OF SUPPORT;DECREASED TOE OFF;DECREASED HEEL STRIKE
DISTANCE (FEET): 125

## 2023-07-09 ASSESSMENT — ACTIVITIES OF DAILY LIVING (ADL): TOILETING: INDEPENDENT

## 2023-07-09 ASSESSMENT — FIBROSIS 4 INDEX: FIB4 SCORE: 3.73

## 2023-07-09 NOTE — PROGRESS NOTES
Assumed care of patient, received bedside report from NOC RN. Patient is A&O X 4. Pain 5/10. Vital signs stable overnight, on 3L NC. On tele monitor, SR 81. POC discussed with patient and he verbalized understanding. Call light within reach and fall precautions in place. Bed locked and in lowest position. Patient verbalized understanding on sternal precautions.

## 2023-07-09 NOTE — PROGRESS NOTES
This RN notified RENETTA Valdes that the patient went into Afib. EKG was ordered. Patient seems rate controlled between . Patient is asymptomatic and vitals are stable. Amio gtt ordered and started.

## 2023-07-09 NOTE — PROGRESS NOTES
Notified provider of [place] interval 0.25, changed from 0.19 overnight. Per provider okay to give metoprolol still.

## 2023-07-09 NOTE — PROGRESS NOTES
Xray notified this RN of DX-chest result, new small right pneumothorax and new small right pleural effusion.  Patient's vitals are stable. No new increase of O2 demand overnight with SpO2 94% on 3 L O2 via NC.  Results discussed with [facility] rapid nurse.  Plan to monitor patient's respiratory status closely and pass on to day shift.

## 2023-07-09 NOTE — CARE PLAN
The patient is Watcher - Medium risk of patient condition declining or worsening    Shift Goals  Clinical Goals: OH  care plan  Patient Goals: sleep  Family Goals: get  patient ready for bed    Progress made toward(s) clinical / shift goals:    Problem: Post op day 2 CABG/Heart Valve Replacement  Goal: Optimal care of the post op CABG/heart valve replacement post op day 2  Outcome: Progressing   Daily weight recorded in flowsheets.  Patient ambulated hallway 4 of 4 times. Incision care complete. IS in use while awake with best effort recorded in flowsheets. SINDI hose in use while awake. Patient up to chair for dinner.     Problem: Respiratory  Goal: Patient will achieve/maintain optimum respiratory ventilation and gas exchange  Outcome: Progressing   3 L O2 via NC I  use to maintain SPO2 above 90%. IS encouraged. Plan for two view CXR in AM.    Problem: Bowel Elimination - Post Surgical  Goal: Patient will resume regular bowel sounds and function with no discomfort or distention  Outcome: Progressing   BM x2 today and recorded in flowsheets.    Problem: Skin Integrity  Goal: Skin integrity is maintained or improved  Outcome: Progressing   Reece risk assessment complete with interventions in place.    Problem: Fall Risk  Goal: Patient will remain free from falls  Outcome: Progressing   Vidya Valente fall risk assessment complete with interventions in place.

## 2023-07-09 NOTE — PROGRESS NOTES
Patient leaving tele 8 with transport via wheel chair for xray.  Patient is A & O 4, 3 L O2 via NC and SR on the monitor.  Monitor room notified.

## 2023-07-09 NOTE — PROGRESS NOTES
Cardiovascular Surgery Progress Note    Name: Selma Avila  MRN: [id number]  : 1945  Admit Date: 2023  5:03 AM  3 Days Post-Op     Procedure:  Procedure(s) and Anesthesia Type:     * REPLACEMENT, MITRAL VALVE, LEFT ATRIAL APPENDAGE LIGATION - General     * ECHOCARDIOGRAM, TRANSESOPHAGEAL, INTRAOPERATIVE - General    Vitals:  Vitals:    23 0300 23 0423 23 0620 23 0717   BP: 113/77 109/67  110/64   Pulse: 75 98  83   Resp: 16 18  18   Temp: 36.6 °C (97.8 °F) 36.7 °C (98.1 °F)  36.6 °C (97.9 °F)   TempSrc: Temporal Temporal  Temporal   SpO2: 92% 91%  92%   Weight:   89.1 kg (196 lb 6.9 oz)    Height:          Temp (24hrs), Av.5 °C (97.7 °F), Min:36.1 °C (96.9 °F), Max:36.7 °C (98.1 °F)      Respiratory:    Respiration: 18, Pulse Oximetry: 92 %       Fluids:    Intake/Output Summary (Last 24 hours) at 2023 1014  Last data filed at 2023 0718  Gross per 24 hour   Intake 240 ml   Output 1675 ml   Net -1435 ml       Admit weight: Weight: 82.1 kg (181 lb)  Current weight: Weight: 89.1 kg (196 lb 6.9 oz) (23 0620)    Labs:  Recent Labs     23  0110 23  0420 23  0058   WBC 20.6* 24.7* 18.9*   RBC 4.14* 3.46* 3.37*   HEMOGLOBIN 12.5* 10.6* 10.4*   HEMATOCRIT 37.6* 32.4* 31.2*   MCV 90.8 93.6 92.6   MCH 30.2 30.6 30.9   MCHC 33.2 32.7 33.3   RDW 47.7 48.9 47.6   PLATELETCT 134* 98* 98*   MPV 11.4 11.4 11.4       Recent Labs     23  0110 23  0500 23  0420 23  0058   SODIUM 151*  --  140 135   POTASSIUM 3.2* 4.2 4.5 4.1   CHLORIDE 111  --  102 98   CO2 25  --  29 29   GLUCOSE 159*  --  162* 102*   BUN 24*  --  29* 25*   CREATININE 1.15  --  1.12 1.03   CALCIUM 8.5  --  8.1* 8.2*       Recent Labs     23  1135 23  0500 23  0420 238   APTT 36.1*  --   --   --    INR 1.72* 1.35* 1.49* 1.93*             Medications:  Scheduled Medications   Medication Dose Frequency    furosemide  40 mg BID DIURETIC     potassium chloride SA  40 mEq BID    warfarin  3 mg DAILY AT 1800    enoxaparin (LOVENOX) injection  40 mg DAILY AT 1800    Nozin nasal  swab  1 Applicator BID    aspirin  81 mg DAILY    Pharmacy Consult Request  1 Each PHARMACY TO DOSE    acetaminophen  1,000 mg Q6HRS    senna-docusate  2 Tablet BID    And    polyethylene glycol/lytes  1 Packet DAILY    And    magnesium hydroxide  30 mL DAILY    omeprazole  20 mg DAILY    metoprolol tartrate  25 mg BID    MD Alert...Warfarin per Pharmacy   PHARMACY TO DOSE        Exam:   Physical Exam  Vitals and nursing note reviewed.   Constitutional:       General: He is not in acute distress.     Appearance: Normal appearance.   HENT:      Head: Normocephalic.      Right Ear: External ear normal.      Left Ear: External ear normal.      Nose: Nose normal. No congestion.      Mouth/Throat:      Mouth: Mucous membranes are moist.      Pharynx: Oropharynx is clear.   Eyes:      Extraocular Movements: Extraocular movements intact.      Pupils: Pupils are equal, round, and reactive to light.   Cardiovascular:      Rate and Rhythm: Normal rate and regular rhythm.      Pulses: Normal pulses.      Heart sounds: Normal heart sounds.   Pulmonary:      Effort: Pulmonary effort is normal.      Breath sounds: Decreased breath sounds present.   Abdominal:      General: Bowel sounds are decreased. There is no distension.      Palpations: Abdomen is soft.   Musculoskeletal:      Cervical back: Normal range of motion and neck supple. No tenderness.      Right lower leg: Edema present.      Left lower leg: Edema present.   Skin:     General: Skin is warm and dry.      Comments: Midline surgical incision   Neurological:      General: No focal deficit present.      Mental Status: He is alert and oriented to person, place, and time. Mental status is at baseline.   Psychiatric:         Mood and Affect: Mood normal.         Behavior: Behavior normal.         Thought Content: Thought content  normal.         Cardiac Medications:    ASA - Yes    Plavix - No; contraindicated because of On Coumadin / NOAC    Post-operative Beta Blockers - Yes    Ace/ARB- No; contraindicated because of Normal EF    Statin - No; contraindicated because of No CAD    Aldactone- No; contraindicated because of Normal EF    SGLT2i-  No contraindicated because of hypotension    Ejection Fraction:  65%    Telemetry:   7/7 SR   7/8 SR  7/9 SR overnight now atrial fibrillation    Assessment/Plan:  POD 1  HDS- off pressors, SR, neuro intact, wounds intact, abdomen soft, fluid balance positive, wt up,  4L NC. Cr 1.15, H/H   12.5/37.6. 400 out of chest tubes last night, 1165 total. Plan:  Keep chest tubes and pacing wires. Diurese. Remove matos, IS/ambulate.     POD 2 HDS. SR. Neuro intact.  Hard of hearing.  Wounds CDI.  Cr 1.12 Hgb 10.6. Plan: DC mediastinal tubes and matos. Wean oxygen as tolerated.  Encourage IS/ambulation. Transfer to tele.    POD 3 HDS. Afib while rounding this AM. Neuro intact. [place]. Cr 1.03 Hgb 10.4. 3LNC, small right apical pneumo on CXR this morning.  Plan: Repeat CXR tomorrow AM.  Amio gtt and bolus.  Add metolazone. Check mag.  Wean oxygen as tolerated.    Disposition:  TBD

## 2023-07-09 NOTE — THERAPY
Physical Therapy   Initial Evaluation     Patient Name: Selma Avila  Age:  78 y.o., Sex:  male  Medical Record #: [id number]  Today's Date: 7/9/2023     Precautions  Precautions: Fall Risk;Sternal Precautions (See Comments);Cardiac Precautions (See Comments)    Assessment  Patient is 78 y.o. male with admitted with SOB and fatigue, 7/6: REPLACEMENT, MITRAL VALVE. Pt is Hard of hearing, bilat hearing aides. Independent prior to admission. Pt with flat affect, min slow with responses and mobility. Pt stated that he recognized that he was confused this morning and that he is not his normal self. He was AxOx4 during eval and able to teach back all education and receptive to it. Pt able to amb in halls, again slow, decreased trunk rotation and arm swing. No LOB, no SOB. VSS. Pt will continue to benefit from acute physical therapy to assist progress towards pt's goals. Anticipate pt will benefit from home with home health and spouse assist upon DC from hospital.       Plan    Physical Therapy Initial Treatment Plan   Treatment Plan : Bed Mobility, Gait Training, Stair Training, Therapeutic Activities  Treatment Frequency: 4 Times per Week  Duration: Until Therapy Goals Met    DC Equipment Recommendations: None (see OT ntoes)  Discharge Recommendations: Recommend home health for continued physical therapy services       Subjective    Pt sitting in recliner. Just finished with OT. Agreed to PT. Flat affect.      Objective       07/09/23 0922   Initial Contact Note    Initial Contact Note Order Received and Verified, Physical Therapy Evaluation in Progress with Full Report to Follow.   Precautions   Precautions Fall Risk;Sternal Precautions (See Comments)   Vitals   Pulse Oximetry 94 %  (after amb)   O2 (LPM) 3   O2 Delivery Device Silicone Nasal Cannula   Pain   Intervention Declines   Prior Living Situation   Housing / Facility 2 Story House  (has a basement, does not need to access)   Steps Into Home 6   Steps In Home  "10  (to basement, pt does not need to do)   Rail Both Rail (Steps into Home)   Equipment Owned None   Lives with - Patient's Self Care Capacity Spouse  (can assist pt, can drive)   Comments pt stated that he was prepared for surgery and watched a lot of videos from [facility]   Prior Level of Functional Mobility   Comments independent, retired [employer], builds furniture as a hobby   Cognition    Cognition / Consciousness WDL   Level of Consciousness Alert   Comments OT was min worried with pt's cognitive status after he eval, pt with falt affect, AxOx4, RN came in and assessed other neuro test, pt stated that he recognized that he was min confused this morning and that he is in a \"fog\", he stated that it may be that he has not is normal 2 cups of coffee a day   Active ROM Lower Body    Active ROM Lower Body  WDL   Strength Lower Body   Lower Body Strength  WDL   Neurological Concerns   Neurological Concerns No   Comments possible min post ICU delirium   Coordination Upper Body   Coordination WDL   Comments pt performed rapid alternating UE coordination exam WDL   Coordination Lower Body    Coordination Lower Body  WDL   Comments   (pt performed rapid alternating and heel to shin tests WDL)   Vision   Vision Comments wears glasses, c./o min blurry vision since surgery but pt stated that it is better today   Other Treatments   Other Treatments Provided education on sternal precautions, talk test, cardiac phase 1   Balance Assessment   Sitting Balance (Static) Good   Sitting Balance (Dynamic) Fair +   Standing Balance (Static) Fair +   Standing Balance (Dynamic) Fair +   Weight Shift Sitting Good   Weight Shift Standing Fair   Comments no AD with standing, no LOB with mobility   Bed Mobility    Comments up in chair upon arrival and upon exit   Gait Analysis   Gait Level Of Assist Standby Assist   Assistive Device None   Distance (Feet) 125   # of Times Distance was Traveled 1   Deviation Bradykinetic;Decreased Base " Of Support;Decreased Toe Off;Decreased Heel Strike  (decreased trunk rotation and arm swing, no LOB)   Comments No SOB or S/S distress   Functional Mobility   Sit to Stand Supervised   Bed, Chair, Wheelchair Transfer Supervised   Mobility STS>amb with O2>recliner   How much difficulty does the patient currently have...   Turning over in bed (including adjusting bedclothes, sheets and blankets)? 3   Sitting down on and standing up from a chair with arms (e.g., wheelchair, bedside commode, etc.) 3   Moving from lying on back to sitting on the side of the bed? 3   How much help from another person does the patient currently need...   Moving to and from a bed to a chair (including a wheelchair)? 3   Need to walk in a hospital room? 3   Climbing 3-5 steps with a railing? 2   6 clicks Mobility Score 17   Activity Tolerance   Sitting in Chair up in chair with OT upon entry   Patient / Family Goals    Patient / Family Goal #1 to be able to do more once all healed   Short Term Goals    Short Term Goal # 1 bed mob supervised log roll in 6 visits   Short Term Goal # 2 amb 200ft supervised in 6 visits   Short Term Goal # 3 up/down 6 steps with rail and SBA in 6 visits   Short Term Goal # 4 all mobility within sternal precautions independently in 6 visits   Education Group   Education Provided Sternal Precautions;Role of Physical Therapist;Gait Training  (talk test, cardiac rehab phase 1, PRE)   Sternal Precautions Patient Response Patient;Acceptance;Teach Back;Handout;Verbal Demonstration  (pt able to state all sternal precautions without VCs)   Role of Physical Therapist Patient Response Patient;Acceptance;Explanation   Gait Training Patient Response Patient;Acceptance;Demonstration;Action Demonstration   Physical Therapy Initial Treatment Plan    Treatment Plan  Bed Mobility;Gait Training;Stair Training;Therapeutic Activities   Treatment Frequency 4 Times per Week   Duration Until Therapy Goals Met   Problem List    Problems  Impaired Bed Mobility;Impaired Transfers;Impaired Ambulation;Decreased Activity Tolerance;Impaired Vision;Limited Knowledge of Post-Op Precautions   Anticipated Discharge Equipment and Recommendations   DC Equipment Recommendations None  (see OT ntoes)   Discharge Recommendations Recommend home health for continued physical therapy services   Interdisciplinary Plan of Care Collaboration   IDT Collaboration with  Nursing;Occupational Therapist   Patient Position at End of Therapy Seated;Call Light within Reach;Tray Table within Reach;Phone within Reach  (RN in room)   Collaboration Comments OT stated concern that pt with min cognitive impairment and decreased hand coordination, RN updated and assessed pt for neuro changes during eval

## 2023-07-09 NOTE — PROGRESS NOTES
Bedside report received from MICHELL James. Patient is alert and oriented x  4, 3 L O2 via NC in use, SR on the monitor. Fall precautions are in place. Call light is in reach.

## 2023-07-09 NOTE — DISCHARGE PLANNING
HTH/SCP TCN chart review completed. CM unavailable to collaborate with prior to meeting with the pt. The most current review of medical record, knowledge of pt's PLOF and social support, LACE+ score of 12, 6 clicks scores of 20/19 ADL/mobility were considered.      Pt seen at bedside, spouse present. Introduced TCN program. Provided education regarding post acute levels of care. Discussed HTH/SCP plan benefits (Meds to Beds, medical uber and [facility] transitional care). Pt's spouse verbalizes understanding. Patient is hard of hearing and wears hearing aids. He was changing out the battery throughout much of the visit - he does note lack of coordination currently given the O2 monitor and finger swelling.    Pt's prior level of function is independent. Presently he is operating below his PLOF, however he was observed to stand from the chair without assist. He has been using the FWW intermittently while in house. His spouse would like for him to have one to go home with. PT and OT recommending home health; [facility] has already accepted. He is presently on 3L of O2. Food, transportation, and housing addressed.    Choice proactively obtained for DME (O2 and AD) and faxed to [facility]. TCN will continue to follow and collaborate with discharge planning team as additional post acute needs arise. Thank you.     Completed today:  PT and OT with recommendations for home health on 7/9/23  Choice obtained: DME (O2 and AD)  HH choice already obtained, [facility] accepted   [facility] referral (Y) sent 7/9/23

## 2023-07-09 NOTE — THERAPY
"Occupational Therapy   Initial Evaluation     Patient Name: Selma Avila  Age:  78 y.o., Sex:  male  Medical Record #: [id number]  Today's Date: 7/9/2023     Precautions: Fall Risk, Sternal Precautions (See Comments), Cardiac Precautions (See Comments)    Assessment  Patient is 78 y.o. male admitted for SOB. PMHx: mitral valve prolapse, mitral regurgitation, hypertension, and dyslipidemia. Pt reports being active and independent prior to sx w/some L shoulder limitations related to arthritis.   This admission pt is s/p MITRAL VALVE REPLACEMENT left atrial appendage ligation/excision and intraoperative transesophageal echocardiography. Pt is POD#3 demonstrating some post op fatigue, and inattention, pt reports feeling \"off\" w/regards to cognition/alertness additionally observed some LUE weakness and incoordination, but does have a hx of L- shoulder defcits/weakness at baseline. RN aware/notified. Pt was able to complete most ADL's w/SBA- min A anticipate pt will continue to progress in this setting to dc w/HH.     Plan  Occupational Therapy Initial Treatment Plan   Treatment Interventions: Self Care / Activities of Daily Living, Adaptive Equipment, Cognitive Skill Development, Manual Therapy Techniques, Neuro Re-Education / Balance, Therapeutic Exercises, Therapeutic Activity  Treatment Frequency: 4 Times per Week  Duration: Until Therapy Goals Met    DC Equipment Recommendations: Unable to determine at this time  Discharge Recommendations: Recommend home health for continued occupational therapy services     Subjective  \"I do feel a bit off and have some blurred vision\"      Objective   07/09/23 0926   Charge Group   OT Evaluation OT Evaluation Low   OT Self Care / ADL (Units) 1   Total Time Spent   OT Time Spent Yes   OT Self Care / ADL (Minutes) 15   OT Evaluation (Minutes) 12   OT Total Time Spent (Calculated) 27   Initial Contact Note    Initial Contact Note Order Received and Verified, Occupational Therapy " Evaluation in Progress with Full Report to Follow.   Prior Living Situation   Prior Services None   Housing / Facility 1 Story House   Steps Into Home 6   Bathroom Set up Walk In Shower   Equipment Owned None   Lives with - Patient's Self Care Capacity Spouse   Comments Pt reports SO is able to assist as needed   Prior Level of ADL Function   Self Feeding Independent   Grooming / Hygiene Independent   Bathing Independent   Dressing Independent   Toileting Independent   Prior Level of IADL Function   Medication Management Independent   Laundry Independent   Kitchen Mobility Independent   Finances Independent   Home Management Independent   Shopping Independent   Prior Level Of Mobility Independent Without Device in Community   Driving / Transportation Driving Independent   Precautions   Precautions Fall Risk;Sternal Precautions (See Comments);Cardiac Precautions (See Comments)   Pain 0 - 10 Group   Therapist Pain Assessment 0;Nurse Notified   Cognition    Cognition / Consciousness X   Speech/ Communication Delayed Responses   Level of Consciousness Alert   New Learning Impaired   Attention Impaired   Comments AOx4 w/mildly delayed responses may be related to hearing at times respones did not match questions being asked   Passive ROM Upper Body   Passive ROM Upper Body X   Comments RUE WFL; LUE w/end range limitations w/shoulder flexion and abducation related to arthrithic pain   Active ROM Upper Body   Active ROM Upper Body  X   Dominant Hand Right   Comments RUE WFL; LUE w/gross end range limitations hx of arthritis   Strength Upper Body   Upper Body Strength  X   Comments RUE WFL; LUE w/mild gross weakness   Sensation Upper Body   Upper Extremity Sensation  WDL   Upper Body Muscle Tone   Upper Body Muscle Tone  WDL   Neurological Concerns   Neurological Concerns No   Coordination Upper Body   Coordination X   Comments observed sublte finger to nose and in hand manipulation coordination impariemts primarily on L may  be related to shoulder issues?   Balance Assessment   Sitting Balance (Static) Good   Sitting Balance (Dynamic) Fair   Standing Balance (Static) Fair   Standing Balance (Dynamic) Fair   Weight Shift Sitting Fair   Weight Shift Standing Fair   Comments no AD in room   Bed Mobility    Comments up in chair pre and post   ADL Assessment   Grooming Standby Assist;Standing   Upper Body Dressing Minimal Assist   Lower Body Dressing Minimal Assist   Toileting   (NT)   Comments declined use of toilet needed assist w/threading and tieing pants and cues to apply and maintain sternal prec   How much help from another person does the patient currently need...   6 Clicks Daily Activity Score 21   Functional Mobility   Sit to Stand Standby Assist   Bed, Chair, Wheelchair Transfer Standby Assist   Mobility walking in room no AD   Visual Perception   Visual Perception  X   Comments pt reports blurred vision since sx   Activity Tolerance   Comments no overt c/o pain or fatigue   Patient / Family Goals   Patient / Family Goal #1 to go home   Short Term Goals   Short Term Goal # 1 pt will complete FB dressing w/spv   Short Term Goal # 2 pt will complete grooming standing at sink w/spv   Short Term Goal # 3 pt will complete toilet txf w/spv   Education Group   Role of Occupational Therapist Patient Response Patient;Acceptance;Explanation;Demonstration   Occupational Therapy Initial Treatment Plan    Treatment Interventions Self Care / Activities of Daily Living;Adaptive Equipment;Cognitive Skill Development;Manual Therapy Techniques;Neuro Re-Education / Balance;Therapeutic Exercises;Therapeutic Activity   Treatment Frequency 4 Times per Week   Duration Until Therapy Goals Met   Problem List   Problem List Decreased Active Daily Living Skills;Decreased Functional Mobility;Decreased Upper Extremity Strength Left;Decreased Upper Extremity AROM Left;Decreased Activity Tolerance;Safety Awareness Deficits / Cognition;Impaired Postural Control  / Balance;Limited Knowledge of Post Op Precautions;Impaired Vision   Anticipated Discharge Equipment and Recommendations   DC Equipment Recommendations Unable to determine at this time   Discharge Recommendations Recommend home health for continued occupational therapy services   Interdisciplinary Plan of Care Collaboration   IDT Collaboration with  Nursing   Patient Position at End of Therapy Call Light within Reach;Seated;Tray Table within Reach  (up w/PT for next session)   Collaboration Comments RN aware of session and observation of mild incoordination and c/o blurred vision; VSS during session   Session Information   Date / Session Number  7/9 #1 (1/4, 7/15)

## 2023-07-09 NOTE — PROGRESS NOTES
Inpatient Anticoagulation Service Note for 7/9/2023  Reason for Anticoagulation: Bioprosthetic Valve Replacement   Hemoglobin Value: (!) 10.4  Hematocrit Value: (!) 31.2  Lab Platelet Value: (!) 98  Target INR: 2.0 to 3.0    INR from last 7 days       Date/Time INR Value    07/09/23 0428 1.93    07/08/23 0420 1.49    07/07/23 0500 1.35    07/06/23 1135 1.72          Dose from last 7 days       Date/Time Dose (mg)    07/09/23 0950 3    07/08/23 1425 3    07/07/23 1551 3          Average Dose (mg): TBD (new start warfarin therapy)  Significant Interactions: Antiplatelet Medications  Bridge Therapy: No (enoxaparin prophylaxis)   INR Value Greater than 2 Prior to Discontinuation of Parenteral Anticoagulation: Not Applicable  Reversal Agent Administered: Not Applicable  Comments: INR below goal. Noted bleed concern. H/H low. PLT low. Warfarin interactions noted. Enoxaparin prophylaxis dosing noted. Will give warfarin 3 mg today. INR with AM labs. Likely to need dose adjustments.    Plan:  warfarin 3 mg 7/9/23  Education Material Provided?: No (needs education prior to discharge)    Pharmacist suggested discharge dosing: warfarin 3 mg daily     Guzman Arita, PharmD    Pharmacy Addendum:  Plan for dose reduction of warfarin to 1.5 mg today given new DDI profile with amiodarone.    Guzman Arita, PharmD

## 2023-07-09 NOTE — PROGRESS NOTES
This RN notified APRN Abdulkadir Valdes that patient has gone back into SR. Per the APRN run the gtt for the next six hours and transition to PO after.

## 2023-07-09 NOTE — CARE PLAN
"The patient is Watcher - Medium risk of patient condition declining or worsening    Shift Goals  Clinical Goals: Monitor vitals, labs, respiratory status, OHS care plan  Patient Goals: \"Get off oxygen\"  Family Goals: get  patient ready for bed    Progress made toward(s) clinical / shift goals:        Problem: Post Op Day 3 CABG/Heart Valve replacement  Goal: Optimal care of the post op CABG/Heart Valve replacement post op day 3  Outcome: Progressing  Intervention: Daily weights in the morning  Note: Patient was weighed this AM  Intervention: Shower daily and clean incisions twice daily with soap and water  Note: Patient showered and cleaned incision during this shift  Intervention: Up in chair for all meals  Note: Patient has been up for all meals and for the majority of this shift  Intervention: Ambulate 4 times daily, increasing the distance each time  Note: Patient has walked the unit twice today and each distance was the same  Intervention: IS q 1 hour while awake and record best IS volume  Note: Patient educated on the proper was to use the IS and how often. Patient demonstrated understanding. Best IS volume was 1000.  Intervention: Consider removal of matos, chest tube and pacer wires if not already done  Note: Pacer wires removed today. Matos and chest tubes already removed.          "

## 2023-07-09 NOTE — PROGRESS NOTES
Monitor Summary    SR 77-97    PVC, trigem, bigem    1st degree AV block    0.29/0.06/0.39

## 2023-07-09 NOTE — PROGRESS NOTES
This RN notified RENETTA Valdes regarding patients 2 view chest xray results. No new orders at this time.

## 2023-07-09 NOTE — CARE PLAN
The patient is Watcher - Medium risk of patient condition declining or worsening    Shift Goals  Clinical Goals: walk, IS, OOB for meals, wean O2  Patient Goals: comfort  Family Goals: comfort    Progress made toward(s) clinical / shift goals:   patient ambulating with increasing distances, OOB for meals    Patient is not progressing towards the following goals: Patient still requiring O2 and only able to pull IS to 1000      Problem: Respiratory  Goal: Patient will achieve/maintain optimum respiratory ventilation and gas exchange  Outcome: Not Progressing  Flowsheets (Taken 7/8/2023 9377)  O2 Delivery Device: Silicone Nasal Cannula  Incentive Spirometer:   Ineffective, Needs Coaching   Weak Effort  Incentive Spirometer Volume: 1000 mL  Deep Breathe and Cough: Needs Coaching  Note: Patient still requiring 3L O2, 2 view CXR to be ordered in AM      Problem: Knowledge Deficit - Standard  Goal: Patient and family/care givers will demonstrate understanding of plan of care, disease process/condition, diagnostic tests and medications  Outcome: Progressing     Problem: Post op day 2 CABG/Heart Valve Replacement  Goal: Optimal care of the post op CABG/heart valve replacement post op day 2  Outcome: Progressing  Intervention: FSBS: when 2 consecutive BS < 130 after post op day 2, discontinue FSBS unless patient is insulin dependent diabetic  Note: Patient had 2 consecutive BG's under 150, BG checks and insulin discontinued.  Intervention: Daily weights in the morning  Note: Patient weighed every morning  Intervention: Up in chair for all meals  Note: Patient up to recliner for all meals   Intervention: Ambulate 4 times daily, increasing the distance each time  Note: Patient able to ambulate increasing distances with each walk, completed 300ft on 4th walk of the day with just SBA and O2  Intervention: Stand at sink and wash up with assistance.  Clean incisions twice daily with soap and water.  Note: Patient able to  demonstrate proper washing of sternal incision, does not have VH site  Intervention: IS q 1 hour while awake and record best IS volume  Note: Patient encouraged to use IS q6 minutes while away, needs frequent reminding and only able to get IS to 1000.  Intervention: Consider pacer wire removal by MD  Note: Pacer wires left in place per MD  Intervention: Consider removal of matos and chest tube if not already done  Note: Mediastinal and matos lines removed, patient able to void without difficulty.

## 2023-07-10 ENCOUNTER — APPOINTMENT (OUTPATIENT)
Dept: RADIOLOGY | Facility: MEDICAL CENTER | Age: 78
DRG: 219 | End: 2023-07-10
Attending: NURSE PRACTITIONER
Payer: MEDICARE

## 2023-07-10 LAB
ANION GAP SERPL CALC-SCNC: 11 MMOL/L (ref 7–16)
BACTERIA SPEC RESP CULT: NORMAL
BUN SERPL-MCNC: 20 MG/DL (ref 8–22)
CALCIUM SERPL-MCNC: 8.8 MG/DL (ref 8.5–10.5)
CHLORIDE SERPL-SCNC: 97 MMOL/L (ref 96–112)
CO2 SERPL-SCNC: 29 MMOL/L (ref 20–33)
CREAT SERPL-MCNC: 0.93 MG/DL (ref 0.5–1.4)
EKG IMPRESSION: NORMAL
ERYTHROCYTE [DISTWIDTH] IN BLOOD BY AUTOMATED COUNT: 45.9 FL (ref 35.9–50)
GFR SERPLBLD CREATININE-BSD FMLA CKD-EPI: 84 ML/MIN/1.73 M 2
GLUCOSE SERPL-MCNC: 99 MG/DL (ref 65–99)
GRAM STN SPEC: NORMAL
HCT VFR BLD AUTO: 31.8 % (ref 42–52)
HGB BLD-MCNC: 10.6 G/DL (ref 14–18)
INR PPP: 1.83 (ref 0.87–1.13)
MCH RBC QN AUTO: 30.4 PG (ref 27–33)
MCHC RBC AUTO-ENTMCNC: 33.3 G/DL (ref 32.3–36.5)
MCV RBC AUTO: 91.1 FL (ref 81.4–97.8)
PLATELET # BLD AUTO: 118 K/UL (ref 164–446)
PMV BLD AUTO: 12.2 FL (ref 9–12.9)
POTASSIUM SERPL-SCNC: 3.6 MMOL/L (ref 3.6–5.5)
PROTHROMBIN TIME: 20.7 SEC (ref 12–14.6)
RBC # BLD AUTO: 3.49 M/UL (ref 4.7–6.1)
SIGNIFICANT IND 70042: NORMAL
SITE SITE: NORMAL
SODIUM SERPL-SCNC: 137 MMOL/L (ref 135–145)
SOURCE SOURCE: NORMAL
WBC # BLD AUTO: 13 K/UL (ref 4.8–10.8)

## 2023-07-10 PROCEDURE — 80048 BASIC METABOLIC PNL TOTAL CA: CPT

## 2023-07-10 PROCEDURE — 700102 HCHG RX REV CODE 250 W/ 637 OVERRIDE(OP): Performed by: NURSE PRACTITIONER

## 2023-07-10 PROCEDURE — A9270 NON-COVERED ITEM OR SERVICE: HCPCS | Performed by: THORACIC SURGERY (CARDIOTHORACIC VASCULAR SURGERY)

## 2023-07-10 PROCEDURE — 770020 HCHG ROOM/CARE - TELE (206)

## 2023-07-10 PROCEDURE — 99024 POSTOP FOLLOW-UP VISIT: CPT | Performed by: NURSE PRACTITIONER

## 2023-07-10 PROCEDURE — 700111 HCHG RX REV CODE 636 W/ 250 OVERRIDE (IP): Mod: JZ | Performed by: NURSE PRACTITIONER

## 2023-07-10 PROCEDURE — 700102 HCHG RX REV CODE 250 W/ 637 OVERRIDE(OP): Performed by: THORACIC SURGERY (CARDIOTHORACIC VASCULAR SURGERY)

## 2023-07-10 PROCEDURE — A9270 NON-COVERED ITEM OR SERVICE: HCPCS | Performed by: NURSE PRACTITIONER

## 2023-07-10 PROCEDURE — 85027 COMPLETE CBC AUTOMATED: CPT

## 2023-07-10 PROCEDURE — 93010 ELECTROCARDIOGRAM REPORT: CPT | Performed by: INTERNAL MEDICINE

## 2023-07-10 PROCEDURE — 71046 X-RAY EXAM CHEST 2 VIEWS: CPT

## 2023-07-10 PROCEDURE — 85610 PROTHROMBIN TIME: CPT

## 2023-07-10 PROCEDURE — 71045 X-RAY EXAM CHEST 1 VIEW: CPT

## 2023-07-10 RX ADMIN — ACETAMINOPHEN 1000 MG: 500 TABLET, FILM COATED ORAL at 05:17

## 2023-07-10 RX ADMIN — ASPIRIN 81 MG: 81 TABLET, COATED ORAL at 04:53

## 2023-07-10 RX ADMIN — FUROSEMIDE 40 MG: 10 INJECTION INTRAMUSCULAR; INTRAVENOUS at 17:04

## 2023-07-10 RX ADMIN — OMEPRAZOLE 20 MG: 20 CAPSULE, DELAYED RELEASE ORAL at 04:53

## 2023-07-10 RX ADMIN — METOPROLOL TARTRATE 25 MG: 25 TABLET, FILM COATED ORAL at 04:53

## 2023-07-10 RX ADMIN — ENOXAPARIN SODIUM 40 MG: 100 INJECTION SUBCUTANEOUS at 17:04

## 2023-07-10 RX ADMIN — AMIODARONE HYDROCHLORIDE 400 MG: 200 TABLET ORAL at 09:30

## 2023-07-10 RX ADMIN — Medication 1 APPLICATOR: at 09:21

## 2023-07-10 RX ADMIN — ACETAMINOPHEN 1000 MG: 500 TABLET, FILM COATED ORAL at 11:34

## 2023-07-10 RX ADMIN — WARFARIN SODIUM 1.5 MG: 3 TABLET ORAL at 17:05

## 2023-07-10 RX ADMIN — ACETAMINOPHEN 1000 MG: 500 TABLET, FILM COATED ORAL at 00:18

## 2023-07-10 RX ADMIN — Medication 1 APPLICATOR: at 21:00

## 2023-07-10 RX ADMIN — METOLAZONE 2.5 MG: 2.5 TABLET ORAL at 04:53

## 2023-07-10 RX ADMIN — POTASSIUM CHLORIDE 40 MEQ: 1500 TABLET, EXTENDED RELEASE ORAL at 17:04

## 2023-07-10 RX ADMIN — ACETAMINOPHEN 1000 MG: 500 TABLET, FILM COATED ORAL at 17:04

## 2023-07-10 RX ADMIN — AMIODARONE HYDROCHLORIDE 400 MG: 200 TABLET ORAL at 22:15

## 2023-07-10 RX ADMIN — FUROSEMIDE 40 MG: 10 INJECTION INTRAMUSCULAR; INTRAVENOUS at 04:54

## 2023-07-10 RX ADMIN — POTASSIUM CHLORIDE 40 MEQ: 1500 TABLET, EXTENDED RELEASE ORAL at 04:53

## 2023-07-10 ASSESSMENT — PAIN DESCRIPTION - PAIN TYPE
TYPE: ACUTE PAIN;SURGICAL PAIN
TYPE: ACUTE PAIN

## 2023-07-10 ASSESSMENT — FIBROSIS 4 INDEX: FIB4 SCORE: 3.1

## 2023-07-10 NOTE — CARE PLAN
The patient is Stable - Low risk of patient condition declining or worsening    Shift Goals  Clinical Goals: Repeat chest xray, ambulate, IS, shower, OOB, titrate O2 as tolerated  Patient Goals: Rest  Family Goals: na    Progress made toward(s) clinical / shift goals:    Problem: Knowledge Deficit - Standard  Goal: Patient and family/care givers will demonstrate understanding of plan of care, disease process/condition, diagnostic tests and medications  Outcome: Progressing  Note: Discuss and review POC with patient/family. Re-educate as needed.       Problem: Post Op Day 4 CABG/Heart Valve Replacement  Goal: Optimal care of the Post Op CABG/Heart Valve replacement Post Op Day 4  Outcome: Progressing  Intervention: Daily weights in the morning  Note: Completed during nightshift this morning   Intervention: Shower daily and clean incisions twice daily with soap and water  Note: Patient showered and completed incision care, along with incision care in the morning   Intervention: Up in chair for all meals  Note: Patient OOB for breakfast and lunch, will be OOB for dinner  Intervention: Ambulate 4 times daily, increasing the distance each time  Note: Patient completed two walks, one with nightshift and one this morning. Will complete two more walks today. One more for day shift and one more for night shift.  Patient did full lap.   Intervention: IS q 1 hour while awake and record best IS volume  Note: Patients best IS 2000     Problem: Respiratory  Goal: Patient will achieve/maintain optimum respiratory ventilation and gas exchange  Outcome: Progressing  Note: Patient will have another chest xray tomorrow. Titrating O2 as toelrated. On 1 L NC sating in the 90's. Will check O2 sats with ambulation for remaining walk.        Patient is not progressing towards the following goals:

## 2023-07-10 NOTE — PROGRESS NOTES
Assumed care of patient, received bedside report from Gabby GALARZA. Patient is A&O X 4. Pain 0/10, on 3 L of oxygen NC. On tele monitor, SR 76. POC discussed with patient. Patient verbalized understanding. All questions answered. Call light within reach and fall precautions in place. Bed locked and in lowest position.

## 2023-07-10 NOTE — PROGRESS NOTES
Monitor Summary:  Rhythm: SR/afib  Rate: 78-95  Ectopy: (R) PVC, (R) Couplet, (R) Trigem, 1st degree heart block  Measurement:  .24/.10/.42

## 2023-07-10 NOTE — PROGRESS NOTES
Cardiovascular Surgery Progress Note    Name: Selma Avila  MRN: [id number]  : 1945  Admit Date: 2023  5:03 AM  4 Days Post-Op     Procedure:  Procedure(s) and Anesthesia Type:     * REPLACEMENT, MITRAL VALVE, LEFT ATRIAL APPENDAGE LIGATION - General     * ECHOCARDIOGRAM, TRANSESOPHAGEAL, INTRAOPERATIVE - General    Vitals:  Vitals:    07/10/23 0400 07/10/23 0512 07/10/23 0657 07/10/23 0930   BP: 129/72  97/63 114/66   Pulse: 75  68 69   Resp: 18  16    Temp: 36.6 °C (97.8 °F)  36.6 °C (97.9 °F)    TempSrc: Temporal  Temporal    SpO2: 93%  95%    Weight:  82.8 kg (182 lb 8.7 oz)     Height:          Temp (24hrs), Av.6 °C (97.9 °F), Min:36.6 °C (97.8 °F), Max:36.7 °C (98.1 °F)      Respiratory:    Respiration: 16, Pulse Oximetry: 95 %       Fluids:    Intake/Output Summary (Last 24 hours) at 7/10/2023 1002  Last data filed at 7/10/2023 0600  Gross per 24 hour   Intake 480 ml   Output 2950 ml   Net -2470 ml       Admit weight: Weight: 82.1 kg (181 lb)  Current weight: Weight: 82.8 kg (182 lb 8.7 oz) (07/10/23 0512)    Labs:  Recent Labs     23  0420 23  0058 07/10/23  0020   WBC 24.7* 18.9* 13.0*   RBC 3.46* 3.37* 3.49*   HEMOGLOBIN 10.6* 10.4* 10.6*   HEMATOCRIT 32.4* 31.2* 31.8*   MCV 93.6 92.6 91.1   MCH 30.6 30.9 30.4   MCHC 32.7 33.3 33.3   RDW 48.9 47.6 45.9   PLATELETCT 98* 98* 118*   MPV 11.4 11.4 12.2       Recent Labs     23  0420 23  0058 23  1100 07/10/23  0020   SODIUM 140 135  --  137   POTASSIUM 4.5 4.1 3.9 3.6   CHLORIDE 102 98  --  97   CO2 29 29  --  29   GLUCOSE 162* 102*  --  99   BUN 29* 25*  --  20   CREATININE 1.12 1.03  --  0.93   CALCIUM 8.1* 8.2*  --  8.8       Recent Labs     23  0420 23  0428 07/10/23  0500   INR 1.49* 1.93* 1.83*             Medications:  Scheduled Medications   Medication Dose Frequency    amiodarone  400 mg BID    metOLazone  2.5 mg Q DAY    warfarin  1.5 mg DAILY AT 1800    furosemide  40 mg BID DIURETIC     potassium chloride SA  40 mEq BID    enoxaparin (LOVENOX) injection  40 mg DAILY AT 1800    Nozin nasal  swab  1 Applicator BID    aspirin  81 mg DAILY    Pharmacy Consult Request  1 Each PHARMACY TO DOSE    acetaminophen  1,000 mg Q6HRS    senna-docusate  2 Tablet BID    And    polyethylene glycol/lytes  1 Packet DAILY    And    magnesium hydroxide  30 mL DAILY    omeprazole  20 mg DAILY    metoprolol tartrate  25 mg BID    MD Alert...Warfarin per Pharmacy   PHARMACY TO DOSE        Exam:   Physical Exam  Vitals and nursing note reviewed.   Constitutional:       General: He is not in acute distress.     Appearance: Normal appearance.   HENT:      Head: Normocephalic.      Right Ear: External ear normal.      Left Ear: External ear normal.      Nose: Nose normal. No congestion.      Mouth/Throat:      Mouth: Mucous membranes are moist.      Pharynx: Oropharynx is clear.   Eyes:      Extraocular Movements: Extraocular movements intact.      Pupils: Pupils are equal, round, and reactive to light.   Cardiovascular:      Rate and Rhythm: Normal rate and regular rhythm.      Pulses: Normal pulses.      Heart sounds: Normal heart sounds.   Pulmonary:      Effort: Pulmonary effort is normal.      Breath sounds: Decreased breath sounds present.   Abdominal:      General: Bowel sounds are decreased. There is no distension.      Palpations: Abdomen is soft.   Musculoskeletal:      Cervical back: Normal range of motion and neck supple. No tenderness.      Right lower leg: Edema present.      Left lower leg: Edema present.   Skin:     General: Skin is warm and dry.      Comments: Midline surgical incision   Neurological:      General: No focal deficit present.      Mental Status: He is alert and oriented to person, place, and time. Mental status is at baseline.   Psychiatric:         Mood and Affect: Mood normal.         Behavior: Behavior normal.         Thought Content: Thought content normal.         Cardiac  Medications:    ASA - Yes    Plavix - No; contraindicated because of On Coumadin / NOAC    Post-operative Beta Blockers - Yes    Ace/ARB- No; contraindicated because of Normal EF    Statin - No; contraindicated because of No CAD    Aldactone- No; contraindicated because of Normal EF    SGLT2i-  No contraindicated because of hypotension    Ejection Fraction:  65%    Telemetry:   7/7 SR   7/8 SR  7/9 SR overnight now atrial fibrillation  7/10 SR    Assessment/Plan:  POD 1  HDS- off pressors, SR, neuro intact, wounds intact, abdomen soft, fluid balance positive, wt up,  4L NC. Cr 1.15, H/H   12.5/37.6. 400 out of chest tubes last night, 1165 total. Plan:  Keep chest tubes and pacing wires. Diurese. Remove matos, IS/ambulate.     POD 2 HDS. SR. Neuro intact.  Hard of hearing.  Wounds CDI.  Cr 1.12 Hgb 10.6. Plan: DC mediastinal tubes and matos. Wean oxygen as tolerated.  Encourage IS/ambulation. Transfer to [facility].    POD 3 HDS. Afib while rounding this AM. Neuro intact. [place]. Cr 1.03 Hgb 10.4. 3LNC, small right apical pneumo on CXR this morning.  Plan: Repeat CXR tomorrow AM.  Amio gtt and bolus.  Add metolazone. Check mag.  Wean oxygen as tolerated.      POD 4  HDS, SR, neuro intact, wounds intact, abdomen soft +BM, fluid balance positive, wt at admission wt, pneumo on CXR improved- now 4mm. 3 L NC. Plan:  Home O2 test, repeat CXR tomorrow, IS/ambulate. CPM. Probable discharge 1-2 days.     Disposition:  Home health for INR draws

## 2023-07-10 NOTE — DISCHARGE PLANNING
HTH/SCP TCN chart review completed. Collaborated with DEANNA Vigil. Discussed current discharge considerations noting patient accepted to [facility] and [facility] referral previously sent.     No new TCN needs identified in patient discharge planning this day in collaboration with JENNIFER. TCN will continue to follow and collaborate with discharge planning team as additional post acute needs arise. Thank you.    Completed  PT and OT with recommendations for home health on 7/9/23  Choice obtained: DME (O2 and AD)  HH choice already obtained, [facility] accepted   [facility] referral (Y) sent 7/9/23

## 2023-07-10 NOTE — CARE PLAN
The patient is Stable - Low risk of patient condition declining or worsening    Shift Goals  Clinical Goals: IS, walks, monitor O2 needs  Patient Goals: discharge  Family Goals: support    Progress made toward(s) clinical / shift goals:    Problem: Post Op Day 3 CABG/Heart Valve replacement  Goal: Optimal care of the post op CABG/Heart Valve replacement post op day 3  Outcome: Progressing  Note: Patient is being weighed daily, patient is pulling 1000 on IS, patient is performing incision care, chest tube and pacer wires removed, patient is taking 4 walks per day. Patient remains on supplemental oxygen.        Patient is not progressing towards the following goals:

## 2023-07-10 NOTE — PROGRESS NOTES
Inpatient Anticoagulation Service Note for 7/10/2023    Reason for Anticoagulation: Bioprosthetic Valve Replacement     Hemoglobin Value: (!) 10.6  Hematocrit Value: (!) 31.8  Lab Platelet Value: (!) 118  Target INR: 2.0 to 3.0    INR from last 7 days       Date/Time INR Value    07/10/23 0500 1.83    07/09/23 0428 1.93    07/08/23 0420 1.49    07/07/23 0500 1.35    07/06/23 1135 1.72          Dose from last 7 days       Date/Time Dose (mg)    07/10/23 1248 1.5    07/09/23 1000 1.5    07/09/23 0950 0    07/08/23 1425 3    07/07/23 1551 3          Average Dose (mg):  New start warfarin therapy  Significant Interactions: Amiodarone, Antiplatelet Medications  Bridge Therapy: No, patient is being prophylaxed with Enoxaparin   INR Value Greater than 2 Prior to Discontinuation of Parenteral Anticoagulation: Not Applicable   Reversal Agent Administered: Not Applicable    Assessment/Plan: INR remains subtherapeutic at 1.83. However this is a noted bleed concern. H/H and PLT low, but slightly increased from yesterday. New DDI with amiodarone, along with existing antiplatelet therapy. Likely have not yet seen the full effects of the amiodarone and warfarin interaction. Will not increase dose today, however, if INR remains subtherapeutic tomorrow it is reasonable to increase the dose. Continue warfarin 1.5 mg tonight. Continue daily INRs.     Education Material Provided?: No    Pharmacist suggested discharge dosing: Warfarin 1.5 mg daily and INR within 24-48 hours of discharge.      Annemarie Renee, PharmD

## 2023-07-11 LAB
ANION GAP SERPL CALC-SCNC: 13 MMOL/L (ref 7–16)
BUN SERPL-MCNC: 22 MG/DL (ref 8–22)
CALCIUM SERPL-MCNC: 8.9 MG/DL (ref 8.5–10.5)
CHLORIDE SERPL-SCNC: 91 MMOL/L (ref 96–112)
CO2 SERPL-SCNC: 31 MMOL/L (ref 20–33)
CREAT SERPL-MCNC: 1.21 MG/DL (ref 0.5–1.4)
ERYTHROCYTE [DISTWIDTH] IN BLOOD BY AUTOMATED COUNT: 43.7 FL (ref 35.9–50)
GFR SERPLBLD CREATININE-BSD FMLA CKD-EPI: 62 ML/MIN/1.73 M 2
GLUCOSE SERPL-MCNC: 128 MG/DL (ref 65–99)
HCT VFR BLD AUTO: 34.9 % (ref 42–52)
HGB BLD-MCNC: 11.7 G/DL (ref 14–18)
INR PPP: 1.65 (ref 0.87–1.13)
MCH RBC QN AUTO: 30.2 PG (ref 27–33)
MCHC RBC AUTO-ENTMCNC: 33.5 G/DL (ref 32.3–36.5)
MCV RBC AUTO: 89.9 FL (ref 81.4–97.8)
PLATELET # BLD AUTO: 173 K/UL (ref 164–446)
PMV BLD AUTO: 11 FL (ref 9–12.9)
POTASSIUM SERPL-SCNC: 3.2 MMOL/L (ref 3.6–5.5)
PROTHROMBIN TIME: 19.1 SEC (ref 12–14.6)
RBC # BLD AUTO: 3.88 M/UL (ref 4.7–6.1)
SODIUM SERPL-SCNC: 135 MMOL/L (ref 135–145)
WBC # BLD AUTO: 11.2 K/UL (ref 4.8–10.8)

## 2023-07-11 PROCEDURE — 99024 POSTOP FOLLOW-UP VISIT: CPT | Performed by: NURSE PRACTITIONER

## 2023-07-11 PROCEDURE — A9270 NON-COVERED ITEM OR SERVICE: HCPCS | Mod: JZ

## 2023-07-11 PROCEDURE — 85610 PROTHROMBIN TIME: CPT

## 2023-07-11 PROCEDURE — 700102 HCHG RX REV CODE 250 W/ 637 OVERRIDE(OP): Performed by: NURSE PRACTITIONER

## 2023-07-11 PROCEDURE — A9270 NON-COVERED ITEM OR SERVICE: HCPCS | Performed by: NURSE PRACTITIONER

## 2023-07-11 PROCEDURE — 770020 HCHG ROOM/CARE - TELE (206)

## 2023-07-11 PROCEDURE — 97535 SELF CARE MNGMENT TRAINING: CPT | Mod: CO

## 2023-07-11 PROCEDURE — 85027 COMPLETE CBC AUTOMATED: CPT

## 2023-07-11 PROCEDURE — 700111 HCHG RX REV CODE 636 W/ 250 OVERRIDE (IP): Mod: JZ | Performed by: NURSE PRACTITIONER

## 2023-07-11 PROCEDURE — 80048 BASIC METABOLIC PNL TOTAL CA: CPT

## 2023-07-11 PROCEDURE — 700102 HCHG RX REV CODE 250 W/ 637 OVERRIDE(OP): Mod: JZ

## 2023-07-11 RX ORDER — WARFARIN SODIUM 3 MG/1
3 TABLET ORAL DAILY
Status: DISCONTINUED | OUTPATIENT
Start: 2023-07-11 | End: 2023-07-12 | Stop reason: HOSPADM

## 2023-07-11 RX ORDER — POTASSIUM CHLORIDE 20 MEQ/1
40 TABLET, EXTENDED RELEASE ORAL ONCE
Status: COMPLETED | OUTPATIENT
Start: 2023-07-11 | End: 2023-07-11

## 2023-07-11 RX ORDER — POTASSIUM CHLORIDE 20 MEQ/1
20 TABLET, EXTENDED RELEASE ORAL 2 TIMES DAILY
Status: DISCONTINUED | OUTPATIENT
Start: 2023-07-11 | End: 2023-07-12 | Stop reason: HOSPADM

## 2023-07-11 RX ORDER — DOCUSATE SODIUM 100 MG/1
100 CAPSULE, LIQUID FILLED ORAL DAILY
Status: DISCONTINUED | OUTPATIENT
Start: 2023-07-12 | End: 2023-07-12 | Stop reason: HOSPADM

## 2023-07-11 RX ORDER — FUROSEMIDE 10 MG/ML
20 INJECTION INTRAMUSCULAR; INTRAVENOUS
Status: DISCONTINUED | OUTPATIENT
Start: 2023-07-11 | End: 2023-07-12 | Stop reason: HOSPADM

## 2023-07-11 RX ORDER — POTASSIUM CHLORIDE 20 MEQ/1
TABLET, EXTENDED RELEASE ORAL
Status: COMPLETED
Start: 2023-07-11 | End: 2023-07-11

## 2023-07-11 RX ADMIN — SENNOSIDES AND DOCUSATE SODIUM 2 TABLET: 50; 8.6 TABLET ORAL at 05:19

## 2023-07-11 RX ADMIN — POTASSIUM CHLORIDE 20 MEQ: 1500 TABLET, EXTENDED RELEASE ORAL at 16:20

## 2023-07-11 RX ADMIN — Medication 1 APPLICATOR: at 10:24

## 2023-07-11 RX ADMIN — OMEPRAZOLE 20 MG: 20 CAPSULE, DELAYED RELEASE ORAL at 05:19

## 2023-07-11 RX ADMIN — ENOXAPARIN SODIUM 40 MG: 100 INJECTION SUBCUTANEOUS at 16:23

## 2023-07-11 RX ADMIN — METOPROLOL TARTRATE 25 MG: 25 TABLET, FILM COATED ORAL at 16:21

## 2023-07-11 RX ADMIN — POTASSIUM CHLORIDE 40 MEQ: 1500 TABLET, EXTENDED RELEASE ORAL at 05:18

## 2023-07-11 RX ADMIN — METOLAZONE 2.5 MG: 2.5 TABLET ORAL at 05:19

## 2023-07-11 RX ADMIN — AMIODARONE HYDROCHLORIDE 400 MG: 200 TABLET ORAL at 10:24

## 2023-07-11 RX ADMIN — Medication 1 APPLICATOR: at 21:08

## 2023-07-11 RX ADMIN — AMIODARONE HYDROCHLORIDE 400 MG: 200 TABLET ORAL at 21:41

## 2023-07-11 RX ADMIN — FUROSEMIDE 40 MG: 10 INJECTION INTRAMUSCULAR; INTRAVENOUS at 05:18

## 2023-07-11 RX ADMIN — FUROSEMIDE 20 MG: 10 INJECTION, SOLUTION INTRAVENOUS at 16:22

## 2023-07-11 RX ADMIN — ACETAMINOPHEN 1000 MG: 500 TABLET, FILM COATED ORAL at 05:19

## 2023-07-11 RX ADMIN — ASPIRIN 81 MG: 81 TABLET, COATED ORAL at 05:18

## 2023-07-11 RX ADMIN — WARFARIN SODIUM 3 MG: 3 TABLET ORAL at 16:22

## 2023-07-11 RX ADMIN — POTASSIUM CHLORIDE 40 MEQ: 1500 TABLET, EXTENDED RELEASE ORAL at 10:24

## 2023-07-11 RX ADMIN — ACETAMINOPHEN 1000 MG: 500 TABLET, FILM COATED ORAL at 11:47

## 2023-07-11 ASSESSMENT — FIBROSIS 4 INDEX
FIB4 SCORE: 2.11
FIB4 SCORE: 2.11

## 2023-07-11 ASSESSMENT — PAIN DESCRIPTION - PAIN TYPE
TYPE: ACUTE PAIN
TYPE: ACUTE PAIN

## 2023-07-11 ASSESSMENT — COGNITIVE AND FUNCTIONAL STATUS - GENERAL
HELP NEEDED FOR BATHING: A LITTLE
TOILETING: A LITTLE
DRESSING REGULAR LOWER BODY CLOTHING: A LITTLE
SUGGESTED CMS G CODE MODIFIER DAILY ACTIVITY: CJ
DAILY ACTIVITIY SCORE: 21

## 2023-07-11 NOTE — DISCHARGE PLANNING
HTH/SCP TCN chart review completed. Collaborated with DEANNA Baugh. Discussed current discharge considerations noting patient accepted to [facility] and [facility] referral previously sent. Appreciate CM following for patient oxygen need at discharge.      No new TCN needs identified in patient discharge planning this day in collaboration with CM. TCN will continue to follow and collaborate with discharge planning team as additional post acute needs arise. Thank you.     Completed  PT with recommendations for home health on 7/9/23  OT with continued recommendations for home health on 7/11/2023  Choice obtained: DME (O2 and AD)  HH choice already obtained, [facility] accepted   [facility] referral (Y) sent 7/9/23

## 2023-07-11 NOTE — FACE TO FACE
"Face to Face Note  -  Durable Medical Equipment    CHUCK Leggett - NPI: [id number]  I certify that this patient is under my care and that they had a durable medical equipment(DME)face to face encounter by myself that meets the physician DME face-to-face encounter requirements with this patient on:    Date of encounter:   Patient:                    MRN:                       YOB: 2022  Selma Avila  [id number]  1945     The encounter with the patient was in whole, or in part, for the following medical condition, which is the primary reason for durable medical equipment:  Other - MVR    I certify that, based on my findings, the following durable medical equipment is medically necessary:    Oxygen   HOME O2 Saturation Measurements:(Values must be present for Home Oxygen orders)  Room air sat at rest: 94  Room air sat with amb: 87  With liters of O2: .5, O2 sat at rest with O2: 94  With Liters of O2: .5, O2 sat with amb with O2 : 94  Is the patient mobile?: Yes  If patient feels more short of breath, they can go up to 6 liters per minute and contact healthcare provider.    Supporting Symptoms: The patient requires supplemental oxygen, as the following interventions have been tried with limited or no improvement: \"Positive expiratory pressure therapies, \"Ambulation with oximetry, and \"Incentive spirometry.    My Clinical findings support the need for the above equipment due to:  Hypoxia  "

## 2023-07-11 NOTE — PROGRESS NOTES
Cardiovascular Surgery Progress Note    Name: Selma Avila  MRN: [id number]  : 1945  Admit Date: 2023  5:03 AM  5 Days Post-Op     Procedure:  Procedure(s) and Anesthesia Type:     * REPLACEMENT, MITRAL VALVE, LEFT ATRIAL APPENDAGE LIGATION - General     * ECHOCARDIOGRAM, TRANSESOPHAGEAL, INTRAOPERATIVE - General    Vitals:  Vitals:    07/10/23 2342 23 0452 23 0500 23 0733   BP: 130/73 112/74  108/70   Pulse: 90 91  92   Resp: 18 18  18   Temp:  36.7 °C (98.1 °F)  36.5 °C (97.7 °F)   TempSrc: Temporal Temporal  Temporal   SpO2: 92% 91%  94%   Weight:   80.1 kg (176 lb 9.4 oz)    Height:          Temp (24hrs), Av.6 °C (97.8 °F), Min:36.4 °C (97.5 °F), Max:36.7 °C (98.1 °F)      Respiratory:    Respiration: 18, Pulse Oximetry: 94 %       Fluids:    Intake/Output Summary (Last 24 hours) at 2023 0939  Last data filed at 2023 0804  Gross per 24 hour   Intake 1040 ml   Output 1900 ml   Net -860 ml       Admit weight: Weight: 82.1 kg (181 lb)  Current weight: Weight: 80.1 kg (176 lb 9.4 oz) (23 0500)    Labs:  Recent Labs     23  0058 07/10/23  0020 23  0020   WBC 18.9* 13.0* 11.2*   RBC 3.37* 3.49* 3.88*   HEMOGLOBIN 10.4* 10.6* 11.7*   HEMATOCRIT 31.2* 31.8* 34.9*   MCV 92.6 91.1 89.9   MCH 30.9 30.4 30.2   MCHC 33.3 33.3 33.5   RDW 47.6 45.9 43.7   PLATELETCT 98* 118* 173   MPV 11.4 12.2 11.0       Recent Labs     23  0058 23  1100 07/10/23  0020 23  0020   SODIUM 135  --  137 135   POTASSIUM 4.1 3.9 3.6 3.2*   CHLORIDE 98  --  97 91*   CO2 29  --  29 31   GLUCOSE 102*  --  99 128*   BUN 25*  --  20 22   CREATININE 1.03  --  0.93 1.21   CALCIUM 8.2*  --  8.8 8.9       Recent Labs     23  0428 07/10/23  0500 23  0020   INR 1.93* 1.83* 1.65*             Medications:  Scheduled Medications   Medication Dose Frequency    amiodarone  400 mg BID    metOLazone  2.5 mg Q DAY    warfarin  1.5 mg DAILY AT 1800    furosemide  40 mg BID  DIURETIC    potassium chloride SA  40 mEq BID    enoxaparin (LOVENOX) injection  40 mg DAILY AT 1800    Nozin nasal  swab  1 Applicator BID    aspirin  81 mg DAILY    Pharmacy Consult Request  1 Each PHARMACY TO DOSE    acetaminophen  1,000 mg Q6HRS    senna-docusate  2 Tablet BID    And    polyethylene glycol/lytes  1 Packet DAILY    And    magnesium hydroxide  30 mL DAILY    omeprazole  20 mg DAILY    metoprolol tartrate  25 mg BID    MD Alert...Warfarin per Pharmacy   PHARMACY TO DOSE        Exam:   Physical Exam  Vitals and nursing note reviewed.   Constitutional:       General: He is not in acute distress.     Appearance: Normal appearance.   HENT:      Head: Normocephalic.      Right Ear: External ear normal.      Left Ear: External ear normal.      Nose: Nose normal. No congestion.      Mouth/Throat:      Mouth: Mucous membranes are moist.      Pharynx: Oropharynx is clear.   Eyes:      Extraocular Movements: Extraocular movements intact.      Pupils: Pupils are equal, round, and reactive to light.   Cardiovascular:      Rate and Rhythm: Normal rate and regular rhythm.      Pulses: Normal pulses.      Heart sounds: Normal heart sounds.   Pulmonary:      Effort: Pulmonary effort is normal.      Breath sounds: Decreased breath sounds present.   Abdominal:      General: Bowel sounds are decreased. There is no distension.      Palpations: Abdomen is soft.   Musculoskeletal:      Cervical back: Normal range of motion and neck supple. No tenderness.      Right lower leg: No edema.      Left lower leg: No edema.   Skin:     General: Skin is warm and dry.      Comments: Midline surgical incision   Neurological:      General: No focal deficit present.      Mental Status: He is alert and oriented to person, place, and time. Mental status is at baseline.   Psychiatric:         Mood and Affect: Mood normal.         Behavior: Behavior normal.         Thought Content: Thought content normal.         Cardiac  Medications:    ASA - Yes    Plavix - No; contraindicated because of On Coumadin / NOAC    Post-operative Beta Blockers - Yes    Ace/ARB- No; contraindicated because of Normal EF    Statin - No; contraindicated because of No CAD    Aldactone- No; contraindicated because of Normal EF    SGLT2i-  No contraindicated because of hypotension    Ejection Fraction:  65%    Telemetry:   7/7 SR   7/8 SR  7/9 SR overnight now atrial fibrillation  7/10 SR  7/11 SR      Assessment/Plan:  POD 1  HDS- off pressors, SR, neuro intact, wounds intact, abdomen soft, fluid balance positive, wt up,  4L NC. Cr 1.15, H/H   12.5/37.6. 400 out of chest tubes last night, 1165 total. Plan:  Keep chest tubes and pacing wires. Diurese. Remove matos, IS/ambulate.     POD 2 HDS. SR. Neuro intact.  Hard of hearing.  Wounds CDI.  Cr 1.12 Hgb 10.6. Plan: DC mediastinal tubes and matos. Wean oxygen as tolerated.  Encourage IS/ambulation. Transfer to [facility].    POD 3 HDS. Afib while rounding this AM. Neuro intact. [place]. Cr 1.03 Hgb 10.4. 3LNC, small right apical pneumo on CXR this morning.  Plan: Repeat CXR tomorrow AM.  Amio gtt and bolus.  Add metolazone. Check mag.  Wean oxygen as tolerated.      POD 4  HDS, SR, neuro intact, wounds intact, abdomen soft +BM, fluid balance positive, wt at admission wt, pneumo on CXR improved- now 4mm. 3 L NC. Plan:  Home O2 test, repeat CXR tomorrow, IS/ambulate. CPM. Probable discharge 1-2 days.     POD 5  HDS, SR, neuro intact, wounds intact, abdomen soft +BM, fluid balance positive, wt below admission weight. Stable small apical pneumo on CXR, now on 1 L. Plan:  Home O2 ordered. Repeat cxr tomorrow. IS/ambulate. Anticipate dc tomorrow.    Disposition:  Home health for INR draws

## 2023-07-11 NOTE — PROGRESS NOTES
Inpatient Anticoagulation Service Note for 7/11/2023      Reason for Anticoagulation: Bioprosthetic Valve Replacement     Hemoglobin Value: (!) 11.7  Hematocrit Value: (!) 34.9  Lab Platelet Value: 173  Target INR: 2.0 to 3.0    INR from last 7 days       Date/Time INR Value    07/11/23 0020 1.65    07/10/23 0500 1.83    07/09/23 0428 1.93    07/08/23 0420 1.49    07/07/23 0500 1.35    07/06/23 1135 1.72          Dose from last 7 days       Date/Time Dose (mg)    07/11/23 1252 3    07/10/23 1248 1.5    07/09/23 1000 1.5    07/09/23 0950 0    07/08/23 1425 3    07/07/23 1551 3          Average Dose (mg):  New start warfarin therapy  Significant Interactions: Amiodarone, Antiplatelet Medications  Bridge Therapy: No (Enoxaparin prophylactic dosing)  Reversal Agent Administered: Not Applicable    Comments: INR remains below goal and decreasing. H/H and renal function stable. DDIs noted. Due to decreasing INR and interaction with amiodarone most likely to take awhile, will give 3 mg PO tonight and monitor with an INR tomorrow AM.    Plan:  Warfarin 3 mg  Education Material Provided?: No    Pharmacist suggested discharge dosing: Warfarin 3 mg PO daily with close follow-up post-discharge with an INR within 48 to 72 hours.       Thank you!    Korina Edwards, PharmD, BCPS

## 2023-07-11 NOTE — DISCHARGE PLANNING
"Received Choice form at 1255  Agency/Facility Name: [facility]   Referral sent per Choice form @ 6961    1250-  Agency/Facility Name: [facility]  Outcome: DPA sent Cardiovascular HH Order, as requested by FIDENCIO RODRIGUEZ.     1318-  Agency/Facility Name: [facility]   Spoke To: Nadine  Outcome: [facility] informed DPA that O2 order diagnosis needs to include \"respiratory failure\" alongside current diagnosis, in order to qualify for O2.     FIDENCIO CM notified.     4542-  Agency/Facility Name: [facility]  Outcome: DPA sent updated O2 order.     "

## 2023-07-11 NOTE — PROGRESS NOTES
Assumed care of pt. Bedside report received from RN. Pt was updated on plan of care. AOx4. Pt pain level 0/10. PT is on telemetry, SR 92. Call light, phone and personal belongings in reach. Bed frame alarm on and working properly, bed in lowest position, and locked.

## 2023-07-11 NOTE — THERAPY
Occupational Therapy  Daily Treatment     Patient Name: Selma Avila  Age:  78 y.o., Sex:  male  Medical Record #: [id number]  Today's Date: 7/11/2023       Precautions: Fall Risk, Sternal Precautions (See Comments), Cardiac Precautions (See Comments)    Assessment    Pt seen for OT tx. Pt continues to mobilize w/ SBA and reports that his SO will be able to assist at home w/ ADLs and ADL transfers as needed. Discussed possible DME needed for home to increase independence in ADL txfs. Pt able to appropriately adhere to sternal precautions throughout session. Will continue to follow while in house.     Plan    Treatment Plan Status: Continue Current Treatment Plan    DC Equipment Recommendations: Unable to determine at this time  Discharge Recommendations: Recommend home health for continued occupational therapy services       07/11/23 0901   Balance   Sitting Balance (Static) Good   Sitting Balance (Dynamic) Good   Standing Balance (Static) Fair   Standing Balance (Dynamic) Fair   Weight Shift Sitting Fair   Weight Shift Standing Fair   Bed Mobility    Supine to Sit Supervised   Sit to Supine Supervised   Activities of Daily Living   Grooming Supervision;Standing   Upper Body Dressing Minimal Assist   Lower Body Dressing Minimal Assist  (reports SO will assist at home)   Toileting Supervision   Functional Mobility   Sit to Stand Standby Assist   Bed, Chair, Wheelchair Transfer Standby Assist   Toilet Transfers Standby Assist   Short Term Goals   Short Term Goal # 1 pt will complete FB dressing w/spv   Goal Outcome # 1 Progressing as expected   Short Term Goal # 2 pt will complete grooming standing at sink w/spv   Goal Outcome # 2 Progressing as expected   Short Term Goal # 3 pt will complete toilet txf w/spv   Goal Outcome # 3 Progressing as expected   Anticipated Discharge Equipment and Recommendations   DC Equipment Recommendations Unable to determine at this time   Discharge Recommendations Recommend home health  for continued occupational therapy services

## 2023-07-11 NOTE — PROGRESS NOTES
Bedside report received from night shift RN. Assumed care of patient at 0700. Pt is A&O 4. Patient is in chair. Patient reports 0/10 pain. Patient was updated on plan of care. Patient has call light, personal belongings and bedside table with in reach. Bed is in low locked position, bed alarm is on.

## 2023-07-11 NOTE — CARE PLAN
The patient is Stable - Low risk of patient condition declining or worsening    Shift Goals  Clinical Goals: monitor labs, wean oxygen, POD4-5, monitor VS  Patient Goals: sleep, go home soon  Family Goals: na    Progress made toward(s) clinical / shift goals:    Problem: Post Op Day 4 CABG/Heart Valve Replacement  Goal: Optimal care of the Post Op CABG/Heart Valve replacement Post Op Day 4  Outcome: Progressing  Note: 4 walks completed  Shower with dayshift  Cleaned chest  IS use-highest 2000  Daily weights  Labs collected from central line       Patient is not progressing towards the following goals:

## 2023-07-11 NOTE — PROGRESS NOTES
Monitor Summary:   Rhythm: SR/ST 1st degree AV block  Rate:   Measurement: .29/.09/.42  Ectopy: R trig

## 2023-07-12 ENCOUNTER — APPOINTMENT (OUTPATIENT)
Dept: RADIOLOGY | Facility: MEDICAL CENTER | Age: 78
DRG: 219 | End: 2023-07-12
Attending: THORACIC SURGERY (CARDIOTHORACIC VASCULAR SURGERY)
Payer: MEDICARE

## 2023-07-12 ENCOUNTER — PHARMACY VISIT (OUTPATIENT)
Dept: PHARMACY | Facility: MEDICAL CENTER | Age: 78
End: 2023-07-12
Payer: COMMERCIAL

## 2023-07-12 VITALS
HEART RATE: 86 BPM | SYSTOLIC BLOOD PRESSURE: 108 MMHG | HEIGHT: 71 IN | WEIGHT: 173.5 LBS | TEMPERATURE: 98.4 F | BODY MASS INDEX: 24.29 KG/M2 | DIASTOLIC BLOOD PRESSURE: 69 MMHG | RESPIRATION RATE: 16 BRPM | OXYGEN SATURATION: 90 %

## 2023-07-12 LAB
ANION GAP SERPL CALC-SCNC: 13 MMOL/L (ref 7–16)
BUN SERPL-MCNC: 23 MG/DL (ref 8–22)
CALCIUM SERPL-MCNC: 8.9 MG/DL (ref 8.5–10.5)
CHLORIDE SERPL-SCNC: 94 MMOL/L (ref 96–112)
CO2 SERPL-SCNC: 31 MMOL/L (ref 20–33)
CREAT SERPL-MCNC: 1.12 MG/DL (ref 0.5–1.4)
ERYTHROCYTE [DISTWIDTH] IN BLOOD BY AUTOMATED COUNT: 43 FL (ref 35.9–50)
GFR SERPLBLD CREATININE-BSD FMLA CKD-EPI: 68 ML/MIN/1.73 M 2
GLUCOSE SERPL-MCNC: 119 MG/DL (ref 65–99)
HCT VFR BLD AUTO: 35 % (ref 42–52)
HGB BLD-MCNC: 12.1 G/DL (ref 14–18)
INR PPP: 1.52 (ref 0.87–1.13)
MCH RBC QN AUTO: 30.6 PG (ref 27–33)
MCHC RBC AUTO-ENTMCNC: 34.6 G/DL (ref 32.3–36.5)
MCV RBC AUTO: 88.6 FL (ref 81.4–97.8)
PLATELET # BLD AUTO: 215 K/UL (ref 164–446)
PMV BLD AUTO: 11 FL (ref 9–12.9)
POTASSIUM SERPL-SCNC: 3.5 MMOL/L (ref 3.6–5.5)
PROTHROMBIN TIME: 18 SEC (ref 12–14.6)
RBC # BLD AUTO: 3.95 M/UL (ref 4.7–6.1)
SODIUM SERPL-SCNC: 138 MMOL/L (ref 135–145)
WBC # BLD AUTO: 12.4 K/UL (ref 4.8–10.8)

## 2023-07-12 PROCEDURE — RXMED WILLOW AMBULATORY MEDICATION CHARGE: Performed by: NURSE PRACTITIONER

## 2023-07-12 PROCEDURE — 97530 THERAPEUTIC ACTIVITIES: CPT

## 2023-07-12 PROCEDURE — 700111 HCHG RX REV CODE 636 W/ 250 OVERRIDE (IP): Mod: JZ | Performed by: NURSE PRACTITIONER

## 2023-07-12 PROCEDURE — 80048 BASIC METABOLIC PNL TOTAL CA: CPT

## 2023-07-12 PROCEDURE — 85027 COMPLETE CBC AUTOMATED: CPT

## 2023-07-12 PROCEDURE — 85610 PROTHROMBIN TIME: CPT

## 2023-07-12 PROCEDURE — A9270 NON-COVERED ITEM OR SERVICE: HCPCS | Performed by: NURSE PRACTITIONER

## 2023-07-12 PROCEDURE — 700102 HCHG RX REV CODE 250 W/ 637 OVERRIDE(OP): Performed by: NURSE PRACTITIONER

## 2023-07-12 PROCEDURE — 97535 SELF CARE MNGMENT TRAINING: CPT

## 2023-07-12 PROCEDURE — 71046 X-RAY EXAM CHEST 2 VIEWS: CPT

## 2023-07-12 RX ORDER — ACETAMINOPHEN 500 MG
1000 TABLET ORAL EVERY 6 HOURS PRN
Qty: 30 TABLET | Refills: 0 | COMMUNITY
Start: 2023-07-12 | End: 2023-07-13 | Stop reason: SDUPTHER

## 2023-07-12 RX ORDER — OMEPRAZOLE 20 MG/1
20 CAPSULE, DELAYED RELEASE ORAL DAILY
Qty: 30 CAPSULE | Refills: 2 | Status: SHIPPED | OUTPATIENT
Start: 2023-07-13 | End: 2023-08-02

## 2023-07-12 RX ORDER — OXYCODONE HYDROCHLORIDE 5 MG/1
5 TABLET ORAL EVERY 6 HOURS PRN
Qty: 56 TABLET | Refills: 0 | Status: SHIPPED | OUTPATIENT
Start: 2023-07-12 | End: 2023-07-26

## 2023-07-12 RX ORDER — AMIODARONE HYDROCHLORIDE 200 MG/1
TABLET ORAL
Qty: 58 TABLET | Refills: 0 | Status: SHIPPED | OUTPATIENT
Start: 2023-07-12 | End: 2023-08-02

## 2023-07-12 RX ORDER — POTASSIUM CHLORIDE 20 MEQ/1
40 TABLET, EXTENDED RELEASE ORAL ONCE
Status: COMPLETED | OUTPATIENT
Start: 2023-07-12 | End: 2023-07-12

## 2023-07-12 RX ORDER — WARFARIN SODIUM 3 MG/1
3 TABLET ORAL DAILY
Qty: 30 TABLET | Refills: 3 | Status: ACTIVE | OUTPATIENT
Start: 2023-07-12 | End: 2023-09-21

## 2023-07-12 RX ORDER — ASPIRIN 81 MG/1
81 TABLET ORAL DAILY
Qty: 100 TABLET | Refills: 2 | Status: SHIPPED | OUTPATIENT
Start: 2023-07-13 | End: 2023-08-02

## 2023-07-12 RX ADMIN — METOLAZONE 2.5 MG: 2.5 TABLET ORAL at 05:57

## 2023-07-12 RX ADMIN — ASPIRIN 81 MG: 81 TABLET, COATED ORAL at 05:57

## 2023-07-12 RX ADMIN — MAGNESIUM HYDROXIDE 30 ML: 1200 LIQUID ORAL at 06:00

## 2023-07-12 RX ADMIN — POTASSIUM CHLORIDE 40 MEQ: 1500 TABLET, EXTENDED RELEASE ORAL at 11:44

## 2023-07-12 RX ADMIN — Medication 1 APPLICATOR: at 11:45

## 2023-07-12 RX ADMIN — AMIODARONE HYDROCHLORIDE 400 MG: 200 TABLET ORAL at 11:44

## 2023-07-12 RX ADMIN — POTASSIUM CHLORIDE 20 MEQ: 1500 TABLET, EXTENDED RELEASE ORAL at 05:56

## 2023-07-12 RX ADMIN — DOCUSATE SODIUM 100 MG: 100 CAPSULE, LIQUID FILLED ORAL at 05:56

## 2023-07-12 RX ADMIN — POLYETHYLENE GLYCOL 3350 1 PACKET: 17 POWDER, FOR SOLUTION ORAL at 06:00

## 2023-07-12 RX ADMIN — FUROSEMIDE 20 MG: 10 INJECTION, SOLUTION INTRAVENOUS at 05:57

## 2023-07-12 RX ADMIN — OMEPRAZOLE 20 MG: 20 CAPSULE, DELAYED RELEASE ORAL at 05:57

## 2023-07-12 ASSESSMENT — COGNITIVE AND FUNCTIONAL STATUS - GENERAL
MOVING FROM LYING ON BACK TO SITTING ON SIDE OF FLAT BED: A LITTLE
CLIMB 3 TO 5 STEPS WITH RAILING: A LITTLE
MOVING TO AND FROM BED TO CHAIR: A LITTLE
STANDING UP FROM CHAIR USING ARMS: A LITTLE
SUGGESTED CMS G CODE MODIFIER MOBILITY: CK
TURNING FROM BACK TO SIDE WHILE IN FLAT BAD: A LITTLE
WALKING IN HOSPITAL ROOM: A LITTLE
MOBILITY SCORE: 18

## 2023-07-12 ASSESSMENT — GAIT ASSESSMENTS
GAIT LEVEL OF ASSIST: STANDBY ASSIST
DISTANCE (FEET): 12
DEVIATION: SHUFFLED GAIT

## 2023-07-12 ASSESSMENT — FIBROSIS 4 INDEX: FIB4 SCORE: 1.7

## 2023-07-12 NOTE — CARE PLAN
The patient is Stable - Low risk of patient condition declining or worsening    Shift Goals  Clinical Goals: POD 6, encourage IS use, d/c in am post CXR  Patient Goals: sleep, home soon  Family Goals: na    Progress made toward(s) clinical / shift goals:    Problem: Post Op Day 5 CABG/Heart Valve Replacement  Goal: Optimal care of the Post Op CABG/Heart Valve replacement Post Op Day 5  Outcome: Progressing  Note: POD 5 PM shift  4 walks completed  AM weight   Labs collected  IS use  CXR orders       Patient is not progressing towards the following goals:

## 2023-07-12 NOTE — CARE PLAN
"  Problem: Knowledge Deficit - Standard  Goal: Patient and family/care givers will demonstrate understanding of plan of care, disease process/condition, diagnostic tests and medications  Outcome: Progressing  Note: Pt's whiteboard is updated, pt has been updated on POC, all questions have been answered at this time       Problem: Pain - Standard  Goal: Alleviation of pain or a reduction in pain to the patient’s comfort goal  Outcome: Progressing  Note: Pt on scheduled tylenol, no further complaints of pain      Problem: Post Op Day 5 CABG/Heart Valve Replacement  Goal: Optimal care of the Post Op CABG/Heart Valve replacement Post Op Day 5  Outcome: Progressing  Intervention: Daily weights in the morning  Note: Pt weighed this morning   Intervention: Shower daily and clean incisions twice daily with soap and water  Note: Shower completed and incision care performed once, to perform second incision care with night shift   Intervention: Up in chair for all meals  Note: Pt up to chair for all meals   Intervention: Ambulate 4 times daily, increasing the distance each time  Note: Pt has ambulated twice with day shift and will complete the other two ambulations with night shift   Intervention: IS q 1 hour while awake and record best IS volume  Note: Pt able to pull 1750 on the IS   Intervention: Complete \"Home O2 Assessment' in vitals flowsheets if unable to wean off O2  Note: Home O2 eval completed and O2 ordered and delivered to bedisde      Problem: Fall Risk  Goal: Patient will remain free from falls  Outcome: Progressing  Note: Bed locked in lowest position. Bed alarm on. Treaded socks in use. Call light and belongings within reach. Pt educated to call for assistance. Pt verbalized understanding. Hourly rounding in place.    The patient is Watcher - Medium risk of patient condition declining or worsening    Shift Goals  Clinical Goals: shower, up to chair for meals, walk 2x, incentive spirometer use  Patient Goals: " rest, use IS often  Family Goals: na    Progress made toward(s) clinical / shift goals:  OHS ADLs, pain mange, ambulate     Patient is not progressing towards the following goals:

## 2023-07-12 NOTE — DISCHARGE PLANNING
Case Management Discharge Planning    Admission Date: 7/6/2023  GMLOS: 8.9  ALOS: 6    6-Clicks ADL Score: 21  6-Clicks Mobility Score: 17    Anticipated Discharge Dispo: Discharge Disposition: Discharged to home w HHA    DME Needed: Yes    DME Ordered: Yes    Action(s) Taken: LMSW went to check at bedside regarding O2 drop off. Confirmed Gan O2 was dropped off at bedside for this pt.    [facility]    Escalations Completed: None    Medically Clear: No    Next Steps: Discharge pt safely home with O2 needs met.     Barriers to Discharge: None

## 2023-07-12 NOTE — PROGRESS NOTES
Discharge instructions provided to pt and relative. Discussed follow up appointments, diet, medications and activity. Pt states understanding. IV and IJ were removed. Copy of discharge provided to the patient. A signed copy of discharge is in patient's chart. All personal belongings are in patients possession. All questions have been answered. Patient is being wheeled off floor. Pt has [id number] and home oxygen in possession

## 2023-07-12 NOTE — DISCHARGE PLANNING
HTH/SCP TCN chart review completed. Collaborated with DARIEL Baugh. Discussed current discharge considerations noting patient accepted to [facility] and [facility] referral previously sent. Also noted 02 has been delivered as well. Appreciate SW following for patient oxygen need at discharge. No new TCN needs identified in patient discharge planning this day in collaboration with JENNIFER. TCN will continue to follow and collaborate with discharge planning team as additional post acute needs arise. Thank you.     Completed  PT with continued recommendations for home health on 7/12/23  OT with continued recommendations for home health on 7/11/2023  Choice obtained: DME (O2/AD), HH -> [facility] accepted   [facility] referral (Y) sent 7/9/23

## 2023-07-12 NOTE — PROGRESS NOTES
Assumed care of pt. Bedside report received from RN. Pt was updated on plan of care. AOx4. Pt pain level 0/10. PT on telemetry, SR 83. Call light, phone and personal belongings in reach. Bed alarm on and working properly, bed in lowest position, and locked.

## 2023-07-12 NOTE — DISCHARGE SUMMARY
DISCHARGE SUMMARY    ADMISSION DATE: 7/6/2023    DISCHARGE DATE: 7/12/23    ADMITTING DIAGNOSES: Severe mitral regurgitation (4+, degenerative), anterior mitral valve leaflet prolapse, bigeminy, hypertension, dyslipidemia     DISCHARGE DIAGNOSES: Severe mitral regurgitation (4+, degenerative), anterior mitral valve leaflet prolapse, bigeminy, hypertension, dyslipidemia    PROCEDURES PERFORMED:   7/6/23 Dr. Neri    HISTORY OF PRESENT ILLNESS:    The patient is a 77 y.o. male with past medical history of mitral valve prolapse, mitral regurgitation, hypertension, and dyslipidemia who presents to clinic with dyspnea and fatigue. He reports these symptoms have been worsening over the last year. He feels feels more worn out than he used to but is still able to ride a stationary bike for 30 minutes and climb up and down stairs for 20 minutes without getting shortness of breath. He states his dyspnea comes intermittently and he is unable to identify exacerbating and alleviating factors. He denies chest pain, orthopnea, PND, dizziness, and syncope.    HOSPITAL COURSE:   POD 1  HDS- off pressors, SR, neuro intact, wounds intact, abdomen soft, fluid balance positive, wt up,  4L NC. Cr 1.15, H/H   12.5/37.6. 400 out of chest tubes last night, 1165 total. Plan:  Keep chest tubes and pacing wires. Diurese. Remove matos, IS/ambulate.      POD 2 HDS. SR. Neuro intact.  Hard of hearing.  Wounds CDI.  Cr 1.12 Hgb 10.6. Plan: DC mediastinal tubes and matos. Wean oxygen as tolerated.  Encourage IS/ambulation. Transfer to [facility].     POD 3 HDS. Afib while rounding this AM. Neuro intact. [place]. Cr 1.03 Hgb 10.4. 3LNC, small right apical pneumo on CXR this morning.  Plan: Repeat CXR tomorrow AM.  Amio gtt and bolus.  Add metolazone. Check mag.  Wean oxygen as tolerated.       POD 4  HDS, SR, neuro intact, wounds intact, abdomen soft +BM, fluid balance positive, wt at admission wt, pneumo on CXR improved- now 4mm. 3 L NC. Plan:  Home  O2 test, repeat CXR tomorrow, IS/ambulate. CPM. Probable discharge 1-2 days.      POD 5  HDS, SR, neuro intact, wounds intact, abdomen soft +BM, fluid balance positive, wt below admission weight. Stable small apical pneumo on CXR, now on 1 L. Plan:  Home O2 ordered. Repeat cxr tomorrow. IS/ambulate. Anticipate dc tomorrow.    POD 6  HDS, SR, neuro intact, wounds intact, abdomen soft +BM, fluid balance negative, wt below admission wt, 1 L NC. Plan: d/c lasix and metolazone. Discharge home today. Patient to follow up in clinic in one month.     TELEMETRY:  7/7 SR   7/8 SR  7/9 SR overnight now atrial fibrillation  7/10 SR  7/11 SR  7/12 SR    RECENT LABS:     Lab Results   Component Value Date/Time    SODIUM 138 07/12/2023 12:57 AM    POTASSIUM 3.5 (L) 07/12/2023 12:57 AM    CHLORIDE 94 (L) 07/12/2023 12:57 AM    CO2 31 07/12/2023 12:57 AM    GLUCOSE 119 (H) 07/12/2023 12:57 AM    BUN 23 (H) 07/12/2023 12:57 AM    CREATININE 1.12 07/12/2023 12:57 AM      Lab Results   Component Value Date/Time    WBC 12.4 (H) 07/12/2023 12:57 AM    RBC 3.95 (L) 07/12/2023 12:57 AM    HEMOGLOBIN 12.1 (L) 07/12/2023 12:57 AM    HEMATOCRIT 35.0 (L) 07/12/2023 12:57 AM    MCV 88.6 07/12/2023 12:57 AM    MCH 30.6 07/12/2023 12:57 AM    MCHC 34.6 07/12/2023 12:57 AM    MPV 11.0 07/12/2023 12:57 AM    NEUTSPOLYS 56.70 07/03/2023 08:35 AM    LYMPHOCYTES 33.10 07/03/2023 08:35 AM    MONOCYTES 7.30 07/03/2023 08:35 AM    EOSINOPHILS 1.60 07/03/2023 08:35 AM    BASOPHILS 1.00 07/03/2023 08:35 AM    HYPOCHROMIA 1+ 07/30/2013 09:01 AM      Lab Results   Component Value Date/Time    PROTHROMBTM 18.0 (H) 07/12/2023 12:57 AM    INR 1.52 (H) 07/12/2023 12:57 AM        Fluids:    Intake/Output Summary (Last 24 hours) at 7/12/2023 1023  Last data filed at 7/12/2023 0845  Gross per 24 hour   Intake 350 ml   Output 1800 ml   Net -1450 ml     Admit weight: Weight: 82.1 kg (181 lb)  Current weight: Weight: 78.7 kg (173 lb 8 oz) (07/12/23  0553)    ALLERGIES:     Patient has no known allergies.    EJECTION FRACTION:  65%    CARDIAC MEDICATIONS:    ASA - Yes    Plavix - No; contraindicated because of On Coumadin / NOAC    Post-operative Beta Blockers - Yes    Ace/ARB- No; contraindicated because of Normal EF    Statin - No; contraindicated because of No CAD    Aldactone- No; contraindicated because of Normal EF    DISCHARGE MEDICATIONS:      Medication List        START taking these medications        Instructions   amiodarone 200 MG Tabs  Commonly known as: Cordarone   Doctor's comments: Take 2 Tablets BID x 7 days, Then Take Two Tablets daily for 7 days, Then Take 1 Tablet Daily.  Take 1 Tablet by mouth every day.  Dose: 200 mg     aspirin 81 MG EC tablet  Start taking on: July 13, 2023   Take 1 Tablet by mouth every day.  Dose: 81 mg     metoprolol tartrate 25 MG Tabs  Commonly known as: Lopressor   Take 1 Tablet by mouth 2 times a day.  Dose: 25 mg     omeprazole 20 MG delayed-release capsule  Start taking on: July 13, 2023  Commonly known as: PriLOSEC   Take 1 Capsule by mouth every day.  Dose: 20 mg     oxyCODONE immediate-release 5 MG Tabs  Commonly known as: Roxicodone   Take 1 Tablet by mouth every 6 hours as needed for Severe Pain for up to 14 days.  Dose: 5 mg     warfarin 3 MG Tabs  Commonly known as: Coumadin   Take 1 Tablet by mouth every day at 6 PM.  Dose: 3 mg            CHANGE how you take these medications        Instructions   acetaminophen 500 MG Tabs  What changed:   how much to take  reasons to take this  Commonly known as: Tylenol   Take 2 Tablets by mouth every 6 hours as needed for Mild Pain or Moderate Pain.  Dose: 1,000 mg            CONTINUE taking these medications        Instructions   docusate sodium 100 MG Caps  Commonly known as: Colace   Take 100 mg by mouth every day.  Dose: 100 mg     Magnesium 250 MG Tabs   Take 125 mg by mouth every day.  Dose: 125 mg     multivitamin Tabs   Take 1 Tablet by mouth every  day.  Dose: 1 Tablet     vitamin C 500 MG Chew   Chew 500 mg every day.  Dose: 500 mg     VITAMIN D PO   Take 6,000 Units by mouth every day.  Dose: 6,000 Units            STOP taking these medications      Fish Oil 1200 MG Caps     ibuprofen 200 MG Tabs  Commonly known as: Motrin     losartan 100 MG Tabs  Commonly known as: Cozaar              NARCOTIC PAIN MEDICATIONS:   In prescribing controlled substances to this patient, I certify that I have obtained and reviewed their medical history. I have also made a good evan effort to obtain applicable records from other providers who have treated the patient .    I have conducted a physical exam and documented it. I have reviewed the patient's prescription history as maintained by the [place] Prescription Monitoring Program.     I have assessed the patient’s risk for abuse, dependency, and addiction using the validated Opioid Risk Tool.    Given the above, I believe the benefits of controlled substance therapy outweigh the risks. The reasons for prescribing controlled substances include non-narcotic, oral analgesic alternatives have been inadequate for pain control. Accordingly, I have discussed the risk and benefits, treatment plan, and alternative therapies with the patient.     Pt understands this prescription is a controlled substance which is potentially habit-forming and its use is regulated by the [employer]. It must be submitted to the pharmacy within 5 days of the date written and can not be called in or faxed to the pharmacy. Refills are subject to terms of a medicine agreement. Any refill requires a new prescription that must be obtained from this office during regular office hours Monday through Thursday 7 am to 4 pm. We ask for 72 hours notice to get an appointment for a narcotic pain medication refill. This medicine can cause nausea, significant constipation, sedation, confusion.     DIET:   Cardiac diet    DISCHARGE INSTRUCTIONS DISCUSSED WITH THE PATIENT:       1. NO driving for 4 weeks after surgery. You may ride as a passenger.  2. NO lifting of any item over 10 lbs (e.g. gallon of milk) for 6 weeks after surgery.  3. DO walk as much as possible! Walk a minimum of once a day. Depending on your fatigue and comfort level, you may walk as much as you wish. There is no maximum.  4. Other physical activities (sex, housework, gardening, etc.) are OK after 4 weeks   5. Continue using incentive spirometer for 2 weeks, especially if going home on oxygen.    Incision Care:  1. SHOWER ONLY - no baths. Clean incision daily with plain Ivory ® soap or any other dye or perfume free soap. Then pat incision dry with clean towel. Avoid creams or lotions on the incision(s).  a. If there is any increase in redness or swelling, or separation of the incision line, or thick drainage* from any of the incisions, call right away  * Clear, thin drainage is not abnormal especially from the leg incision and/or                         chest tube sites.  2. Continue to wear your SINDI Stockings for 4 weeks. You may take off the stockings when in bed or when the legs are elevated.    Patient instructed [facility] at [phone]  if any increased shortness of breath, uncontrolled pain, weight gain greater than 3 pounds in 1 day or 5 pounds in 1 week, SBP >140, HR <60 or redness swelling or drainage of incisions.      FOLLOW-UP:   Future Appointments   Date Time Provider Department Center   8/2/2023  3:45 PM CHUCK Mercer [facility] None   8/7/2023 12:00 PM CT RESOURCE PROVIDER [facility] None

## 2023-07-12 NOTE — DISCHARGE INSTRUCTIONS
DIVISION OF CARDIAC SURGERY   DISCHARGE INSTRUCTIONS    Activity:    NO driving for 4 weeks after surgery. You may ride as a passenger.  NO lifting, pushing, or pulling more than 10 pounds for 6 weeks.  For the next 6 weeks, keep your elbows close to your body and move within a pain-free motion when lifting, pushing or pulling.  Do not stretch both arms backwards at the same time.    Walk at least 4 times per day, there is no maximum. The goal is to increase your distance over time.  Continue using incentive spirometer for 2 weeks or until your baseline volume is reached.  If you are going home on oxygen and you were not on oxygen prior to surgery, keep using until you are oxygen free.  Weigh yourself daily.  Call your Cardiologist for a weight gain of 3 or more pounds in 1 day or more than 5 pounds in 7 days.  Take all of your medications as prescribed. Do not use a pill box for the first month at home. If you have questions, please call your nurse navigator at [phone].  Continue to wear the SINDI (compression) stockings for 2-4 weeks or until all swelling is gone. You may take them off when you are in bed or when your legs are elevated.    Incision Care:    Make sure to clean your incision(s) TWICE DAILY.  Once by showering AND once using the no rinse Foam cleanser provided in the hospital.  During the shower, cleanse the incision(s) with a perfume and dye free soap (Dial, Dove, [place])  Use gentle pressure and rub up and down over incision with your hands or a washcloth. Rinse off and pat incision(s) dry with clean towel.  Keep the incision open to air. No creams or lotions on your incision(s). No baths.  If there is any increased redness or swelling, separation of the incision line, or thick drainage from any of your incisions, call the Cardiac Surgeons ([phone]).      General Instructions:    You have been referred to Cardiac Rehab.  You can start Cardiac Rehab 30 days after surgery.  If you do  not have an appointment at the time of discharge call [phone] to schedule an appointment.  Your Primary Care Doctor typically handles home oxygen. Oxygen may be stopped when your oxygen level is consistently greater than 90.  Check with your Primary Care Doctor if you are unsure.  Take all of your medications (including pain medications) as prescribed.  Taking medications other than prescribed can result in serious injury.    For Patients Discharged with Narcotic Pain Medication:     If a refill is needed, understand that only 1 refill will be provided and you must come to the Cardiac Surgeons’ office for an appointment (72 hours’ notice is required to schedule and there are no weekend appointments).  If the pain medications you are discharged on are not working, you will need to bring your remaining prescription into the office in order to receive a new prescription.  If you were taking narcotics prior to your heart surgery, the Cardiac Surgeons will provide you with one prescription and additional medications will need to be provided by your pain management doctor.  Do not drink alcohol while taking narcotics.  Lost or stolen medications will not be refilled.  If medications are stolen, report to law enforcement.    Contact Cardiac Surgery at [phone] if you have any questions.

## 2023-07-12 NOTE — THERAPY
Physical Therapy   Daily Treatment     Patient Name: Selma Avila  Age:  78 y.o., Sex:  male  Medical Record #: [id number]  Today's Date: 7/12/2023     Precautions  Precautions: Fall Risk;Sternal Precautions (See Comments);Cardiac Precautions (See Comments)    Assessment    Patient seen for PT tx session, agreeable however endorsed feeling unwell with a bout of diaphoresis and dizziness in bed prior to therapist arrival.  BP as detailed below.  Patient ambulated around bed with SBA for safety however further mobility deferred due to low BP and patient feeling unwell.  Reinforced post-op education including talk test, RPE scale, walking program, and signs/symptoms of failure response as patient was unable to recall from initial evaluation.  Will continue to follow.     Plan    Treatment Plan Status: Continue Current Treatment Plan  Type of Treatment: Bed Mobility, Gait Training, Stair Training, Therapeutic Activities  Treatment Frequency: 4 Times per Week  Treatment Duration: Until Therapy Goals Met    DC Equipment Recommendations: None  Discharge Recommendations: Recommend home health for continued physical therapy services     Objective     07/12/23 0942   Precautions   Precautions Fall Risk;Sternal Precautions (See Comments);Cardiac Precautions (See Comments)   Vitals   Vitals Comments /68 supine, 93/63 sitting in chair, 109/69 sitting after 8 min   Pain 0 - 10 Group   Therapist Pain Assessment Post Activity Pain Same as Prior to Activity;Nurse Notified   Cognition    Cognition / Consciousness X   Speech/ Communication Delayed Responses   Level of Consciousness Alert   Comments Pleasant & cooperative, [place]   Balance   Sitting Balance (Static) Good   Sitting Balance (Dynamic) Good   Standing Balance (Static) Fair   Standing Balance (Dynamic) Fair   Weight Shift Sitting Fair   Weight Shift Standing Fair   Skilled Intervention Verbal Cuing   Bed Mobility    Supine to Sit Supervised   Sit to Supine (NT, left up  in chair)   Skilled Intervention Verbal Cuing   Gait Analysis   Gait Level Of Assist Standby Assist   Assistive Device None   Distance (Feet) 12   # of Times Distance was Traveled 1   Deviation Shuffled Gait   Skilled Intervention Verbal Cuing   Comments deferred further ambulation & stairs due to pt feeling unwell   Functional Mobility   Sit to Stand Standby Assist   Bed, Chair, Wheelchair Transfer Standby Assist   Mobility short gait around bed to chair   Skilled Intervention Verbal Cuing   Activity Tolerance   Comments c/o feeling off, mildly diaphoretic.  C/o feeling sweaty & dizzy prior to PT arrival   Short Term Goals    Short Term Goal # 1 bed mob supervised log roll in 6 visits   Goal Outcome # 1 goal not met   Short Term Goal # 2 amb 200ft supervised in 6 visits   Goal Outcome # 2 Goal not met   Short Term Goal # 3 up/down 6 steps with rail and SBA in 6 visits   Goal Outcome # 3 Goal not met   Short Term Goal # 4 all mobility within sternal precautions independently in 6 visits   Goal Outcome # 4 Goal not met   Physical Therapy Treatment Plan   Physical Therapy Treatment Plan Continue Current Treatment Plan   Anticipated Discharge Equipment and Recommendations   DC Equipment Recommendations None   Discharge Recommendations Recommend home health for continued physical therapy services

## 2023-07-12 NOTE — DOCUMENTATION QUERY
[facility]                                                                       Query Response Note      PATIENT:               MILLA HAMILTON  ACCT #:                  [id number]  MRN:                     [id number]  :                      1945  ADMIT DATE:       2023 5:03 AM  DISCH DATE:          RESPONDING  PROVIDER #:        [id number]           QUERY TEXT:    A potassium lab value of 2.7 is noted in the Medical Record.  Can a diagnosis be provided to support this finding?    The patient's Clinical Indicators include:   Potassium: 4.5 -> 2.7 -> 2.8   Potassium: 3.2 -> 4.2    Treatment: IV potassium -> Kdur; lab testing  Risk Factors: Post-op mitral valve replacement; treatment w/ lasix    Thank You,  Екатерина Stein RN  Clinical    Connect via [facility]  Options provided:   -- Hypokalemia   -- Findings are clinically insignificant   -- Other explanation, (please specify other explanation)   -- Unable to determine      Query created by: Екатерина Stein on 7/10/2023 12:50 PM    RESPONSE TEXT:    Hypokalemia       QUERY TEXT:    Per [facility] Lab Results patient had a hemoglobin of 17.0 on , day of admit. Hemoglobin of 10.6 is noted on 7/10.     Can a diagnosis be provided to support this finding?      The patient's clinical indicators include:   Istat Hgb: 17.0 -> 15.6  -> 11.9 -> 12.6 -> 12.2 -> 13.6   - 7/10 Hgb: 13.8 -> 12.5 -> 10.6 -> 10.4 -> 10.6    Treatment: Transfusion PRBC's and platelets; serial CBC  Risk Factors: Post-op mitral valve replacement    Thank You,  Екатерина Stein RN  Clinical    Connect via [facility]  Options provided:   -- Acute blood loss anemia   -- Other type of anemia, (Please specify type)   -- Clinically insignificant finding   -- Other explanation, (please specify other explanation)   -- Unable to determine      Query created by: Blair  Екатерина Vargas on 7/10/2023 12:50 PM    RESPONSE TEXT:    Acute blood loss anemia          Electronically signed by:  SABRINA ENGLISH MD 7/12/2023 7:56 AM

## 2023-07-12 NOTE — PROGRESS NOTES
"Discharge Discussion and home care recap prior to discharge:      Discharge review was completed with patient and wife.    Patient was reminded that outpatient cardiac rehab beginning 6 weeks post op. They were told it is highly recommended and available to them if desired, but not required. They were told to look at the provided flyer for the contact number and additional information.    Patient was reminded to utilize the vitals log book provided to take his/her weight daily and record.    Patient was told to call me with weight gain > 3 lbs in 1 day or 5 lbs in 1 week  Patient was also told to record heart rate and blood pressure morning and night; and to follow the hold parameters provided in the discharge summary on beta blockers and ACE/ARB (if prescribed on discharge).    Patient was reminded to review the \"recovery after heart surgery\" packet with information on normal expectations such as clicking in the chest, loud/pounding heart/ hot and cold flashes, weight loss, constipation, insomnia, tingling on left side of chest (CABG with LIMA).  I also reviewed the decision tree of whom to call and when with concerns after going home. RN navigator Monday through Friday during business hours for any questions or urgent concerns, and the office for urgent concerns at night or on the weekends.    I briefly recapped the discharge instructions that their primary RN will review in depth prior to discharge   1) appointments   2) medication reconciliation (start, continue, change, stop)   3) care instructions    All additional questions were answered or deferred to the bedside RN.    Event time 45 minutes   "

## 2023-07-13 ENCOUNTER — HOME CARE VISIT (OUTPATIENT)
Dept: HOME HEALTH SERVICES | Facility: HOME HEALTHCARE | Age: 78
End: 2023-07-13
Payer: MEDICARE

## 2023-07-13 VITALS
OXYGEN SATURATION: 94 % | DIASTOLIC BLOOD PRESSURE: 65 MMHG | BODY MASS INDEX: 23.91 KG/M2 | TEMPERATURE: 97 F | HEART RATE: 82 BPM | WEIGHT: 170.8 LBS | HEIGHT: 71 IN | RESPIRATION RATE: 20 BRPM | SYSTOLIC BLOOD PRESSURE: 110 MMHG

## 2023-07-13 DIAGNOSIS — Z79.01 CHRONIC ANTICOAGULATION: ICD-10-CM

## 2023-07-13 LAB — INR PPP: 1.7 - CRITICAL LOW: < 0.8, CRITICAL HIGH: > 6.5 (ref 2–3.5)

## 2023-07-13 PROCEDURE — G0493 RN CARE EA 15 MIN HH/HOSPICE: HCPCS

## 2023-07-13 PROCEDURE — 665001 SOC-HOME HEALTH

## 2023-07-13 RX ORDER — ACETAMINOPHEN 500 MG
650 TABLET ORAL EVERY 6 HOURS PRN
Qty: 30 TABLET | Refills: 0 | Status: SHIPPED | OUTPATIENT
Start: 2023-07-13 | End: 2023-07-26

## 2023-07-13 ASSESSMENT — FIBROSIS 4 INDEX: FIB4 SCORE: 1.7

## 2023-07-13 NOTE — PROGRESS NOTES
Patient is being seen by [facility]. Discharged from hospital yesterday. Has initial appt scheduled 7/18 for education. INR ordered for  to obtain today.     Sasha Tierney, JobyD

## 2023-07-14 SDOH — ECONOMIC STABILITY: HOUSING INSECURITY: EVIDENCE OF SMOKING MATERIAL: 0

## 2023-07-14 ASSESSMENT — ENCOUNTER SYMPTOMS
HIGHEST PAIN SEVERITY IN PAST 24 HOURS: 5/10
PAIN SEVERITY GOAL: 0/10
DRY SKIN: 1
STOOL FREQUENCY: DAILY
VOMITING: DENIES
NAUSEA: DENIES
PAIN LOCATION - PAIN SEVERITY: 5/10
PAIN LOCATION - PAIN QUALITY: ACHY
PAIN: 1
PAIN LOCATION - PAIN FREQUENCY: INTERMITTENT
SHORTNESS OF BREATH: 1
SUBJECTIVE PAIN PROGRESSION: GRADUALLY IMPROVING
FATIGUES EASILY: 1
PAIN LOCATION: STERNAL INCISION
DYSPNEA ACTIVITY LEVEL: AT REST
PERSON REPORTING PAIN: PATIENT
BOWEL PATTERN NORMAL: 1
PAIN LOCATION - PAIN DURATION: 1-2 HRS
LOWEST PAIN SEVERITY IN PAST 24 HOURS: 0/10
LAST BOWEL MOVEMENT: 66668

## 2023-07-14 ASSESSMENT — ACTIVITIES OF DAILY LIVING (ADL): OASIS_M1830: 03

## 2023-07-15 ENCOUNTER — HOME CARE VISIT (OUTPATIENT)
Dept: HOME HEALTH SERVICES | Facility: HOME HEALTHCARE | Age: 78
End: 2023-07-15
Payer: MEDICARE

## 2023-07-15 PROCEDURE — G0299 HHS/HOSPICE OF RN EA 15 MIN: HCPCS

## 2023-07-15 ASSESSMENT — FIBROSIS 4 INDEX: FIB4 SCORE: 1.7

## 2023-07-15 NOTE — Clinical Note
FOR REVIEW PLEASE  Appt with you on 8/02 post  MVR  BP today 94/60, wife held metoprolol second thime time this week.

## 2023-07-16 ENCOUNTER — HOME CARE VISIT (OUTPATIENT)
Dept: HOME HEALTH SERVICES | Facility: HOME HEALTHCARE | Age: 78
End: 2023-07-16
Payer: MEDICARE

## 2023-07-16 VITALS
SYSTOLIC BLOOD PRESSURE: 94 MMHG | TEMPERATURE: 97.4 F | WEIGHT: 172.2 LBS | RESPIRATION RATE: 17 BRPM | OXYGEN SATURATION: 93 % | DIASTOLIC BLOOD PRESSURE: 60 MMHG | BODY MASS INDEX: 24.02 KG/M2 | HEART RATE: 71 BPM

## 2023-07-16 SDOH — ECONOMIC STABILITY: HOUSING INSECURITY: EVIDENCE OF SMOKING MATERIAL: 0

## 2023-07-16 ASSESSMENT — ENCOUNTER SYMPTOMS
PAIN LOCATION - PAIN FREQUENCY: INTERMITTENT
PAIN LOCATION - PAIN QUALITY: SHARP
PAIN: 1
PERSON REPORTING PAIN: PATIENT
HIGHEST PAIN SEVERITY IN PAST 24 HOURS: 2/10
PAIN LOCATION - RELIEVING FACTORS: RESTING
LAST BOWEL MOVEMENT: 66669
STOOL FREQUENCY: DAILY
PAIN SEVERITY GOAL: 0/10
PAIN LOCATION - PAIN SEVERITY: 2/10
BOWEL PATTERN NORMAL: 1
PAIN LOCATION: MID STERNAL
NAUSEA: DENIES ANY
LOWEST PAIN SEVERITY IN PAST 24 HOURS: 0/10
MUSCLE WEAKNESS: 1
PAIN LOCATION - EXACERBATING FACTORS: COUGHING
SUBJECTIVE PAIN PROGRESSION: GRADUALLY IMPROVING

## 2023-07-16 ASSESSMENT — PAIN SCALES - PAIN ASSESSMENT IN ADVANCED DEMENTIA (PAINAD)
CONSOLABILITY: 0 - NO NEED TO CONSOLE.
TOTALSCORE: 0
FACIALEXPRESSION: 0 - SMILING OR INEXPRESSIVE.
BODYLANGUAGE: 0 - RELAXED.
NEGVOCALIZATION: 0 - NONE.

## 2023-07-17 ENCOUNTER — HOME CARE VISIT (OUTPATIENT)
Dept: HOME HEALTH SERVICES | Facility: HOME HEALTHCARE | Age: 78
End: 2023-07-17
Payer: MEDICARE

## 2023-07-17 ENCOUNTER — TELEPHONE (OUTPATIENT)
Dept: CARDIOLOGY | Facility: MEDICAL CENTER | Age: 78
End: 2023-07-17
Payer: MEDICARE

## 2023-07-17 ENCOUNTER — TELEPHONE (OUTPATIENT)
Dept: CARDIOTHORACIC SURGERY | Facility: MEDICAL CENTER | Age: 78
End: 2023-07-17
Payer: MEDICARE

## 2023-07-17 DIAGNOSIS — Z79.01 CHRONIC ANTICOAGULATION: ICD-10-CM

## 2023-07-17 PROCEDURE — G0299 HHS/HOSPICE OF RN EA 15 MIN: HCPCS

## 2023-07-17 ASSESSMENT — FIBROSIS 4 INDEX: FIB4 SCORE: 1.7

## 2023-07-17 NOTE — TELEPHONE ENCOUNTER
SAMANTHA Mercer. filed at 7/17/2023  8:20 AM    Status: Signed   Lets have him change his metoprolol to 25 mg SR and hold if systolic blood pressure less than 100        Called patient and spoke with patient's wife Noy. She states that she had misread the instructions on the Metoprolol and had been giving the patient double the dose (two in the morning, two at night). She states now that she corrected that she thinks his vitals should return to normal. Requesting to see how this continues and follow up at the  on 8/2/23.     To LH: Please advise if any recommendations for above, thank you!

## 2023-07-17 NOTE — TELEPHONE ENCOUNTER
Coumadin clinic appointment and f/u appointment with surgery reviewed where and purpose.    Spo2: > 93%, He denies SOB, denies lower or upper extremity swelling.    HH RN hear crackles upon auscultation, recommended they request a CXR.    Spoke with RENETTA Macias who stated this is not necessary at this time given the whole picture and to call us back if he gets SOB, or doesn't feel well.    There is a new CXR order from RENETTA Michel with geriatric care, Noy was told that if she wants to cancel the CXR to discuss with the ordering provider.    Call time 10 minutes

## 2023-07-18 ENCOUNTER — APPOINTMENT (OUTPATIENT)
Dept: VASCULAR LAB | Facility: MEDICAL CENTER | Age: 78
End: 2023-07-18
Attending: INTERNAL MEDICINE
Payer: MEDICARE

## 2023-07-18 ENCOUNTER — DOCUMENTATION (OUTPATIENT)
Dept: CARDIOLOGY | Facility: MEDICAL CENTER | Age: 78
End: 2023-07-18
Payer: MEDICARE

## 2023-07-18 VITALS
HEART RATE: 63 BPM | WEIGHT: 172 LBS | RESPIRATION RATE: 16 BRPM | DIASTOLIC BLOOD PRESSURE: 58 MMHG | TEMPERATURE: 96.8 F | SYSTOLIC BLOOD PRESSURE: 98 MMHG | OXYGEN SATURATION: 93 % | BODY MASS INDEX: 23.99 KG/M2

## 2023-07-18 DIAGNOSIS — I34.0 SEVERE MITRAL REGURGITATION: ICD-10-CM

## 2023-07-18 ASSESSMENT — ENCOUNTER SYMPTOMS
SUBJECTIVE PAIN PROGRESSION: UNCHANGED
LOWEST PAIN SEVERITY IN PAST 24 HOURS: 0/10
SHORTNESS OF BREATH: 1
DYSPNEA ACTIVITY LEVEL: AFTER AMBULATING MORE THAN 20 FT
PERSON REPORTING PAIN: PATIENT
NAUSEA: DENIES
PAIN SEVERITY GOAL: 0/10
PAIN LOCATION - EXACERBATING FACTORS: COUGHING
HIGHEST PAIN SEVERITY IN PAST 24 HOURS: 6/10
HYPOTENSION: 1
VOMITING: DENIES
LAST BOWEL MOVEMENT: 66672
MUSCLE WEAKNESS: 1
PAIN LOCATION - PAIN FREQUENCY: INTERMITTENT
PAIN: 1
STOOL FREQUENCY: DAILY
PAIN LOCATION - PAIN QUALITY: SORENESS
PAIN LOCATION - PAIN SEVERITY: 6/10
PAIN LOCATION: CHEST
BOWEL PATTERN NORMAL: 1
PAIN LOCATION - PAIN DURATION: WITH COUGH

## 2023-07-18 ASSESSMENT — ACTIVITIES OF DAILY LIVING (ADL)
CURRENT_FUNCTION: STAND BY ASSIST
AMBULATION ASSISTANCE: STAND BY ASSIST

## 2023-07-18 NOTE — PROGRESS NOTES
Medication chart review for [facility] services    Received referral from [facility].   Medications reviewed  compared with discharge summary if available.  Discharge summary date, if applicable:   7/12    Current medication list per [facility]     Medication list one, patient is currently taking    Current Outpatient Medications:     Home Care Oxygen, 1 L/min, Inhalation, Continuous    acetaminophen, 750 mg, Oral, Q6HRS PRN    metoprolol tartrate, 25 mg, Oral, BID    amiodarone, Take 2 Tablets by mouth 2 times a day for 7 days, THEN 2 Tablets every day for 7 days, THEN 1 Tablet every day thereafter.    aspirin, 81 mg, Oral, DAILY    omeprazole, 20 mg, Oral, DAILY    oxyCODONE immediate-release, 5 mg, Oral, Q6HRS PRN    warfarin, 3 mg, Oral, DAILY AT 1800    multivitamin, 1 Tablet, Oral, DAILY (Patient not taking: Reported on 7/14/2023)    vitamin C, 500 mg, Oral, DAILY    docusate sodium, 100 mg, Oral, DAILY    Magnesium, 125 mg, Oral, DAILY    VITAMIN D PO, 6,000 Units, Oral, DAILY      Medication list two, drugs that the patient has been prescribed or recommended to take by their healthcare provider on discharge summary     START taking these medications         Instructions   amiodarone 200 MG Tabs  Commonly known as: Cordarone    Doctor's comments: Take 2 Tablets BID x 7 days, Then Take Two Tablets daily for 7 days, Then Take 1 Tablet Daily.  Take 1 Tablet by mouth every day.  Dose: 200 mg      aspirin 81 MG EC tablet  Start taking on: July 13, 2023    Take 1 Tablet by mouth every day.  Dose: 81 mg      metoprolol tartrate 25 MG Tabs  Commonly known as: Lopressor    Take 1 Tablet by mouth 2 times a day.  Dose: 25 mg      omeprazole 20 MG delayed-release capsule  Start taking on: July 13, 2023  Commonly known as: PriLOSEC    Take 1 Capsule by mouth every day.  Dose: 20 mg      oxyCODONE immediate-release 5 MG Tabs  Commonly known as: Roxicodone    Take 1 Tablet by mouth every 6 hours as needed for  Severe Pain for up to 14 days.  Dose: 5 mg      warfarin 3 MG Tabs  Commonly known as: Coumadin    Take 1 Tablet by mouth every day at 6 PM.  Dose: 3 mg                CHANGE how you take these medications         Instructions   acetaminophen 500 MG Tabs  What changed:   how much to take  reasons to take this  Commonly known as: Tylenol    Take 2 Tablets by mouth every 6 hours as needed for Mild Pain or Moderate Pain.  Dose: 1,000 mg                CONTINUE taking these medications         Instructions   docusate sodium 100 MG Caps  Commonly known as: Colace    Take 100 mg by mouth every day.  Dose: 100 mg      Magnesium 250 MG Tabs    Take 125 mg by mouth every day.  Dose: 125 mg      multivitamin Tabs    Take 1 Tablet by mouth every day.  Dose: 1 Tablet      vitamin C 500 MG Chew    Chew 500 mg every day.  Dose: 500 mg      VITAMIN D PO    Take 6,000 Units by mouth every day.  Dose: 6,000 Units                STOP taking these medications       Fish Oil 1200 MG Caps      ibuprofen 200 MG Tabs  Commonly known as: Motrin      losartan 100 MG Tabs  Commonly known as: Cozaar                  No Known Allergies    Labs     Lab Results   Component Value Date/Time    SODIUM 138 07/12/2023 12:57 AM    POTASSIUM 3.5 (L) 07/12/2023 12:57 AM    CHLORIDE 94 (L) 07/12/2023 12:57 AM    CO2 31 07/12/2023 12:57 AM    GLUCOSE 119 (H) 07/12/2023 12:57 AM    BUN 23 (H) 07/12/2023 12:57 AM    CREATININE 1.12 07/12/2023 12:57 AM     Lab Results   Component Value Date/Time    ALKPHOSPHAT 86 07/03/2023 08:35 AM    ASTSGOT 15 07/03/2023 08:35 AM    ALTSGPT 10 07/03/2023 08:35 AM    TBILIRUBIN 0.8 07/03/2023 08:35 AM    INR 1.70 (A) 07/13/2023 11:19 AM    ALBUMIN 4.4 07/03/2023 08:35 AM        Assessment for clinically significant drug interactions, drug omissions/additions, duplicative therapies.            CC   Temitope Gilbert M.D.  [address] [place] [address] [place] [address]  Fax: [phone]    [facility]  Phone [phone] fax [phone]    This note was created using voice recognition software (Dragon). The accuracy of the dictation is limited by the abilities of the software. I have reviewed the note prior to signing, however some errors in grammar and context are still possible. If you have any questions related to this note please do not hesitate to contact our office.

## 2023-07-19 ENCOUNTER — HOSPITAL ENCOUNTER (OUTPATIENT)
Facility: MEDICAL CENTER | Age: 78
End: 2023-07-19
Attending: PHYSICIAN ASSISTANT
Payer: MEDICARE

## 2023-07-19 ENCOUNTER — HOME CARE VISIT (OUTPATIENT)
Dept: HOME HEALTH SERVICES | Facility: HOME HEALTHCARE | Age: 78
End: 2023-07-19
Payer: MEDICARE

## 2023-07-19 ENCOUNTER — TELEPHONE (OUTPATIENT)
Dept: CARDIOTHORACIC SURGERY | Facility: MEDICAL CENTER | Age: 78
End: 2023-07-19
Payer: MEDICARE

## 2023-07-19 VITALS
HEART RATE: 66 BPM | SYSTOLIC BLOOD PRESSURE: 102 MMHG | RESPIRATION RATE: 16 BRPM | OXYGEN SATURATION: 97 % | TEMPERATURE: 97.6 F | DIASTOLIC BLOOD PRESSURE: 60 MMHG

## 2023-07-19 LAB
ERYTHROCYTE [DISTWIDTH] IN BLOOD BY AUTOMATED COUNT: 46 FL (ref 35.9–50)
HCT VFR BLD AUTO: 35 % (ref 42–52)
HGB BLD-MCNC: 11.4 G/DL (ref 14–18)
INR PPP: 2.8 (ref 2–3.5)
INR PPP: 2.8 - CRITICAL LOW: < 0.8, CRITICAL HIGH: > 6.5 (ref 2–3.5)
MCH RBC QN AUTO: 30.3 PG (ref 27–33)
MCHC RBC AUTO-ENTMCNC: 32.6 G/DL (ref 32.3–36.5)
MCV RBC AUTO: 93.1 FL (ref 81.4–97.8)
PLATELET # BLD AUTO: 447 K/UL (ref 164–446)
PMV BLD AUTO: 10.7 FL (ref 9–12.9)
RBC # BLD AUTO: 3.76 M/UL (ref 4.7–6.1)
WBC # BLD AUTO: 14 K/UL (ref 4.8–10.8)

## 2023-07-19 PROCEDURE — G0299 HHS/HOSPICE OF RN EA 15 MIN: HCPCS

## 2023-07-19 PROCEDURE — 85027 COMPLETE CBC AUTOMATED: CPT

## 2023-07-19 PROCEDURE — G0151 HHCP-SERV OF PT,EA 15 MIN: HCPCS

## 2023-07-19 PROCEDURE — 80048 BASIC METABOLIC PNL TOTAL CA: CPT

## 2023-07-19 SDOH — ECONOMIC STABILITY: HOUSING INSECURITY: EVIDENCE OF SMOKING MATERIAL: 0

## 2023-07-19 SDOH — ECONOMIC STABILITY: HOUSING INSECURITY

## 2023-07-19 ASSESSMENT — BALANCE ASSESSMENTS
SITTING DOWN: 1 - USES ARMS OR NOT SMOOTH MOTION
NUDGED: 2 - STEADY
IMMEDIATE STANDING BALANCE FIRST 5 SECONDS: 2 - STEADY WITHOUT WALKER OR OTHER SUPPORT
SITTING BALANCE: 1 - STEADY, SAFE
BALANCE SCORE: 15
NUDGED SCORE: 2
ATTEMPTS TO ARISE: 2 - ABLE TO RISE, ONE ATTEMPT
TURNING 360 DEGREES STEPS: 1 - CONTINUOUS STEPS
ARISES: 2 - ABLE WITHOUT USING ARMS
EYES CLOSED AT MAXIMUM POSITION NUDGED: 1 - STEADY
STANDING BALANCE: 2 - NARROW STANCE WITHOUT SUPPORT
ARISING SCORE: 2

## 2023-07-19 ASSESSMENT — ENCOUNTER SYMPTOMS: MUSCLE WEAKNESS: 1

## 2023-07-19 ASSESSMENT — ACTIVITIES OF DAILY LIVING (ADL)
PHYSICAL_TRANSFERS_DEVICES: NO AD
AMBULATION_DISTANCE/DURATION_TOLERATED: 80FT
CURRENT_FUNCTION: STAND BY ASSIST
PHYSICAL TRANSFERS ASSESSED: 1
GROOMING_WITHIN_DEFINED_LIMITS: 1
AMBULATION ASSISTANCE: STAND BY ASSIST
AMBULATION ASSISTANCE: 1
AMBULATION ASSISTANCE ON FLAT SURFACES: 1

## 2023-07-19 ASSESSMENT — GAIT ASSESSMENTS
TRUNK SCORE: 1
BALANCE AND GAIT SCORE: 24
PATH: 1 - MILD/MODERATE DEVIATION OR USES WALKING AID
STEP CONTINUITY: 1 - STEPS APPEAR CONTINUOUS
GAIT SCORE: 9
TRUNK: 1 - NO SWAY BUT FLEXION OF KNEES OR BACK OR SPREADS ARMS WHILE WALKING
INITIATION OF GAIT IMMEDIATELY AFTER GO: 1 - NO HESITANCY
WALKING STANCE: 0 - HEELS APART
STEP SYMMETRY: 1 - RIGHT AND LEFT STEP LENGTH APPEAR EQUAL
PATH SCORE: 1

## 2023-07-19 ASSESSMENT — FIBROSIS 4 INDEX: FIB4 SCORE: 0.82

## 2023-07-19 NOTE — TELEPHONE ENCOUNTER
"Hospital follow up call    he states that all incisions are healing well and approximated with no s/s of infection (redness, puss, fever, purulent drainage, or tenderness).    he is using the IS; volume is improved.    he is walking everyday, distance is increased from the hospital.    he is obtaining daily weights. Current weight is 178.8 which is more than the first home weight of 170.8 lbs. Denies LE edema at this time, \"they look like his normal skinny legs\". His appetite is back to normal.    he is taking all prescribed meds as directed.  he has no medication questions.    he is taking vitals (HR and BP).    BP's: mornings: 95/63   108/69   97/65   119/75   101/61   115/73  BP's Evenings: 110/76   118/80   119/81   131/77   122/74  134/75    HR's: 86  74  71  73  62  71  68    They were told to call if SBP is consistently > 140.    he has had a bowel movement since discharge.    he is showering everyday or every other day and is not wearing the compression/SINDI hose.    he states that the post op pain is well controlled.  Narcotics are not being utilized. Tylenol is tylenol being utilized.    he has no additional questions at this time. Follow up education was not needed on anything else. Follow up appointments were reviewed and I ensured they have my number if issues or concerns arise.      Follow up call time was 13 minutes.        "

## 2023-07-20 ENCOUNTER — ANTICOAGULATION MONITORING (OUTPATIENT)
Dept: VASCULAR LAB | Facility: MEDICAL CENTER | Age: 78
End: 2023-07-20

## 2023-07-20 ENCOUNTER — HOME CARE VISIT (OUTPATIENT)
Dept: HOME HEALTH SERVICES | Facility: HOME HEALTHCARE | Age: 78
End: 2023-07-20
Payer: MEDICARE

## 2023-07-20 ENCOUNTER — DOCUMENTATION (OUTPATIENT)
Dept: VASCULAR LAB | Facility: MEDICAL CENTER | Age: 78
End: 2023-07-20

## 2023-07-20 VITALS
DIASTOLIC BLOOD PRESSURE: 60 MMHG | RESPIRATION RATE: 16 BRPM | BODY MASS INDEX: 25.38 KG/M2 | TEMPERATURE: 97.6 F | SYSTOLIC BLOOD PRESSURE: 102 MMHG | WEIGHT: 182 LBS | HEART RATE: 66 BPM | OXYGEN SATURATION: 97 %

## 2023-07-20 DIAGNOSIS — Z95.3 HISTORY OF HEART VALVE REPLACEMENT WITH BIOPROSTHETIC VALVE: ICD-10-CM

## 2023-07-20 LAB
ANION GAP SERPL CALC-SCNC: 12 MMOL/L (ref 7–16)
BUN SERPL-MCNC: 15 MG/DL (ref 8–22)
CALCIUM SERPL-MCNC: 8.9 MG/DL (ref 8.5–10.5)
CHLORIDE SERPL-SCNC: 99 MMOL/L (ref 96–112)
CO2 SERPL-SCNC: 25 MMOL/L (ref 20–33)
CREAT SERPL-MCNC: 0.99 MG/DL (ref 0.5–1.4)
GFR SERPLBLD CREATININE-BSD FMLA CKD-EPI: 78 ML/MIN/1.73 M 2
GLUCOSE SERPL-MCNC: 89 MG/DL (ref 65–99)
POTASSIUM SERPL-SCNC: 4.2 MMOL/L (ref 3.6–5.5)
SODIUM SERPL-SCNC: 136 MMOL/L (ref 135–145)

## 2023-07-20 PROCEDURE — G0180 MD CERTIFICATION HHA PATIENT: HCPCS | Performed by: FAMILY MEDICINE

## 2023-07-20 ASSESSMENT — ENCOUNTER SYMPTOMS
VOMITING: DENIES
PERSON REPORTING PAIN: PATIENT
NAUSEA: DENIES
PAIN: PT DENIES PAIN
MUSCLE WEAKNESS: 1
LAST BOWEL MOVEMENT: 66674
DENIES PAIN: 1

## 2023-07-20 NOTE — CASE COMMUNICATION
Quality Review Completed for 7/13 SOC OASIS by TYREL Campbell RN on 7/19/2023:     Edits completed by TYREL Campbell RN:     1.  and  diagnosis coding updated per chart review  2.  is 7/12, date of DC from acute care  3.  changed to #4 per respiratory assessment  4.  changed to 3 per guidelines when ambulation is supervised  5.  H. Opioid is taking oxycodone with indication noted per MAR  6. Checked hearing to 485  functional limitations per  moderate difficulty hearing.   7. Added HH OXYGEN INSTRUCTION to MAR  8.  F is CG assist per narrative

## 2023-07-20 NOTE — PROGRESS NOTES
Anticoagulation Summary  As of 2023      INR goal:  2.0-3.0   TTR:  --   INR used for dosin.80 (2023)   Warfarin maintenance plan:  3 mg (3 mg x 1) every day   Weekly warfarin total:  21 mg   Plan last modified:  Vito Rg, PharmD (2023)   Next INR check:  2023   Target end date:  10/6/2023    Indications    History of heart valve replacement with bioprosthetic valve [Z95.3]                 Anticoagulation Episode Summary       INR check location:      Preferred lab:      Send INR reminders to:  AMB ANTICOAG POOL    Comments:  S/P mitral valve replacement          Anticoagulation Care Providers       Provider Role Specialty Phone number    [facility]   [phone]          Anticoagulation Patient Findings          HPI:     Selma Avila was referred to the [facility] for new bioprosthetic valve replacement. Next appt with cardiology 23. He is also on ASA daily.   Is there a discharge summary available: 23  Does this pt have a past history of using a AC: no  Does this pt have a past history of blood clots: no  Does this pt have a family history of blood clots or known clotting disorders: no  Was there are provoking events leading to this blood clot or stroke: no    Inpatient Anticoagulation Service Note for 2023   Reason for Anticoagulation: Bioprosthetic Valve Replacement    Hemoglobin Value: (!) 11.7  Hematocrit Value: (!) 34.9  Lab Platelet Value: 173  Target INR: 2.0 to 3.0     INR from last 7 days         Date/Time INR Value     23 0020 1.65     07/10/23 0500 1.83     23 0428 1.93     23 0420 1.49     23 0500 1.35     23 1135 1.72             Dose from last 7 days         Date/Time Dose (mg)     23 1252 3     07/10/23 1248 1.5     23 1000 1.5     23 0950 0     23 1425 3     23 1551 3        Care Team    PCP:  Temitope Gilbert M.D.  [address] [place] [address] [address]  [address] [phone]    Patient Care Team                Temitope Gilbert M.D. PCP - General, Geriatric Medicine - Family Medicine [phone]     [address] [facility] [address] [place] [address]    Antony Ceja M.D. PCP - [facility] Paneled [phone]     [address] [place] [address]    Ulises Alfonso M.D. (Inactive) Consulting Physician, Cardiovascular Disease (Cardiology) [phone]     [address] [place] [address]    [facility]  [phone]     [address] [place] [address]              Meds, Vitals, and labs     3 vitals included with today's appt-unless patient declined:  (BP, HR, weight, ht, RR)   There were no vitals filed for this visit.    Lab Results   Component Value Date/Time    ALKPHOSPHAT 86 07/03/2023 08:35 AM    ASTSGOT 15 07/03/2023 08:35 AM    ALTSGPT 10 07/03/2023 08:35 AM    TBILIRUBIN 0.8 07/03/2023 08:35 AM    ALBUMIN 4.4 07/03/2023 08:35 AM    BUN 15 07/19/2023 02:30 PM    CREATININE 0.99 07/19/2023 02:30 PM    RBC 3.76 (L) 07/19/2023 02:30 PM    HEMOGLOBIN 11.4 (L) 07/19/2023 02:30 PM    HEMATOCRIT 35.0 (L) 07/19/2023 02:30 PM    PLATELETCT 447 (H) 07/19/2023 02:30 PM    INR 2.80 07/19/2023 02:19 PM          Current Outpatient Medications:     Home Care Oxygen, 1 L/min, Inhalation, Continuous    acetaminophen, 750 mg, Oral, Q6HRS PRN    metoprolol tartrate, 25 mg, Oral, BID    amiodarone, Take 2 Tablets by mouth 2 times a day for 7 days, THEN 2 Tablets every day for 7 days, THEN 1 Tablet every day thereafter.    aspirin, 81 mg, Oral, DAILY    omeprazole, 20 mg, Oral, DAILY    oxyCODONE immediate-release, 5 mg, Oral, Q6HRS PRN    warfarin, 3 mg, Oral, DAILY AT 1800    multivitamin, 1 Tablet, Oral, DAILY (Patient not taking: Reported on 7/14/2023)    vitamin C, 500 mg, Oral, DAILY    docusate sodium, 100 mg, Oral, DAILY    Magnesium, 125 mg, Oral, DAILY    VITAMIN D PO, 6,000 Units, Oral, DAILY    Assessment:     INR  therapeutic.   Confirmed warfarin  dosing regimen: y  Candidate for DOAC therapy?-no  Interval Changes with foods rich in vitamin K: No  Interval Changes in ETOH or cranberries:   No  Interval Changes in smoking status:  No  S/S of bleeding or bruising:  No  Signs/symptoms  thrombosis since the last appt:  No  Any upcoming procedures that require stopping warfarin and/or using bridge therapy: None  Interval Changes in medication:  No   Is pt on antiplatelet therapy: yes,  Indication: new valve .   This should be reviewed again on by cardiology .   Is pt on NSAID: n  Are there any clinically significant drug interactions?-- Amiodarone    HAS-BLED AND CHADSVAS   Hypertension-Uncontrolled, >160 mmHg systolic- n  Renal disease Dialysis, transplant, Cr >2.26 mg/dL or >200 µmol/L - n  Liver disease Cirrhosis or bilirubin >2x normal with AST/ALT/AP >3x normal- n  Stroke history, prior major bleeding, or predisposition to bleeding- n  Labile INR, Unstable/high INRs, time in therapeutic range <60%- n  Age >65- y  Medication usage predisposing to bleeding Aspirin, clopidogrel, NSAIDs - y  Alcohol use  ?8 drinks/week- n  HAS-BLED = 2    CHADSVAS=n/a    Plan:     Continue the same warfarin dose, as noted above.       Follow-up:     Our protocol suggests we test in 2 days     Reminder message sent to myself and the ambulatory care pool to make sure they make their next appointment      This decision was made using shared decision making with the pt and benefits vs risks were discussed.      Additional information discussed with patient:     Pt educated to contact our clinic with any changes in medications or s/s of bleeding or thrombosis.  Education was provided today regarding tips to reduce their bleed risk and dietary constraints while on a anticoagulant.  Pt gave verbal consent  to leave a  with detailed medical information about this topic.   Pt education done regarding the medication listed above.  Lifestyle safety, ie smoking, ETOH, hobby safety, fall  safety/prevention  Procedures for missed doses or suspected missed doses, surgeries/procedures, travel, dental work, any medication changes    Patients on warfarin should maintain a consistent, balanced diet that includes a variety of foods from all food groups. It's important to avoid making drastic changes to their diet without consulting their healthcare provider or a registered dietitian because certain foods and supplements can interfere with warfarin's effectiveness or increase the risk of bleeding.    Here are some general guidelines for patients on warfarin:    Eat a consistent amount of vitamin K-rich foods each day. Vitamin K plays a role in blood clotting and can interfere with warfarin's effectiveness. Some foods high in vitamin K include leafy greens (kale, spinach, melody greens), broccoli, Fenton sprouts, asparagus, and green tea.    Limit or avoid alcohol consumption. Alcohol can interact with warfarin and increase the risk of bleeding.    Maintain a healthy weight. Obesity can increase the risk of blood clots, while drastic weight loss can affect warfarin dosing.    Be mindful of food interactions. Some foods and supplements can interact with warfarin and either increase or decrease its effectiveness. Examples include cranberry juice, grapefruit juice, garlic, jose, and ginkgo biloba.    Follow a healthy, balanced diet. Focus on whole, nutrient-dense foods like fruits, vegetables, whole grains, lean protein, and healthy fats. Limit processed foods, added sugars, and saturated and trans fats.    It's important for patients on warfarin to have regular INR (international normalized ratio) tests to monitor their blood clotting time and adjust their medication dosage if needed. They should also inform their healthcare provider if they experience any unusual bleeding or bruising.    What should I remember about warfarin (Coumadin) and vitamin K?           National recommendations regarding  anticoagulation therapy:     The CHEST guidelines recommends frequent INR monitoring at regular intervals (a few days up to a max of 12 weeks) to ensure patients are on the proper dose of warfarin, and patients are not having any complications from therapy.  INRs can dramatically change over a short time period due to diet, medications, and medical conditions.       Vito Rg, PharmD, MS, BCACP, [facility]  Phone [phone] fax [phone]    This note was created using voice recognition software (Dragon). The accuracy of the dictation is limited by the abilities of the software. I have reviewed the note prior to signing, however some errors in grammar and context are still possible. If you have any questions related to this note please do not hesitate to contact our office.       [facility] Consent                                             Name    Selma Avila    MRN number: [id number]    the following are guidelines for participation in the [facility].     I, ____Selma Avila_____, understand and voluntarily agree to participate in the [facility] and to have services provided to me by pharmacists working in collaboration with my provider.    I understand the pharmacist within the [facility] may initiate, modify or discontinue my medications, order appropriate testing and appointments, perform exams, monitor treatment, and make clinical evaluations and decisions pursuant to a collaborative practice agreement with my provider.  I understand the pharmacist within the [facility] is not a physician, osteopathic physician, advanced practice registered nurse or physician assistant and may not diagnose.  I will take all my medications as instructed and not change the way I take it without first talking to my provider or a pharmacist  within the [facility].  I understand that if I am late to my appointment I may not be able to be seen by a pharmacist at that time and will have to reschedule my appointment.  During appointment with pharmacist I understand that pharmacist has the right not to answer questions or perform services outside the pharmacist’s scope of practice.  By signing below, I provide informed consent for the pharmacist to provide these services and for my participation in the [facility].      Selma Avila           [id number]          07/20/23  Patient Name                   MRN number  Date     ____Obtained verbal consent from Selma Avila

## 2023-07-20 NOTE — Clinical Note
Dr. Bloch,  [facility] for valve replacement  Thanks,  Alvaro  Problem: Falls - Risk of:  Goal: Will remain free from falls  Description  Will remain free from falls  Outcome: Ongoing  Goal: Absence of physical injury  Description  Absence of physical injury  Outcome: Ongoing     Problem: Cardiac:  Goal: Ability to maintain vital signs within normal range will improve  Description  Ability to maintain vital signs within normal range will improve  Outcome: Ongoing  Goal: Cardiovascular alteration will improve  Description  Cardiovascular alteration will improve  Outcome: Ongoing     Problem: Pain:  Description  Pain management should include both nonpharmacologic and pharmacologic interventions.   Goal: Pain level will decrease  Description  Pain level will decrease  Outcome: Ongoing  Goal: Control of acute pain  Description  Control of acute pain  Outcome: Ongoing

## 2023-07-20 NOTE — PROGRESS NOTES
Initial anticoagulation clinic note and discharge summary reviewed  Patient started on anticoagulation status post bioprosthetic MVR  Pending further recommendations from cardiology, we will continue with 3 months of oral anti-coagulation with warfarin and indefinite low-dose aspirin.    Will defer all other cardiovascular care, aside from anticoagulation, to cardiology    Michael J. Bloch, MD  Anticoagulation Center

## 2023-07-21 ENCOUNTER — HOME CARE VISIT (OUTPATIENT)
Dept: HOME HEALTH SERVICES | Facility: HOME HEALTHCARE | Age: 78
End: 2023-07-21
Payer: MEDICARE

## 2023-07-21 ENCOUNTER — ANTICOAGULATION MONITORING (OUTPATIENT)
Dept: VASCULAR LAB | Facility: MEDICAL CENTER | Age: 78
End: 2023-07-21
Payer: MEDICARE

## 2023-07-21 VITALS
RESPIRATION RATE: 16 BRPM | SYSTOLIC BLOOD PRESSURE: 115 MMHG | DIASTOLIC BLOOD PRESSURE: 60 MMHG | OXYGEN SATURATION: 95 % | WEIGHT: 178.25 LBS | HEART RATE: 67 BPM | TEMPERATURE: 97.5 F | BODY MASS INDEX: 24.86 KG/M2

## 2023-07-21 DIAGNOSIS — Z95.3 HISTORY OF HEART VALVE REPLACEMENT WITH BIOPROSTHETIC VALVE: ICD-10-CM

## 2023-07-21 LAB — INR PPP: 3 - CRITICAL LOW: < 0.8, CRITICAL HIGH: > 6.5 (ref 2–3.5)

## 2023-07-21 PROCEDURE — G0151 HHCP-SERV OF PT,EA 15 MIN: HCPCS

## 2023-07-21 PROCEDURE — G0493 RN CARE EA 15 MIN HH/HOSPICE: HCPCS

## 2023-07-21 ASSESSMENT — FIBROSIS 4 INDEX
FIB4 SCORE: 0.82
FIB4 SCORE: 0.82

## 2023-07-21 NOTE — PROGRESS NOTES
OP Anticoagulation Service Note    Date: 7/21/2023    Anticoagulation Summary  As of 7/21/2023      INR goal:  2.0-3.0   TTR:  --   INR used for dosing:  3.00 (7/21/2023)   Warfarin maintenance plan:  3 mg (3 mg x 1) every day   Weekly warfarin total:  21 mg   Plan last modified:  Vito Rg, PharmD (7/20/2023)   Next INR check:  7/24/2023   Target end date:  10/6/2023    Indications    History of heart valve replacement with bioprosthetic valve [Z95.3]                 Anticoagulation Episode Summary       INR check location:      Preferred lab:      Send INR reminders to:  AMB ANTICOAG POOL    Comments:  S/P mitral valve replacement          Anticoagulation Care Providers       Provider Role Specialty Phone number    [facility]   [phone]          Anticoagulation Patient Findings        Patient's preferred phone number:  [phone]        HPI:   The reason for today's call is to prevent morbidity and mortality from a blood clot and/or stroke and to reduce the risk of bleeding while on a anticoagulant.     PCP:  Temitope Gilbert M.D.  [address] [place] [address]    Assessment:     INR  therapeutic.     Lab Results   Component Value Date/Time    BUN 15 07/19/2023 02:30 PM    CREATININE 0.99 07/19/2023 02:30 PM     Lab Results   Component Value Date/Time    HEMOGLOBIN 11.4 (L) 07/19/2023 02:30 PM    HEMATOCRIT 35.0 (L) 07/19/2023 02:30 PM    PLATELETCT 447 (H) 07/19/2023 02:30 PM    ALKPHOSPHAT 86 07/03/2023 08:35 AM    ASTSGOT 15 07/03/2023 08:35 AM    ALTSGPT 10 07/03/2023 08:35 AM          Current Outpatient Medications:     Home Care Oxygen, 1 L/min, Inhalation, Continuous    acetaminophen, 750 mg, Oral, Q6HRS PRN    metoprolol tartrate, 25 mg, Oral, BID    amiodarone, Take 2 Tablets by mouth 2 times a day for 7 days, THEN 2 Tablets every day for 7 days, THEN 1 Tablet every day thereafter.    aspirin, 81 mg, Oral, DAILY    omeprazole, 20 mg, Oral, DAILY     oxyCODONE immediate-release, 5 mg, Oral, Q6HRS PRN    warfarin, 3 mg, Oral, DAILY AT 1800    multivitamin, 1 Tablet, Oral, DAILY (Patient not taking: Reported on 7/14/2023)    vitamin C, 500 mg, Oral, DAILY    docusate sodium, 100 mg, Oral, DAILY    Magnesium, 125 mg, Oral, DAILY    VITAMIN D PO, 6,000 Units, Oral, DAILY      Plan:     Continue the same warfarin dose, as noted above.       Follow-up:     As seen above      Additional information discussed with patient:     Asked patient to please call the anticoagulation clinic if they have any signs/symptoms of bleeding and/or thrombosis or any changes to diet or medications.      National recommendations regarding anticoagulation therapy:     The CHEST guidelines recommends frequent INR monitoring at regular intervals (a few days up to a max of 12 weeks) to ensure patients are on the proper dose of warfarin, and patients are not having any complications from therapy.  INRs can dramatically change over a short time period due to diet, medications, and medical conditions.         [facility]  Phone: [phone]  Fax: [phone]  On call: [phone]  General scheduling/information [phone]  For emergencies please dial [phone]  Please do not use [facility] for urgent matters, call the phone numbers listed above.    This note was created using voice recognition software (Dragon). The accuracy of the dictation is limited by the abilities of the software. I have reviewed the note prior to signing, however some errors in grammar and context are still possible. If you have any questions related to this note please do not hesitate to contact our office.

## 2023-07-24 ENCOUNTER — HOME CARE VISIT (OUTPATIENT)
Dept: HOME HEALTH SERVICES | Facility: HOME HEALTHCARE | Age: 78
End: 2023-07-24
Payer: MEDICARE

## 2023-07-24 ENCOUNTER — HOSPITAL ENCOUNTER (OUTPATIENT)
Facility: MEDICAL CENTER | Age: 78
End: 2023-07-24
Attending: PHYSICIAN ASSISTANT
Payer: MEDICARE

## 2023-07-24 ENCOUNTER — ANTICOAGULATION MONITORING (OUTPATIENT)
Dept: MEDICAL GROUP | Facility: PHYSICIAN GROUP | Age: 78
End: 2023-07-24
Payer: MEDICARE

## 2023-07-24 VITALS
HEART RATE: 67 BPM | SYSTOLIC BLOOD PRESSURE: 115 MMHG | BODY MASS INDEX: 24.88 KG/M2 | OXYGEN SATURATION: 95 % | WEIGHT: 178.4 LBS | RESPIRATION RATE: 16 BRPM | DIASTOLIC BLOOD PRESSURE: 60 MMHG | TEMPERATURE: 97.5 F

## 2023-07-24 DIAGNOSIS — Z95.3 HISTORY OF HEART VALVE REPLACEMENT WITH BIOPROSTHETIC VALVE: ICD-10-CM

## 2023-07-24 DIAGNOSIS — I10 ESSENTIAL HYPERTENSION: ICD-10-CM

## 2023-07-24 LAB
ERYTHROCYTE [DISTWIDTH] IN BLOOD BY AUTOMATED COUNT: 44.7 FL (ref 35.9–50)
HCT VFR BLD AUTO: 36.8 % (ref 42–52)
HGB BLD-MCNC: 11.9 G/DL (ref 14–18)
INR PPP: 2.6 (ref 2–3.5)
INR PPP: 2.6 - CRITICAL LOW: < 0.8, CRITICAL HIGH: > 6.5 (ref 2–3.5)
MCH RBC QN AUTO: 29.5 PG (ref 27–33)
MCHC RBC AUTO-ENTMCNC: 32.3 G/DL (ref 32.3–36.5)
MCV RBC AUTO: 91.3 FL (ref 81.4–97.8)
PLATELET # BLD AUTO: 481 K/UL (ref 164–446)
PMV BLD AUTO: 9.4 FL (ref 9–12.9)
RBC # BLD AUTO: 4.03 M/UL (ref 4.7–6.1)
WBC # BLD AUTO: 10.2 K/UL (ref 4.8–10.8)

## 2023-07-24 PROCEDURE — G0299 HHS/HOSPICE OF RN EA 15 MIN: HCPCS

## 2023-07-24 PROCEDURE — 36415 COLL VENOUS BLD VENIPUNCTURE: CPT

## 2023-07-24 PROCEDURE — 85027 COMPLETE CBC AUTOMATED: CPT

## 2023-07-24 ASSESSMENT — FIBROSIS 4 INDEX: FIB4 SCORE: 0.76

## 2023-07-24 ASSESSMENT — ENCOUNTER SYMPTOMS
DENIES PAIN: 1
LAST BOWEL MOVEMENT: 66676
BOWEL PATTERN NORMAL: 1
PERSON REPORTING PAIN: PATIENT
FATIGUES EASILY: 1
NAUSEA: DENIES
SHORTNESS OF BREATH: 1
VOMITING: DENIES
DYSPNEA ACTIVITY LEVEL: AFTER AMBULATING MORE THAN 20 FT

## 2023-07-24 NOTE — PROGRESS NOTES
Anticoagulation Summary  As of 2023      INR goal:  2.0-3.0   TTR:  --   INR used for dosin.60 (2023)   Warfarin maintenance plan:  3 mg (3 mg x 1) every day   Weekly warfarin total:  21 mg   Plan last modified:  Vito Rg PharmD (2023)   Next INR check:  2023   Target end date:  10/6/2023    Indications    History of heart valve replacement with bioprosthetic valve [Z95.3]                 Anticoagulation Episode Summary       INR check location:      Preferred lab:      Send INR reminders to:  AMB ANTICOAG POOL    Comments:  S/P mitral valve replacement          Anticoagulation Care Providers       Provider Role Specialty Phone number    [facility]   [phone]            Refer to Anticoagulation Patient Findings for HPI  Patient Findings       Negatives:  Signs/symptoms of thrombosis, Signs/symptoms of bleeding, Laboratory test error suspected, Change in health, Change in alcohol use, Change in activity, Upcoming invasive procedure, Emergency department visit, Upcoming dental procedure, Missed doses, Extra doses, Change in medications, Change in diet/appetite, Hospital admission, Bruising, Other complaints            Spoke with patient wife to report a therapeutic INR.      Pt instructed to continue with current warfarin dosing regimen, confirms dosing.   Will follow up in 2 days via [facility].     Vitor Espinoza PharmD, BCACP

## 2023-07-24 NOTE — PROGRESS NOTES
CHIEF COMPLAINT: Post-op visit     PROCEDURE: 7/6/23 Dr. Neri  MITRAL VALVE REPLACEMENT (31 mm Mitris Cagle bovine pericardial valve), left atrial appendage ligation/excision and intraoperative transesophageal echocardiography    HPI: Patient presents to clinic for one month follow up . He complains of coughing. He denies sputum production and states he is getting 2300 mL on IS. Patient has been seen by cardiology and gerontology provider. Recent CXR with fluid for which he was started on lasix. He also complains of visual changes post operatively. Patient stopped amiodarone 1 week ago. Patient states he walks 2-3 miles per day. He plans to start cardiac rehab in 2-3 weeks.     /60 (BP Location: Right arm, Patient Position: Sitting, BP Cuff Size: Adult)   Pulse 75   Temp (!) 35.8 °C (96.4 °F) (Temporal)   Wt 82.1 kg (181 lb)   SpO2 97%     PHYSICAL EXAM:   Cardiac: S1S2  Neuro:  AAO x 3  Resp:  Crackles bilateral bases  Wounds:  CDI  Sternum:  Stable      PLAN: Patient to continue lasix per cardiology recommendations.   Continue coumadin for 3 months or per your Cardiologist's recommendations.  We reviewed post operative sternal precautions, weight limits and driving precautions moving forward.  We reviewed SBE prophylaxis. Discharge from cardiac surgery clinic.     Overall, we are very pleased with the patient’s progress and we have instructed the patient to follow-up with us in the future should they have any concerns related to their surgery. Otherwise, we will see the patient on a PRN basis. The patient will continue to follow-up with their Cardiologist and PCP.  The patient has been informed that any further prescription refills should be done through their primary care physician and/or cardiologist.  They acknowledged understanding.  Thank you for allowing us to participate in the care of this very pleasant patient and please let us know if there is any way we may be of further assistance.

## 2023-07-25 ENCOUNTER — HOSPITAL ENCOUNTER (OUTPATIENT)
Dept: RADIOLOGY | Facility: MEDICAL CENTER | Age: 78
End: 2023-07-25
Attending: PHYSICIAN ASSISTANT
Payer: MEDICARE

## 2023-07-25 ENCOUNTER — HOME CARE VISIT (OUTPATIENT)
Dept: HOME HEALTH SERVICES | Facility: HOME HEALTHCARE | Age: 78
End: 2023-07-25
Payer: MEDICARE

## 2023-07-25 ENCOUNTER — HOSPITAL ENCOUNTER (OUTPATIENT)
Dept: LAB | Facility: MEDICAL CENTER | Age: 78
End: 2023-07-25
Attending: PHYSICIAN ASSISTANT
Payer: MEDICARE

## 2023-07-25 VITALS
TEMPERATURE: 97.8 F | HEART RATE: 64 BPM | OXYGEN SATURATION: 93 % | BODY MASS INDEX: 24.6 KG/M2 | SYSTOLIC BLOOD PRESSURE: 100 MMHG | DIASTOLIC BLOOD PRESSURE: 58 MMHG | RESPIRATION RATE: 16 BRPM | WEIGHT: 176.4 LBS

## 2023-07-25 VITALS
TEMPERATURE: 97.4 F | BODY MASS INDEX: 24.62 KG/M2 | RESPIRATION RATE: 16 BRPM | HEART RATE: 69 BPM | DIASTOLIC BLOOD PRESSURE: 68 MMHG | WEIGHT: 176.5 LBS | SYSTOLIC BLOOD PRESSURE: 114 MMHG | OXYGEN SATURATION: 96 %

## 2023-07-25 DIAGNOSIS — N13.9 OBSTRUCTIVE UROPATHY: ICD-10-CM

## 2023-07-25 LAB
APPEARANCE UR: CLEAR
BILIRUB UR QL STRIP.AUTO: NEGATIVE
COLOR UR: YELLOW
GLUCOSE UR STRIP.AUTO-MCNC: NEGATIVE MG/DL
KETONES UR STRIP.AUTO-MCNC: ABNORMAL MG/DL
LEUKOCYTE ESTERASE UR QL STRIP.AUTO: NEGATIVE
MICRO URNS: ABNORMAL
NITRITE UR QL STRIP.AUTO: NEGATIVE
PH UR STRIP.AUTO: 6 [PH] (ref 5–8)
PROT UR QL STRIP: NEGATIVE MG/DL
RBC UR QL AUTO: NEGATIVE
SP GR UR STRIP.AUTO: 1.02
UROBILINOGEN UR STRIP.AUTO-MCNC: 0.2 MG/DL

## 2023-07-25 PROCEDURE — 81003 URINALYSIS AUTO W/O SCOPE: CPT

## 2023-07-25 PROCEDURE — 71046 X-RAY EXAM CHEST 2 VIEWS: CPT

## 2023-07-25 PROCEDURE — G0151 HHCP-SERV OF PT,EA 15 MIN: HCPCS

## 2023-07-25 ASSESSMENT — ENCOUNTER SYMPTOMS
MUSCLE WEAKNESS: 1
SUBJECTIVE PAIN PROGRESSION: UNCHANGED
PAIN LOCATION - PAIN QUALITY: SORE
COUGH: 1
PAIN LOCATION: CHEST
VOMITING: NO
PAIN SEVERITY GOAL: 0/10
COUGH CHARACTERISTICS: PRODUCTIVE
PAIN: 1
SPUTUM AMOUNT: SCANT
HIGHEST PAIN SEVERITY IN PAST 24 HOURS: 2/10
PERSON REPORTING PAIN: PATIENT
PAIN LOCATION - PAIN FREQUENCY: INTERMITTENT
SPUTUM COLOR: CLEAR
PAIN LOCATION - RELIEVING FACTORS: RESTING
LAST BOWEL MOVEMENT: 66679
SPUTUM PRODUCTION: 1
PAIN LOCATION - PAIN DURATION: WITH COUGHING
PAIN LOCATION - PAIN SEVERITY: 1/10
NAUSEA: NO
PAIN LOCATION - EXACERBATING FACTORS: COUGH
LOWEST PAIN SEVERITY IN PAST 24 HOURS: 0/10

## 2023-07-25 ASSESSMENT — FIBROSIS 4 INDEX: FIB4 SCORE: 0.76

## 2023-07-26 ENCOUNTER — HOME CARE VISIT (OUTPATIENT)
Dept: HOME HEALTH SERVICES | Facility: HOME HEALTHCARE | Age: 78
End: 2023-07-26
Payer: MEDICARE

## 2023-07-26 PROBLEM — R35.0 URINARY FREQUENCY: Status: ACTIVE | Noted: 2023-07-26

## 2023-07-26 PROBLEM — D68.69 SECONDARY HYPERCOAGULABILITY DISORDER (HCC): Status: ACTIVE | Noted: 2023-07-26

## 2023-07-26 NOTE — CASE COMMUNICATION
Refused visit to obtain UA. Went to lab yesterday. Gave Aerobika to wife as pt not home, explained use

## 2023-07-26 NOTE — Clinical Note
Thank you.   ----- Message -----  From: Edna Hendricks R.N.  Sent: 7/26/2023   4:20 PM PDT  To: Roger Moreira R.N.; *      Refused visit to obtain UA. Went to lab yesterday. Gave Aerobika to wife as pt not home, explained use

## 2023-07-27 ENCOUNTER — ANTICOAGULATION MONITORING (OUTPATIENT)
Dept: MEDICAL GROUP | Facility: PHYSICIAN GROUP | Age: 78
End: 2023-07-27
Payer: MEDICARE

## 2023-07-27 ENCOUNTER — HOME CARE VISIT (OUTPATIENT)
Dept: HOME HEALTH SERVICES | Facility: HOME HEALTHCARE | Age: 78
End: 2023-07-27
Payer: MEDICARE

## 2023-07-27 VITALS
BODY MASS INDEX: 24.41 KG/M2 | SYSTOLIC BLOOD PRESSURE: 100 MMHG | HEART RATE: 68 BPM | RESPIRATION RATE: 16 BRPM | WEIGHT: 175 LBS | DIASTOLIC BLOOD PRESSURE: 68 MMHG | TEMPERATURE: 97.8 F | OXYGEN SATURATION: 97 %

## 2023-07-27 VITALS
WEIGHT: 175 LBS | BODY MASS INDEX: 24.41 KG/M2 | OXYGEN SATURATION: 97 % | HEART RATE: 68 BPM | DIASTOLIC BLOOD PRESSURE: 68 MMHG | SYSTOLIC BLOOD PRESSURE: 100 MMHG | RESPIRATION RATE: 16 BRPM | TEMPERATURE: 97.8 F

## 2023-07-27 DIAGNOSIS — Z95.3 HISTORY OF HEART VALVE REPLACEMENT WITH BIOPROSTHETIC VALVE: ICD-10-CM

## 2023-07-27 LAB
INR PPP: 3.2 (ref 2–3.5)
INR PPP: 3.2 - CRITICAL LOW: < 0.8, CRITICAL HIGH: > 6.5 (ref 2–3.5)

## 2023-07-27 PROCEDURE — G0151 HHCP-SERV OF PT,EA 15 MIN: HCPCS

## 2023-07-27 PROCEDURE — G0299 HHS/HOSPICE OF RN EA 15 MIN: HCPCS

## 2023-07-27 SDOH — ECONOMIC STABILITY: HOUSING INSECURITY: EVIDENCE OF SMOKING MATERIAL: 0

## 2023-07-27 ASSESSMENT — FIBROSIS 4 INDEX
FIB4 SCORE: 0.76
FIB4 SCORE: 0.76

## 2023-07-27 ASSESSMENT — ENCOUNTER SYMPTOMS
DENIES PAIN: 1
SHORTNESS OF BREATH: 1
DRY SKIN: 1

## 2023-07-27 NOTE — PROGRESS NOTES
Anticoagulation Summary  As of 7/27/2023      INR goal:  2.0-3.0   TTR:  --   INR used for dosing:  3.20 (7/27/2023)   Warfarin maintenance plan:  3 mg (3 mg x 1) every day   Weekly warfarin total:  21 mg   Plan last modified:  Joby YoderD (7/20/2023)   Next INR check:  7/31/2023   Target end date:  10/6/2023    Indications    History of heart valve replacement with bioprosthetic valve [Z95.3]                 Anticoagulation Episode Summary       INR check location:      Preferred lab:      Send INR reminders to:  AMB ANTICOAG POOL    Comments:  [facility]   S/P mitral valve replacement          Anticoagulation Care Providers       Provider Role Specialty Phone number    [facility]   [phone]          Anticoagulation Patient Findings      Left voicemail message to report a SUPRA-therapeutic INR.    Will have pt take decreased dose of warfarin today of 1.5 mg and then   Pt to continue with current warfarin dosing regimen. Requested pt contact the clinic for any s/s of unusual bleeding, bruising, clotting or any changes to diet or medication.    FU INR in 4 days(s) via [facility].    Joby DixonD

## 2023-07-28 ENCOUNTER — TELEPHONE (OUTPATIENT)
Dept: CARDIOTHORACIC SURGERY | Facility: MEDICAL CENTER | Age: 78
End: 2023-07-28
Payer: MEDICARE

## 2023-07-28 NOTE — TELEPHONE ENCOUNTER
Received vague e-mail from patient that he was starting lasix to clear his lungs, I called to ensure this was under the direction of a provider.    He states it is, his PA Esha initiated this (see note in Epic), he just wanted us to be aware as his surgery follow up is next week.    Call time 2 minutes

## 2023-07-31 ENCOUNTER — ANTICOAGULATION MONITORING (OUTPATIENT)
Dept: MEDICAL GROUP | Facility: PHYSICIAN GROUP | Age: 78
End: 2023-07-31
Payer: MEDICARE

## 2023-07-31 ENCOUNTER — HOSPITAL ENCOUNTER (OUTPATIENT)
Facility: MEDICAL CENTER | Age: 78
End: 2023-07-31
Attending: PHYSICIAN ASSISTANT
Payer: MEDICARE

## 2023-07-31 ENCOUNTER — HOME CARE VISIT (OUTPATIENT)
Dept: HOME HEALTH SERVICES | Facility: HOME HEALTHCARE | Age: 78
End: 2023-07-31
Payer: MEDICARE

## 2023-07-31 VITALS
RESPIRATION RATE: 18 BRPM | OXYGEN SATURATION: 96 % | TEMPERATURE: 97.4 F | DIASTOLIC BLOOD PRESSURE: 60 MMHG | HEART RATE: 66 BPM | SYSTOLIC BLOOD PRESSURE: 105 MMHG

## 2023-07-31 DIAGNOSIS — Z95.3 HISTORY OF HEART VALVE REPLACEMENT WITH BIOPROSTHETIC VALVE: ICD-10-CM

## 2023-07-31 PROBLEM — I48.91 ATRIAL FIBRILLATION (HCC): Status: ACTIVE | Noted: 2023-07-31

## 2023-07-31 LAB
ANION GAP SERPL CALC-SCNC: 12 MMOL/L (ref 7–16)
BUN SERPL-MCNC: 14 MG/DL (ref 8–22)
CALCIUM SERPL-MCNC: 8.9 MG/DL (ref 8.5–10.5)
CHLORIDE SERPL-SCNC: 97 MMOL/L (ref 96–112)
CO2 SERPL-SCNC: 24 MMOL/L (ref 20–33)
CREAT SERPL-MCNC: 1.17 MG/DL (ref 0.5–1.4)
GFR SERPLBLD CREATININE-BSD FMLA CKD-EPI: 64 ML/MIN/1.73 M 2
GLUCOSE SERPL-MCNC: 95 MG/DL (ref 65–99)
INR PPP: 3.5 (ref 2–3.5)
INR PPP: 3.5 - CRITICAL LOW: < 0.8, CRITICAL HIGH: > 6.5 (ref 2–3.5)
POTASSIUM SERPL-SCNC: 4.4 MMOL/L (ref 3.6–5.5)
SODIUM SERPL-SCNC: 133 MMOL/L (ref 135–145)

## 2023-07-31 PROCEDURE — G0493 RN CARE EA 15 MIN HH/HOSPICE: HCPCS

## 2023-07-31 PROCEDURE — 80048 BASIC METABOLIC PNL TOTAL CA: CPT

## 2023-07-31 NOTE — PROGRESS NOTES
OP Anticoagulation Service Note    Date: 7/31/2023    Anticoagulation Summary  As of 7/31/2023      INR goal:  2.0-3.0   TTR:  0.0 % (1 d)   INR used for dosing:  3.50 (7/31/2023)   Warfarin maintenance plan:  3 mg (3 mg x 1) every day   Weekly warfarin total:  21 mg   Plan last modified:  Vito Rg, PharmD (7/20/2023)   Next INR check:  8/3/2023   Target end date:  10/6/2023    Indications    History of heart valve replacement with bioprosthetic valve [Z95.3]                 Anticoagulation Episode Summary       INR check location:      Preferred lab:      Send INR reminders to:  AMB ANTICOAG POOL    Comments:  [facility]   S/P mitral valve replacement          Anticoagulation Care Providers       Provider Role Specialty Phone number    [facility]   [phone]          Anticoagulation Patient Findings        Patient's preferred phone number:  [phone]        HPI:   The reason for today's call is to prevent morbidity and mortality from a blood clot and/or stroke and to reduce the risk of bleeding while on a anticoagulant.     PCP:  Temitope Gilbert M.D.  [address] [place] [id number]    Assessment:     INR  supra-therapeutic.     Lab Results   Component Value Date/Time    BUN 15 07/19/2023 02:30 PM    CREATININE 0.99 07/19/2023 02:30 PM     Lab Results   Component Value Date/Time    HEMOGLOBIN 11.9 (L) 07/24/2023 01:40 PM    HEMATOCRIT 36.8 (L) 07/24/2023 01:40 PM    PLATELETCT 481 (H) 07/24/2023 01:40 PM    ALKPHOSPHAT 86 07/03/2023 08:35 AM    ASTSGOT 15 07/03/2023 08:35 AM    ALTSGPT 10 07/03/2023 08:35 AM          Current Outpatient Medications:     furosemide, 20 mg, Oral, DAILY    potassium chloride, 8 mEq, Oral, DAILY    Home Care Oxygen, 1 L/min, Inhalation, Continuous    metoprolol tartrate, 25 mg, Oral, BID    amiodarone, Take 2 Tablets by mouth 2 times a day for 7 days, THEN 2 Tablets every day for 7 days, THEN 1 Tablet every day thereafter.    aspirin, 81  mg, Oral, DAILY    omeprazole, 20 mg, Oral, DAILY    warfarin, 3 mg, Oral, DAILY AT 1800    vitamin C, 500 mg, Oral, DAILY    docusate sodium, 100 mg, Oral, DAILY    Magnesium, 125 mg, Oral, DAILY    VITAMIN D PO, 6,000 Units, Oral, DAILY      Plan:     Hold warfarin today then resume     Follow-up:     On date seen above    Additional information discussed with patient:     Asked patient to please call the anticoagulation clinic if they have any signs/symptoms of bleeding and/or thrombosis or any changes to diet or medications.      National recommendations regarding anticoagulation therapy:     The CHEST guidelines recommends frequent INR monitoring at regular intervals (a few days up to a max of 12 weeks) to ensure patients are on the proper dose of warfarin, and patients are not having any complications from therapy.  INRs can dramatically change over a short time period due to diet, medications, and medical conditions.       [facility]  Phone: [phone]  Fax: [phone]  On call: [phone]  General scheduling/information [phone]  For emergencies please dial [phone]  Please do not use [facility] for urgent matters, call the phone numbers listed above.    This note was created using voice recognition software (Dragon). The accuracy of the dictation is limited by the abilities of the software. I have reviewed the note prior to signing, however some errors in grammar and context are still possible. If you have any questions related to this note please do not hesitate to contact our office.

## 2023-08-01 ENCOUNTER — HOME CARE VISIT (OUTPATIENT)
Dept: HOME HEALTH SERVICES | Facility: HOME HEALTHCARE | Age: 78
End: 2023-08-01
Payer: MEDICARE

## 2023-08-01 VITALS
BODY MASS INDEX: 24.15 KG/M2 | SYSTOLIC BLOOD PRESSURE: 112 MMHG | RESPIRATION RATE: 16 BRPM | DIASTOLIC BLOOD PRESSURE: 64 MMHG | TEMPERATURE: 97.8 F | HEART RATE: 62 BPM | OXYGEN SATURATION: 97 % | WEIGHT: 173.13 LBS

## 2023-08-01 PROBLEM — Z95.2 STATUS POST MITRAL VALVE REPLACEMENT: Status: ACTIVE | Noted: 2023-08-01

## 2023-08-01 PROCEDURE — G0151 HHCP-SERV OF PT,EA 15 MIN: HCPCS

## 2023-08-01 ASSESSMENT — ENCOUNTER SYMPTOMS
VOMITING: DENIES
DENIES PAIN: 1
FATIGUES EASILY: 1
LAST BOWEL MOVEMENT: 66686
STOOL FREQUENCY: DAILY
NAUSEA: DENIES
PERSON REPORTING PAIN: PATIENT
BOWEL PATTERN NORMAL: 1

## 2023-08-01 ASSESSMENT — FIBROSIS 4 INDEX: FIB4 SCORE: 0.76

## 2023-08-02 ENCOUNTER — OFFICE VISIT (OUTPATIENT)
Dept: CARDIOLOGY | Facility: MEDICAL CENTER | Age: 78
End: 2023-08-02
Payer: MEDICARE

## 2023-08-02 VITALS
RESPIRATION RATE: 18 BRPM | BODY MASS INDEX: 24.5 KG/M2 | DIASTOLIC BLOOD PRESSURE: 70 MMHG | OXYGEN SATURATION: 94 % | WEIGHT: 175 LBS | SYSTOLIC BLOOD PRESSURE: 102 MMHG | HEART RATE: 67 BPM | HEIGHT: 71 IN

## 2023-08-02 DIAGNOSIS — J90 PLEURAL EFFUSION: ICD-10-CM

## 2023-08-02 DIAGNOSIS — I10 ESSENTIAL HYPERTENSION: ICD-10-CM

## 2023-08-02 DIAGNOSIS — E78.5 DYSLIPIDEMIA WITH HIGH LDL AND LOW HDL: ICD-10-CM

## 2023-08-02 DIAGNOSIS — I48.91 ATRIAL FIBRILLATION, UNSPECIFIED TYPE (HCC): ICD-10-CM

## 2023-08-02 DIAGNOSIS — Z95.3 HISTORY OF HEART VALVE REPLACEMENT WITH BIOPROSTHETIC VALVE: ICD-10-CM

## 2023-08-02 PROBLEM — Z95.2 STATUS POST MITRAL VALVE REPLACEMENT: Status: RESOLVED | Noted: 2023-08-01 | Resolved: 2023-08-02

## 2023-08-02 PROCEDURE — 3074F SYST BP LT 130 MM HG: CPT

## 2023-08-02 PROCEDURE — 99212 OFFICE O/P EST SF 10 MIN: CPT

## 2023-08-02 PROCEDURE — 3078F DIAST BP <80 MM HG: CPT

## 2023-08-02 PROCEDURE — 99214 OFFICE O/P EST MOD 30 MIN: CPT

## 2023-08-02 RX ORDER — FUROSEMIDE 20 MG/1
20 TABLET ORAL DAILY
Qty: 30 TABLET | Refills: 0 | Status: SHIPPED | OUTPATIENT
Start: 2023-08-02 | End: 2023-08-24 | Stop reason: SDUPTHER

## 2023-08-02 ASSESSMENT — ENCOUNTER SYMPTOMS
PND: 0
PALPITATIONS: 0
BLURRED VISION: 1
MUSCULOSKELETAL NEGATIVE: 1
COUGH: 1
NEUROLOGICAL NEGATIVE: 1
ORTHOPNEA: 0
GASTROINTESTINAL NEGATIVE: 1
SHORTNESS OF BREATH: 0
NERVOUS/ANXIOUS: 0
CONSTITUTIONAL NEGATIVE: 1
DEPRESSION: 0

## 2023-08-02 ASSESSMENT — FIBROSIS 4 INDEX: FIB4 SCORE: 0.76

## 2023-08-02 NOTE — PROGRESS NOTES
Chief Complaint   Patient presents with    Atrial Fibrillation    Hypertension    Dyslipidemia       Subjective     Jenny Avila is a 78 y.o. male who presents today for follow-up of his MVR. They have a history of severe MR, hypertension, hyperlipidemia, postoperative A-fib    He underwent mitral valve replacement with (31 mm Mitris Cagle bovine pericardial valve) and left atrial appendage ligation/excision on 7/6/2023 with Dr. Neri.  Discharged on 7/12/2023.  He did develop small pleural effusions during his hospital course    They are accompanied today by his wife Jillian    Today 8/2/2023 feel ing overall well, he is still coughing. He can feel the fluid on his lung. He is able to go on walks without symptoms    No symptoms of chest pain, palpitations, shortness of breath, exercise intolerance, or lower extremity edema.      Past Medical History:   Diagnosis Date    Arrhythmia 2017    Arthritis 1989    thumbs spine neck    Breath shortness 2022    fatigued after 4 miles    Cancer (HCC) 2022    skin melanoma left jaw    Dyslipidemia 12/14/2018    Essential hypertension 09/12/2013    Heart murmur 1980    Mitral valve prolapse 09/12/2013    Non-rheumatic mitral regurgitation 09/12/2013    Obstructive uropathy 01/25/2021    Renal disorder     imnpaired function pt states two benign cysts on right kidney    Snoring      Past Surgical History:   Procedure Laterality Date    MITRAL VALVE REPLACE  7/6/2023    Procedure: REPLACEMENT, MITRAL VALVE, LEFT ATRIAL APPENDAGE LIGATION;  Surgeon: Jacqueline eNri M.D.;  Location: SURGERY [facility];  Service: Cardiothoracic    ECHOCARDIOGRAM, TRANSESOPHAGEAL, INTRAOPERATIVE  7/6/2023    Procedure: ECHOCARDIOGRAM, TRANSESOPHAGEAL, INTRAOPERATIVE;  Surgeon: Jacqueline Neri M.D.;  Location: SURGERY [facility];  Service: Cardiothoracic    FISTULA IN ANO REPAIR Left 2019    OTHER  September 2019    Fistula    OTHER      tonsils     Family History   Problem Relation Age of Onset  "   Cancer Mother         Ovarian cancer    Lung Disease Father         Arlene intercellulare avium    Heart Disease Neg Hx      Social History     Socioeconomic History    Marital status:      Spouse name: Not on file    Number of children: Not on file    Years of education: Not on file    Highest education level: Not on file   Occupational History    Not on file   Tobacco Use    Smoking status: Former     Years: 0.00     Types: Cigarettes     Start date: 1966     Quit date: 1966     Years since quittin.9     Passive exposure: Never    Smokeless tobacco: Never   Vaping Use    Vaping Use: Never used   Substance and Sexual Activity    Alcohol use: Not Currently     Comment: \" about an 1 oz red wine for my health everyday    Drug use: No    Sexual activity: Not on file     Comment:    Other Topics Concern    Not on file   Social History Narrative    Not on file     Social Determinants of Health     Financial Resource Strain: Not on file   Food Insecurity: Not on file   Transportation Needs: Not on file   Physical Activity: Not on file   Stress: Not on file   Social Connections: Not on file   Intimate Partner Violence: Not on file   Housing Stability: Not on file     No Known Allergies  Outpatient Encounter Medications as of 2023   Medication Sig Dispense Refill    furosemide (LASIX) 20 MG Tab Take 1 Tablet by mouth every day. 14 Tablet 0    metoprolol tartrate (LOPRESSOR) 25 MG Tab Take 1 Tablet by mouth 2 times a day. DO NOT TAKE IF HR IS LESS THAN 60 OR IF SBP IS LESS THAN 100 60 Tablet 2    aspirin 81 MG EC tablet Take 1 Tablet by mouth every day. 100 Tablet 2    omeprazole (PRILOSEC) 20 MG delayed-release capsule Take 1 Capsule by mouth every day. 30 Capsule 2    warfarin (COUMADIN) 3 MG Tab Take 1 Tablet by mouth every day at 6 PM. 30 Tablet 3    Ascorbic Acid (VITAMIN C) 500 MG Chew Tab Chew 500 mg every day. Indications: supplement      docusate sodium (COLACE) 100 MG Cap " "Take 100 mg by mouth every day. Indications: Constipation      Magnesium 250 MG Tab Take 125 mg by mouth every day. Indications: supplement      VITAMIN D PO Take 6,000 Units by mouth every day. Indications: supplement      potassium chloride (KLOR-CON) 8 MEQ tablet Take 1 Tablet by mouth every day. (Patient not taking: Reported on 8/2/2023) 14 Tablet 0    Home Care Oxygen Inhale 1 L/min continuous. Oxygen dose range: 1 to 1 L/min  Respiratory route via: Nasal Cannula   Oxygen supplier: Preferred      Indications: sob (Patient not taking: Reported on 8/2/2023)      [DISCONTINUED] amiodarone (CORDARONE) 200 MG Tab Take 2 Tablets by mouth 2 times a day for 7 days, THEN 2 Tablets every day for 7 days, THEN 1 Tablet every day thereafter. (Patient taking differently:  1 Tablet QD) 58 Tablet 0     No facility-administered encounter medications on file as of 8/2/2023.     Review of Systems   Constitutional: Negative.    HENT: Negative.     Eyes:  Positive for blurred vision.   Respiratory:  Positive for cough. Negative for shortness of breath.    Cardiovascular:  Negative for chest pain, palpitations, orthopnea, leg swelling and PND.   Gastrointestinal: Negative.    Genitourinary: Negative.    Musculoskeletal: Negative.    Skin: Negative.    Neurological: Negative.    Endo/Heme/Allergies: Negative.    Psychiatric/Behavioral:  Negative for depression. The patient is not nervous/anxious.               Objective     /70 (BP Location: Left arm, Patient Position: Sitting, BP Cuff Size: Adult)   Pulse 67   Resp 18   Ht 1.803 m (5' 11\")   Wt 79.4 kg (175 lb)   SpO2 94%   BMI 24.41 kg/m²     Physical Exam  Constitutional:       Appearance: Normal appearance.   HENT:      Head: Normocephalic.   Neck:      Vascular: No JVD.   Cardiovascular:      Rate and Rhythm: Normal rate and regular rhythm.      Pulses: Normal pulses.      Heart sounds: Normal heart sounds. No murmur heard.     No friction rub.      Comments: Midline " incision is CDI, appears to be healing well  Pulmonary:      Effort: Pulmonary effort is normal.      Breath sounds: Normal breath sounds.   Abdominal:      Palpations: Abdomen is soft.   Musculoskeletal:         General: Normal range of motion.      Right lower leg: No edema.      Left lower leg: No edema.   Skin:     General: Skin is warm and dry.   Neurological:      General: No focal deficit present.      Mental Status: He is alert and oriented to person, place, and time.   Psychiatric:         Mood and Affect: Mood normal.         Behavior: Behavior normal.            Lab Results   Component Value Date/Time    CHOLSTRLTOT 168 03/29/2022 08:58 AM     (H) 03/29/2022 08:58 AM    HDL 48 03/29/2022 08:58 AM    TRIGLYCERIDE 83 03/29/2022 08:58 AM       Lab Results   Component Value Date/Time    SODIUM 133 (L) 07/31/2023 02:00 PM    POTASSIUM 4.4 07/31/2023 02:00 PM    CHLORIDE 97 07/31/2023 02:00 PM    CO2 24 07/31/2023 02:00 PM    GLUCOSE 95 07/31/2023 02:00 PM    BUN 14 07/31/2023 02:00 PM    CREATININE 1.17 07/31/2023 02:00 PM     Lab Results   Component Value Date/Time    ALKPHOSPHAT 86 07/03/2023 08:35 AM    ASTSGOT 15 07/03/2023 08:35 AM    ALTSGPT 10 07/03/2023 08:35 AM    TBILIRUBIN 0.8 07/03/2023 08:35 AM      Angiogram 3/28/2023  Final Impression:  Angiographically normal appearing coronary arteries.  Normal pulmonary pressures        Findings:  1.  Left main coronary artery:   Normal.  2.  Left anterior descending artery:  Normal.   3.  Left circumflex coronary artery:  Normal.    4.  Right coronary artery:  Tortuous but normal.  This is a right dominant system.  5. LVEDP: 13  mmHg  6. LVgram showed ejection fraction of 60% , no significant gradient across aortic valve     Right heart cath pressures in mmHg:  RA 13  RV 29/12, EDP 14  PA 28/18, mean 21  PCW 15     Echocardiogram 7/6/2023  CONCLUSIONS  Pre:       LV:  Normal left ventricular systolic function. EF visually estimated   at 65%.  Normal  left ventricular wall thickness (1 cm).   RV:  Mildly dilated right ventricle.   RA:  Normal right atrial size.   LA:  Not fully visualized.    EMILI:  Normal left atrial appendage. No thrombus detected in the left   atrial appendage. PW velocities wnl's.  IA Septum:  Normal interatrial septum. No evidence for interatrial   septum aneurysm. Unable to appreciate PFO, ASD nor shunt.   IV Septum:  Normal Interventricular Septum.   MV:  Flail A2 segment of the anterior mitral valve with eccentric   posteriorly directed mitral regurgitant jet.  By definition, an   eccentric jet is considered severe.  Mean gradient of 2 mmHg across the   mitral valve.  AV:  Structurally normal aortic valve without significant stenosis or   regurgitation. AV annulus:  2.4 cm, Sinus of valsalva:  3.6 cm, STJ:    3.2 cm.    TV:  Structurally normal tricuspid valve without significant stenosis   or regurgitation.   PV:  Structurally normal pulmonic valve without significant stenosis or   regurgitation.   Pericardium:  No pericardial effusion.   Aorta:  Proximal ascending aorta:  3.4 cm.  Grade 2 descending aorta.       Post:     LV:  EF preserved at 65% with low dose Epi gtt.    EMILI:  Unable to visualize EMILI s/p surgical ligation.    MV:  Nicely seated bioprosthetic mitral valve.  No evidence of trans   nor para valvular regurgitation.  Gradient of 4 mmHg across the   bioprosthetic mitral valve.       Assessment & Plan     1. History of heart valve replacement with bioprosthetic valve        2. Atrial fibrillation, unspecified type (HCC)        3. Essential hypertension        4. Dyslipidemia with high LDL and low HDL            Medical Decision Making: Today's Assessment/Status/Plan:        Status post MVR LAAL on 7/6/2023  -Doing well postop  -Continue BB: metoprolol 25 mg BID  -Continue Lasix 20 mg for now given the pleural effusion, recheck x-ray in 1 month  -echo in 3 months   - referral to cardiac rehab   -Continue postop precautions:  1.  NO driving for 4 weeks after surgery. You may ride as a passenger.  2. NO lifting of any item over 10 lbs (e.g. gallon of milk) for 6 weeks after surgery.  3. DO walk as much as possible! Walk a minimum of once a day. Depending on your fatigue and comfort level, you may walk as much as you wish. There is no maximum.  4. Other physical activities (sex, housework, gardening, etc.) are OK after 4 weeks     Atrial Fibrillation  -Postoperatively, he is complaining of blurry vision which he believes to be associated with the amiodarone, we will DC the amiodarone now and check a heart monitor in 2 weeks    Hypertension  -Good control, continue metoprolol 25 mg twice daily  - goal < 130/80    Hyperlipidemia  -Most recent   -Continue lifestyle modifications  -Goal of less than 100  -Check lipid panel annually    Follow up with RENETTA Gastelum in 3 months with echocardiogram    This note was dictated using Dragon speech recognition software.

## 2023-08-02 NOTE — PATIENT INSTRUCTIONS
Heart monitor in 2 weeks  Chest x-ray in 1 month  Echo in 3 months  Follow up with Teresa JACKSON after echocardiogram  Follow up with anticoagulation clinic

## 2023-08-03 ENCOUNTER — TELEPHONE (OUTPATIENT)
Dept: MEDICAL GROUP | Facility: MEDICAL CENTER | Age: 78
End: 2023-08-03
Payer: MEDICARE

## 2023-08-03 ENCOUNTER — ANTICOAGULATION MONITORING (OUTPATIENT)
Dept: VASCULAR LAB | Facility: MEDICAL CENTER | Age: 78
End: 2023-08-03
Payer: MEDICARE

## 2023-08-03 ENCOUNTER — HOME CARE VISIT (OUTPATIENT)
Dept: HOME HEALTH SERVICES | Facility: HOME HEALTHCARE | Age: 78
End: 2023-08-03
Payer: MEDICARE

## 2023-08-03 VITALS
HEART RATE: 65 BPM | OXYGEN SATURATION: 98 % | SYSTOLIC BLOOD PRESSURE: 100 MMHG | DIASTOLIC BLOOD PRESSURE: 68 MMHG | WEIGHT: 175 LBS | TEMPERATURE: 97.6 F | BODY MASS INDEX: 24.41 KG/M2 | RESPIRATION RATE: 16 BRPM

## 2023-08-03 DIAGNOSIS — Z95.3 HISTORY OF HEART VALVE REPLACEMENT WITH BIOPROSTHETIC VALVE: ICD-10-CM

## 2023-08-03 LAB
INR PPP: 2.7 (ref 2–3.5)
INR PPP: 2.7 - CRITICAL LOW: < 0.8, CRITICAL HIGH: > 6.5 (ref 2–3.5)

## 2023-08-03 PROCEDURE — G0299 HHS/HOSPICE OF RN EA 15 MIN: HCPCS

## 2023-08-03 PROCEDURE — G0151 HHCP-SERV OF PT,EA 15 MIN: HCPCS

## 2023-08-03 ASSESSMENT — FIBROSIS 4 INDEX
FIB4 SCORE: 0.76
FIB4 SCORE: 0.76

## 2023-08-03 NOTE — TELEPHONE ENCOUNTER
Patient requesting an order to discontinue his oxygen from [facility]. He stated that Dana SINGH approved yesterday please advise

## 2023-08-03 NOTE — PROGRESS NOTES
Anticoagulation Summary  As of 8/3/2023      INR goal:  2.0-3.0   TTR:  29.1 % (4 d)   INR used for dosin.70 (8/3/2023)   Warfarin maintenance plan:  3 mg (3 mg x 1) every day   Weekly warfarin total:  21 mg   Plan last modified:  Vito Rg PharmD (2023)   Next INR check:  2023   Target end date:  10/6/2023    Indications    History of heart valve replacement with bioprosthetic valve [Z95.3]                 Anticoagulation Episode Summary       INR check location:      Preferred lab:      Send INR reminders to:  AMB ANTICOAG POOL    Comments:  [facility]   S/P mitral valve replacement          Anticoagulation Care Providers       Provider Role Specialty Phone number    [facility]   [phone]          Anticoagulation Patient Findings      Left voicemail message to report a therapeutic INR.      Pt to continue with current warfarin dosing regimen. Requested pt contact the clinic for any s/s of unusual bleeding, bruising, clotting or any changes to diet or medication.    FU INR in 5 day(s).    Joby DixonD

## 2023-08-04 ENCOUNTER — TELEPHONE (OUTPATIENT)
Dept: CARDIOLOGY | Facility: MEDICAL CENTER | Age: 78
End: 2023-08-04

## 2023-08-04 VITALS
RESPIRATION RATE: 16 BRPM | WEIGHT: 175 LBS | TEMPERATURE: 97.6 F | OXYGEN SATURATION: 98 % | HEART RATE: 65 BPM | DIASTOLIC BLOOD PRESSURE: 68 MMHG | SYSTOLIC BLOOD PRESSURE: 110 MMHG | BODY MASS INDEX: 24.41 KG/M2

## 2023-08-04 ASSESSMENT — ENCOUNTER SYMPTOMS
NAUSEA: DENIES
DENIES PAIN: 1
MUSCLE WEAKNESS: 1
PERSON REPORTING PAIN: PATIENT
VOMITING: DENIES

## 2023-08-04 NOTE — TELEPHONE ENCOUNTER
Reviewed chart, last OV with  8/2/23. ASA was D/C'd at that visit.   Returned call to home care nurse, she wanted to confirm that pt is now NOT taking ASA and K+. Per chart review, informed pt is not taking either of these.   FYI to .   Nothing needed unless this is not correct.

## 2023-08-04 NOTE — TELEPHONE ENCOUNTER
Caller: [facility]    Topic/issue: Home health RN would like to clarify if the patient is still to be taking   aspirin 81 MG EC tablet.    Callback Number: [phone]    Thank you  Justina HURTADO

## 2023-08-05 DIAGNOSIS — Z95.3 HISTORY OF HEART VALVE REPLACEMENT WITH BIOPROSTHETIC VALVE: ICD-10-CM

## 2023-08-06 NOTE — TELEPHONE ENCOUNTER
I did not see this patient for posthospitalization follow-up.  So I cannot tell.  Besides, in her last appointment with nurse practitioner Dana Meza there is no obvious statement that the patient does not need oxygen.  So patient need to be seen oxygen saturation needs to be checked at rest and with exercise

## 2023-08-07 ENCOUNTER — OFFICE VISIT (OUTPATIENT)
Dept: CARDIOTHORACIC SURGERY | Facility: MEDICAL CENTER | Age: 78
End: 2023-08-07
Payer: MEDICARE

## 2023-08-07 VITALS
DIASTOLIC BLOOD PRESSURE: 60 MMHG | SYSTOLIC BLOOD PRESSURE: 108 MMHG | WEIGHT: 181 LBS | HEART RATE: 75 BPM | BODY MASS INDEX: 25.24 KG/M2 | OXYGEN SATURATION: 97 % | TEMPERATURE: 96.4 F

## 2023-08-07 DIAGNOSIS — I34.0 SEVERE MITRAL REGURGITATION: ICD-10-CM

## 2023-08-07 PROCEDURE — 3074F SYST BP LT 130 MM HG: CPT | Performed by: NURSE PRACTITIONER

## 2023-08-07 PROCEDURE — 3078F DIAST BP <80 MM HG: CPT | Performed by: NURSE PRACTITIONER

## 2023-08-07 PROCEDURE — 99024 POSTOP FOLLOW-UP VISIT: CPT | Performed by: NURSE PRACTITIONER

## 2023-08-07 ASSESSMENT — FIBROSIS 4 INDEX: FIB4 SCORE: 0.76

## 2023-08-08 ENCOUNTER — ANTICOAGULATION MONITORING (OUTPATIENT)
Dept: MEDICAL GROUP | Facility: PHYSICIAN GROUP | Age: 78
End: 2023-08-08
Payer: MEDICARE

## 2023-08-08 ENCOUNTER — HOME CARE VISIT (OUTPATIENT)
Dept: HOME HEALTH SERVICES | Facility: HOME HEALTHCARE | Age: 78
End: 2023-08-08
Payer: MEDICARE

## 2023-08-08 VITALS
BODY MASS INDEX: 24.27 KG/M2 | SYSTOLIC BLOOD PRESSURE: 120 MMHG | RESPIRATION RATE: 17 BRPM | OXYGEN SATURATION: 97 % | DIASTOLIC BLOOD PRESSURE: 78 MMHG | WEIGHT: 174 LBS | HEART RATE: 75 BPM | TEMPERATURE: 97.9 F

## 2023-08-08 VITALS
BODY MASS INDEX: 24.3 KG/M2 | OXYGEN SATURATION: 97 % | HEART RATE: 75 BPM | SYSTOLIC BLOOD PRESSURE: 120 MMHG | RESPIRATION RATE: 17 BRPM | TEMPERATURE: 97.7 F | DIASTOLIC BLOOD PRESSURE: 78 MMHG | WEIGHT: 174.25 LBS

## 2023-08-08 DIAGNOSIS — Z95.3 HISTORY OF HEART VALVE REPLACEMENT WITH BIOPROSTHETIC VALVE: ICD-10-CM

## 2023-08-08 LAB
INR PPP: 2.4 (ref 2–3.5)
INR PPP: 2.4 - CRITICAL LOW: < 0.8, CRITICAL HIGH: > 6.5 (ref 2–3.5)

## 2023-08-08 PROCEDURE — G0299 HHS/HOSPICE OF RN EA 15 MIN: HCPCS

## 2023-08-08 PROCEDURE — G0151 HHCP-SERV OF PT,EA 15 MIN: HCPCS

## 2023-08-08 ASSESSMENT — ENCOUNTER SYMPTOMS
BOWEL PATTERN NORMAL: 1
MUSCLE WEAKNESS: 1
DYSPNEA ON EXERTION: 1
DENIES PAIN: 1
LAST BOWEL MOVEMENT: 66694
PERSON REPORTING PAIN: PATIENT
FATIGUES EASILY: 1
LIMITED RANGE OF MOTION: 1

## 2023-08-08 ASSESSMENT — FIBROSIS 4 INDEX
FIB4 SCORE: 0.76
FIB4 SCORE: 0.76

## 2023-08-09 ASSESSMENT — GAIT ASSESSMENTS
STEP CONTINUITY: 1 - STEPS APPEAR CONTINUOUS
GAIT SCORE: 12
PATH SCORE: 2
STEP SYMMETRY: 1 - RIGHT AND LEFT STEP LENGTH APPEAR EQUAL
WALKING STANCE: 1 - HEELS ALMOST TOUCHING WHILE WALKING
TRUNK: 2 - NO SWAY, NO FLEXION, NO USE OF ARMS, NO WALKING AID
TRUNK SCORE: 2
PATH: 2 - STRAIGHT WITHOUT WALKING AID
BALANCE AND GAIT SCORE: 28
INITIATION OF GAIT IMMEDIATELY AFTER GO: 1 - NO HESITANCY

## 2023-08-09 ASSESSMENT — BALANCE ASSESSMENTS
ARISES: 2 - ABLE WITHOUT USING ARMS
EYES CLOSED AT MAXIMUM POSITION NUDGED: 1 - STEADY
ATTEMPTS TO ARISE: 2 - ABLE TO RISE, ONE ATTEMPT
SITTING DOWN: 2 - SAFE, SMOOTH MOTION
ARISING SCORE: 2
NUDGED SCORE: 2
STANDING BALANCE: 2 - NARROW STANCE WITHOUT SUPPORT
SITTING BALANCE: 1 - STEADY, SAFE
TURNING 360 DEGREES STEPS: 1 - CONTINUOUS STEPS
BALANCE SCORE: 16
NUDGED: 2 - STEADY
IMMEDIATE STANDING BALANCE FIRST 5 SECONDS: 2 - STEADY WITHOUT WALKER OR OTHER SUPPORT

## 2023-08-09 NOTE — PROGRESS NOTES
Anticoagulation Summary  As of 2023      INR goal:  2.0-3.0   TTR:  68.1 % (1.3 wk)   INR used for dosin.40 (2023)   Warfarin maintenance plan:  3 mg (3 mg x 1) every day   Weekly warfarin total:  21 mg   Plan last modified:  Joby YoderD (2023)   Next INR check:  8/15/2023   Target end date:  10/6/2023    Indications    History of heart valve replacement with bioprosthetic valve [Z95.3]                 Anticoagulation Episode Summary       INR check location:      Preferred lab:      Send INR reminders to:  AMB ANTICOAG POOL    Comments:  [facility]   S/P mitral valve replacement          Anticoagulation Care Providers       Provider Role Specialty Phone number    [facility]   [phone]          Anticoagulation Patient Findings       HPI:  Selma Avila, on anticoagulation therapy with warfarin for vavle replacement.   Changes to current medical/health status since last appt: none  Denies signs/symptoms of bleeding and/or thrombosis since the last appt.    Denies any interval changes to diet  Denies any interval changes to medications since last appt.   Denies any complications or cost restrictions with current therapy.     A/P   INR  therapeutic.   Pt is to continue with current warfarin dosing regimen.     Next INR in 1 week(s).    Huseyin Brown, PharmD

## 2023-08-10 ENCOUNTER — DOCUMENTATION (OUTPATIENT)
Dept: MEDICAL GROUP | Facility: PHYSICIAN GROUP | Age: 78
End: 2023-08-10
Payer: MEDICARE

## 2023-08-10 ENCOUNTER — HOME CARE VISIT (OUTPATIENT)
Dept: HOME HEALTH SERVICES | Facility: HOME HEALTHCARE | Age: 78
End: 2023-08-10
Payer: MEDICARE

## 2023-08-10 VITALS
TEMPERATURE: 95.8 F | DIASTOLIC BLOOD PRESSURE: 64 MMHG | WEIGHT: 174.4 LBS | HEART RATE: 72 BPM | RESPIRATION RATE: 16 BRPM | SYSTOLIC BLOOD PRESSURE: 118 MMHG | BODY MASS INDEX: 24.32 KG/M2 | OXYGEN SATURATION: 92 %

## 2023-08-10 PROCEDURE — G0493 RN CARE EA 15 MIN HH/HOSPICE: HCPCS

## 2023-08-10 RX ORDER — FUROSEMIDE 20 MG/1
20 TABLET ORAL DAILY
Qty: 100 TABLET | Refills: 0 | OUTPATIENT
Start: 2023-08-10

## 2023-08-10 SDOH — ECONOMIC STABILITY: FOOD INSECURITY: MEALS PER DAY: 3

## 2023-08-10 ASSESSMENT — ENCOUNTER SYMPTOMS
APPETITE LEVEL: GOOD
PERSON REPORTING PAIN: PATIENT
PAIN LOCATION - PAIN SEVERITY: 2/10
NAUSEA: NO
LAST BOWEL MOVEMENT: 66695
PAIN LOCATION - RELIEVING FACTORS: REPOSITION
SUBJECTIVE PAIN PROGRESSION: UNCHANGED
VOMITING: NO
PAIN LOCATION - EXACERBATING FACTORS: LYING ON LEFT SIDE
PAIN SEVERITY GOAL: 0/10
HIGHEST PAIN SEVERITY IN PAST 24 HOURS: 2/10
CHANGE IN APPETITE: INCREASED
PAIN LOCATION - PAIN QUALITY: DULL
PAIN LOCATION - PAIN FREQUENCY: INFREQUENT
LOWEST PAIN SEVERITY IN PAST 24 HOURS: 0/10
PAIN: 1

## 2023-08-10 ASSESSMENT — ACTIVITIES OF DAILY LIVING (ADL)
HOME_HEALTH_OASIS: 00
OASIS_M1830: 00

## 2023-08-10 ASSESSMENT — FIBROSIS 4 INDEX: FIB4 SCORE: 0.76

## 2023-08-10 NOTE — TELEPHONE ENCOUNTER
"Lasix ordered for 30 days at last OV. Notes state \"Continue Lasix 20 mg for now given the pleural effusion, recheck x-ray in 1 month\".   RX refused due to above. Can re-order after xray if appropriate.   "

## 2023-08-10 NOTE — PROGRESS NOTES
Medication chart review for [facility] services    Received referral from [facility].   Medications reviewed  compared with discharge summary if available.  Discharge summary date, if applicable:   N/a    Current medication list per [facility]     Medication list one, patient is currently taking    Current Outpatient Medications:     Carboxymethylcellulose Sodium (ARTIFICIAL TEARS OP), 1 Drop, Both Eyes, HS PRN    furosemide, 20 mg, Oral, DAILY    metoprolol tartrate, 25 mg, Oral, BID    warfarin, 3 mg, Oral, DAILY AT 1800    vitamin C, 500 mg, Oral, DAILY    docusate sodium, 100 mg, Oral, DAILY    Magnesium, 125 mg, Oral, DAILY    VITAMIN D PO, 6,000 Units, Oral, DAILY      Medication list two, drugs that the patient has been prescribed or recommended to take by their healthcare provider on discharge summary  N/a    No Known Allergies    Labs     Lab Results   Component Value Date/Time    SODIUM 133 (L) 07/31/2023 02:00 PM    POTASSIUM 4.4 07/31/2023 02:00 PM    CHLORIDE 97 07/31/2023 02:00 PM    CO2 24 07/31/2023 02:00 PM    GLUCOSE 95 07/31/2023 02:00 PM    BUN 14 07/31/2023 02:00 PM    CREATININE 1.17 07/31/2023 02:00 PM     Lab Results   Component Value Date/Time    ALKPHOSPHAT 86 07/03/2023 08:35 AM    ASTSGOT 15 07/03/2023 08:35 AM    ALTSGPT 10 07/03/2023 08:35 AM    TBILIRUBIN 0.8 07/03/2023 08:35 AM    INR 2.40 08/08/2023 12:04 PM    ALBUMIN 4.4 07/03/2023 08:35 AM        Assessment for clinically significant drug interactions, drug omissions/additions, duplicative therapies.            CC   Temitope Gilbert M.D.  [address] [place] [address] [address]  [place] [address]  Fax: [phone]    [facility]  Phone [phone] fax [phone]    This note was created using voice recognition software (Dragon). The accuracy of the dictation is limited by the abilities of the software. I have reviewed the note prior to signing, however some errors in grammar and context are  still possible. If you have any questions related to this note please do not hesitate to contact our office.

## 2023-08-15 ENCOUNTER — OFFICE VISIT (OUTPATIENT)
Dept: MEDICAL GROUP | Facility: MEDICAL CENTER | Age: 78
End: 2023-08-15
Payer: MEDICARE

## 2023-08-15 ENCOUNTER — ANTICOAGULATION VISIT (OUTPATIENT)
Dept: VASCULAR LAB | Facility: MEDICAL CENTER | Age: 78
End: 2023-08-15
Attending: INTERNAL MEDICINE
Payer: MEDICARE

## 2023-08-15 VITALS
BODY MASS INDEX: 25.48 KG/M2 | DIASTOLIC BLOOD PRESSURE: 60 MMHG | HEIGHT: 71 IN | TEMPERATURE: 96.9 F | RESPIRATION RATE: 16 BRPM | WEIGHT: 182 LBS | HEART RATE: 75 BPM | SYSTOLIC BLOOD PRESSURE: 108 MMHG | OXYGEN SATURATION: 95 %

## 2023-08-15 VITALS — SYSTOLIC BLOOD PRESSURE: 106 MMHG | DIASTOLIC BLOOD PRESSURE: 72 MMHG | HEART RATE: 73 BPM

## 2023-08-15 DIAGNOSIS — J90 PLEURAL EFFUSION: ICD-10-CM

## 2023-08-15 DIAGNOSIS — Z95.3 HISTORY OF HEART VALVE REPLACEMENT WITH BIOPROSTHETIC VALVE: ICD-10-CM

## 2023-08-15 DIAGNOSIS — J96.01 ACUTE RESPIRATORY FAILURE WITH HYPOXIA (HCC): ICD-10-CM

## 2023-08-15 DIAGNOSIS — D68.69 SECONDARY HYPERCOAGULABILITY DISORDER (HCC): ICD-10-CM

## 2023-08-15 LAB — INR PPP: 3.3 (ref 2–3.5)

## 2023-08-15 PROCEDURE — 85610 PROTHROMBIN TIME: CPT

## 2023-08-15 PROCEDURE — 3074F SYST BP LT 130 MM HG: CPT | Performed by: FAMILY MEDICINE

## 2023-08-15 PROCEDURE — 99214 OFFICE O/P EST MOD 30 MIN: CPT | Performed by: FAMILY MEDICINE

## 2023-08-15 PROCEDURE — 3078F DIAST BP <80 MM HG: CPT | Performed by: FAMILY MEDICINE

## 2023-08-15 PROCEDURE — 99212 OFFICE O/P EST SF 10 MIN: CPT

## 2023-08-15 ASSESSMENT — FIBROSIS 4 INDEX: FIB4 SCORE: 0.76

## 2023-08-15 NOTE — PROGRESS NOTES
Anticoagulation Summary  As of 8/15/2023      INR goal:  2.0-3.0   TTR:  67.4 % (2.3 wk)   INR used for dosing:  3.30 (8/15/2023)   Warfarin maintenance plan:  3 mg (3 mg x 1) every day   Weekly warfarin total:  21 mg   Plan last modified:  Vito Rg, PharmD (7/20/2023)   Next INR check:  8/22/2023   Target end date:  10/6/2023    Indications    History of heart valve replacement with bioprosthetic valve [Z95.3]                 Anticoagulation Episode Summary       INR check location:      Preferred lab:      Send INR reminders to:  AMB ANTICOAG POOL    Comments:  S/P mitral valve replacement          Anticoagulation Care Providers       Provider Role Specialty Phone number    [facility]   [phone]          Refer to Patient Findings for HPI:  Patient Findings       Positives:  Change in diet/appetite (less greens over past week)    Negatives:  Signs/symptoms of thrombosis, Signs/symptoms of bleeding, Laboratory test error suspected, Change in health, Change in alcohol use, Change in activity, Upcoming invasive procedure, Emergency department visit, Upcoming dental procedure, Missed doses, Extra doses, Change in medications, Hospital admission, Bruising, Other complaints          Vitals:    08/15/23 0931   BP: 106/72   Pulse: 73     Patient seen in clinic today for follow up on anticoagulation therapy with warfarin (a high risk medication) for hx of AVR  Verified current warfarin dosing schedule.  Patient denies any missed doses of warfarin.    Medications reconciled   Pt is not on antiplatelet therapy      A/P   INR is SUPRA-therapeutic today at 3.3.     Warfarin dosing recommendation: Patient will reduce dose to 1.5mg TONIGHT, as a one time change, then will resume his current dosing regimen.   Patient will follow up again in 1 week.    Pt educated to contact our clinic with any changes in medications or s/s of bleeding or thrombosis. Pt is aware to seek immediate medical attention  for falls, head injury or deep cuts.    Follow up appointment in 1 week(s).    Next appt: Tues, Aug 22 @ 9:15am     Nika Murcia Pharmacy Intern   Wisam Dumont PharmD

## 2023-08-16 ENCOUNTER — NON-PROVIDER VISIT (OUTPATIENT)
Dept: CARDIOLOGY | Facility: MEDICAL CENTER | Age: 78
End: 2023-08-16
Payer: MEDICARE

## 2023-08-16 DIAGNOSIS — I49.3 PVCS (PREMATURE VENTRICULAR CONTRACTIONS): ICD-10-CM

## 2023-08-16 DIAGNOSIS — Z86.79 ATRIAL FIBRILLATION, CURRENTLY IN SINUS RHYTHM: ICD-10-CM

## 2023-08-16 DIAGNOSIS — I47.10 SVT (SUPRAVENTRICULAR TACHYCARDIA) (HCC): ICD-10-CM

## 2023-08-16 DIAGNOSIS — I49.1 PREMATURE ATRIAL CONTRACTIONS: ICD-10-CM

## 2023-08-16 DIAGNOSIS — I48.91 ATRIAL FIBRILLATION, UNSPECIFIED TYPE (HCC): ICD-10-CM

## 2023-08-16 NOTE — PROGRESS NOTES
CC: Recent history of mitral valve replacement, pleural effusion/shortness of breath    HPI:   Selma presents today to discuss the following    Recent history of heart valve replacement with bioprosthetic valve/ Secondary hypercoagulability disorder (HCC)/  Pleural effusion/ SOB with exertion  Patient with history of mitral valve prolapse with severe mitral regurgitation he underwent mitral valve replacement in 7/6/2023.  Developed some respiratory complications, was put on oxygen and Lasix due to his pleural effusion, his condition has improved gradually.  Was discharged without oxygen on room his oxygen saturation has always been more than 90%.  Patient was put on warfarin, has been following up with Coumadin clinic.  His last chest x-ray showed mild pleural effusion bilaterally, he is currently on Lasix 20 mg daily, denies any leg swelling.  Oxygen saturation has been normal.  Patient has been following up with cardiology.  Scheduled for cardiac rehab.        Patient Active Problem List    Diagnosis Date Noted    Pleural effusion 08/02/2023    Atrial fibrillation (HCC) 07/31/2023    Secondary hypercoagulability disorder (HCC) 07/26/2023    Urinary frequency 07/26/2023    History of heart valve replacement with bioprosthetic valve 07/20/2023    Acute respiratory failure (HCC) 07/07/2023    Cyst of right kidney 10/24/2022    Pre-diabetes 03/31/2022    BMI 27.0-27.9,adult 03/31/2022    Primary osteoarthritis of both first carpometacarpal joints 03/31/2022    Degenerative disc disease, lumbar 03/31/2022    Peripheral neuropathy 03/31/2022    Actinic keratoses 03/31/2022    Obstructive uropathy 01/25/2021    Fistula-in-ano 01/25/2021    Bigeminy 06/15/2020    Elevated PSA 02/12/2020    Dyslipidemia with high LDL and low HDL 12/14/2018    Chest rales 09/12/2013    Essential hypertension 09/12/2013       Current Outpatient Medications   Medication Sig Dispense Refill    Carboxymethylcellulose Sodium (ARTIFICIAL TEARS OP)  "Administer 1 Drop into both eyes at bedtime as needed (dry eyes). Indications: DRY EYES (INACTIVE)      furosemide (LASIX) 20 MG Tab Take 1 Tablet by mouth every day. 30 Tablet 0    metoprolol tartrate (LOPRESSOR) 25 MG Tab Take 1 Tablet by mouth 2 times a day. DO NOT TAKE IF HR IS LESS THAN 60 OR IF SBP IS LESS THAN 100  Indications: Atrial Fibrillation 200 Tablet 3    warfarin (COUMADIN) 3 MG Tab Take 1 Tablet by mouth every day at 6 PM. 30 Tablet 3    Ascorbic Acid (VITAMIN C) 500 MG Chew Tab Chew 500 mg every day. Indications: supplement      docusate sodium (COLACE) 100 MG Cap Take 100 mg by mouth every day. Indications: Constipation      Magnesium 250 MG Tab Take 125 mg by mouth every day. Indications: supplement      VITAMIN D PO Take 6,000 Units by mouth every day. Indications: supplement       No current facility-administered medications for this visit.         Allergies as of 08/15/2023    (No Known Allergies)        ROS: Denies any chest pain, Shortness of breath, Changes bowel or bladder, Lower extremity edema.    Physical Exam:  /60 (BP Location: Right arm, Patient Position: Sitting, BP Cuff Size: Adult)   Pulse 75   Temp 36.1 °C (96.9 °F) (Temporal)   Resp 16   Ht 1.803 m (5' 11\")   Wt 82.6 kg (182 lb)   SpO2 95%   BMI 25.38 kg/m²   Gen.: Well-developed, well-nourished, no apparent distress,pleasant and cooperative with the examination  Skin:  Warm and dry with good turgor. No rashes or suspicious lesions in visible areas  HEENT:Sinuses nontender with palpation, TMs clear, nares patent with pink mucosa and clear rhinorrhea,no septal deviation ,polyps or lesions. lips without lesions, oropharynx clear.  Neck: Trachea midline,no masses or adenopathy. No JVD.  Cor: Regular rate and rhythm without murmur, gallop or rub.  Lungs: Respirations unlabored.slightly decreased breath sounds bilaterally. No wheezes, rhonchi.  Extremities: No cyanosis, clubbing or edema.          Assessment and Plan. "   78 y.o. male     1. History of heart valve replacement with bioprosthetic valve  History of mitral valve prolapse with severe mitral regurgitation status post mitral valve replacement.  Feels better.  Continue on warfarin and metoprolol  Repeat echocardiogram, already ordered.  Continue follow-up with cardiology    2. Secondary hypercoagulability disorder (HCC)  Has been on Coumadin.  Continue follow-up with Coumadin clinic    3. Pleural effusion  Last x-ray showed mild left and right pleural effusion.  Patient still having some shortness of breath with exertion, and some difficulty expanding his chest.  However has normal oxygen saturation.  Currently not on oxygen  Continue on Lasix 20 mg daily,   Repeat chest x-ray  Return to clinic in a month for reevaluation    4. Acute respiratory failure with hypoxia (HCC)  Has improved.  Not on oxygen.  Normal oxygen saturation on room air however has been having mild difficulty expanding his case.  Repeat chest x-ray, return to the clinic in a month for reevaluation.

## 2023-08-16 NOTE — PROGRESS NOTES
Home enrollment completed for the 14 day Zio XT Holter monitoring program per RENETTA Morton..  >Monitor mailed to patient by [employer].  >Currently pending EOS.

## 2023-08-18 ENCOUNTER — TELEPHONE (OUTPATIENT)
Dept: CARDIOLOGY | Facility: MEDICAL CENTER | Age: 78
End: 2023-08-18
Payer: MEDICARE

## 2023-08-18 ENCOUNTER — HOSPITAL ENCOUNTER (OUTPATIENT)
Dept: RADIOLOGY | Facility: MEDICAL CENTER | Age: 78
End: 2023-08-18
Payer: MEDICARE

## 2023-08-18 DIAGNOSIS — J90 PLEURAL EFFUSION: ICD-10-CM

## 2023-08-18 PROCEDURE — 71046 X-RAY EXAM CHEST 2 VIEWS: CPT

## 2023-08-18 NOTE — TELEPHONE ENCOUNTER
Noted that results have not been finalized or reviewed by physician at this point yet. Will follow up once results available to sent to provider for review.

## 2023-08-18 NOTE — TELEPHONE ENCOUNTER
Caller:  Valentin Villafuerte    Topic/issue: They would like a call with the results from Jenny's chest xray from today.     Callback Number: [phone]     Thank you,   April ARAIZA

## 2023-08-19 ENCOUNTER — PATIENT MESSAGE (OUTPATIENT)
Dept: CARDIOLOGY | Facility: MEDICAL CENTER | Age: 78
End: 2023-08-19
Payer: MEDICARE

## 2023-08-21 ENCOUNTER — PATIENT MESSAGE (OUTPATIENT)
Dept: CARDIOLOGY | Facility: MEDICAL CENTER | Age: 78
End: 2023-08-21
Payer: MEDICARE

## 2023-08-21 DIAGNOSIS — I10 ESSENTIAL HYPERTENSION: ICD-10-CM

## 2023-08-21 NOTE — PATIENT COMMUNICATION
SC: Please review and advise per care team. MO OOO.  CXR dated 8/28/23. Thank you.     : For review. Thank you.

## 2023-08-22 ENCOUNTER — ANTICOAGULATION VISIT (OUTPATIENT)
Dept: VASCULAR LAB | Facility: MEDICAL CENTER | Age: 78
End: 2023-08-22
Attending: INTERNAL MEDICINE
Payer: MEDICARE

## 2023-08-22 ENCOUNTER — PATIENT MESSAGE (OUTPATIENT)
Dept: CARDIOLOGY | Facility: MEDICAL CENTER | Age: 78
End: 2023-08-22

## 2023-08-22 DIAGNOSIS — Z95.3 HISTORY OF HEART VALVE REPLACEMENT WITH BIOPROSTHETIC VALVE: ICD-10-CM

## 2023-08-22 LAB — INR PPP: 3.8 (ref 2–3.5)

## 2023-08-22 PROCEDURE — 85610 PROTHROMBIN TIME: CPT

## 2023-08-22 PROCEDURE — 99212 OFFICE O/P EST SF 10 MIN: CPT

## 2023-08-22 NOTE — TELEPHONE ENCOUNTER
Lets have him take 40 mg of lasix for 5 days and check in with him then to see if symptoms have improved.

## 2023-08-22 NOTE — PROGRESS NOTES
Anticoagulation Summary  As of 8/22/2023      INR goal:  2.0-3.0   TTR:  46.9 % (3.3 wk)   INR used for dosing:  3.80 (8/22/2023)   Warfarin maintenance plan:  1.5 mg (3 mg x 0.5) every Tue, Fri; 3 mg (3 mg x 1) all other days   Weekly warfarin total:  18 mg   Plan last modified:  Sasha Tierney PharmD (8/22/2023)   Next INR check:  8/29/2023   Target end date:  10/6/2023    Indications    History of heart valve replacement with bioprosthetic valve [Z95.3]                 Anticoagulation Episode Summary       INR check location:      Preferred lab:      Send INR reminders to:  AMB ANTICOAG POOL    Comments:  S/P mitral valve replacement          Anticoagulation Care Providers       Provider Role Specialty Phone number    [facility]   [phone]                  Refer to Patient Findings for HPI:  Patient Findings       Positives:  Change in diet/appetite (less vitamin K containing foods)    Negatives:  Signs/symptoms of thrombosis, Signs/symptoms of bleeding, Laboratory test error suspected, Change in health, Change in alcohol use, Change in activity, Upcoming invasive procedure, Emergency department visit, Upcoming dental procedure, Missed doses, Extra doses, Change in medications, Hospital admission, Bruising, Other complaints            There were no vitals filed for this visit.  pt declined vitals    Verified current warfarin dosing schedule.    Medications reconciled   Pt is not on antiplatelet therapy      A/P   INR  SUPRA-therapeutic. Last INR also supratherapeutic. Patient states he has weight loss from his surgery and has been eating less vegetables containing vitaminK.      Warfarin dosing recommendation: Reduce warfarin to 1.5 mg Tues/Fri and 3 mg AOD.     Pt educated to contact our clinic with any changes in medications or s/s of bleeding or thrombosis. Pt is aware to seek immediate medical attention for falls, head injury or deep cuts.    Follow up appointment in 1  week(s).    Sasha Tierney, PharmD

## 2023-08-24 ENCOUNTER — PATIENT MESSAGE (OUTPATIENT)
Dept: CARDIOLOGY | Facility: MEDICAL CENTER | Age: 78
End: 2023-08-24
Payer: MEDICARE

## 2023-08-24 DIAGNOSIS — Z95.3 HISTORY OF HEART VALVE REPLACEMENT WITH BIOPROSTHETIC VALVE: ICD-10-CM

## 2023-08-24 RX ORDER — FUROSEMIDE 20 MG/1
20 TABLET ORAL DAILY
Qty: 30 TABLET | Refills: 2 | Status: SHIPPED | OUTPATIENT
Start: 2023-08-24 | End: 2023-08-25 | Stop reason: SDUPTHER

## 2023-08-25 RX ORDER — FUROSEMIDE 20 MG/1
TABLET ORAL
Qty: 90 TABLET | Refills: 3 | Status: SHIPPED | OUTPATIENT
Start: 2023-08-25 | End: 2023-09-01

## 2023-08-28 ENCOUNTER — NON-PROVIDER VISIT (OUTPATIENT)
Dept: CARDIOLOGY | Facility: MEDICAL CENTER | Age: 78
End: 2023-08-28
Payer: MEDICARE

## 2023-08-28 ENCOUNTER — PATIENT MESSAGE (OUTPATIENT)
Dept: CARDIOLOGY | Facility: MEDICAL CENTER | Age: 78
End: 2023-08-28

## 2023-08-28 DIAGNOSIS — Z95.4 S/P MITRAL VALVE ALLOGRAFT: Primary | ICD-10-CM

## 2023-08-28 PROCEDURE — G0423 INTENS CARDIAC REHAB NO EXER: HCPCS | Mod: 59 | Performed by: INTERNAL MEDICINE

## 2023-08-28 PROCEDURE — G0422 INTENS CARDIAC REHAB W/EXERC: HCPCS | Performed by: INTERNAL MEDICINE

## 2023-08-28 NOTE — PROGRESS NOTES
Patient arrived for initial 1:1 Intensive Cardiac Rehabilitation Consultation and Education session with the Registered Nurse.  A total of 60 minutes was spent with the patient during which time a focused cardiovascular assessment was completed and musculoskeletal limitations were addressed in preparation to safely start the exercise portion of the program.  Education regarding the program was explained including exercise goals, precautions, and target heart rate during exercise.  Nutrition goals were reviewed and patient was introduced to the [facility] Program.  Stress management goals were dicussed and patient concerns and questions were answered at this time. Patient arrived for education at 0930 and visit was concluded at 1030.  Exercise time was from 1030 to 1130.

## 2023-08-29 ENCOUNTER — NON-PROVIDER VISIT (OUTPATIENT)
Dept: CARDIOLOGY | Facility: MEDICAL CENTER | Age: 78
End: 2023-08-29
Payer: MEDICARE

## 2023-08-29 ENCOUNTER — ANTICOAGULATION VISIT (OUTPATIENT)
Dept: VASCULAR LAB | Facility: MEDICAL CENTER | Age: 78
End: 2023-08-29
Attending: INTERNAL MEDICINE
Payer: MEDICARE

## 2023-08-29 VITALS — SYSTOLIC BLOOD PRESSURE: 107 MMHG | DIASTOLIC BLOOD PRESSURE: 71 MMHG | HEART RATE: 71 BPM

## 2023-08-29 DIAGNOSIS — Z95.4 S/P MITRAL VALVE ALLOGRAFT: ICD-10-CM

## 2023-08-29 DIAGNOSIS — Z95.3 HISTORY OF HEART VALVE REPLACEMENT WITH BIOPROSTHETIC VALVE: ICD-10-CM

## 2023-08-29 LAB — INR PPP: 2.6 (ref 2–3.5)

## 2023-08-29 PROCEDURE — 99211 OFF/OP EST MAY X REQ PHY/QHP: CPT

## 2023-08-29 PROCEDURE — 85610 PROTHROMBIN TIME: CPT

## 2023-08-29 PROCEDURE — G0422 INTENS CARDIAC REHAB W/EXERC: HCPCS | Performed by: INTERNAL MEDICINE

## 2023-08-29 PROCEDURE — G0423 INTENS CARDIAC REHAB NO EXER: HCPCS | Mod: 59 | Performed by: INTERNAL MEDICINE

## 2023-08-29 RX ORDER — POTASSIUM CHLORIDE 750 MG/1
10 CAPSULE, EXTENDED RELEASE ORAL DAILY
Qty: 90 CAPSULE | Refills: 3 | Status: SHIPPED | OUTPATIENT
Start: 2023-08-29 | End: 2023-09-01

## 2023-08-29 NOTE — PROGRESS NOTES
Selma Avila attended Intensive Cardiac Rehab today from 1600 to 1800. During his time he exercised and attended class.   His education today was a video titled: Fueling a Healthy Body. Patient received handouts and class discussion pertaining to the topic.

## 2023-08-29 NOTE — PROGRESS NOTES
Anticoagulation Summary  As of 2023      INR goal:  2.0-3.0   TTR:  43.8 % (1 mo)   INR used for dosin.60 (2023)   Warfarin maintenance plan:  1.5 mg (3 mg x 0.5) every Tue, Fri; 3 mg (3 mg x 1) all other days   Weekly warfarin total:  18 mg   No change documented:  Jodi Dumont   Plan last modified:  Sasha Tierney, PharmD (2023)   Next INR check:  2023   Target end date:  10/6/2023    Indications    History of heart valve replacement with bioprosthetic valve [Z95.3]                 Anticoagulation Episode Summary       INR check location:      Preferred lab:      Send INR reminders to:  AMB ANTICOAG POOL    Comments:  S/P mitral valve replacement          Anticoagulation Care Providers       Provider Role Specialty Phone number    [facility]   [phone]          Refer to Patient Findings for HPI:  Patient Findings       Negatives:  Signs/symptoms of thrombosis, Signs/symptoms of bleeding, Laboratory test error suspected, Change in health, Change in alcohol use, Change in activity, Upcoming invasive procedure, Emergency department visit, Upcoming dental procedure, Missed doses, Extra doses, Change in medications, Change in diet/appetite, Hospital admission, Bruising, Other complaints          Vitals:    23 1009   BP: 107/71   Pulse: 71       Patient seen in clinic today for follow up on anticoagulation therapy with warfarin (a high risk medication) for hx of bMVR  Verified current warfarin dosing schedule.  Patient denies any missed doses of warfarin.    Medications reconciled   Pt is not on antiplatelet therapy      A/P   INR is therapeutic today at 2.6.     Warfarin dosing recommendation: Continue with the current dosing regimen.   Patient will follow up again in 1 week.     Pt educated to contact our clinic with any changes in medications or s/s of bleeding or thrombosis. Pt is aware to seek immediate medical attention for falls, head injury or deep  cuts.    Follow up appointment in 1 week(s).    Next appt: Tues, Sept 5 @ 9:30am     Wisam Dumont PharmD

## 2023-08-30 ENCOUNTER — PATIENT MESSAGE (OUTPATIENT)
Dept: CARDIOLOGY | Facility: MEDICAL CENTER | Age: 78
End: 2023-08-30
Payer: MEDICARE

## 2023-08-30 ENCOUNTER — NON-PROVIDER VISIT (OUTPATIENT)
Dept: CARDIOLOGY | Facility: MEDICAL CENTER | Age: 78
End: 2023-08-30
Payer: MEDICARE

## 2023-08-30 DIAGNOSIS — Z95.4 S/P MITRAL VALVE ALLOGRAFT: ICD-10-CM

## 2023-08-30 PROCEDURE — G0422 INTENS CARDIAC REHAB W/EXERC: HCPCS | Performed by: INTERNAL MEDICINE

## 2023-08-30 PROCEDURE — G0423 INTENS CARDIAC REHAB NO EXER: HCPCS | Mod: 59 | Performed by: INTERNAL MEDICINE

## 2023-08-30 NOTE — PATIENT COMMUNICATION
"Phone number called: [phone]    Call outcome: Spoke with patient's wife regarding medications and side effects. Ensured they received the patient's MyChart and that they are picking up the potassium. Patient's wife concerned over patient's side effects with the lasix and metoprolol, stating the patient has \"not been himself\" and that she is concerned over him driving with the dizziness and fatigue he's had. She asked when patient can stop the lasix now that he is pulling more on his IS than prior to surgery. He is still currently wearing his cardiac monitor and stated they will send it in on Friday. Started cardiac rehab yesterday.    To LH: Please advise on patient's side effects and when patient will be able to discontinue lasix. OV note states after X-ray recheck but that seems to have been done early on 8/18 and patient has improved further since. Thank you!    "

## 2023-08-30 NOTE — TELEPHONE ENCOUNTER
It might be best for this patient to just come and see me in person so we can have these discussions.  Can you please see if they would be willing to make an appointment with me?

## 2023-08-30 NOTE — PROGRESS NOTES
Selma Avila attended Intensive Cardiac Rehab today from 1600 to 1800. During his time he exercised and attended class.   His education today was a cooking class titled: tasty apps and snacks. Patient received handouts and class discussion pertaining to the topic.

## 2023-09-01 ENCOUNTER — OFFICE VISIT (OUTPATIENT)
Dept: CARDIOLOGY | Facility: MEDICAL CENTER | Age: 78
End: 2023-09-01
Payer: MEDICARE

## 2023-09-01 VITALS
DIASTOLIC BLOOD PRESSURE: 74 MMHG | OXYGEN SATURATION: 96 % | SYSTOLIC BLOOD PRESSURE: 108 MMHG | WEIGHT: 180.1 LBS | RESPIRATION RATE: 18 BRPM | HEART RATE: 80 BPM | HEIGHT: 71 IN | BODY MASS INDEX: 25.21 KG/M2

## 2023-09-01 DIAGNOSIS — Z95.2 S/P MITRAL VALVE REPLACEMENT: ICD-10-CM

## 2023-09-01 DIAGNOSIS — Z95.3 HISTORY OF HEART VALVE REPLACEMENT WITH BIOPROSTHETIC VALVE: ICD-10-CM

## 2023-09-01 DIAGNOSIS — I48.91 ATRIAL FIBRILLATION, UNSPECIFIED TYPE (HCC): ICD-10-CM

## 2023-09-01 DIAGNOSIS — I10 ESSENTIAL HYPERTENSION: ICD-10-CM

## 2023-09-01 DIAGNOSIS — E78.5 DYSLIPIDEMIA WITH HIGH LDL AND LOW HDL: ICD-10-CM

## 2023-09-01 PROBLEM — J96.00 ACUTE RESPIRATORY FAILURE (HCC): Status: RESOLVED | Noted: 2023-07-07 | Resolved: 2023-09-01

## 2023-09-01 PROCEDURE — 99214 OFFICE O/P EST MOD 30 MIN: CPT

## 2023-09-01 PROCEDURE — 3078F DIAST BP <80 MM HG: CPT

## 2023-09-01 PROCEDURE — 99212 OFFICE O/P EST SF 10 MIN: CPT

## 2023-09-01 PROCEDURE — 3074F SYST BP LT 130 MM HG: CPT

## 2023-09-01 RX ORDER — WARFARIN SODIUM 3 MG/1
TABLET ORAL
Qty: 30 TABLET | Refills: 2 | OUTPATIENT
Start: 2023-09-01

## 2023-09-01 ASSESSMENT — ENCOUNTER SYMPTOMS
GASTROINTESTINAL NEGATIVE: 1
DEPRESSION: 0
BLURRED VISION: 1
CONSTITUTIONAL NEGATIVE: 1
DIZZINESS: 1
PALPITATIONS: 0
MUSCULOSKELETAL NEGATIVE: 1
NERVOUS/ANXIOUS: 0
SHORTNESS OF BREATH: 0
PND: 0
ORTHOPNEA: 0

## 2023-09-01 ASSESSMENT — FIBROSIS 4 INDEX: FIB4 SCORE: 0.76

## 2023-09-01 NOTE — PROGRESS NOTES
Chief Complaint   Patient presents with    Hypertension    Other     F/V Dx: History of heart valve replacement with bioprosthetic valve  Severe mitral regurgitation    Mitral valve prolapse          Thiago Avila is a 78 y.o. male who presents today for follow-up of his symptoms. They have a history of severe MR, hypertension, hyperlipidemia, postoperative A-fib     He underwent mitral valve replacement with (31 mm Mitris Cagle bovine pericardial valve) and left atrial appendage ligation/excision on 7/6/2023 with Dr. Neri.  Discharged on 7/12/2023.  He did develop small pleural effusions during his hospital course     They are accompanied today by his wife Jillian     8/2/2023 feeling overall well, he is still coughing. He can feel the fluid on his lung. He is able to go on walks without symptoms.  At that visit I had him continue his Lasix and repeat chest x-ray.  Chest x-ray on 8/18/2023 showed decrease in size of the left pleural effusion and bilateral lung atelectasis.  Ordered heart monitor for him, results are pending    Today 9/1/2023: Since her last visit he has been having difficulty with the Lasix, we have titrated multiple times. He has an issue with orthostatic hypotension at cardiac rehab. He has had some occular migraines, bran fog. He is walking about 2-3 miles a day         Past Medical History:   Diagnosis Date    Arrhythmia 2017    Arthritis 1989    thumbs spine neck    Breath shortness 2022    fatigued after 4 miles    Cancer (HCC) 2022    skin melanoma left jaw    Dyslipidemia 12/14/2018    Essential hypertension 09/12/2013    Heart murmur 1980    Mitral valve prolapse 09/12/2013    Non-rheumatic mitral regurgitation 09/12/2013    Obstructive uropathy 01/25/2021    Renal disorder     imnpaired function pt states two benign cysts on right kidney    Snoring      Past Surgical History:   Procedure Laterality Date    MITRAL VALVE REPLACE  7/6/2023    Procedure: REPLACEMENT, MITRAL  "VALVE, LEFT ATRIAL APPENDAGE LIGATION;  Surgeon: Jacqueline Neri M.D.;  Location: SURGERY [facility];  Service: Cardiothoracic    ECHOCARDIOGRAM, TRANSESOPHAGEAL, INTRAOPERATIVE  2023    Procedure: ECHOCARDIOGRAM, TRANSESOPHAGEAL, INTRAOPERATIVE;  Surgeon: Jacqueline Neri M.D.;  Location: SURGERY [facility];  Service: Cardiothoracic    FISTULA IN ANO REPAIR Left     OTHER  2019    Fistula    OTHER      tonsils     Family History   Problem Relation Age of Onset    Cancer Mother         Ovarian cancer    Lung Disease Father         Microbacterium intercellulare avium    Heart Disease Neg Hx      Social History     Socioeconomic History    Marital status:      Spouse name: Not on file    Number of children: Not on file    Years of education: Not on file    Highest education level: Not on file   Occupational History    Not on file   Tobacco Use    Smoking status: Former     Current packs/day: 0.00     Types: Cigarettes     Start date: 1966     Quit date: 1966     Years since quittin.0     Passive exposure: Never    Smokeless tobacco: Never   Vaping Use    Vaping Use: Never used   Substance and Sexual Activity    Alcohol use: Not Currently     Comment: \" about an 1 oz red wine for my health everyday    Drug use: No    Sexual activity: Not on file     Comment:    Other Topics Concern    Not on file   Social History Narrative    Not on file     Social Determinants of Health     Financial Resource Strain: Not on file   Food Insecurity: Not on file   Transportation Needs: Not on file   Physical Activity: Not on file   Stress: Not on file   Social Connections: Feeling Socially Integrated (8/10/2023)    OASIS : Social Isolation     Frequency of experiencing loneliness or isolation: Never   Intimate Partner Violence: Not on file   Housing Stability: Not on file     No Known Allergies  Outpatient Encounter Medications as of 2023   Medication Sig Dispense Refill    " Carboxymethylcellulose Sodium (ARTIFICIAL TEARS OP) Administer 1 Drop into both eyes at bedtime as needed (dry eyes). Indications: DRY EYES (INACTIVE)      metoprolol tartrate (LOPRESSOR) 25 MG Tab Take 1 Tablet by mouth 2 times a day. DO NOT TAKE IF HR IS LESS THAN 60 OR IF SBP IS LESS THAN 100  Indications: Atrial Fibrillation 200 Tablet 3    warfarin (COUMADIN) 3 MG Tab Take 1 Tablet by mouth every day at 6 PM. (Patient taking differently: Take 3 mg by mouth every day at 6 PM. Depending on INR levels  Indications: s/p mitral valve placement) 30 Tablet 3    Ascorbic Acid (VITAMIN C) 500 MG Chew Tab Chew 500 mg every day. Indications: supplement      docusate sodium (COLACE) 100 MG Cap Take 100 mg by mouth every day. Indications: Constipation      Magnesium 250 MG Tab Take 125 mg by mouth every day. Indications: supplement      VITAMIN D PO Take 6,000 Units by mouth every day. Indications: supplement      [DISCONTINUED] potassium chloride (MICRO-K) 10 MEQ capsule Take 1 Capsule by mouth every day. 90 Capsule 3    [DISCONTINUED] furosemide (LASIX) 20 MG Tab Take 2 Tablets by mouth every day for 5 days, THEN 1 Tablet every day for 90 days. Indications: Edema (Patient taking differently: 1 Tablet every day for 90 days. Indications: Edema) 90 Tablet 3     No facility-administered encounter medications on file as of 9/1/2023.     Review of Systems   Constitutional: Negative.    HENT: Negative.     Eyes:  Positive for blurred vision.   Respiratory:  Negative for shortness of breath.    Cardiovascular:  Negative for chest pain, palpitations, orthopnea, leg swelling and PND.   Gastrointestinal: Negative.    Genitourinary: Negative.    Musculoskeletal: Negative.    Skin: Negative.    Neurological:  Positive for dizziness.   Endo/Heme/Allergies: Negative.    Psychiatric/Behavioral:  Negative for depression. The patient is not nervous/anxious.               Objective     /74 (BP Location: Right arm, Patient Position:  "Standing, BP Cuff Size: Adult)   Pulse 80   Resp 18   Ht 1.803 m (5' 11\")   Wt 81.7 kg (180 lb 1.6 oz)   SpO2 96%   BMI 25.12 kg/m²     Physical Exam  Constitutional:       Appearance: Normal appearance.   HENT:      Head: Normocephalic.   Neck:      Vascular: No JVD.   Cardiovascular:      Rate and Rhythm: Normal rate and regular rhythm.      Pulses: Normal pulses.      Heart sounds: Normal heart sounds. No murmur heard.     No friction rub.   Pulmonary:      Effort: Pulmonary effort is normal.      Breath sounds: Normal breath sounds.   Abdominal:      Palpations: Abdomen is soft.   Musculoskeletal:         General: Normal range of motion.      Right lower leg: No edema.      Left lower leg: No edema.   Skin:     General: Skin is warm and dry.   Neurological:      General: No focal deficit present.      Mental Status: He is alert and oriented to person, place, and time.   Psychiatric:         Mood and Affect: Mood normal.         Behavior: Behavior normal.              Assessment & Plan     1. History of heart valve replacement with bioprosthetic valve        2. Atrial fibrillation, unspecified type (HCC)        3. Essential hypertension        4. Dyslipidemia with high LDL and low HDL            Medical Decision Making: Today's Assessment/Status/Plan:        Status post MVR LAAL on 7/6/2023  -Doing well postop, he is frustrated with the speed of his recovery, has been having some use with his vision for which I recommended he follow-up with optometry  -Some orthostatic hypotension at cardiac rehab, we have discontinued the Lasix, I suspect he may have been over diuresed.  Orthostatics in the office were normal  -Continue BB: metoprolol 25 mg BID  -echo scheduled for 9/4/2023  - continue cardiac rehab      Atrial Fibrillation  -Continue metoprolol, warfarin for now, if no recurrence of A-fib on monitor could discontinue both     Hypertension  -Good control, continue metoprolol 25 mg twice daily, was " previously on losartan and was happy with that, we will wait and see what his heart monitor results are before discontinuing metoprolol  - goal < 130/80     Hyperlipidemia  -Most recent   -Continue lifestyle modifications  -Goal of less than 100  -Check lipid panel annually     Follow up with RENETTA Gastelum in 3 months     This note was dictated using Dragon speech recognition software.

## 2023-09-04 ENCOUNTER — HOSPITAL ENCOUNTER (OUTPATIENT)
Dept: CARDIOLOGY | Facility: MEDICAL CENTER | Age: 78
End: 2023-09-04
Payer: MEDICARE

## 2023-09-04 DIAGNOSIS — Z95.3 HISTORY OF HEART VALVE REPLACEMENT WITH BIOPROSTHETIC VALVE: ICD-10-CM

## 2023-09-04 LAB
LV EJECT FRACT MOD 2C 99903: 60.66
LV EJECT FRACT MOD 4C 99902: 67.28
LV EJECT FRACT MOD BP 99901: 66.87

## 2023-09-04 PROCEDURE — 93306 TTE W/DOPPLER COMPLETE: CPT | Mod: 26 | Performed by: INTERNAL MEDICINE

## 2023-09-04 PROCEDURE — 93306 TTE W/DOPPLER COMPLETE: CPT

## 2023-09-05 ENCOUNTER — ANTICOAGULATION VISIT (OUTPATIENT)
Dept: VASCULAR LAB | Facility: MEDICAL CENTER | Age: 78
End: 2023-09-05
Attending: INTERNAL MEDICINE
Payer: MEDICARE

## 2023-09-05 ENCOUNTER — NON-PROVIDER VISIT (OUTPATIENT)
Dept: CARDIOLOGY | Facility: MEDICAL CENTER | Age: 78
End: 2023-09-05
Payer: MEDICARE

## 2023-09-05 VITALS — SYSTOLIC BLOOD PRESSURE: 127 MMHG | DIASTOLIC BLOOD PRESSURE: 84 MMHG | HEART RATE: 60 BPM

## 2023-09-05 DIAGNOSIS — Z95.3 HISTORY OF HEART VALVE REPLACEMENT WITH BIOPROSTHETIC VALVE: ICD-10-CM

## 2023-09-05 DIAGNOSIS — Z95.4 S/P MITRAL VALVE ALLOGRAFT: ICD-10-CM

## 2023-09-05 LAB — INR PPP: 1.8 (ref 2–3.5)

## 2023-09-05 PROCEDURE — 99212 OFFICE O/P EST SF 10 MIN: CPT

## 2023-09-05 PROCEDURE — G0422 INTENS CARDIAC REHAB W/EXERC: HCPCS | Performed by: INTERNAL MEDICINE

## 2023-09-05 PROCEDURE — 85610 PROTHROMBIN TIME: CPT

## 2023-09-05 PROCEDURE — G0423 INTENS CARDIAC REHAB NO EXER: HCPCS | Mod: 59 | Performed by: INTERNAL MEDICINE

## 2023-09-05 NOTE — PROGRESS NOTES
Anticoagulation Summary  As of 2023      INR goal:  2.0-3.0   TTR:  49.7 % (1.2 mo)   INR used for dosin.80 (2023)   Warfarin maintenance plan:  1.5 mg (3 mg x 0.5) every Tue, Fri; 3 mg (3 mg x 1) all other days   Weekly warfarin total:  18 mg   Plan last modified:  Sasha Tierney PharmD (2023)   Next INR check:  2023   Target end date:  10/6/2023    Indications    History of heart valve replacement with bioprosthetic valve [Z95.3]                 Anticoagulation Episode Summary       INR check location:      Preferred lab:      Send INR reminders to:  AMB ANTICOAG POOL    Comments:  S/P mitral valve replacement          Anticoagulation Care Providers       Provider Role Specialty Phone number    [facility]   [phone]                  Refer to Patient Findings for HPI:      Vitals:    23 0924   BP: 127/84   Pulse: 60       Verified current warfarin dosing schedule.    Medications reconciled: Yes  Pt is not on antiplatelet therapy      A/P   INR is subtherapeutic    Warfarin dosing recommendation: Bolus today then Pt is to continue with current warfarin dosing regimen.     Pt educated to contact our clinic with any changes in medications or s/s of bleeding or thrombosis. Pt is aware to seek immediate medical attention for falls, head injury or deep cuts.    Follow up appointment in 1 week(s).    Huseyin Brown, JobyD

## 2023-09-05 NOTE — PROGRESS NOTES
Selma Avila attended Intensive Cardiac Rehab today from 1600 to 1800. During his time he exercised and attended class.   His education today was a video titled: Hypertension and Heart Disease. Patient received handouts and class discussion pertaining to the topic.

## 2023-09-06 ENCOUNTER — APPOINTMENT (OUTPATIENT)
Dept: CARDIOLOGY | Facility: MEDICAL CENTER | Age: 78
End: 2023-09-06
Payer: MEDICARE

## 2023-09-08 ENCOUNTER — TELEPHONE (OUTPATIENT)
Dept: CARDIOLOGY | Facility: MEDICAL CENTER | Age: 78
End: 2023-09-08
Payer: MEDICARE

## 2023-09-11 PROCEDURE — 93248 EXT ECG>7D<15D REV&INTERPJ: CPT | Performed by: INTERNAL MEDICINE

## 2023-09-12 ENCOUNTER — ANTICOAGULATION VISIT (OUTPATIENT)
Dept: VASCULAR LAB | Facility: MEDICAL CENTER | Age: 78
End: 2023-09-12
Attending: INTERNAL MEDICINE
Payer: MEDICARE

## 2023-09-12 ENCOUNTER — NON-PROVIDER VISIT (OUTPATIENT)
Dept: CARDIOLOGY | Facility: MEDICAL CENTER | Age: 78
End: 2023-09-12
Payer: MEDICARE

## 2023-09-12 VITALS — HEART RATE: 66 BPM | DIASTOLIC BLOOD PRESSURE: 73 MMHG | SYSTOLIC BLOOD PRESSURE: 115 MMHG

## 2023-09-12 DIAGNOSIS — Z95.4 S/P MITRAL VALVE ALLOGRAFT: ICD-10-CM

## 2023-09-12 DIAGNOSIS — Z95.3 HISTORY OF HEART VALVE REPLACEMENT WITH BIOPROSTHETIC VALVE: ICD-10-CM

## 2023-09-12 LAB — INR PPP: 2.2 (ref 2–3.5)

## 2023-09-12 PROCEDURE — 85610 PROTHROMBIN TIME: CPT

## 2023-09-12 PROCEDURE — 99212 OFFICE O/P EST SF 10 MIN: CPT

## 2023-09-12 PROCEDURE — G0423 INTENS CARDIAC REHAB NO EXER: HCPCS | Mod: 59 | Performed by: INTERNAL MEDICINE

## 2023-09-12 PROCEDURE — G0422 INTENS CARDIAC REHAB W/EXERC: HCPCS | Performed by: INTERNAL MEDICINE

## 2023-09-12 NOTE — PROGRESS NOTES
Anticoagulation Summary  As of 2023      INR goal:  2.0-3.0   TTR:  49.7 % (1.5 mo)   INR used for dosin.20 (2023)   Warfarin maintenance plan:  1.5 mg (3 mg x 0.5) every Fri; 3 mg (3 mg x 1) all other days   Weekly warfarin total:  19.5 mg   Plan last modified:  Joby MahoneyD (2023)   Next INR check:  2023   Target end date:  10/6/2023    Indications    History of heart valve replacement with bioprosthetic valve [Z95.3]                 Anticoagulation Episode Summary       INR check location:      Preferred lab:      Send INR reminders to:  AMB ANTICOAG POOL    Comments:  S/P mitral valve replacement          Anticoagulation Care Providers       Provider Role Specialty Phone number    [facility]   [phone]                  Refer to Patient Findings for HPI:      Vitals:    23 1033   BP: 115/73   Pulse: 66        Verified current warfarin dosing schedule.    Medications reconciled: Yes  Pt is not on antiplatelet therapy.      A/P   INR is therapeutic    Warfarin dosing recommendation: Begin increased plan to match TWD.    Pt educated to contact our clinic with any changes in medications or s/s of bleeding or thrombosis. Pt is aware to seek immediate medical attention for falls, head injury or deep cuts.    Follow up appointment in 1 week(s).    Huseyin Brown, JobyD

## 2023-09-13 ENCOUNTER — NON-PROVIDER VISIT (OUTPATIENT)
Dept: CARDIOLOGY | Facility: MEDICAL CENTER | Age: 78
End: 2023-09-13
Payer: MEDICARE

## 2023-09-13 DIAGNOSIS — Z95.4 S/P MITRAL VALVE ALLOGRAFT: ICD-10-CM

## 2023-09-13 PROCEDURE — G0423 INTENS CARDIAC REHAB NO EXER: HCPCS | Mod: 59 | Performed by: INTERNAL MEDICINE

## 2023-09-13 PROCEDURE — G0422 INTENS CARDIAC REHAB W/EXERC: HCPCS | Performed by: INTERNAL MEDICINE

## 2023-09-13 NOTE — PROGRESS NOTES
Selma Avila attended Intensive Cardiac Rehab today from 1600 to 1800. During his time he exercised and attended class.   His education today was a cooking school titled: powerhouse proteins. Patient received handouts and class discussion pertaining to the topic.

## 2023-09-13 NOTE — PROGRESS NOTES
Selma Avila attended Intensive Cardiac Rehab today from 1600 to 1800. During his time he exercised and attended class.   His education today was a video titled: Reading Labels. Patient received handouts and class discussion pertaining to the topic.

## 2023-09-14 ENCOUNTER — NON-PROVIDER VISIT (OUTPATIENT)
Dept: CARDIOLOGY | Facility: MEDICAL CENTER | Age: 78
End: 2023-09-14
Payer: MEDICARE

## 2023-09-14 DIAGNOSIS — Z95.4 S/P MITRAL VALVE ALLOGRAFT: ICD-10-CM

## 2023-09-14 PROCEDURE — G0423 INTENS CARDIAC REHAB NO EXER: HCPCS | Mod: 59 | Performed by: INTERNAL MEDICINE

## 2023-09-14 PROCEDURE — G0422 INTENS CARDIAC REHAB W/EXERC: HCPCS | Performed by: INTERNAL MEDICINE

## 2023-09-14 NOTE — PROGRESS NOTES
Selma Avila attended Intensive Cardiac Rehab today from 1600 to 1800. During his time he exercised and attended class.   His education today was a lecture titled: Label Reading. Patient received handouts and class discussion pertaining to the topic.

## 2023-09-19 ENCOUNTER — ANTICOAGULATION VISIT (OUTPATIENT)
Dept: VASCULAR LAB | Facility: MEDICAL CENTER | Age: 78
End: 2023-09-19
Attending: INTERNAL MEDICINE
Payer: MEDICARE

## 2023-09-19 ENCOUNTER — NON-PROVIDER VISIT (OUTPATIENT)
Dept: CARDIOLOGY | Facility: MEDICAL CENTER | Age: 78
End: 2023-09-19
Payer: MEDICARE

## 2023-09-19 DIAGNOSIS — Z95.4 S/P MITRAL VALVE ALLOGRAFT: ICD-10-CM

## 2023-09-19 DIAGNOSIS — Z95.3 HISTORY OF HEART VALVE REPLACEMENT WITH BIOPROSTHETIC VALVE: ICD-10-CM

## 2023-09-19 LAB — INR PPP: 1.9 (ref 2–3.5)

## 2023-09-19 PROCEDURE — 99212 OFFICE O/P EST SF 10 MIN: CPT

## 2023-09-19 PROCEDURE — G0423 INTENS CARDIAC REHAB NO EXER: HCPCS | Mod: 59 | Performed by: INTERNAL MEDICINE

## 2023-09-19 PROCEDURE — 85610 PROTHROMBIN TIME: CPT

## 2023-09-19 PROCEDURE — G0422 INTENS CARDIAC REHAB W/EXERC: HCPCS | Performed by: INTERNAL MEDICINE

## 2023-09-19 NOTE — PROGRESS NOTES
Anticoagulation Summary  As of 2023      INR goal:  2.0-3.0   TTR:  52.0 % (1.7 mo)   INR used for dosin.90 (2023)   Warfarin maintenance plan:  3 mg (3 mg x 1) every day   Weekly warfarin total:  21 mg   Plan last modified:  Cori Lopes PharmD (2023)   Next INR check:  2023   Target end date:  10/6/2023    Indications    History of heart valve replacement with bioprosthetic valve [Z95.3]                 Anticoagulation Episode Summary       INR check location:      Preferred lab:      Send INR reminders to:  AMB ANTICOAG POOL    Comments:  S/P mitral valve replacement          Anticoagulation Care Providers       Provider Role Specialty Phone number    [facility]   [phone]             Refer to Patient Findings for HPI:  Patient Findings       Negatives:  Signs/symptoms of thrombosis, Signs/symptoms of bleeding, Laboratory test error suspected, Change in health, Change in alcohol use, Change in activity, Upcoming invasive procedure, Emergency department visit, Upcoming dental procedure, Missed doses, Extra doses, Change in medications, Change in diet/appetite, Hospital admission, Bruising, Other complaints            There were no vitals filed for this visit.  Pt declined vitals    Verified current warfarin dosing schedule.    Medications reconciled: Yes  Pt is not on antiplatelet therapy      A/P   INR is slightly subtherapeutic despite recent dose increase. Will increase regimen further.    Warfarin dosing recommendation: Increase to 3 mg daily    Pt educated to contact our clinic with any changes in medications or s/s of bleeding or thrombosis. Pt is aware to seek immediate medical attention for falls, head injury or deep cuts.    Follow up appointment in 1 week(s).    Cori Lopes PharmD

## 2023-09-20 ENCOUNTER — NON-PROVIDER VISIT (OUTPATIENT)
Dept: CARDIOLOGY | Facility: MEDICAL CENTER | Age: 78
End: 2023-09-20
Payer: MEDICARE

## 2023-09-20 DIAGNOSIS — Z95.4 S/P MITRAL VALVE ALLOGRAFT: ICD-10-CM

## 2023-09-20 PROCEDURE — G0423 INTENS CARDIAC REHAB NO EXER: HCPCS | Mod: 59 | Performed by: INTERNAL MEDICINE

## 2023-09-20 PROCEDURE — G0422 INTENS CARDIAC REHAB W/EXERC: HCPCS | Performed by: INTERNAL MEDICINE

## 2023-09-20 NOTE — PROGRESS NOTES
Selma Avila attended Intensive Cardiac Rehab today from 1600 to 1800. During his time he exercised and attended class.   His education today was a cooking class titled: fast evening meals. Patient received handouts and class discussion pertaining to the topic.

## 2023-09-20 NOTE — PROGRESS NOTES
Selma Avila attended Intensive Cardiac Rehab today from 1600 to 1800. During his time he exercised and attended class.   His education today was a video titled: Intro To Yoga. Patient received handouts and class discussion pertaining to the topic.

## 2023-09-21 ENCOUNTER — PATIENT MESSAGE (OUTPATIENT)
Dept: CARDIOLOGY | Facility: MEDICAL CENTER | Age: 78
End: 2023-09-21

## 2023-09-21 ENCOUNTER — NON-PROVIDER VISIT (OUTPATIENT)
Dept: CARDIOLOGY | Facility: MEDICAL CENTER | Age: 78
End: 2023-09-21
Payer: MEDICARE

## 2023-09-21 DIAGNOSIS — I10 ESSENTIAL HYPERTENSION: ICD-10-CM

## 2023-09-21 DIAGNOSIS — Z95.4 S/P MITRAL VALVE ALLOGRAFT: ICD-10-CM

## 2023-09-21 PROCEDURE — G0422 INTENS CARDIAC REHAB W/EXERC: HCPCS | Performed by: INTERNAL MEDICINE

## 2023-09-21 PROCEDURE — G0423 INTENS CARDIAC REHAB NO EXER: HCPCS | Mod: 59 | Performed by: INTERNAL MEDICINE

## 2023-09-21 RX ORDER — LOSARTAN POTASSIUM 100 MG/1
100 TABLET ORAL DAILY
Qty: 90 TABLET | Refills: 3 | Status: SHIPPED | OUTPATIENT
Start: 2023-09-21

## 2023-09-21 NOTE — PATIENT COMMUNICATION
SAMANTHA Mercer.  You 2 minutes ago (4:34 PM)        His heart monitor did not show any atrial fibrillation recurrence, at this point he would be okay to stop taking the warfarin and the metoprolol both.  He was previously taking losartan for his blood pressure, at this time I would recommend that he restart his losartan at his previous dose, okay to reorder it for him if he needs refills.     That being said the side effects that he listed are infrequently associated with either metoprolol or Coumadin.  I would suggest that he follow-up with his PCP regarding the symptoms        Message sent to patient. Medication list updated.

## 2023-09-21 NOTE — TELEPHONE ENCOUNTER
His heart monitor did not show any atrial fibrillation recurrence, at this point he would be okay to stop taking the warfarin and the metoprolol both.  He was previously taking losartan for his blood pressure, at this time I would recommend that he restart his losartan at his previous dose, okay to reorder it for him if he needs refills.    That being said the side effects that he listed are infrequently associated with either metoprolol or Coumadin.  I would suggest that he follow-up with his PCP regarding the symptoms

## 2023-09-21 NOTE — PATIENT COMMUNICATION
To LH: Please see patient's MyChart with side effects/concerns and advise any recommendations. Thank you!

## 2023-09-22 NOTE — PROGRESS NOTES
Nancyal Jenny Avila attended Intensive Cardiac Rehab today from 1600 to 1800. During his time he exercised and attended class.   His education today was a workshop titled: naty. Patient received handouts and class discussion pertaining to the topic.

## 2023-09-25 ENCOUNTER — PATIENT MESSAGE (OUTPATIENT)
Dept: CARDIOLOGY | Facility: MEDICAL CENTER | Age: 78
End: 2023-09-25
Payer: MEDICARE

## 2023-09-25 RX ORDER — ASPIRIN 81 MG/1
81 TABLET ORAL DAILY
COMMUNITY

## 2023-09-25 NOTE — TELEPHONE ENCOUNTER
Okay, can you please have him write down his BP for 2 weeks and then send him a message so we can get a better overall picture?

## 2023-09-25 NOTE — TELEPHONE ENCOUNTER
Have all of his blood pressures been this high?     Also please add the ASA 81 mg daily to his med list. Okay to resume fish oil, but please let him know that it can increase his risk of bleeding while taking aspirin

## 2023-09-26 ENCOUNTER — NON-PROVIDER VISIT (OUTPATIENT)
Dept: CARDIOLOGY | Facility: MEDICAL CENTER | Age: 78
End: 2023-09-26
Payer: MEDICARE

## 2023-09-26 ENCOUNTER — ANTICOAGULATION VISIT (OUTPATIENT)
Dept: VASCULAR LAB | Facility: MEDICAL CENTER | Age: 78
End: 2023-09-26
Attending: INTERNAL MEDICINE
Payer: MEDICARE

## 2023-09-26 VITALS — DIASTOLIC BLOOD PRESSURE: 78 MMHG | SYSTOLIC BLOOD PRESSURE: 116 MMHG | HEART RATE: 91 BPM

## 2023-09-26 DIAGNOSIS — Z95.4 S/P MITRAL VALVE ALLOGRAFT: ICD-10-CM

## 2023-09-26 DIAGNOSIS — Z95.3 HISTORY OF HEART VALVE REPLACEMENT WITH BIOPROSTHETIC VALVE: ICD-10-CM

## 2023-09-26 LAB — INR PPP: 1.4 (ref 2–3.5)

## 2023-09-26 PROCEDURE — 99211 OFF/OP EST MAY X REQ PHY/QHP: CPT

## 2023-09-26 PROCEDURE — G0422 INTENS CARDIAC REHAB W/EXERC: HCPCS | Performed by: INTERNAL MEDICINE

## 2023-09-26 PROCEDURE — 3078F DIAST BP <80 MM HG: CPT

## 2023-09-26 PROCEDURE — 3074F SYST BP LT 130 MM HG: CPT

## 2023-09-26 PROCEDURE — G0423 INTENS CARDIAC REHAB NO EXER: HCPCS | Mod: 59 | Performed by: INTERNAL MEDICINE

## 2023-09-26 PROCEDURE — 85610 PROTHROMBIN TIME: CPT

## 2023-09-26 NOTE — PROGRESS NOTES
Selma Avila attended Intensive Cardiac Rehab today from 1500 to 1700. During his time he exercised and attended a one on one meeting with the Registered Dietitian.

## 2023-09-26 NOTE — PROGRESS NOTES
Anticoagulation Summary  As of 2023      INR goal:     TTR:  45.8 % (1.9 mo)   Prior goal:  2.0-3.0   INR used for dosin.40 (2023)   Warfarin maintenance plan:  No maintenance plan   Plan last modified:  Cori Lopes PharmD (2023)   Target end date:  10/6/2023    Indications    History of heart valve replacement with bioprosthetic valve [Z95.3]                 Anticoagulation Episode Summary       INR check location:      Preferred lab:      Resolved date:  2023    Resolved reason:  Therapy Completed    Send INR reminders to:  AMB ANTICOAG POOL    Comments:  S/P mitral valve replacement          Anticoagulation Care Providers       Provider Role Specialty Phone number    [facility]   [phone]          Refer to Patient Findings for HPI:  Patient Findings       Positives:  Missed doses (patient was instructed to go off of warfarin on friday due to cognitive issues)    Negatives:  Signs/symptoms of thrombosis, Signs/symptoms of bleeding, Laboratory test error suspected, Change in health, Change in alcohol use, Change in activity, Upcoming invasive procedure, Emergency department visit, Upcoming dental procedure, Extra doses, Change in medications, Change in diet/appetite, Hospital admission, Bruising, Other complaints          Vitals:    23 0855   BP: 116/78   Pulse: 91       Patient seen in clinic today for follow up on anticoagulation therapy with warfarin (a high risk medication) for hx of MVR  Verified current warfarin dosing schedule.    Patient reports he was having some severe mental issues causing him to speak with Dana JACKSON from cardiology about stopping his medications as he was unsure which was causing it. They decided to STOP warfarin early and he is NO LONGER taking warfarin and has transitioned to ASA 81mg QD.     Medications reconciled       A/P   INR is SUB-therapeutic today at 1.4.     Warfarin dosing recommendation: Patient  was instructed to STOP warfarin (slightly earlier than LOT date) by cardiology and has since transitioned to ASA 81mg QD.  He will be discharged from our clinic.     Pt educated to contact our clinic with any changes in medications or s/s of bleeding or thrombosis. Pt is aware to seek immediate medical attention for falls, head injury or deep cuts.      Wisam HemphillD

## 2023-09-27 ENCOUNTER — NON-PROVIDER VISIT (OUTPATIENT)
Dept: CARDIOLOGY | Facility: MEDICAL CENTER | Age: 78
End: 2023-09-27
Payer: MEDICARE

## 2023-09-27 DIAGNOSIS — Z95.4 S/P MITRAL VALVE ALLOGRAFT: ICD-10-CM

## 2023-09-27 PROCEDURE — G0422 INTENS CARDIAC REHAB W/EXERC: HCPCS | Performed by: INTERNAL MEDICINE

## 2023-09-27 PROCEDURE — G0423 INTENS CARDIAC REHAB NO EXER: HCPCS | Mod: 59 | Performed by: INTERNAL MEDICINE

## 2023-09-27 NOTE — PROGRESS NOTES
Selma Avila attended Intensive Cardiac Rehab today from 1600 to 1800. During his time he exercised and attended class.   His education today was a cooking school titled: comforting breakfasts. Patient received handouts and class discussion pertaining to the topic.

## 2023-09-28 ENCOUNTER — NON-PROVIDER VISIT (OUTPATIENT)
Dept: CARDIOLOGY | Facility: MEDICAL CENTER | Age: 78
End: 2023-09-28
Payer: MEDICARE

## 2023-09-28 VITALS
SYSTOLIC BLOOD PRESSURE: 115 MMHG | HEIGHT: 72 IN | WEIGHT: 180.8 LBS | BODY MASS INDEX: 24.49 KG/M2 | DIASTOLIC BLOOD PRESSURE: 83 MMHG | HEART RATE: 90 BPM

## 2023-09-28 DIAGNOSIS — Z95.4 S/P MITRAL VALVE ALLOGRAFT: ICD-10-CM

## 2023-09-28 PROCEDURE — G0422 INTENS CARDIAC REHAB W/EXERC: HCPCS | Performed by: INTERNAL MEDICINE

## 2023-09-28 PROCEDURE — G0423 INTENS CARDIAC REHAB NO EXER: HCPCS | Mod: 59 | Performed by: INTERNAL MEDICINE

## 2023-09-28 ASSESSMENT — FIBROSIS 4 INDEX: FIB4 SCORE: 0.76

## 2023-09-28 NOTE — PROGRESS NOTES
Nutrition Consult Note by:    Char Garcia RD, LDN  Patient name: Selma Avila  Date of visit:  09/26/23     Start Time:1700 End Time: 1755  Referring MD: ANDREA      Referring diagnosis: MVR          Diagnosis code: Z95.4    Selma Avila is here today for Intensive Cardiac Rehab. Today the patient had their one-on-one RD appointment. This program includes Nutrition education and counseling following the Pritikin guidelines, which promote whole foods-fruits, vegetables, beans/legumes, whole grains, reduced saturated fats with lean animal protein and reduced fat dairy-while avoiding added salt, processed foods with excessive added sugars/salt/fat, trans/saturated fat, added sugar, tropical oils and heavy use of added oils.     Past Medical History:   HTN, HLD, PREDIABETES, AFIB, PE, NEPHROTIC CYSTS    Nutrition-related Medications/Supplements:  Vit C, ASA, COLACE, COZAAR, MG, VIT D, LASIX, METOPROLOL    NUTRITION ASSESSMENT    Anthropometrics:  Male/Female:  MALE    Age: 78  Height: 72 IN     Weight: 180.8#     BMI: 24.52                BMI Class: WNL  Goal weight: CBW    Protein Needs 80g daily     BMI is NOT an accurate assessment tool for health    Nutrition-Related Labs:  Date: 09/26/23           BP: 115/83 Heart Rate: 90               Date: 07/31/23  Glucose: 95    Date: 07/03/23  HbA1c: 5.3, 5.5, 5.7                                                                                                                    Date:    Total Cholesterol                                                                                            Triglycerides:                                                                                                            HDL:                                                                                            LDL:                                                                                                     Date: 07/31/23      eGFR:  64    Calcium:    8.9         Vit D:     PO4:       B12:       Potassium:  4.4    Other :      Digestive s/s:    constipation; takes colace    Social History:    Current tobacco use (Y/N): N    ETOH (Y/N): Y        If yes, frequency & variety: ONCE A MONTH   Typical hours of sleep per night: 7   Primary Food  and/or preparer in the household: spouse   Level of social/familial support? Very good     Diet Recall and Relevant Hx:    Breakfast: Cheerios, shredded wheat w/fruit, oatmeal, ww Citizen of [place] toast    Lunch: ham and eggs with rice and beans, cheese sand on ww, w/unsweet applesauce, leftovers    Dinner: vegetarian enchiladas, chicken twice a week, veg lasagna, potstickers, homemade minestrone, bean salad    Snacks: fruit, fig bar, nuts    Beverages: almond milk low sugar, juice, water 60oz, kombucha, cinnamon black tea     Diet change recommendations: plant forward for kidney health, reduce saturated fats    Current Exercise:  45-60mins at [facility] x3 days per week   OTHER: stairs at home 20 minutes; waling 2-3 miles, bands and weights    Physical Activity:            Moderate (4-5x/wk)        Positive Changes Since Beginning the Program:   1. Increase in strength    Barriers to Change/Biggest Struggles:  1. Shoulder pain, memory issues     Nutrition SMART Goals:  1. Water intake Recommended: 90 oz daily   2. Reduce animal protein to aid with kidney health  3. Whole Grains/beans/legumes/seeds/starchy vegetables at 1/2 cup servings 5x day      Exercise SMART Goals:  1. 6 sets of 20 pushups, 5 sets of 10 chin-ups, rope climbing 20ft    Educational Handouts Provide  Pritikin Eating Plan w/ Healthy Plate method     Benefits of drinking water   Food Sources High in Omega 3 FA          Food Sources high in Fiber         Heart healthy snack ideas 20 ways to increase fruit & vegetable intake    Protein Content of common foods      Nutrition Diagnosis:   Problem:  Nutrition-related knowledge deficit    Related to: Cardiac and kidney function      As  Evidenced by: diet recall and concerns brought up by patient for kidney health     Follow Up PRN

## 2023-09-28 NOTE — PROGRESS NOTES
Nancyal Jenny Avila attended Intensive Cardiac Rehab today from 1600 to 1800. During his time he exercised and attended class.   His education today was a workshop titled: dining out. Patient received handouts and class discussion pertaining to the topic.

## 2023-09-29 ENCOUNTER — HOSPITAL ENCOUNTER (OUTPATIENT)
Dept: RADIOLOGY | Facility: MEDICAL CENTER | Age: 78
End: 2023-09-29
Attending: STUDENT IN AN ORGANIZED HEALTH CARE EDUCATION/TRAINING PROGRAM
Payer: MEDICARE

## 2023-09-29 DIAGNOSIS — N28.1 ACQUIRED CYST OF KIDNEY: ICD-10-CM

## 2023-09-29 PROCEDURE — 74176 CT ABD & PELVIS W/O CONTRAST: CPT

## 2023-10-02 ENCOUNTER — OFFICE VISIT (OUTPATIENT)
Dept: MEDICAL GROUP | Facility: MEDICAL CENTER | Age: 78
End: 2023-10-02
Payer: MEDICARE

## 2023-10-02 VITALS
WEIGHT: 186.6 LBS | OXYGEN SATURATION: 97 % | DIASTOLIC BLOOD PRESSURE: 72 MMHG | TEMPERATURE: 97.3 F | HEART RATE: 83 BPM | HEIGHT: 72 IN | SYSTOLIC BLOOD PRESSURE: 114 MMHG | BODY MASS INDEX: 25.27 KG/M2

## 2023-10-02 DIAGNOSIS — Z95.3 HISTORY OF HEART VALVE REPLACEMENT WITH BIOPROSTHETIC VALVE: ICD-10-CM

## 2023-10-02 DIAGNOSIS — I10 ESSENTIAL HYPERTENSION: ICD-10-CM

## 2023-10-02 DIAGNOSIS — E78.2 MIXED HYPERLIPIDEMIA: ICD-10-CM

## 2023-10-02 PROBLEM — I48.91 ATRIAL FIBRILLATION (HCC): Status: RESOLVED | Noted: 2023-07-31 | Resolved: 2023-10-02

## 2023-10-02 PROCEDURE — 3074F SYST BP LT 130 MM HG: CPT | Performed by: FAMILY MEDICINE

## 2023-10-02 PROCEDURE — 3078F DIAST BP <80 MM HG: CPT | Performed by: FAMILY MEDICINE

## 2023-10-02 PROCEDURE — 99214 OFFICE O/P EST MOD 30 MIN: CPT | Performed by: FAMILY MEDICINE

## 2023-10-02 ASSESSMENT — FIBROSIS 4 INDEX: FIB4 SCORE: 0.76

## 2023-10-02 NOTE — PROGRESS NOTES
CC: History of mitral valve replacement, hypertension, hyperlipidemia    HPI:   Selma presents today to discuss the following:    History of heart valve replacement with bioprosthetic valve  Patient's most recent echocardiogram showed :  Normal left ventricular chamber size with mild concentric left   ventricular hypertrophy.   Normal left ventricular systolic function.   The left ventricular ejection fraction is visually estimated to be 60-  65%.   Normal regional wall motion.   Grade I diastolic dysfunction.   Mitral valve bioprosthesis which is functioning normally with   appropriate transvalvular gradient  Normal RA pressure    Currently denies any chest pain, shortness of breath, or leg swelling. Patient requested referral to cardiology Dr. Merritt Perez who is no longer working with [employer].    Essential hypertension  Blood pressure has been adequately controlled on losartan 100 mg daily.  No side effects    Mixed hyperlipidemia  Patient's last lipid panel showed a slightly high LDL.  However patient has no history of CAD.  Patient used to take fish oil and has been tolerating it very well.  Wants to go back on it.  Not on statin.    Patient Active Problem List    Diagnosis Date Noted    Pleural effusion 08/02/2023    Secondary hypercoagulability disorder (HCC) 07/26/2023    Urinary frequency 07/26/2023    History of heart valve replacement with bioprosthetic valve 07/20/2023    Cyst of right kidney 10/24/2022    Pre-diabetes 03/31/2022    BMI 27.0-27.9,adult 03/31/2022    Primary osteoarthritis of both first carpometacarpal joints 03/31/2022    Degenerative disc disease, lumbar 03/31/2022    Peripheral neuropathy 03/31/2022    Actinic keratoses 03/31/2022    Obstructive uropathy 01/25/2021    Fistula-in-ano 01/25/2021    Bigeminy 06/15/2020    Elevated PSA 02/12/2020    Dyslipidemia with high LDL and low HDL 12/14/2018    Chest rales 09/12/2013    Essential hypertension 09/12/2013       Current Outpatient  Medications   Medication Sig Dispense Refill    aspirin 81 MG EC tablet Take 81 mg by mouth every day.      losartan (COZAAR) 100 MG Tab Take 1 Tablet by mouth every day. 90 Tablet 3    Carboxymethylcellulose Sodium (ARTIFICIAL TEARS OP) Administer 1 Drop into both eyes at bedtime as needed (dry eyes). Indications: DRY EYES (INACTIVE)      Ascorbic Acid (VITAMIN C) 500 MG Chew Tab Chew 500 mg every day. Indications: supplement      docusate sodium (COLACE) 100 MG Cap Take 100 mg by mouth every day. Indications: Constipation      Magnesium 250 MG Tab Take 125 mg by mouth every day. Indications: supplement      VITAMIN D PO Take 6,000 Units by mouth every day. Indications: supplement       No current facility-administered medications for this visit.         Allergies as of 10/02/2023    (No Known Allergies)        ROS: Denies any chest pain, Shortness of breath, Changes bowel or bladder, Lower extremity edema.    Physical Exam:  /72 (BP Location: Right arm, Patient Position: Sitting, BP Cuff Size: Adult)   Pulse 83   Temp 36.3 °C (97.3 °F) (Temporal)   Ht 1.829 m (6')   Wt 84.6 kg (186 lb 9.6 oz)   SpO2 97%   BMI 25.31 kg/m²   Gen.: Well-developed, well-nourished, no apparent distress,pleasant and cooperative with the examination  Skin:  Warm and dry with good turgor. No rashes or suspicious lesions in visible areas  HEENT:Sinuses nontender with palpation, TMs clear, nares patent with pink mucosa and clear rhinorrhea,no septal deviation ,polyps or lesions. lips without lesions, oropharynx clear.  Neck: Trachea midline,no masses or adenopathy. No JVD.  Cor: Regular rate and rhythm without murmur, gallop or rub.  Lungs: Respirations unlabored.Clear to auscultation with equal breath sounds bilaterally. No wheezes, rhonchi.  Extremities: No cyanosis, clubbing or edema.          Assessment and Plan.   78 y.o. male     1. History of heart valve replacement with bioprosthetic valve  Stable.  Asymptomatic.  Most  recent echocardiogram showed no significant abnormality.  Patient requested referral to Dr. Merritt Perez.    - REFERRAL TO CARDIOLOGY    2. Essential hypertension  Controlled.  Continue on losartan 100 mg daily.  - REFERRAL TO CARDIOLOGY    3. Mixed hyperlipidemia  Last lipid panel showed a slightly high LDL.  Patient used to take fish oil and has been tolerating it very well.  Wants to go back on it.  Not on statin.

## 2023-10-03 ENCOUNTER — NON-PROVIDER VISIT (OUTPATIENT)
Dept: CARDIOLOGY | Facility: MEDICAL CENTER | Age: 78
End: 2023-10-03
Payer: MEDICARE

## 2023-10-03 DIAGNOSIS — Z95.4 S/P MITRAL VALVE ALLOGRAFT: ICD-10-CM

## 2023-10-03 PROCEDURE — G0422 INTENS CARDIAC REHAB W/EXERC: HCPCS | Performed by: INTERNAL MEDICINE

## 2023-10-03 PROCEDURE — G0423 INTENS CARDIAC REHAB NO EXER: HCPCS | Mod: 59 | Performed by: INTERNAL MEDICINE

## 2023-10-03 NOTE — PROGRESS NOTES
Selma Avila attended Intensive Cardiac Rehab today from 1600 to 1800. During his time he exercised and attended class.   His education today was a video titled:  HEALTHY MINDS, BODIES AND HEARTS. Patient received handouts and class discussion pertaining to the topic.

## 2023-10-04 ENCOUNTER — NON-PROVIDER VISIT (OUTPATIENT)
Dept: CARDIOLOGY | Facility: MEDICAL CENTER | Age: 78
End: 2023-10-04
Payer: MEDICARE

## 2023-10-04 DIAGNOSIS — Z95.4 S/P MITRAL VALVE ALLOGRAFT: ICD-10-CM

## 2023-10-04 PROCEDURE — G0422 INTENS CARDIAC REHAB W/EXERC: HCPCS | Performed by: INTERNAL MEDICINE

## 2023-10-04 PROCEDURE — G0423 INTENS CARDIAC REHAB NO EXER: HCPCS | Mod: 59 | Performed by: INTERNAL MEDICINE

## 2023-10-05 ENCOUNTER — PATIENT MESSAGE (OUTPATIENT)
Dept: CARDIOLOGY | Facility: MEDICAL CENTER | Age: 78
End: 2023-10-05
Payer: MEDICARE

## 2023-10-05 ENCOUNTER — NON-PROVIDER VISIT (OUTPATIENT)
Dept: CARDIOLOGY | Facility: MEDICAL CENTER | Age: 78
End: 2023-10-05
Payer: MEDICARE

## 2023-10-05 DIAGNOSIS — Z95.4 S/P MITRAL VALVE ALLOGRAFT: ICD-10-CM

## 2023-10-05 DIAGNOSIS — E78.5 DYSLIPIDEMIA WITH HIGH LDL AND LOW HDL: ICD-10-CM

## 2023-10-05 PROCEDURE — G0422 INTENS CARDIAC REHAB W/EXERC: HCPCS | Performed by: INTERNAL MEDICINE

## 2023-10-05 PROCEDURE — G0423 INTENS CARDIAC REHAB NO EXER: HCPCS | Mod: 59 | Performed by: INTERNAL MEDICINE

## 2023-10-05 NOTE — PATIENT COMMUNICATION
To SC: ( patient but out of office) Patient requesting lipid panel. Not on statin so unable to order per protocol. Okay to place order? Thank you!

## 2023-10-05 NOTE — PROGRESS NOTES
Selma Avila attended Intensive Cardiac Rehab today from 1600 to 1800. During his time he exercised and attended class.   His education today was a workshop titled: new thoughts and new behaviors. Patient received handouts and class discussion pertaining to the topic.

## 2023-10-05 NOTE — PROGRESS NOTES
Selma Avila attended Intensive Cardiac Rehab today from 1600 to 1800. During his time he exercised and attended class.   His education today was a nutrition and cooking class titled: Meals In A Snap. Patient received handouts and class discussion pertaining to the topic.

## 2023-10-09 ENCOUNTER — HOSPITAL ENCOUNTER (OUTPATIENT)
Dept: LAB | Facility: MEDICAL CENTER | Age: 78
End: 2023-10-09
Payer: MEDICARE

## 2023-10-09 DIAGNOSIS — E78.5 DYSLIPIDEMIA WITH HIGH LDL AND LOW HDL: ICD-10-CM

## 2023-10-09 LAB
CHOLEST SERPL-MCNC: 175 MG/DL (ref 100–199)
HDLC SERPL-MCNC: 42 MG/DL
LDLC SERPL CALC-MCNC: 120 MG/DL
TRIGL SERPL-MCNC: 67 MG/DL (ref 0–149)

## 2023-10-09 PROCEDURE — 80061 LIPID PANEL: CPT

## 2023-10-09 PROCEDURE — 36415 COLL VENOUS BLD VENIPUNCTURE: CPT

## 2023-10-10 ENCOUNTER — NON-PROVIDER VISIT (OUTPATIENT)
Dept: CARDIOLOGY | Facility: MEDICAL CENTER | Age: 78
End: 2023-10-10
Payer: MEDICARE

## 2023-10-10 DIAGNOSIS — Z95.4 S/P MITRAL VALVE ALLOGRAFT: ICD-10-CM

## 2023-10-10 PROCEDURE — G0423 INTENS CARDIAC REHAB NO EXER: HCPCS | Mod: 59 | Performed by: INTERNAL MEDICINE

## 2023-10-10 PROCEDURE — G0422 INTENS CARDIAC REHAB W/EXERC: HCPCS | Performed by: INTERNAL MEDICINE

## 2023-10-11 ENCOUNTER — NON-PROVIDER VISIT (OUTPATIENT)
Dept: CARDIOLOGY | Facility: MEDICAL CENTER | Age: 78
End: 2023-10-11
Payer: MEDICARE

## 2023-10-11 DIAGNOSIS — Z95.4 S/P MITRAL VALVE ALLOGRAFT: ICD-10-CM

## 2023-10-11 PROCEDURE — G0422 INTENS CARDIAC REHAB W/EXERC: HCPCS | Performed by: INTERNAL MEDICINE

## 2023-10-11 PROCEDURE — G0423 INTENS CARDIAC REHAB NO EXER: HCPCS | Mod: 59 | Performed by: INTERNAL MEDICINE

## 2023-10-11 NOTE — PROGRESS NOTES
Selma Avila attended Intensive Cardiac Rehab today from 1600 to 1800. During his time he exercised and attended class.   His education today was a video titled: Decoding Lab Results. Patient received handouts and class discussion pertaining to the topic.

## 2023-10-12 ENCOUNTER — NON-PROVIDER VISIT (OUTPATIENT)
Dept: CARDIOLOGY | Facility: MEDICAL CENTER | Age: 78
End: 2023-10-12
Payer: MEDICARE

## 2023-10-12 DIAGNOSIS — Z95.4 S/P MITRAL VALVE ALLOGRAFT: ICD-10-CM

## 2023-10-12 PROCEDURE — G0422 INTENS CARDIAC REHAB W/EXERC: HCPCS | Performed by: INTERNAL MEDICINE

## 2023-10-12 PROCEDURE — G0423 INTENS CARDIAC REHAB NO EXER: HCPCS | Mod: 59 | Performed by: INTERNAL MEDICINE

## 2023-10-12 NOTE — PROGRESS NOTES
Selma Avila attended Intensive Cardiac Rehab today from 1600 to 1800. During his time he exercised and attended class.   His education today was a workshop titled: Mindful Eating. Patient received handouts and class discussion pertaining to the topic.

## 2023-10-12 NOTE — PROGRESS NOTES
Selma Avila attended Intensive Cardiac Rehab today from 1600 to 1800. During his time he exercised and attended class.   His education today was a nutrition and cooking class titled: One Pot Wonders. Patient received handouts and class discussion pertaining to the topic.

## 2023-10-17 ENCOUNTER — NON-PROVIDER VISIT (OUTPATIENT)
Dept: CARDIOLOGY | Facility: MEDICAL CENTER | Age: 78
End: 2023-10-17
Payer: MEDICARE

## 2023-10-17 DIAGNOSIS — Z95.4 S/P MITRAL VALVE ALLOGRAFT: ICD-10-CM

## 2023-10-17 PROCEDURE — G0422 INTENS CARDIAC REHAB W/EXERC: HCPCS | Performed by: INTERNAL MEDICINE

## 2023-10-17 PROCEDURE — G0423 INTENS CARDIAC REHAB NO EXER: HCPCS | Mod: 59 | Performed by: INTERNAL MEDICINE

## 2023-10-18 ENCOUNTER — NON-PROVIDER VISIT (OUTPATIENT)
Dept: CARDIOLOGY | Facility: MEDICAL CENTER | Age: 78
End: 2023-10-18
Payer: MEDICARE

## 2023-10-18 DIAGNOSIS — Z95.4 S/P MITRAL VALVE ALLOGRAFT: ICD-10-CM

## 2023-10-18 PROCEDURE — G0423 INTENS CARDIAC REHAB NO EXER: HCPCS | Mod: 59 | Performed by: INTERNAL MEDICINE

## 2023-10-18 PROCEDURE — G0422 INTENS CARDIAC REHAB W/EXERC: HCPCS | Performed by: INTERNAL MEDICINE

## 2023-10-18 NOTE — PROGRESS NOTES
Selma Avila attended Intensive Cardiac Rehab today from 1600 to 1800. During his time he exercised and attended class.   His education today was a video titled: metabolic syndrome. Patient received handouts and class discussion pertaining to the topic.

## 2023-10-19 ENCOUNTER — NON-PROVIDER VISIT (OUTPATIENT)
Dept: CARDIOLOGY | Facility: MEDICAL CENTER | Age: 78
End: 2023-10-19
Payer: MEDICARE

## 2023-10-19 DIAGNOSIS — Z95.4 S/P MITRAL VALVE ALLOGRAFT: ICD-10-CM

## 2023-10-19 PROCEDURE — G0423 INTENS CARDIAC REHAB NO EXER: HCPCS | Mod: 59 | Performed by: INTERNAL MEDICINE

## 2023-10-19 PROCEDURE — G0422 INTENS CARDIAC REHAB W/EXERC: HCPCS | Performed by: INTERNAL MEDICINE

## 2023-10-19 NOTE — PROGRESS NOTES
Nancyal Jenny Avila attended Intensive Cardiac Rehab today from 1600 to 1800. During his time he exercised and attended class.   His education today was a lecture titled: Building Your Action Plan. Patient received handouts and class discussion pertaining to the topic.

## 2023-11-27 ENCOUNTER — TELEPHONE (OUTPATIENT)
Dept: CARDIOLOGY | Facility: MEDICAL CENTER | Age: 78
End: 2023-11-27
Payer: MEDICARE

## 2024-02-15 ENCOUNTER — TELEPHONE (OUTPATIENT)
Dept: MEDICAL GROUP | Facility: MEDICAL CENTER | Age: 79
End: 2024-02-15
Payer: MEDICARE

## 2024-02-15 NOTE — TELEPHONE ENCOUNTER
Patient left a voicemail, he needs a DMV form Signed stating he is fit to drive. Unfortunately there are no opening until and he needs it soon. He was last seen in Oct. Please advise

## 2024-03-03 ENCOUNTER — PATIENT MESSAGE (OUTPATIENT)
Dept: CARDIOLOGY | Facility: MEDICAL CENTER | Age: 79
End: 2024-03-03
Payer: MEDICARE

## 2024-03-07 ENCOUNTER — APPOINTMENT (OUTPATIENT)
Dept: CARDIOLOGY | Facility: MEDICAL CENTER | Age: 79
End: 2024-03-07
Payer: MEDICARE

## 2024-04-30 ENCOUNTER — TELEPHONE (OUTPATIENT)
Dept: CARDIOLOGY | Facility: MEDICAL CENTER | Age: 79
End: 2024-04-30
Payer: MEDICARE

## 2024-04-30 DIAGNOSIS — I10 ESSENTIAL HYPERTENSION: ICD-10-CM

## 2024-04-30 RX ORDER — LOSARTAN POTASSIUM 100 MG/1
100 TABLET ORAL DAILY
Qty: 100 TABLET | Refills: 1 | Status: SHIPPED | OUTPATIENT
Start: 2024-04-30

## 2024-04-30 NOTE — TELEPHONE ENCOUNTER
Caller: Jenny Avila     Topic/issue: Patient is requesting the quantity be changed per Insurance from  for the losartan (COZAAR) 100 MG Tab   Patient uses [facility] pharmacy on [address] in [place]     Callback Number: [phone]    Thank you   Ayana CASTILLO

## 2024-08-13 ENCOUNTER — TELEPHONE (OUTPATIENT)
Dept: HEALTH INFORMATION MANAGEMENT | Facility: OTHER | Age: 79
End: 2024-08-13
Payer: MEDICARE

## 2024-08-22 ENCOUNTER — DOCUMENTATION (OUTPATIENT)
Dept: HEALTH INFORMATION MANAGEMENT | Facility: OTHER | Age: 79
End: 2024-08-22
Payer: MEDICARE

## 2024-08-22 NOTE — TELEPHONE ENCOUNTER
----- Message from Ulises Alfonso M.D. sent at 9/5/2017  3:15 PM PDT -----  Echo same. Mild mitral leakage   BRAXTON Fischer

## 2024-08-27 ENCOUNTER — APPOINTMENT (OUTPATIENT)
Dept: TELEHEALTH | Facility: TELEMEDICINE | Age: 79
End: 2024-08-27
Payer: MEDICARE

## 2024-09-25 ENCOUNTER — HOSPITAL ENCOUNTER (OUTPATIENT)
Dept: LAB | Facility: MEDICAL CENTER | Age: 79
End: 2024-09-25
Attending: STUDENT IN AN ORGANIZED HEALTH CARE EDUCATION/TRAINING PROGRAM
Payer: MEDICARE

## 2024-09-25 ENCOUNTER — DOCUMENTATION (OUTPATIENT)
Dept: HEALTH INFORMATION MANAGEMENT | Facility: OTHER | Age: 79
End: 2024-09-25
Payer: MEDICARE

## 2024-09-25 PROCEDURE — 36415 COLL VENOUS BLD VENIPUNCTURE: CPT

## 2024-09-25 PROCEDURE — 84153 ASSAY OF PSA TOTAL: CPT

## 2024-09-26 LAB — PSA SERPL-MCNC: 9.74 NG/ML (ref 0–4)

## 2024-10-03 ENCOUNTER — APPOINTMENT (OUTPATIENT)
Dept: RADIOLOGY | Facility: MEDICAL CENTER | Age: 79
End: 2024-10-03
Attending: STUDENT IN AN ORGANIZED HEALTH CARE EDUCATION/TRAINING PROGRAM
Payer: MEDICARE

## 2024-10-03 DIAGNOSIS — N28.1 KIDNEY CYSTS: ICD-10-CM

## 2024-10-03 PROCEDURE — 74176 CT ABD & PELVIS W/O CONTRAST: CPT

## 2024-10-26 SDOH — ECONOMIC STABILITY: FOOD INSECURITY: WITHIN THE PAST 12 MONTHS, YOU WORRIED THAT YOUR FOOD WOULD RUN OUT BEFORE YOU GOT MONEY TO BUY MORE.: NEVER TRUE

## 2024-10-26 SDOH — ECONOMIC STABILITY: TRANSPORTATION INSECURITY
IN THE PAST 12 MONTHS, HAS LACK OF TRANSPORTATION KEPT YOU FROM MEETINGS, WORK, OR FROM GETTING THINGS NEEDED FOR DAILY LIVING?: NO

## 2024-10-26 SDOH — ECONOMIC STABILITY: INCOME INSECURITY: IN THE LAST 12 MONTHS, WAS THERE A TIME WHEN YOU WERE NOT ABLE TO PAY THE MORTGAGE OR RENT ON TIME?: NO

## 2024-10-26 SDOH — ECONOMIC STABILITY: FOOD INSECURITY: WITHIN THE PAST 12 MONTHS, THE FOOD YOU BOUGHT JUST DIDN'T LAST AND YOU DIDN'T HAVE MONEY TO GET MORE.: NEVER TRUE

## 2024-10-26 SDOH — ECONOMIC STABILITY: TRANSPORTATION INSECURITY
IN THE PAST 12 MONTHS, HAS THE LACK OF TRANSPORTATION KEPT YOU FROM MEDICAL APPOINTMENTS OR FROM GETTING MEDICATIONS?: NO

## 2024-10-26 SDOH — ECONOMIC STABILITY: TRANSPORTATION INSECURITY
IN THE PAST 12 MONTHS, HAS LACK OF RELIABLE TRANSPORTATION KEPT YOU FROM MEDICAL APPOINTMENTS, MEETINGS, WORK OR FROM GETTING THINGS NEEDED FOR DAILY LIVING?: NO

## 2024-10-26 SDOH — HEALTH STABILITY: MENTAL HEALTH
STRESS IS WHEN SOMEONE FEELS TENSE, NERVOUS, ANXIOUS, OR CAN'T SLEEP AT NIGHT BECAUSE THEIR MIND IS TROUBLED. HOW STRESSED ARE YOU?: NOT AT ALL

## 2024-10-26 SDOH — HEALTH STABILITY: PHYSICAL HEALTH: ON AVERAGE, HOW MANY DAYS PER WEEK DO YOU ENGAGE IN MODERATE TO STRENUOUS EXERCISE (LIKE A BRISK WALK)?: 4 DAYS

## 2024-10-26 SDOH — HEALTH STABILITY: PHYSICAL HEALTH: ON AVERAGE, HOW MANY MINUTES DO YOU ENGAGE IN EXERCISE AT THIS LEVEL?: 40 MIN

## 2024-10-26 SDOH — ECONOMIC STABILITY: HOUSING INSECURITY
IN THE LAST 12 MONTHS, WAS THERE A TIME WHEN YOU DID NOT HAVE A STEADY PLACE TO SLEEP OR SLEPT IN A SHELTER (INCLUDING NOW)?: NO

## 2024-10-26 SDOH — ECONOMIC STABILITY: INCOME INSECURITY: HOW HARD IS IT FOR YOU TO PAY FOR THE VERY BASICS LIKE FOOD, HOUSING, MEDICAL CARE, AND HEATING?: NOT HARD AT ALL

## 2024-10-26 ASSESSMENT — LIFESTYLE VARIABLES
HOW OFTEN DO YOU HAVE A DRINK CONTAINING ALCOHOL: MONTHLY OR LESS
HOW OFTEN DO YOU HAVE SIX OR MORE DRINKS ON ONE OCCASION: NEVER
AUDIT-C TOTAL SCORE: 1
HOW MANY STANDARD DRINKS CONTAINING ALCOHOL DO YOU HAVE ON A TYPICAL DAY: 1 OR 2
SKIP TO QUESTIONS 9-10: 1

## 2024-10-26 ASSESSMENT — SOCIAL DETERMINANTS OF HEALTH (SDOH)
HOW OFTEN DO YOU GET TOGETHER WITH FRIENDS OR RELATIVES?: TWICE A WEEK
IN A TYPICAL WEEK, HOW MANY TIMES DO YOU TALK ON THE PHONE WITH FAMILY, FRIENDS, OR NEIGHBORS?: MORE THAN THREE TIMES A WEEK
HOW OFTEN DO YOU GET TOGETHER WITH FRIENDS OR RELATIVES?: TWICE A WEEK
HOW OFTEN DO YOU HAVE SIX OR MORE DRINKS ON ONE OCCASION: NEVER
HOW OFTEN DO YOU ATTEND CHURCH OR RELIGIOUS SERVICES?: MORE THAN 4 TIMES PER YEAR
IN THE PAST 12 MONTHS, HAS THE ELECTRIC, GAS, OIL, OR WATER COMPANY THREATENED TO SHUT OFF SERVICE IN YOUR HOME?: NO
IN A TYPICAL WEEK, HOW MANY TIMES DO YOU TALK ON THE PHONE WITH FAMILY, FRIENDS, OR NEIGHBORS?: MORE THAN THREE TIMES A WEEK
DO YOU BELONG TO ANY CLUBS OR ORGANIZATIONS SUCH AS CHURCH GROUPS UNIONS, FRATERNAL OR ATHLETIC GROUPS, OR SCHOOL GROUPS?: YES
HOW OFTEN DO YOU ATTEND CHURCH OR RELIGIOUS SERVICES?: MORE THAN 4 TIMES PER YEAR
HOW MANY DRINKS CONTAINING ALCOHOL DO YOU HAVE ON A TYPICAL DAY WHEN YOU ARE DRINKING: 1 OR 2
HOW OFTEN DO YOU ATTENT MEETINGS OF THE CLUB OR ORGANIZATION YOU BELONG TO?: MORE THAN 4 TIMES PER YEAR
HOW OFTEN DO YOU ATTENT MEETINGS OF THE CLUB OR ORGANIZATION YOU BELONG TO?: MORE THAN 4 TIMES PER YEAR
WITHIN THE PAST 12 MONTHS, YOU WORRIED THAT YOUR FOOD WOULD RUN OUT BEFORE YOU GOT THE MONEY TO BUY MORE: NEVER TRUE
DO YOU BELONG TO ANY CLUBS OR ORGANIZATIONS SUCH AS CHURCH GROUPS UNIONS, FRATERNAL OR ATHLETIC GROUPS, OR SCHOOL GROUPS?: YES
HOW HARD IS IT FOR YOU TO PAY FOR THE VERY BASICS LIKE FOOD, HOUSING, MEDICAL CARE, AND HEATING?: NOT HARD AT ALL
HOW OFTEN DO YOU HAVE A DRINK CONTAINING ALCOHOL: MONTHLY OR LESS

## 2024-10-28 ENCOUNTER — HOSPITAL ENCOUNTER (OUTPATIENT)
Dept: LAB | Facility: MEDICAL CENTER | Age: 79
End: 2024-10-28
Attending: STUDENT IN AN ORGANIZED HEALTH CARE EDUCATION/TRAINING PROGRAM
Payer: MEDICARE

## 2024-10-28 LAB
ANION GAP SERPL CALC-SCNC: 11 MMOL/L (ref 7–16)
BUN SERPL-MCNC: 20 MG/DL (ref 8–22)
CALCIUM SERPL-MCNC: 9.5 MG/DL (ref 8.5–10.5)
CHLORIDE SERPL-SCNC: 103 MMOL/L (ref 96–112)
CO2 SERPL-SCNC: 25 MMOL/L (ref 20–33)
CREAT SERPL-MCNC: 1.03 MG/DL (ref 0.5–1.4)
GFR SERPLBLD CREATININE-BSD FMLA CKD-EPI: 74 ML/MIN/1.73 M 2
GLUCOSE SERPL-MCNC: 97 MG/DL (ref 65–99)
POTASSIUM SERPL-SCNC: 4.5 MMOL/L (ref 3.6–5.5)
SODIUM SERPL-SCNC: 139 MMOL/L (ref 135–145)

## 2024-10-28 PROCEDURE — 80048 BASIC METABOLIC PNL TOTAL CA: CPT

## 2024-10-28 PROCEDURE — 36415 COLL VENOUS BLD VENIPUNCTURE: CPT

## 2024-10-29 ENCOUNTER — OFFICE VISIT (OUTPATIENT)
Dept: MEDICAL GROUP | Facility: MEDICAL CENTER | Age: 79
End: 2024-10-29
Payer: MEDICARE

## 2024-10-29 VITALS
DIASTOLIC BLOOD PRESSURE: 70 MMHG | WEIGHT: 180 LBS | BODY MASS INDEX: 24.38 KG/M2 | TEMPERATURE: 97.2 F | SYSTOLIC BLOOD PRESSURE: 122 MMHG | OXYGEN SATURATION: 95 % | HEART RATE: 78 BPM | RESPIRATION RATE: 12 BRPM | HEIGHT: 72 IN

## 2024-10-29 DIAGNOSIS — I10 ESSENTIAL HYPERTENSION: ICD-10-CM

## 2024-10-29 DIAGNOSIS — N28.1 CYST OF RIGHT KIDNEY: ICD-10-CM

## 2024-10-29 DIAGNOSIS — Z85.828 HISTORY OF BASAL CELL CARCINOMA (BCC) OF SKIN: ICD-10-CM

## 2024-10-29 DIAGNOSIS — E78.5 DYSLIPIDEMIA WITH HIGH LDL AND LOW HDL: ICD-10-CM

## 2024-10-29 DIAGNOSIS — K57.90 DIVERTICULOSIS: ICD-10-CM

## 2024-10-29 DIAGNOSIS — R91.1 PULMONARY NODULE, LEFT: ICD-10-CM

## 2024-10-29 DIAGNOSIS — Z85.820 HISTORY OF MELANOMA: ICD-10-CM

## 2024-10-29 DIAGNOSIS — R97.20 ELEVATED PSA: ICD-10-CM

## 2024-10-29 PROBLEM — D68.69 SECONDARY HYPERCOAGULABILITY DISORDER (HCC): Status: RESOLVED | Noted: 2023-07-26 | Resolved: 2024-10-29

## 2024-10-29 PROBLEM — K80.20 CHOLELITHIASES: Status: ACTIVE | Noted: 2024-10-29

## 2024-10-29 PROBLEM — Z86.0100 HISTORY OF COLONIC POLYPS: Status: ACTIVE | Noted: 2024-10-29

## 2024-10-29 PROBLEM — I49.8 BIGEMINY: Status: RESOLVED | Noted: 2020-06-15 | Resolved: 2024-10-29

## 2024-10-29 RX ORDER — IBUPROFEN 200 MG
200 TABLET ORAL EVERY 6 HOURS PRN
COMMUNITY

## 2024-10-29 ASSESSMENT — ENCOUNTER SYMPTOMS
FEVER: 0
VOMITING: 0
WEIGHT LOSS: 0
WHEEZING: 0
NAUSEA: 0
HEADACHES: 0
CHILLS: 0
DIZZINESS: 0
SHORTNESS OF BREATH: 0
PALPITATIONS: 0

## 2024-10-29 ASSESSMENT — PATIENT HEALTH QUESTIONNAIRE - PHQ9: CLINICAL INTERPRETATION OF PHQ2 SCORE: 0

## 2024-10-29 ASSESSMENT — FIBROSIS 4 INDEX: FIB4 SCORE: 0.77

## 2024-11-15 PROBLEM — M19.012 OSTEOARTHRITIS OF LEFT SHOULDER: Status: ACTIVE | Noted: 2024-11-15

## 2024-11-15 PROBLEM — M25.512 LEFT SHOULDER PAIN: Status: ACTIVE | Noted: 2024-11-15

## 2024-11-15 ASSESSMENT — FIBROSIS 4 INDEX: FIB4 SCORE: 0.77

## 2024-12-06 ENCOUNTER — TELEPHONE (OUTPATIENT)
Dept: CARDIOLOGY | Facility: MEDICAL CENTER | Age: 79
End: 2024-12-06
Payer: MEDICARE

## 2024-12-06 NOTE — TELEPHONE ENCOUNTER
Called and spoke to pt re: schd f/v. Pt declined to schd f/v as he sees Dr. Duke (sp?) now. Pt will ask his new doctor to refill meds. /cg 12/06/24

## 2024-12-09 DIAGNOSIS — I10 ESSENTIAL HYPERTENSION: ICD-10-CM

## 2024-12-09 RX ORDER — LOSARTAN POTASSIUM 100 MG/1
100 TABLET ORAL DAILY
Qty: 100 TABLET | Refills: 1 | Status: SHIPPED | OUTPATIENT
Start: 2024-12-09

## 2024-12-09 NOTE — TELEPHONE ENCOUNTER
Received request via: Pharmacy    Was the patient seen in the last year in this department? Yes    Does the patient have an active prescription (recently filled or refills available) for medication(s) requested? No    Pharmacy Name: [facility]    Does the patient have alf Plus and need 100-day supply? (This applies to ALL medications) Yes, quantity updated to 100 days

## 2024-12-10 ENCOUNTER — TELEPHONE (OUTPATIENT)
Dept: MEDICAL GROUP | Facility: MEDICAL CENTER | Age: 79
End: 2024-12-10
Payer: MEDICARE

## 2024-12-11 DIAGNOSIS — M25.50 ARTHRALGIA, UNSPECIFIED JOINT: ICD-10-CM

## 2024-12-11 RX ORDER — CELECOXIB 200 MG/1
200 CAPSULE ORAL
Qty: 60 CAPSULE | Refills: 5 | Status: SHIPPED | OUTPATIENT
Start: 2024-12-11

## 2024-12-17 NOTE — TELEPHONE ENCOUNTER
40 mg of Lasix for 5 days, then 20 mg of Lasix, okay to order the 10 mEq of potassium daily.  Thank you  
none

## 2025-01-29 ENCOUNTER — OFFICE VISIT (OUTPATIENT)
Dept: URGENT CARE | Facility: CLINIC | Age: 80
End: 2025-01-29
Payer: MEDICARE

## 2025-01-29 VITALS
RESPIRATION RATE: 16 BRPM | TEMPERATURE: 96.7 F | WEIGHT: 180 LBS | BODY MASS INDEX: 24.38 KG/M2 | DIASTOLIC BLOOD PRESSURE: 70 MMHG | OXYGEN SATURATION: 95 % | SYSTOLIC BLOOD PRESSURE: 108 MMHG | HEIGHT: 72 IN | HEART RATE: 75 BPM

## 2025-01-29 DIAGNOSIS — H81.10 BENIGN PAROXYSMAL POSITIONAL VERTIGO, UNSPECIFIED LATERALITY: ICD-10-CM

## 2025-01-29 PROCEDURE — 99213 OFFICE O/P EST LOW 20 MIN: CPT | Performed by: PHYSICIAN ASSISTANT

## 2025-01-29 PROCEDURE — 3074F SYST BP LT 130 MM HG: CPT | Performed by: PHYSICIAN ASSISTANT

## 2025-01-29 PROCEDURE — 3078F DIAST BP <80 MM HG: CPT | Performed by: PHYSICIAN ASSISTANT

## 2025-01-29 ASSESSMENT — ENCOUNTER SYMPTOMS
DIZZINESS: 1
EYE PAIN: 0
CONSTIPATION: 0
SORE THROAT: 0
DIARRHEA: 0
SHORTNESS OF BREATH: 0
FEVER: 0
MYALGIAS: 0
COUGH: 0
ABDOMINAL PAIN: 0
NAUSEA: 1
VOMITING: 0
HEADACHES: 0
CHILLS: 0

## 2025-01-29 ASSESSMENT — FIBROSIS 4 INDEX: FIB4 SCORE: 0.77

## 2025-01-30 NOTE — PROGRESS NOTES
Subjective:   Selma Avila is a 79 y.o. male who presents for Dizziness (Nausea, eyes won't focus, neck pain x 1 month)      79 years old male notes randomly triggered episodes of vertigo. Seems with only specific motions that trigger it lasting <15 seconds. Some associated nausea, and vision while vertiginous but returns to normal quickly.     Review of Systems   Constitutional:  Negative for chills and fever.   HENT:  Negative for congestion, ear pain and sore throat.    Eyes:  Negative for pain.   Respiratory:  Negative for cough and shortness of breath.    Cardiovascular:  Negative for chest pain.   Gastrointestinal:  Positive for nausea. Negative for abdominal pain, constipation, diarrhea and vomiting.   Genitourinary:  Negative for dysuria.   Musculoskeletal:  Negative for myalgias.   Skin:  Negative for rash.   Neurological:  Positive for dizziness. Negative for headaches.       Medications, Allergies, and current problem list reviewed today in Epic.     Objective:     /70 (BP Location: Left arm, Patient Position: Sitting, BP Cuff Size: Adult)   Pulse 75   Temp 35.9 °C (96.7 °F) (Temporal)   Resp 16   Ht 1.829 m (6')   Wt 81.6 kg (180 lb)   SpO2 95%     Physical Exam  Vitals reviewed.   Constitutional:       Appearance: Normal appearance.   HENT:      Head: Normocephalic and atraumatic.      Right Ear: Tympanic membrane, ear canal and external ear normal.      Left Ear: Tympanic membrane, ear canal and external ear normal.      Nose: Rhinorrhea present.      Mouth/Throat:      Mouth: Mucous membranes are moist.   Eyes:      Conjunctiva/sclera: Conjunctivae normal.   Cardiovascular:      Rate and Rhythm: Normal rate and regular rhythm.   Pulmonary:      Effort: Pulmonary effort is normal.      Breath sounds: Normal breath sounds.   Skin:     General: Skin is warm and dry.      Capillary Refill: Capillary refill takes less than 2 seconds.   Neurological:      General: No focal deficit present.       Mental Status: He is alert and oriented to person, place, and time.      Cranial Nerves: No cranial nerve deficit.      Comments: No dysarthria, dysphagia, or dysdiadokokinesa. Normal romberg         Assessment/Plan:     Diagnosis and associated orders:     1. Benign paroxysmal positional vertigo, unspecified laterality  Referral to Physical Therapy         Comments/MDM:     Reviewed physiology  No red flags  Follow up with vestibular therapy           Differential diagnosis, natural history, supportive care, and indications for immediate follow-up discussed.    Advised the patient to follow-up with the primary care physician for recheck, reevaluation, and consideration of further management.    Please note that this dictation was created using voice recognition software. I have made a reasonable attempt to correct obvious errors, but I expect that there are errors of grammar and possibly content that I did not discover before finalizing the note.    This note was electronically signed by Emanuel Calabrese PA-C

## 2025-02-11 DIAGNOSIS — H81.10 BENIGN PAROXYSMAL POSITIONAL VERTIGO, UNSPECIFIED LATERALITY: ICD-10-CM

## 2025-02-13 NOTE — OP THERAPY EVALUATION
"  Outpatient Physical Therapy  VESTIBULAR EVALUATION    [facility] Outpatient Physical Therapy  [address]  [place] [address]  Phone:  [phone]  Fax:  [phone]    Date of Evaluation: 02/14/2025    Patient: Jenny Avila  YOB: 1945  MRN: [id number]     Referring Provider: Aaron Lazaro M.D.  [address] [place] [address]  [address]  [place] [address]   Referring Diagnosis: Benign paroxysmal positional vertigo, unspecified laterality [H81.10]     Time Calculation    Start time: 1100  Stop time: 1140 Time Calculation (min): 40 minutes           Chief Complaint: Vertigo    Visit Diagnoses     ICD-10-CM   1. Benign paroxysmal positional vertigo, unspecified laterality  H81.10         History of Present Illness:     Mechanism of injury:    Jenny presents to PT for support in managing his dizziness. He reports having a few episodes of dizziness since 2023. He recalls symptoms generally presenting some time after his Mitral Valve replacement in July of that year. He does not recall the details of the early episodes, but did have some dizziness that was related to a medication that was discontinued. More recently, about 3 months ago, he had an episode of spinning vertigo when he was walking to a neighbors house. [place] well when he left the house, but about [facility] there he had vertigo that lasted about 5 minutes and caused nausea that lasted another hour. Since then, he has continued to have spontaneous 1-2 episodes a day. This comes at various times a day and not with any particular type of movement. Reports a pre-symptom of cramping that starts in his R or L lower back/kidney area and progresses to a sense of dizziness/dissociation. Finds that if he calms himself and breaths through it, the symptoms will go away. Does get nausea for minutes afterward. \"It feels like if the cramping sensation were to worsen, I would be short of breath.\" Has not seen cardiology since this " symptom started.     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 (Reports occipital headaches most days of the week)    Ear problems:  Hearing loss (Chronic)    Sleep disturbance:  Not disrupted  Symptoms:     Current symptom ratin    At best symptom ratin    At worst symptom ratin    Progression:  Stable  Treatments:     No prior treatments received    Patient goals:     Other patient goals:  Understand how to reduce dizziness.      Past Medical History:   Diagnosis Date    Arrhythmia     Arthritis     thumbs spine neck    Breath shortness     fatigued after 4 miles    Cancer (HCC)     skin melanoma left jaw    Dyslipidemia 2018    Essential hypertension 2013    Heart murmur 1980    Mitral valve prolapse 2013    Non-rheumatic mitral regurgitation 2013    Obstructive uropathy 2021    Renal disorder     imnpaired function pt states two benign cysts on right kidney    Snoring      Past Surgical History:   Procedure Laterality Date    MITRAL VALVE REPLACE  2023    Procedure: REPLACEMENT, MITRAL VALVE, LEFT ATRIAL APPENDAGE LIGATION;  Surgeon: Jacqueline Neri M.D.;  Location: SURGERY [facility];  Service: Cardiothoracic    ECHOCARDIOGRAM, TRANSESOPHAGEAL, INTRAOPERATIVE  2023    Procedure: ECHOCARDIOGRAM, TRANSESOPHAGEAL, INTRAOPERATIVE;  Surgeon: Jacqueline Neri M.D.;  Location: SURGERY [facility];  Service: Cardiothoracic    FISTULA IN ANO REPAIR Left     OTHER  2019    Fistula    OTHER      tonsils     Social History     Tobacco Use    Smoking status: Former     Current packs/day: 0.00     Types: Cigarettes     Start date: 1966     Quit date: 1966     Years since quittin.4     Passive exposure: Never    Smokeless tobacco: Never   Substance Use Topics    Alcohol use: Not Currently     Family and Occupational History     Socioeconomic History    Marital status:       Spouse name: Not on file    Number of children: Not on file    Years of education: Not on file    Highest education level: Bachelor's degree (e.g., BA, AB, BS)   Occupational History    Not on file       Objective:    Gait:     Comments: Unremarkable. WNL  Oculomotor Exam:         Details: No spontaneous nystagmus central gaze          Details: No spontaneous nystagmus eccentric gaze    No saccadic eye movements    Smooth pursuit present    Convergence:        Normal convergence    No skew  Active Range of Motion:     Active range of motion comments: Cervical AROM is WFL for testing, but generally limited in all planes. Reports to be chronic and related to OA. Rotation= 50 deg bilaterally. No dizziness during AROM screening.   Limb Ataxia Exam:     Finger-to-Nose:         Intention tremor: none    Dysdiadochokinesia: none.  Strength Exam:     Upper extremities within functional limits    Lower extremities within functional limits  Sensation Tests:     Left Light Touch Sensation:         All left lumbar dermatomes intact      Right Light Touch Sensation:         All right lumbar dermatomes intact    Vertebrobasilar Exam:    Comments:   NEG seated active screening bilaterally   Vestibulo-Ocular Exam:     Normal head-shaking nystagmus test  Normal head thrust test  BPPV Exam:     Normal Amaury-Hallpike    Normal straight head-hanging test    Normal roll test        Therapeutic Treatments and Modalities:     1. Self Care ADL Training (CPT 91179), Education to support self management of this condition: discussed dizziness qualities and different sources that may be invovled (vestibular vs cardiovascular vs central symptoms). Reviewed vestibular anatomy/function and typical presentations for vestibulopathies. Discussed his exam in contrast to this. Encouraged him to follow up with cardiology, which he plans to do ASAP considering he has a L TSA scheduled for 3/6/25.    Time-based treatments/modalities:    Physical Therapy  Timed Treatment Charges  Functional training, self care minutes (CPT 16194): 20 minutes        Assessment:     Assessment details:    Jenny is a 79 y.o male who presents to PT with complaint of dizziness that began with a pronounced episode approximately 3 months ago. Describes spontaneous onset of vertigo while walking that lasted 5 mins. Since then, he has 1-2 episodes daily that are not motion induced and have other concurrent symptoms including intense cramping unilaterally in the lower back/kidney area, anxiety and shortness of breath. Vestibular examination was unremarkable today. He tested negative for BPPV and demonstrates normal VOR/VCR/VSR control. Given the spontaneous nature of these symptoms and his history of heart disease, did encourage him to follow up with PCP and Cardiology. Reviewed ED indications as well. No further skilled PT services are indicated at present time.   Goals:     Short term goals:  None, eval only    Long term goals:  None, eval only  Plan:     Therapy options:  No further treatment needed      Functional Assessment Used    DHI= 4%      Referring provider co-signature:  I have reviewed this plan of care and my co-signature certifies the need for services.    Certification Period: 02/14/2025 to  04/11/25    Physician Signature: ________________________________ Date: ______________

## 2025-02-14 ENCOUNTER — PHYSICAL THERAPY (OUTPATIENT)
Dept: PHYSICAL THERAPY | Facility: MEDICAL CENTER | Age: 80
End: 2025-02-14
Attending: STUDENT IN AN ORGANIZED HEALTH CARE EDUCATION/TRAINING PROGRAM
Payer: MEDICARE

## 2025-02-14 DIAGNOSIS — H81.10 BENIGN PAROXYSMAL POSITIONAL VERTIGO, UNSPECIFIED LATERALITY: ICD-10-CM

## 2025-02-14 PROCEDURE — 97535 SELF CARE MNGMENT TRAINING: CPT

## 2025-02-14 PROCEDURE — 97162 PT EVAL MOD COMPLEX 30 MIN: CPT

## 2025-02-14 ASSESSMENT — ENCOUNTER SYMPTOMS
PAIN SCALE AT LOWEST: 0
PAIN SCALE: 0
PAIN SCALE AT HIGHEST: 9

## 2025-02-20 ENCOUNTER — APPOINTMENT (OUTPATIENT)
Dept: ADMISSIONS | Facility: MEDICAL CENTER | Age: 80
End: 2025-02-20
Attending: ORTHOPAEDIC SURGERY
Payer: MEDICARE

## 2025-02-24 ENCOUNTER — PRE-ADMISSION TESTING (OUTPATIENT)
Dept: ADMISSIONS | Facility: MEDICAL CENTER | Age: 80
End: 2025-02-24
Attending: ORTHOPAEDIC SURGERY
Payer: MEDICARE

## 2025-02-24 VITALS — BODY MASS INDEX: 24.41 KG/M2 | HEIGHT: 72 IN

## 2025-02-24 DIAGNOSIS — Z01.812 PRE-OPERATIVE LABORATORY EXAMINATION: ICD-10-CM

## 2025-02-24 NOTE — PREADMIT AVS NOTE
Current Medications   Medication Instructions    NON SPECIFIED  AG1 Stop 7 days before surgery    celecoxib (CELEBREX) 200 MG Cap Stop 5 days before surgery    losartan (COZAAR) 100 MG Tab Stop 24 hours before surgery    Omega-3 Fatty Acids (FISH OIL) 300 MG Cap Stop 7 days before surgery    ibuprofen (MOTRIN) 200 MG Tab Stop 5 days before surgery    aspirin 81 MG EC tablet Follow instructions from surgeon or specialist.    Carboxymethylcellulose Sodium (ARTIFICIAL TEARS OP) As needed medication, may take if needed, including morning of procedure     Ascorbic Acid (VITAMIN C) 500 MG Chew Tab Stop 7 days before surgery    VITAMIN D PO Stop 7 days before surgery

## 2025-02-25 NOTE — PREPROCEDURE INSTRUCTIONS
PreAdmit Telephone Appointment: Reviewed the Preparing for your procedure handout with patient over the phone. Patient instructed per pharmacy guidelines regarding taking, holding or contacting provider for instructions on regularly prescribed medications before surgery. Instructed to take the following medications the day of surgery with a sip of water per pharmacy medication guidelines: Artificial tears as needed.    Confirmed with patient where to check in morning of surgery. AVS sent to patient's My Chart.

## 2025-02-27 ENCOUNTER — PRE-ADMISSION TESTING (OUTPATIENT)
Dept: ADMISSIONS | Facility: MEDICAL CENTER | Age: 80
End: 2025-02-27
Attending: ORTHOPAEDIC SURGERY
Payer: MEDICARE

## 2025-02-27 VITALS — BODY MASS INDEX: 24.92 KG/M2 | WEIGHT: 184 LBS | HEIGHT: 72 IN

## 2025-02-27 DIAGNOSIS — Z01.812 PRE-OPERATIVE LABORATORY EXAMINATION: ICD-10-CM

## 2025-02-27 DIAGNOSIS — M19.012 PRIMARY OSTEOARTHRITIS OF LEFT SHOULDER: ICD-10-CM

## 2025-02-27 LAB
ANION GAP SERPL CALC-SCNC: 11 MMOL/L (ref 7–16)
BUN SERPL-MCNC: 27 MG/DL (ref 8–22)
CALCIUM SERPL-MCNC: 9.1 MG/DL (ref 8.4–10.2)
CHLORIDE SERPL-SCNC: 103 MMOL/L (ref 96–112)
CO2 SERPL-SCNC: 22 MMOL/L (ref 20–33)
CREAT SERPL-MCNC: 1.06 MG/DL (ref 0.5–1.4)
ERYTHROCYTE [DISTWIDTH] IN BLOOD BY AUTOMATED COUNT: 45.3 FL (ref 35.9–50)
EST. AVERAGE GLUCOSE BLD GHB EST-MCNC: 111 MG/DL
GFR SERPLBLD CREATININE-BSD FMLA CKD-EPI: 71 ML/MIN/1.73 M 2
GLUCOSE SERPL-MCNC: 85 MG/DL (ref 65–99)
HBA1C MFR BLD: 5.5 % (ref 4–5.6)
HCT VFR BLD AUTO: 51.2 % (ref 42–52)
HGB BLD-MCNC: 17 G/DL (ref 14–18)
MCH RBC QN AUTO: 29.9 PG (ref 27–33)
MCHC RBC AUTO-ENTMCNC: 33.2 G/DL (ref 32.3–36.5)
MCV RBC AUTO: 90.1 FL (ref 81.4–97.8)
PLATELET # BLD AUTO: 208 K/UL (ref 164–446)
PMV BLD AUTO: 11.1 FL (ref 9–12.9)
POTASSIUM SERPL-SCNC: 4.1 MMOL/L (ref 3.6–5.5)
RBC # BLD AUTO: 5.68 M/UL (ref 4.7–6.1)
SCCMEC + MECA PNL NOSE NAA+PROBE: NEGATIVE
SCCMEC + MECA PNL NOSE NAA+PROBE: POSITIVE
SODIUM SERPL-SCNC: 136 MMOL/L (ref 135–145)
WBC # BLD AUTO: 7.4 K/UL (ref 4.8–10.8)

## 2025-02-27 PROCEDURE — 87640 STAPH A DNA AMP PROBE: CPT | Mod: XU

## 2025-02-27 PROCEDURE — 85027 COMPLETE CBC AUTOMATED: CPT

## 2025-02-27 PROCEDURE — 36415 COLL VENOUS BLD VENIPUNCTURE: CPT

## 2025-02-27 PROCEDURE — 87641 MR-STAPH DNA AMP PROBE: CPT

## 2025-02-27 PROCEDURE — 80048 BASIC METABOLIC PNL TOTAL CA: CPT

## 2025-02-27 PROCEDURE — 83036 HEMOGLOBIN GLYCOSYLATED A1C: CPT

## 2025-02-27 ASSESSMENT — FIBROSIS 4 INDEX: FIB4 SCORE: 0.77

## 2025-02-27 NOTE — DISCHARGE PLANNING
DISCHARGE PLANNING NOTE - TOTAL JOINT    Procedure: LEFT TOTAL SHOULDER ARTHROPLASTY  Procedure Date: 3/6/2025  Insurance: Payor: [facility] SENIOR CARE PLUS / Plan: [facility] PREFERRED  / Product Type: Medicare Advantage /    Equipment currently available at home?   Recliner, ice packs, Hand held shower head, Reacher  Steps into the home? 5  Steps within the home? 11 w/landing and handrail  Toilet height? ADA  Type of shower? tub-shower  Home Oxygen? No  Portable tank?    Oxygen Provider:  Planning same day discharge: Yes    Is Outpatient Physical Therapy set up after surgery? Yes  Did you take the Total Joint Class and where or did you receive an Educational booklet? Yes, received NAON book.  Who will be your transportation home on day of discharge? Noy- spouse    Have you made arrangements to have someone stay with you at home for the first 3 days following discharge, and if so, whom? Noy- spouse    Have you notified your surgeon that you do not have transportation or someone to help you after discharge? N/A    Are you planning on going to a transitional care facility, for example a skilled nursing facility, post operatively for rehab, and if so, have you contacted your insurance plan to see if they cover this? No      Met with the pt and spouse. Pt given a copy of Home Safety Checklist, Equipment Resource Guide, CHG scrub kit and instructions. Expected process in Recovery Room and dc criteria discussed with pt. All questions answered and verbalizes understanding of all instructions. No dc needs identified at this time. Anticipate dc to home without barriers. MRSA swab obtained.

## 2025-03-05 ENCOUNTER — ANESTHESIA EVENT (OUTPATIENT)
Dept: SURGERY | Facility: MEDICAL CENTER | Age: 80
End: 2025-03-05
Payer: MEDICARE

## 2025-03-06 ENCOUNTER — APPOINTMENT (OUTPATIENT)
Dept: RADIOLOGY | Facility: MEDICAL CENTER | Age: 80
End: 2025-03-06
Attending: ORTHOPAEDIC SURGERY
Payer: MEDICARE

## 2025-03-06 ENCOUNTER — ANESTHESIA (OUTPATIENT)
Dept: SURGERY | Facility: MEDICAL CENTER | Age: 80
End: 2025-03-06
Payer: MEDICARE

## 2025-03-06 ENCOUNTER — HOSPITAL ENCOUNTER (OUTPATIENT)
Facility: MEDICAL CENTER | Age: 80
End: 2025-03-06
Attending: ORTHOPAEDIC SURGERY | Admitting: ORTHOPAEDIC SURGERY
Payer: MEDICARE

## 2025-03-06 VITALS
HEART RATE: 72 BPM | WEIGHT: 182.21 LBS | SYSTOLIC BLOOD PRESSURE: 118 MMHG | DIASTOLIC BLOOD PRESSURE: 74 MMHG | TEMPERATURE: 96.8 F | BODY MASS INDEX: 24.68 KG/M2 | RESPIRATION RATE: 16 BRPM | HEIGHT: 72 IN | OXYGEN SATURATION: 95 %

## 2025-03-06 DIAGNOSIS — M25.512 CHRONIC LEFT SHOULDER PAIN: ICD-10-CM

## 2025-03-06 DIAGNOSIS — G89.29 CHRONIC LEFT SHOULDER PAIN: ICD-10-CM

## 2025-03-06 PROCEDURE — C1713 ANCHOR/SCREW BN/BN,TIS/BN: HCPCS | Performed by: ORTHOPAEDIC SURGERY

## 2025-03-06 PROCEDURE — 73020 X-RAY EXAM OF SHOULDER: CPT | Mod: LT

## 2025-03-06 PROCEDURE — 160048 HCHG OR STATISTICAL LEVEL 1-5: Performed by: ORTHOPAEDIC SURGERY

## 2025-03-06 PROCEDURE — 502240 HCHG MISC OR SUPPLY RC 0272: Performed by: ORTHOPAEDIC SURGERY

## 2025-03-06 PROCEDURE — 700111 HCHG RX REV CODE 636 W/ 250 OVERRIDE (IP): Mod: JZ | Performed by: ANESTHESIOLOGY

## 2025-03-06 PROCEDURE — 160025 RECOVERY II MINUTES (STATS): Performed by: ORTHOPAEDIC SURGERY

## 2025-03-06 PROCEDURE — 700111 HCHG RX REV CODE 636 W/ 250 OVERRIDE (IP): Performed by: ANESTHESIOLOGY

## 2025-03-06 PROCEDURE — 160035 HCHG PACU - 1ST 60 MINS PHASE I: Performed by: ORTHOPAEDIC SURGERY

## 2025-03-06 PROCEDURE — 97535 SELF CARE MNGMENT TRAINING: CPT

## 2025-03-06 PROCEDURE — A9270 NON-COVERED ITEM OR SERVICE: HCPCS | Performed by: ANESTHESIOLOGY

## 2025-03-06 PROCEDURE — 160009 HCHG ANES TIME/MIN: Performed by: ORTHOPAEDIC SURGERY

## 2025-03-06 PROCEDURE — 160002 HCHG RECOVERY MINUTES (STAT): Performed by: ORTHOPAEDIC SURGERY

## 2025-03-06 PROCEDURE — 160029 HCHG SURGERY MINUTES - 1ST 30 MINS LEVEL 4: Performed by: ORTHOPAEDIC SURGERY

## 2025-03-06 PROCEDURE — 700101 HCHG RX REV CODE 250: Performed by: ORTHOPAEDIC SURGERY

## 2025-03-06 PROCEDURE — 700102 HCHG RX REV CODE 250 W/ 637 OVERRIDE(OP): Performed by: ANESTHESIOLOGY

## 2025-03-06 PROCEDURE — 700105 HCHG RX REV CODE 258: Performed by: ORTHOPAEDIC SURGERY

## 2025-03-06 PROCEDURE — 502000 HCHG MISC OR IMPLANTS RC 0278: Performed by: ORTHOPAEDIC SURGERY

## 2025-03-06 PROCEDURE — 700111 HCHG RX REV CODE 636 W/ 250 OVERRIDE (IP): Performed by: ORTHOPAEDIC SURGERY

## 2025-03-06 PROCEDURE — 160046 HCHG PACU - 1ST 60 MINS PHASE II: Performed by: ORTHOPAEDIC SURGERY

## 2025-03-06 PROCEDURE — 160015 HCHG STAT PREOP MINUTES: Performed by: ORTHOPAEDIC SURGERY

## 2025-03-06 PROCEDURE — 160047 HCHG PACU  - EA ADDL 30 MINS PHASE II: Performed by: ORTHOPAEDIC SURGERY

## 2025-03-06 PROCEDURE — 97166 OT EVAL MOD COMPLEX 45 MIN: CPT

## 2025-03-06 PROCEDURE — 64415 NJX AA&/STRD BRCH PLXS IMG: CPT | Performed by: ORTHOPAEDIC SURGERY

## 2025-03-06 PROCEDURE — 160036 HCHG PACU - EA ADDL 30 MINS PHASE I: Performed by: ORTHOPAEDIC SURGERY

## 2025-03-06 PROCEDURE — C1776 JOINT DEVICE (IMPLANTABLE): HCPCS | Performed by: ORTHOPAEDIC SURGERY

## 2025-03-06 PROCEDURE — 160041 HCHG SURGERY MINUTES - EA ADDL 1 MIN LEVEL 4: Performed by: ORTHOPAEDIC SURGERY

## 2025-03-06 PROCEDURE — 700101 HCHG RX REV CODE 250: Performed by: ANESTHESIOLOGY

## 2025-03-06 PROCEDURE — 23472 RECONSTRUCT SHOULDER JOINT: CPT | Mod: LT | Performed by: ORTHOPAEDIC SURGERY

## 2025-03-06 DEVICE — SUTURE ANCHOR 2.8MM: Type: IMPLANTABLE DEVICE | Status: FUNCTIONAL

## 2025-03-06 DEVICE — CEMENT BONE SIMPLEX SPEEDSET FULL DOSE (10EA/BX): Type: IMPLANTABLE DEVICE | Status: FUNCTIONAL

## 2025-03-06 DEVICE — IMPLANTABLE DEVICE: Type: IMPLANTABLE DEVICE | Status: FUNCTIONAL

## 2025-03-06 RX ORDER — ALBUTEROL SULFATE 5 MG/ML
2.5 SOLUTION RESPIRATORY (INHALATION)
Status: DISCONTINUED | OUTPATIENT
Start: 2025-03-06 | End: 2025-03-06 | Stop reason: HOSPADM

## 2025-03-06 RX ORDER — HYDROMORPHONE HYDROCHLORIDE 1 MG/ML
0.4 INJECTION, SOLUTION INTRAMUSCULAR; INTRAVENOUS; SUBCUTANEOUS
Status: DISCONTINUED | OUTPATIENT
Start: 2025-03-06 | End: 2025-03-06 | Stop reason: HOSPADM

## 2025-03-06 RX ORDER — ONDANSETRON 2 MG/ML
4 INJECTION INTRAMUSCULAR; INTRAVENOUS EVERY 4 HOURS PRN
Status: CANCELLED | OUTPATIENT
Start: 2025-03-06

## 2025-03-06 RX ORDER — OXYCODONE HCL 5 MG/5 ML
10 SOLUTION, ORAL ORAL
Status: DISCONTINUED | OUTPATIENT
Start: 2025-03-06 | End: 2025-03-06 | Stop reason: HOSPADM

## 2025-03-06 RX ORDER — BISACODYL 10 MG
10 SUPPOSITORY, RECTAL RECTAL
Status: CANCELLED | OUTPATIENT
Start: 2025-03-06

## 2025-03-06 RX ORDER — AMOXICILLIN 250 MG
1 CAPSULE ORAL NIGHTLY
Status: CANCELLED | OUTPATIENT
Start: 2025-03-06

## 2025-03-06 RX ORDER — DEXAMETHASONE SODIUM PHOSPHATE 4 MG/ML
INJECTION, SOLUTION INTRA-ARTICULAR; INTRALESIONAL; INTRAMUSCULAR; INTRAVENOUS; SOFT TISSUE PRN
Status: DISCONTINUED | OUTPATIENT
Start: 2025-03-06 | End: 2025-03-06 | Stop reason: SURG

## 2025-03-06 RX ORDER — PHENYLEPHRINE HCL IN 0.9% NACL 1 MG/10 ML
SYRINGE (ML) INTRAVENOUS PRN
Status: DISCONTINUED | OUTPATIENT
Start: 2025-03-06 | End: 2025-03-06 | Stop reason: SURG

## 2025-03-06 RX ORDER — CEFAZOLIN SODIUM 1 G/3ML
2 INJECTION, POWDER, FOR SOLUTION INTRAMUSCULAR; INTRAVENOUS ONCE
Status: COMPLETED | OUTPATIENT
Start: 2025-03-06 | End: 2025-03-06

## 2025-03-06 RX ORDER — DOCUSATE SODIUM 100 MG/1
100 CAPSULE, LIQUID FILLED ORAL 2 TIMES DAILY
Status: CANCELLED | OUTPATIENT
Start: 2025-03-06

## 2025-03-06 RX ORDER — SODIUM CHLORIDE, SODIUM LACTATE, POTASSIUM CHLORIDE, CALCIUM CHLORIDE 600; 310; 30; 20 MG/100ML; MG/100ML; MG/100ML; MG/100ML
INJECTION, SOLUTION INTRAVENOUS CONTINUOUS
Status: ACTIVE | OUTPATIENT
Start: 2025-03-06 | End: 2025-03-06

## 2025-03-06 RX ORDER — AMOXICILLIN 250 MG
1 CAPSULE ORAL
Status: CANCELLED | OUTPATIENT
Start: 2025-03-06

## 2025-03-06 RX ORDER — OXYCODONE HYDROCHLORIDE 10 MG/1
10 TABLET ORAL
Refills: 0 | Status: CANCELLED | OUTPATIENT
Start: 2025-03-06

## 2025-03-06 RX ORDER — HALOPERIDOL 5 MG/ML
1 INJECTION INTRAMUSCULAR
Status: DISCONTINUED | OUTPATIENT
Start: 2025-03-06 | End: 2025-03-06 | Stop reason: HOSPADM

## 2025-03-06 RX ORDER — ACETAMINOPHEN 500 MG
1000 TABLET ORAL ONCE
Status: COMPLETED | OUTPATIENT
Start: 2025-03-06 | End: 2025-03-06

## 2025-03-06 RX ORDER — ROCURONIUM BROMIDE 10 MG/ML
INJECTION, SOLUTION INTRAVENOUS PRN
Status: DISCONTINUED | OUTPATIENT
Start: 2025-03-06 | End: 2025-03-06 | Stop reason: SURG

## 2025-03-06 RX ORDER — ASPIRIN 81 MG/1
81 TABLET ORAL 2 TIMES DAILY
Status: CANCELLED | OUTPATIENT
Start: 2025-03-06

## 2025-03-06 RX ORDER — OXYCODONE HYDROCHLORIDE 5 MG/1
5 TABLET ORAL
Refills: 0 | Status: CANCELLED | OUTPATIENT
Start: 2025-03-06

## 2025-03-06 RX ORDER — DIPHENHYDRAMINE HYDROCHLORIDE 50 MG/ML
25 INJECTION, SOLUTION INTRAMUSCULAR; INTRAVENOUS EVERY 6 HOURS PRN
Status: CANCELLED | OUTPATIENT
Start: 2025-03-06

## 2025-03-06 RX ORDER — BUPIVACAINE HYDROCHLORIDE AND EPINEPHRINE 5; 5 MG/ML; UG/ML
INJECTION, SOLUTION EPIDURAL; INTRACAUDAL; PERINEURAL
Status: DISCONTINUED | OUTPATIENT
Start: 2025-03-06 | End: 2025-03-06 | Stop reason: HOSPADM

## 2025-03-06 RX ORDER — DEXAMETHASONE SODIUM PHOSPHATE 4 MG/ML
4 INJECTION, SOLUTION INTRA-ARTICULAR; INTRALESIONAL; INTRAMUSCULAR; INTRAVENOUS; SOFT TISSUE
Status: CANCELLED | OUTPATIENT
Start: 2025-03-06

## 2025-03-06 RX ORDER — SODIUM PHOSPHATE,MONO-DIBASIC 19G-7G/118
1 ENEMA (ML) RECTAL
Status: CANCELLED | OUTPATIENT
Start: 2025-03-06

## 2025-03-06 RX ORDER — ONDANSETRON 2 MG/ML
INJECTION INTRAMUSCULAR; INTRAVENOUS PRN
Status: DISCONTINUED | OUTPATIENT
Start: 2025-03-06 | End: 2025-03-06 | Stop reason: SURG

## 2025-03-06 RX ORDER — HYDROMORPHONE HYDROCHLORIDE 1 MG/ML
0.2 INJECTION, SOLUTION INTRAMUSCULAR; INTRAVENOUS; SUBCUTANEOUS
Status: DISCONTINUED | OUTPATIENT
Start: 2025-03-06 | End: 2025-03-06 | Stop reason: HOSPADM

## 2025-03-06 RX ORDER — LIDOCAINE HYDROCHLORIDE 40 MG/ML
SOLUTION TOPICAL PRN
Status: DISCONTINUED | OUTPATIENT
Start: 2025-03-06 | End: 2025-03-06 | Stop reason: SURG

## 2025-03-06 RX ORDER — HYDRALAZINE HYDROCHLORIDE 20 MG/ML
5 INJECTION INTRAMUSCULAR; INTRAVENOUS
Status: DISCONTINUED | OUTPATIENT
Start: 2025-03-06 | End: 2025-03-06 | Stop reason: HOSPADM

## 2025-03-06 RX ORDER — BUPIVACAINE HYDROCHLORIDE AND EPINEPHRINE 5; 5 MG/ML; UG/ML
INJECTION, SOLUTION EPIDURAL; INTRACAUDAL; PERINEURAL
Status: COMPLETED | OUTPATIENT
Start: 2025-03-06 | End: 2025-03-06

## 2025-03-06 RX ORDER — LIDOCAINE HYDROCHLORIDE 20 MG/ML
INJECTION, SOLUTION EPIDURAL; INFILTRATION; INTRACAUDAL; PERINEURAL PRN
Status: DISCONTINUED | OUTPATIENT
Start: 2025-03-06 | End: 2025-03-06 | Stop reason: SURG

## 2025-03-06 RX ORDER — LABETALOL HYDROCHLORIDE 5 MG/ML
5 INJECTION, SOLUTION INTRAVENOUS
Status: DISCONTINUED | OUTPATIENT
Start: 2025-03-06 | End: 2025-03-06 | Stop reason: HOSPADM

## 2025-03-06 RX ORDER — TRANEXAMIC ACID 100 MG/ML
INJECTION, SOLUTION INTRAVENOUS PRN
Status: DISCONTINUED | OUTPATIENT
Start: 2025-03-06 | End: 2025-03-06 | Stop reason: SURG

## 2025-03-06 RX ORDER — HYDROMORPHONE HYDROCHLORIDE 1 MG/ML
0.5 INJECTION, SOLUTION INTRAMUSCULAR; INTRAVENOUS; SUBCUTANEOUS
Status: CANCELLED | OUTPATIENT
Start: 2025-03-06

## 2025-03-06 RX ORDER — SCOPOLAMINE 1 MG/3D
1 PATCH, EXTENDED RELEASE TRANSDERMAL
Status: CANCELLED | OUTPATIENT
Start: 2025-03-06

## 2025-03-06 RX ORDER — CELECOXIB 200 MG/1
200 CAPSULE ORAL ONCE
Status: COMPLETED | OUTPATIENT
Start: 2025-03-06 | End: 2025-03-06

## 2025-03-06 RX ORDER — HYDROMORPHONE HYDROCHLORIDE 1 MG/ML
0.1 INJECTION, SOLUTION INTRAMUSCULAR; INTRAVENOUS; SUBCUTANEOUS
Status: DISCONTINUED | OUTPATIENT
Start: 2025-03-06 | End: 2025-03-06 | Stop reason: HOSPADM

## 2025-03-06 RX ORDER — OXYCODONE HCL 5 MG/5 ML
5 SOLUTION, ORAL ORAL
Status: DISCONTINUED | OUTPATIENT
Start: 2025-03-06 | End: 2025-03-06 | Stop reason: HOSPADM

## 2025-03-06 RX ORDER — HALOPERIDOL 5 MG/ML
1 INJECTION INTRAMUSCULAR EVERY 6 HOURS PRN
Status: CANCELLED | OUTPATIENT
Start: 2025-03-06

## 2025-03-06 RX ORDER — SODIUM CHLORIDE, SODIUM LACTATE, POTASSIUM CHLORIDE, CALCIUM CHLORIDE 600; 310; 30; 20 MG/100ML; MG/100ML; MG/100ML; MG/100ML
INJECTION, SOLUTION INTRAVENOUS CONTINUOUS
Status: DISCONTINUED | OUTPATIENT
Start: 2025-03-06 | End: 2025-03-06 | Stop reason: HOSPADM

## 2025-03-06 RX ORDER — ONDANSETRON 2 MG/ML
4 INJECTION INTRAMUSCULAR; INTRAVENOUS
Status: COMPLETED | OUTPATIENT
Start: 2025-03-06 | End: 2025-03-06

## 2025-03-06 RX ORDER — VANCOMYCIN HYDROCHLORIDE 1 G/20ML
INJECTION, POWDER, LYOPHILIZED, FOR SOLUTION INTRAVENOUS
Status: COMPLETED | OUTPATIENT
Start: 2025-03-06 | End: 2025-03-06

## 2025-03-06 RX ORDER — POLYETHYLENE GLYCOL 3350 17 G/17G
1 POWDER, FOR SOLUTION ORAL 2 TIMES DAILY PRN
Status: CANCELLED | OUTPATIENT
Start: 2025-03-06

## 2025-03-06 RX ADMIN — ROCURONIUM BROMIDE 50 MG: 10 INJECTION INTRAVENOUS at 06:58

## 2025-03-06 RX ADMIN — DEXAMETHASONE SODIUM PHOSPHATE 8 MG: 4 INJECTION INTRA-ARTICULAR; INTRALESIONAL; INTRAMUSCULAR; INTRAVENOUS; SOFT TISSUE at 07:07

## 2025-03-06 RX ADMIN — PROPOFOL 20 MG: 10 INJECTION, EMULSION INTRAVENOUS at 08:23

## 2025-03-06 RX ADMIN — ONDANSETRON 4 MG: 2 INJECTION INTRAMUSCULAR; INTRAVENOUS at 09:26

## 2025-03-06 RX ADMIN — Medication 100 MCG: at 08:10

## 2025-03-06 RX ADMIN — ONDANSETRON 4 MG: 2 INJECTION INTRAMUSCULAR; INTRAVENOUS at 08:10

## 2025-03-06 RX ADMIN — CELECOXIB 200 MG: 200 CAPSULE ORAL at 05:58

## 2025-03-06 RX ADMIN — TRANEXAMIC ACID 1000 MG: 100 INJECTION, SOLUTION INTRAVENOUS at 07:01

## 2025-03-06 RX ADMIN — LIDOCAINE HYDROCHLORIDE 4 ML: 40 SOLUTION TOPICAL at 07:00

## 2025-03-06 RX ADMIN — FENTANYL CITRATE 50 MCG: 50 INJECTION, SOLUTION INTRAMUSCULAR; INTRAVENOUS at 06:42

## 2025-03-06 RX ADMIN — Medication 100 MCG: at 08:04

## 2025-03-06 RX ADMIN — BUPIVACAINE HYDROCHLORIDE AND EPINEPHRINE 17 ML: 5; 5 INJECTION, SOLUTION EPIDURAL; INTRACAUDAL; PERINEURAL at 06:42

## 2025-03-06 RX ADMIN — LIDOCAINE HYDROCHLORIDE 50 MG: 20 INJECTION, SOLUTION EPIDURAL; INFILTRATION; INTRACAUDAL; PERINEURAL at 06:58

## 2025-03-06 RX ADMIN — PROPOFOL 150 MG: 10 INJECTION, EMULSION INTRAVENOUS at 06:58

## 2025-03-06 RX ADMIN — ACETAMINOPHEN 1000 MG: 500 TABLET ORAL at 05:58

## 2025-03-06 RX ADMIN — CEFAZOLIN 2 G: 1 INJECTION, POWDER, FOR SOLUTION INTRAMUSCULAR; INTRAVENOUS at 06:57

## 2025-03-06 RX ADMIN — SODIUM CHLORIDE, POTASSIUM CHLORIDE, SODIUM LACTATE AND CALCIUM CHLORIDE: 600; 310; 30; 20 INJECTION, SOLUTION INTRAVENOUS at 06:54

## 2025-03-06 RX ADMIN — TRANEXAMIC ACID 1000 MG: 100 INJECTION, SOLUTION INTRAVENOUS at 08:10

## 2025-03-06 RX ADMIN — Medication 100 MCG: at 07:58

## 2025-03-06 RX ADMIN — SUGAMMADEX 200 MG: 100 INJECTION, SOLUTION INTRAVENOUS at 08:24

## 2025-03-06 RX ADMIN — FENTANYL CITRATE 50 MCG: 50 INJECTION, SOLUTION INTRAMUSCULAR; INTRAVENOUS at 08:14

## 2025-03-06 ASSESSMENT — GAIT ASSESSMENTS: DISTANCE (FEET): 50

## 2025-03-06 ASSESSMENT — COGNITIVE AND FUNCTIONAL STATUS - GENERAL
DRESSING REGULAR LOWER BODY CLOTHING: A LITTLE
SUGGESTED CMS G CODE MODIFIER DAILY ACTIVITY: CJ
HELP NEEDED FOR BATHING: A LITTLE
DAILY ACTIVITIY SCORE: 20
DRESSING REGULAR UPPER BODY CLOTHING: A LOT

## 2025-03-06 ASSESSMENT — PAIN DESCRIPTION - PAIN TYPE
TYPE: SURGICAL PAIN

## 2025-03-06 ASSESSMENT — FIBROSIS 4 INDEX: FIB4 SCORE: 1.778781183844713374

## 2025-03-06 ASSESSMENT — ACTIVITIES OF DAILY LIVING (ADL): TOILETING: INDEPENDENT

## 2025-03-06 NOTE — ANESTHESIA PROCEDURE NOTES
Airway    Date/Time: 3/6/2025 7:00 AM    Performed by: Anay Garce M.D.  Authorized by: Anay Grace M.D.    Location:  OR  Urgency:  Elective  Difficult Airway: No    Indications for Airway Management:  Anesthesia      Spontaneous Ventilation: absent    Sedation Level:  Deep  Preoxygenated: Yes    Patient Position:  Sniffing  Mask Difficulty Assessment:  1 - vent by mask  Final Airway Type:  Endotracheal airway  Final Endotracheal Airway:  ETT  Cuffed: Yes    Technique Used for Successful ETT Placement:  Video laryngoscopy  Devices/Methods Used in Placement:  Intubating stylet    Insertion Site:  Oral  Blade Type:  Glide  Laryngoscope Blade/Videolaryngoscope Blade Size:  4  ETT Size (mm):  7.5  Measured from:  Teeth  ETT to Teeth (cm):  23  Placement Verified by: auscultation and capnometry    Cormack-Lehane Classification:  Grade I - full view of glottis  Number of Attempts at Approach:  1

## 2025-03-06 NOTE — ANESTHESIA TIME REPORT
Anesthesia Start and Stop Event Times       Date Time Event    3/6/2025 0651 Ready for Procedure     0654 Anesthesia Start     0832 Anesthesia Stop          Responsible Staff  03/06/25      Name Role Begin End    Anay Grace M.D. Anesth 0654 0832          Overtime Reason:  no overtime (within assigned shift)    Comments:

## 2025-03-06 NOTE — OP REPORT
DATE OF SERVICE: 3/6/25    PREOPERATIVE DIAGNOSIS: left shoulder advanced glenohumeral arthritis.      POSTOPERATIVE DIAGNOSIS: left shoulder advanced glenohumeral arthritis..     PROCEDURE PERFORMED: left total shoulder arthroplasty     SURGEON: Delonte Kim MD    ASSISTANT: EFRAIN Terrell    ANESTHESIA: General with interscalene block for posterior pain control.     ANESTHESIOLOGIST: Cogan, MD    ANTIBIOTICS: Ancef    COMPLICATIONS: None.     IMPLANTS: Tornier Perform press fit 4 stemless with a M35 glenoid, cemented with a 50 x 19 head, two q-fix    INDICATIONS: The patient presents with left shoulder pain recalcitrant to conservative treatment. The patient has evidence of a left advanced glenohumeral arthritis, with intact rotator cuff. Options were discussed, including both non operative and operative treatments. Risks of surgery were discussed at length. All questions were answered. No guarantees were given. The patient elected to proceed with a total shoulder arthroplasty.    PROCEDURE IN DETAIL: The patient was properly identified in the preoperative holding area, and the left shoulder was marked as the correct surgical site. The patient was then taken back to the operative room, and placed supine on the operating room table and underwent general anesthesia. The patient was placed in a beach chair position. The left upper extremity was sterilely prepped and draped in standard fashion.     A standard deltopectoral approach was created. The subscap was identified. The subscap was released, and tacked with # 1 Vicryl. The joint was dislocated and the humeral head showed advanced glenohumeral arthritis. The jig was placed on the proximal humerus.  A saw cut was made, the head was removed. The glenoid was then exposed and the labrum was excised. The central drill was made followed by reaming by hand and then this was sized at M35. The three jig drill holes were made. The glenoid was irrigated and then cemented  in place. Once the cement was totally hard, attention was directed back to the humerus. The 4 had a good fit and this was pinned and reamed.  This was trialed with the 50 x 19 mm and this had excellent stability and excellent range of motion with approximately 50% translation anterior posteriorly and good height. The trial implant was removed. The final implant was inserted, once again retrialed and everything looked excellent with nice stability. This was soaked in saline and betadine. The subscap was reattached with two q-fix. The deltopectoral approach was closed with 0 vicryl, then 2-0 vicryl and staples on the skin. All counts were correct. Soft dressings were applied. The ultrasling was placed. The patient was extubated and transferred to the recovery room in stable condition.

## 2025-03-06 NOTE — OR NURSING
Pt allergies and NPO status verified. Home medications reconciled, preferred pharmacy verified. Belongings secured in locker. Pt verbalizes understanding of pain scale, expected course of stay, and plan of care. Surgical site verified with pt. Triple aim completed. IV access established. All questions answered. Bed in lowest position, call light within reach.

## 2025-03-06 NOTE — LETTER
January 22, 2025    Patient Name: Selma Avila  Surgeon Name: Delonte Kim M.D.  Surgery Facility: [facility] ([address] [facility])  Surgery Date: 3/6/2025    The time of your surgery is not final and may change up to and until the day of your surgery. You will be contacted 24-48 hours prior to your surgery date with your check-in and surgery time.    If you will not be at one of the below numbers please call the surgery scheduler at [phone]  Preferred Phone: [phone]    BEFORE YOUR SURGERY   Pre Registration and/or Lab Work must be done within and no earlier than 28 days prior to your surgery date. Your scheduled facility will contact you regarding all required preregistration and/or lab work. If you have not been contacted within 7 days of your scheduled procedure please call [facility] at [phone] for an appointment as soon as possible.    Pre op Appointment:   Date: 02/26/25   Time: 10:30AM   Provider: Delonte Kim MD   Location: [address] [place] [id number]  Instructions: Bring a list of all medications you are taking including the dosing and frequency.    - Please  your non-narcotic prescriptions prior to surgery at the pharmacy you provided us. DON'T START them until after your surgery.    DAY OF YOUR SURGERY  Nothing to eat or drink after midnight     Refrain from smoking any substance after midnight prior to surgery. Smoking may interfere with the anesthetic and frequently produces nausea during the recovery period.    Continue taking all lifesaving medications. Including the morning of your surgery with small sip of water.        Please do NOT take on the day of surgery:  Diuretics: examples- furosemide (Lasix), spironolactone, hydrochlorothiazide  ACE-inhibitors: examples- lisinopril, ramipril, enalapril  “ARBs”: examples- losartan, Olmesartan, valsartan    Please arrive at the hospital/surgery center at the  check-in time provided.     An adult will need to bring you and take you home after your surgery.     AFTER YOUR SURGERY  Post op Appointment:   Date: 03/21/25   Time: 10:30AM   With: Delonte Kim MD   Location: [address] [place] [facility] [place] [address]    - Therapy- Your first appointment should be 5  day(s) after your surgery. For your convenience we have 4 Physical Therapy locations: [place], [facility], [place], and [facility]. Call our office ASAP to schedule an appointment at [phone] or take the enclosed Therapy Prescription to a facility of your choice.  - Post Surgery - You will need someone to drive you home  - Post Surgery - You will need someone to stay with you for 24 hours     TIME OFF WORK  FMLA or Disability forms can be faxed directly to: [phone] or you may drop them off at [address] [place] [facility] [address]. Our office charges a $35.00 fee per form. Forms will be completed within 10 business days of drop off and payment received. For the status of your forms you may contact our disability office directly [phone].    MEDICATION INSTRUCTIONS **Please read section completely**  The following medications should be stopped a minimum of 10 days prior to surgery:  All over the counter, Supplements & Herbal medications    Anorectics: Phentermine (Adipex-P, Lomaira and Suprenza), Phentermine-topiramate (Qsymia), Bupropion-naltrexone (Contrave)    **If you are on Bupropion for anxiety/depression, please continue this medication up until the day of surgery.     Opiod Partial Agonists/Opioid Antagonists: Buprenorphine (Suboxone, Belbuca, Butrans, Probuphine Implant, Sublocade), Naltrexone (ReVia, Vivitrol), Naloxone    Amphetamines: Dextroamphetamine/Amphetamine (Adderall, Mydayis), Methylphenidate Hydrochloride (Concerta, Metadate, Methylin, Ritalin)    The following medications should be stopped 5 days prior to surgery:  Blood Thinners: Any Aspirin, Aspirin products, anti-inflammatories  such as ibuprofen and any blood thinners such as Coumadin and Plavix. Please consult your prescribing physician if you are on life saving blood thinners, in regards to when to stop medications prior to surgery.     The following medications should be stopped a minimum of 3 days prior to surgery:  PDE-5 inhibitors: Sildenafil (Viagra), Tadalafil (Cialis), Vardenafil (Levitra), Avanafil (Stendra)    MAO Inhibitors: Rasagiline (Azilect), Selegiline (Eldepryl, Emsam, Selapar), Isocarboxazid (Marplan), Phenelzine (Nardil)

## 2025-03-06 NOTE — ANESTHESIA PREPROCEDURE EVALUATION
Case: [id number] Date/Time: 03/06/25 0645    Procedure: ARTHROPLASTY, SHOULDER, TOTAL (Left)    Diagnosis:       Left shoulder pain [M25.512]      Osteoarthritis of left shoulder [M19.012]    Pre-op diagnosis: Left shoulder pain [M25.512] Osteoarthritis of left shoulder [M19.012]    Location:  OR 06 / SURGERY [facility]    Surgeons: Delonte Kim M.D.          78 yo M with history of HTN, preDM and s/p MVR.  Record shows history of difficult intubation but anesthesia record does not show this; pt denies being told he was difficult to intubate in the past.      NPO  Relevant Problems   CARDIAC   (positive) Essential hypertension         (positive) Cyst of right kidney      Other   (positive) History of basal cell carcinoma (BCC) of skin   (positive) History of heart valve replacement with bioprosthetic valve   (positive) History of melanoma   (positive) Left shoulder pain   (positive) Osteoarthritis of left shoulder   (positive) Pre-diabetes   (positive) Pulmonary nodule, left       Physical Exam    Airway   Mallampati: II  TM distance: >3 FB  Neck ROM: full       Cardiovascular - normal exam  Rhythm: regular  Rate: normal  (-) murmur     Dental - normal exam           Pulmonary - normal exam  Breath sounds clear to auscultation     Abdominal    Neurological - normal exam                   Anesthesia Plan    ASA 2       Plan - general and peripheral nerve block     Peripheral nerve block will be post-op pain control  Airway plan will be ETT          Induction: intravenous    Postoperative Plan: Postoperative administration of opioids is intended.    Pertinent diagnostic labs and testing reviewed    Informed Consent:    Anesthetic plan and risks discussed with patient and spouse.    Use of blood products discussed with: patient whom consented to blood products.

## 2025-03-06 NOTE — THERAPY
Occupational Therapy   Initial Evaluation     Patient Name: Selma Avila  Age:  79 y.o., Sex:  male  Medical Record #: [id number]  Today's Date: 3/6/2025     Precautions  Precautions: (P) Immobilizer Left Upper Extremity, Non Weight Bearing Left Upper Extremity    Assessment  Patient is 79 y.o. male s/p L TSA, POD 0. Pt seen in stage 2, A&Ox4, [place]. Spouse present for caregiver training. Pt is R hand dominant, brace in place to LUE. Requires ModA with UB dressing (shirt and brace), CGA with LB. Ambulates to/from br with SBA, good balance.     Pt and spouse educated in detail on ADL's after L Total Shoulder surgery.   Summary of education completed:  How to adjust sling/immobilizer for best fit.  To keep sling positioned so hand is level or slightly above elbow to reduce pull of gravity on arm and maintain shoulder in neutral positioning.  How to don & doff sling/immobilizer safely and appropriately for dressing and bathing.  How to dress upper body while maintaining post surgical precautions.  How to shower safely.  How to appropriately cover incision for showering if needed prior to removal of staples.    Proper positioning of arm during showering.   Encouraged use of hand held shower (using the non-operative extremity) to keep water away from the incision site.    Safe shower and toilet transfers.  Proper positioning while sleeping either in bed or recliner  Encouraged patient to support arm with pillows under forearm when sitting to reduce strain on the neck from the weight of the arm and immobilizer.   Proper bed mobility and transfer techniques while maintaining NWB on surgical arm.  Proper positioning of arm for gentle wrist/hand ROM and passive elbow extension.    Pain management education - around the clock. Pt and spouse verbalized and demonstrated understanding of education provided. Pt is DC ready from OT  Plan  Eval/Education 1x only  DC Equipment Recommendations: (P) None  Discharge Recommendations: (P)  Anticipate that the patient will have no further occupational therapy needs after discharge from the hospital   Subjective  Agreeable  Objective   03/06/25 1053   Prior Living Situation   Prior Services None   Housing / Facility 2 Story House   Steps Into Home 5   Steps In Home 11  (to basement which pt will not go down)   Bathroom Set up Bathtub / Shower Combination;Shower Chair   Equipment Owned Hand Held Shower;Reacher;Raised Toilet Seat Without Arms;Tub / Shower Seat   Lives with - Patient's Self Care Capacity Spouse   Prior Level of ADL Function   Self Feeding Independent   Grooming / Hygiene Independent   Bathing Independent   Dressing Independent   Toileting Independent   Prior Level of IADL Function   Medication Management Independent   Laundry Independent   Kitchen Mobility Independent   Finances Independent   Home Management Independent   Shopping Independent   Prior Level Of Mobility Independent Without Device in Home   Driving / Transportation Driving Independent   Occupation (Pre-Hospital Vocational) Retired Due To Age   Leisure Interests Hobbies   History of Falls   History of Falls No   Precautions   Precautions Immobilizer Left Upper Extremity;Non Weight Bearing Left Upper Extremity   Vitals   O2 Delivery Device None - Room Air   Pain 0 - 10 Group   Therapist Pain Assessment 0   Cognition    Cognition / Consciousness WDL   Comments Pleasant and cooperative. [place].   Passive ROM Upper Body   Comments pendululms demonstrated, handout issued. Nerve block in effect.   Active ROM Upper Body   Active ROM Upper Body  X   Dominant Hand Right   Comments Brace in place to LUE   Strength Upper Body   Upper Body Strength  Not Tested   Sensation Upper Body   Upper Extremity Sensation  X   Comments due to nerve block   Upper Body Muscle Tone   Upper Body Muscle Tone  X   Coordination Upper Body   Coordination X   Balance Assessment   Sitting Balance (Static) Good   Sitting Balance (Dynamic) Fair +   Standing Balance  (Static) Good   Standing Balance (Dynamic) Fair +   Weight Shift Sitting Fair   Weight Shift Standing Good   ADL Assessment   Upper Body Dressing Moderate Assist   Lower Body Dressing Contact Guard Assist   Toileting Standby Assist   How much help from another person does the patient currently need...   Putting on and taking off regular lower body clothing? 3   Bathing (including washing, rinsing, and drying)? 3   Toileting, which includes using a toilet, bedpan, or urinal? 4   Putting on and taking off regular upper body clothing? 2   Taking care of personal grooming such as brushing teeth? 4   Eating meals? 4   6 Clicks Daily Activity Score 20   Functional Mobility   Sit to Stand Standby Assist   Bed, Chair, Wheelchair Transfer Standby Assist   Toilet Transfers Standby Assist   Transfer Method Stand Step   Mobility steady   Distance (Feet) 50   # of Times Distance was Traveled 2   Visual Perception   Visual Perception  WDL   Activity Tolerance   Sitting in Chair >30   Standing 7   Education Group   Education Provided Brace Wear and Care;Upper Extremity Range of Motion;Transfers;Home Safety;Activities of Daily Living   Role of Occupational Therapist Patient Response Patient;Acceptance;Explanation;Verbal Demonstration;Family   Upper Ext ROM Patient Response Patient;Acceptance;Explanation;Verbal Demonstration;Family;Demonstration;Handout   Brace Wear & Care Patient Response Patient;Acceptance;Explanation;Verbal Demonstration;Family;Handout   Home Safety Patient Response Patient;Acceptance;Explanation;Verbal Demonstration   Transfers Patient Response Family;Acceptance;Explanation;Verbal Demonstration;Action Demonstration   ADL Patient Response Family;Acceptance;Explanation;Verbal Demonstration;Action Demonstration   Anticipated Discharge Equipment and Recommendations   DC Equipment Recommendations None   Discharge Recommendations Anticipate that the patient will have no further occupational therapy needs after  discharge from the hospital   Interdisciplinary Plan of Care Collaboration   IDT Collaboration with  Family / Caregiver;Nursing   Patient Position at End of Therapy Seated;Family / Friend in Room;Phone within Reach;Call Light within Reach   Collaboration Comments OT Eval/Education.

## 2025-03-06 NOTE — ANESTHESIA PROCEDURE NOTES
Peripheral Block    Date/Time: 3/6/2025 6:42 AM    Performed by: Anay Grace M.D.  Authorized by: Anay Grace M.D.    Patient Location:  Pre-op  Start Time:  3/6/2025 6:42 AM  End Time:  3/6/2025 6:46 AM  Reason for Block: at surgeon's request and post-op pain management ONLY    patient identified, IV checked, site marked, risks and benefits discussed, surgical consent, monitors and equipment checked, pre-op evaluation and timeout performed    Patient Position:  Supine  Prep: ChloraPrep    Monitoring:  Heart rate, continuous pulse ox and cardiac monitor  Block Region:  Upper Extremity  Upper Extremity - Block Type:  BRACHIAL PLEXUS block, Interscalene approach    Laterality:  Left  Procedures: ultrasound guided  Image captured, interpreted and electronically stored.  Local Infiltration:  Lidocaine  Strength:  1 %  Dose:  1 ml  Block Type:  Single-shot  Needle Length:  50mm  Needle Gauge:  22 G  Needle Localization:  Ultrasound guidance  Ultrasound picture in chart  Injection Assessment:  Negative aspiration for heme, no paresthesia on injection, incremental injection and local visualized surrounding nerve on ultrasound  Evidence of intravascular injection: No     No heme or paresthesias, pt tolerated procedure well

## 2025-03-06 NOTE — DISCHARGE INSTRUCTIONS
Surgeon specific instructions  Ice as needed.    Keep arm in Ultrasling.   Call for questions or concerns.    Ok to start showering in 2 days.   Keep original dressing in place.     Follow up as scheduled.   Start physical therapy within 5 days.   May come out of brace for showering, physical therapy, and Codman Exercises.   No active Range of Motion, reaching, grabbing, pushing, pulling with the operative arm x 6 weeks.        ACTIVITY: Rest and take it easy for the first 24 hours.  A responsible adult is recommended to remain with you during that time.  It is normal to feel sleepy.  We encourage you to not do anything that requires balance, judgment or coordination.    MILD FLU-LIKE SYMPTOMS ARE NORMAL. YOU MAY EXPERIENCE GENERALIZED MUSCLE ACHES, THROAT IRRITATION, HEADACHE AND/OR SOME NAUSEA.    FOR 24 HOURS DO NOT:  Drive, operate machinery or run household appliances.  Drink beer or alcoholic beverages.   Make important decisions or sign legal documents.    DIET: To avoid nausea, slowly advance diet as tolerated, avoiding spicy or greasy foods for the first day.  Add more substantial food to your diet according to your physician's instructions.  INCREASE FLUIDS AND FIBER TO AVOID CONSTIPATION.      FOLLOW-UP APPOINTMENT:  A follow-up appointment should be arranged with your doctor, call to schedule.    You should CALL YOUR PHYSICIAN if you develop:  Fever greater than 101 degrees F.  Pain not relieved by medication, or persistent nausea or vomiting.  Excessive bleeding (blood soaking through dressing) or unexpected drainage from the wound.  Extreme redness or swelling around the incision site, drainage of pus or foul smelling drainage.  Inability to urinate or empty your bladder within 8 hours.  Problems with breathing or chest pain.    You should call 911 if you develop problems with breathing or chest pain.  If you are unable to contact your doctor or surgical center, you should go to the nearest emergency  room or urgent care center.  Physician's telephone #: [phone]      If any questions arise, call your doctor.  If your doctor is not available, please feel free to call the Surgical Center at [phone].     A registered nurse may call you a few days after your surgery to see how you are doing after your procedure.    MEDICATIONS: Resume taking daily medication.  Take prescribed pain medication with food.  If no medication is prescribed, you may take non-aspirin pain medication if needed.  PAIN MEDICATION CAN BE VERY CONSTIPATING.  Take a stool softener or laxative such as senokot, pericolace, or milk of magnesia if needed.    Last pain medication given at ___________.    If your physician has prescribed pain medication that includes Acetaminophen (Tylenol), do not take additional Acetaminophen (Tylenol) while taking the prescribed medication.          Peripheral Nerve Block Discharge Instructions from Same Day Surgery and Inpatient :    What to Expect - Upper Extremity  You may experience numbness and weakness in shoulder, arm, and hand  on the same side as your surgery  This is normal. For some people, this may be an unpleasant sensation. Be very careful with your numb limb  Ask for help when you need it  Shoulder Surgery Side Effects  In addition to numbness and weakness you may experience other symptoms  Other nerves that are close to those nerves injected can also be affected by local anesthesia  You may experience a hoarseness in your voice  Your breathing may feel different  You may also notice drooping of your eyelid, pupil constriction, and decreased sweating, on the side of your surgery  All of these side effects are normal and will resolve when the local anesthetic wears off   Prevent Injury  Protect the limb like a baby  Beware of exposing your limb to extreme heat or cold or trauma  The limb may be injured without you noticing because it is numb  Keep the limb elevated whenever possible  Do not  "sleep on the limb  Change the position of the limb regularly  Avoid putting pressure on your surgical limb  Pain Control  The initial block on the day of surgery will make your extremity feel \"numb\"  Any consecutive injection including prior to discharge from the hospital will make your extremity feel \"numb\"  You may feel an aching or burning when the local anesthesia starts to wear off  Take pain pills as prescribed by your surgeon  Call your surgeon or anesthesiologist if you do not have adequate pain control    "

## 2025-03-06 NOTE — H&P
Surgery Orthopedic History & Physical Note    Date  3/6/2025    Primary Care Physician  Aaron Lazaro M.D.      Pre-Op Diagnosis Codes:      * Left shoulder pain [M25.512]     * Osteoarthritis of left shoulder [M19.012]    HPI  This is a 79 y.o. male who presented with left shoulder pain secondary to advanced djd.  He elects to undergo a left TSA.    Past Medical History:   Diagnosis Date    Arrhythmia 2017    Arthritis 1989    thumbs spine neck    Breath shortness 2022    not currently    Cancer (HCC) 2022    skin melanoma left jaw    Diverticulosis     Dyslipidemia 12/14/2018    Essential hypertension 09/12/2013    on medication; cardiologist Dr. Duke    Hard to intubate July 6,2023    Heart murmur 1980    History of heart valve replacement with bioprosthetic valve 07/2023    Mitral valve prolapse 09/12/2013    Non-rheumatic mitral regurgitation 09/12/2013    Obstructive uropathy 01/25/2021    Pre-diabetes     Renal disorder     impaired function pt states two benign cysts on right kidney    Skin cancer (melanoma) (HCC)     had removed    Snoring        Past Surgical History:   Procedure Laterality Date    MITRAL VALVE REPLACE  07/06/2023    Procedure: REPLACEMENT, MITRAL VALVE, LEFT ATRIAL APPENDAGE LIGATION;  Surgeon: Jacqueline Neri M.D.;  Location: SURGERY [facility];  Service: Cardiothoracic    ECHOCARDIOGRAM, TRANSESOPHAGEAL, INTRAOPERATIVE  07/06/2023    Procedure: ECHOCARDIOGRAM, TRANSESOPHAGEAL, INTRAOPERATIVE;  Surgeon: Jacqueline Neri M.D.;  Location: SURGERY [facility];  Service: Cardiothoracic    FISTULA IN ANO REPAIR Left 2019    TONSILLECTOMY  1950    COLONOSCOPY         Current Facility-Administered Medications   Medication Dose Route Frequency Provider Last Rate Last Admin    lactated ringers infusion   Intravenous Continuous Delonte Kim M.D.        ceFAZolin (Ancef) injection 2 g  2 g Intravenous Once Delonte Kim M.D.           Social History     Socioeconomic History    Marital  status:      Spouse name: Not on file    Number of children: Not on file    Years of education: Not on file    Highest education level: Bachelor's degree (e.g., BA, AB, BS)   Occupational History    Not on file   Tobacco Use    Smoking status: Former     Current packs/day: 0.00     Average packs/day: 0.3 packs/day for 0.3 years (0.1 ttl pk-yrs)     Types: Cigarettes     Start date: 1966     Quit date: 1966     Years since quittin.5     Passive exposure: Never    Smokeless tobacco: Never   Vaping Use    Vaping status: Never Used   Substance and Sexual Activity    Alcohol use: Yes     Comment: 1 drink per month    Drug use: No    Sexual activity: Not on file     Comment:    Other Topics Concern    Not on file   Social History Narrative    Not on file     Social Drivers of Health     Financial Resource Strain: Low Risk  (10/26/2024)    Overall Financial Resource Strain (CARDIA)     Difficulty of Paying Living Expenses: Not hard at all   Food Insecurity: No Food Insecurity (10/26/2024)    Hunger Vital Sign     Worried About Running Out of Food in the Last Year: Never true     Ran Out of Food in the Last Year: Never true   Transportation Needs: No Transportation Needs (10/26/2024)    PRAPARE - Transportation     Lack of Transportation (Medical): No     Lack of Transportation (Non-Medical): No   Physical Activity: Sufficiently Active (10/26/2024)    Exercise Vital Sign     Days of Exercise per Week: 4 days     Minutes of Exercise per Session: 40 min   Stress: No Stress Concern Present (10/26/2024)    [place] [employer] - Occupational Stress Questionnaire     Feeling of Stress : Not at all   Social Connections: Socially Integrated (10/26/2024)    Social Connection and Isolation Panel [NHANES]     Frequency of Communication with Friends and Family: More than three times a week     Frequency of Social Gatherings with Friends and Family: Twice a week     Attends Pentec[employer]ism  Services: More than 4 times per year     Active Member of Clubs or Organizations: Yes     Attends Club or Organization Meetings: More than 4 times per year     Marital Status:    Intimate Partner Violence: Not on file   Housing Stability: Unknown (10/26/2024)    Housing Stability Vital Sign     Unable to Pay for Housing in the Last Year: No     Number of Times Moved in the Last Year: Not on file     Homeless in the Last Year: No       Family History   Problem Relation Age of Onset    Ovarian Cancer Mother     Cancer Mother         Ovarian cancer    Cancer Father         prostate cx    Lung Disease Father         Microbacterium intercellulare avium    Heart Disease Neg Hx        Allergies  Patient has no known allergies.    Review of Systems  Negative    Physical Exam  Lungs:  CTA bilat  CV:  RRR  Left shoulder:  pain and crepitus with range of motion  Vital Signs  Blood Pressure : (!) 144/94   Temperature: 36 °C (96.8 °F)   Pulse: 76   Respiration: 14   Pulse Oximetry: 94 %       Labs:                    Radiology:  No orders to display         Assessment/Plan:  Pre-Op Diagnosis Codes:      * Left shoulder pain [M25.512]     * Osteoarthritis of left shoulder [M19.012]  Procedure(s):  ARTHROPLASTY, SHOULDER, TOTAL

## 2025-03-06 NOTE — OR NURSING
0830 Patient arrived from OR via gurney.  L shoulder CDI. Cold pack to L shoulder.     Sedation/Resp Status: Eye opening to verbal.  Respirations spontaneous and non-labored.    Patient has peripheral block to L shoulder.    HR 98; VSS on 6L 02 via Simple mask.    Patient resting comfortably, no complaints of pain or nausea at this time.    0845: Patient states small amount of nausea, denies wanting medication.     0852: X ray at bedside.     0857: Called family to update on patient.     0900 Patient resting comfortably.     0905: Report given to MICHELL Rowan    0920: Received report from MICHELL Rowan.     0926: Administered Zofran to patient.     0930: Patient resting comfortably, no complaints of pain or nausea at this time.    0941: Gave report to MICHELL Schwab in Stage II.     0949: Patient to Stage II.

## 2025-03-06 NOTE — ANESTHESIA POSTPROCEDURE EVALUATION
Patient: Selma Avila    Procedure Summary       Date: 03/06/25 Room / Location:  OR 06 / SURGERY [facility]    Anesthesia Start: 0654 Anesthesia Stop:     Procedure: ARTHROPLASTY, SHOULDER, TOTAL (Left) Diagnosis:       Left shoulder pain      Osteoarthritis of left shoulder      (Left shoulder pain [M25.512] Osteoarthritis of left shoulder [M19.012])    Surgeons: Delonte Kim M.D. Responsible Provider: Anay Grace M.D.    Anesthesia Type: general, peripheral nerve block ASA Status: 2            Final Anesthesia Type: general, peripheral nerve block  Last vitals  BP   Blood Pressure : 106/69    Temp   36 °C (96.8 °F)    Pulse   83   Resp   17    SpO2   90 %      Anesthesia Post Evaluation    Patient location during evaluation: PACU  Patient participation: complete - patient participated  Level of consciousness: awake and alert    Airway patency: patent  Anesthetic complications: no  Cardiovascular status: hemodynamically stable  Respiratory status: acceptable  Hydration status: euvolemic    PONV: none    patient able to participate, but full recovery from regional anesthesia has not occurred and is not expected within the stipulated timeframe for the completion of the evaluation      There were no known notable events for this encounter.     Nurse Pain Score: 0 (NPRS)

## 2025-03-11 ENCOUNTER — HOSPITAL ENCOUNTER (OUTPATIENT)
Dept: LAB | Facility: MEDICAL CENTER | Age: 80
End: 2025-03-11
Attending: STUDENT IN AN ORGANIZED HEALTH CARE EDUCATION/TRAINING PROGRAM
Payer: MEDICARE

## 2025-03-11 ENCOUNTER — PATIENT MESSAGE (OUTPATIENT)
Dept: CARDIOLOGY | Facility: MEDICAL CENTER | Age: 80
End: 2025-03-11
Payer: MEDICARE

## 2025-03-11 DIAGNOSIS — I10 ESSENTIAL HYPERTENSION: ICD-10-CM

## 2025-03-11 DIAGNOSIS — E78.5 DYSLIPIDEMIA WITH HIGH LDL AND LOW HDL: ICD-10-CM

## 2025-03-11 LAB
ALBUMIN SERPL BCP-MCNC: 3.6 G/DL (ref 3.2–4.9)
ALBUMIN/GLOB SERPL: 1 G/DL
ALP SERPL-CCNC: 64 U/L (ref 30–99)
ALT SERPL-CCNC: 21 U/L (ref 2–50)
ANION GAP SERPL CALC-SCNC: 11 MMOL/L (ref 7–16)
AST SERPL-CCNC: 33 U/L (ref 12–45)
BILIRUB SERPL-MCNC: 1.3 MG/DL (ref 0.1–1.5)
BUN SERPL-MCNC: 15 MG/DL (ref 8–22)
CALCIUM ALBUM COR SERPL-MCNC: 9.6 MG/DL (ref 8.5–10.5)
CALCIUM SERPL-MCNC: 9.3 MG/DL (ref 8.5–10.5)
CHLORIDE SERPL-SCNC: 104 MMOL/L (ref 96–112)
CHOLEST SERPL-MCNC: 152 MG/DL (ref 100–199)
CO2 SERPL-SCNC: 24 MMOL/L (ref 20–33)
CREAT SERPL-MCNC: 1.01 MG/DL (ref 0.5–1.4)
ERYTHROCYTE [DISTWIDTH] IN BLOOD BY AUTOMATED COUNT: 44.9 FL (ref 35.9–50)
GFR SERPLBLD CREATININE-BSD FMLA CKD-EPI: 76 ML/MIN/1.73 M 2
GLOBULIN SER CALC-MCNC: 3.5 G/DL (ref 1.9–3.5)
GLUCOSE SERPL-MCNC: 106 MG/DL (ref 65–99)
HCT VFR BLD AUTO: 48 % (ref 42–52)
HDLC SERPL-MCNC: 44 MG/DL
HGB BLD-MCNC: 16 G/DL (ref 14–18)
LDLC SERPL CALC-MCNC: 96 MG/DL
MCH RBC QN AUTO: 29.7 PG (ref 27–33)
MCHC RBC AUTO-ENTMCNC: 33.3 G/DL (ref 32.3–36.5)
MCV RBC AUTO: 89.1 FL (ref 81.4–97.8)
PLATELET # BLD AUTO: 256 K/UL (ref 164–446)
PMV BLD AUTO: 12.2 FL (ref 9–12.9)
POTASSIUM SERPL-SCNC: 4.3 MMOL/L (ref 3.6–5.5)
PROT SERPL-MCNC: 7.1 G/DL (ref 6–8.2)
RBC # BLD AUTO: 5.39 M/UL (ref 4.7–6.1)
SODIUM SERPL-SCNC: 139 MMOL/L (ref 135–145)
TRIGL SERPL-MCNC: 59 MG/DL (ref 0–149)
TSH SERPL DL<=0.005 MIU/L-ACNC: 2.06 UIU/ML (ref 0.38–5.33)
WBC # BLD AUTO: 9.3 K/UL (ref 4.8–10.8)

## 2025-03-11 PROCEDURE — 85027 COMPLETE CBC AUTOMATED: CPT

## 2025-03-11 PROCEDURE — 36415 COLL VENOUS BLD VENIPUNCTURE: CPT

## 2025-03-11 PROCEDURE — 80061 LIPID PANEL: CPT

## 2025-03-11 PROCEDURE — 80053 COMPREHEN METABOLIC PANEL: CPT

## 2025-03-11 PROCEDURE — 84443 ASSAY THYROID STIM HORMONE: CPT

## 2025-03-12 ENCOUNTER — PATIENT MESSAGE (OUTPATIENT)
Dept: CARDIOLOGY | Facility: MEDICAL CENTER | Age: 80
End: 2025-03-12
Payer: MEDICARE

## 2025-03-12 ENCOUNTER — RESULTS FOLLOW-UP (OUTPATIENT)
Dept: MEDICAL GROUP | Facility: MEDICAL CENTER | Age: 80
End: 2025-03-12

## 2025-03-20 ENCOUNTER — OFFICE VISIT (OUTPATIENT)
Dept: MEDICAL GROUP | Facility: MEDICAL CENTER | Age: 80
End: 2025-03-20
Payer: MEDICARE

## 2025-03-20 VITALS
BODY MASS INDEX: 25.35 KG/M2 | SYSTOLIC BLOOD PRESSURE: 110 MMHG | HEART RATE: 91 BPM | DIASTOLIC BLOOD PRESSURE: 60 MMHG | HEIGHT: 72 IN | WEIGHT: 187.17 LBS | TEMPERATURE: 96.6 F | OXYGEN SATURATION: 93 %

## 2025-03-20 DIAGNOSIS — N40.0 BENIGN PROSTATIC HYPERPLASIA, UNSPECIFIED WHETHER LOWER URINARY TRACT SYMPTOMS PRESENT: ICD-10-CM

## 2025-03-20 DIAGNOSIS — R97.20 ELEVATED PSA: ICD-10-CM

## 2025-03-20 DIAGNOSIS — R73.03 PRE-DIABETES: ICD-10-CM

## 2025-03-20 DIAGNOSIS — N28.1 RENAL CYST: ICD-10-CM

## 2025-03-20 DIAGNOSIS — I10 ESSENTIAL HYPERTENSION: ICD-10-CM

## 2025-03-20 PROCEDURE — 3074F SYST BP LT 130 MM HG: CPT | Performed by: STUDENT IN AN ORGANIZED HEALTH CARE EDUCATION/TRAINING PROGRAM

## 2025-03-20 PROCEDURE — 3078F DIAST BP <80 MM HG: CPT | Performed by: STUDENT IN AN ORGANIZED HEALTH CARE EDUCATION/TRAINING PROGRAM

## 2025-03-20 PROCEDURE — 99214 OFFICE O/P EST MOD 30 MIN: CPT | Performed by: STUDENT IN AN ORGANIZED HEALTH CARE EDUCATION/TRAINING PROGRAM

## 2025-03-20 RX ORDER — LOSARTAN POTASSIUM 50 MG/1
50 TABLET ORAL DAILY
COMMUNITY

## 2025-03-20 ASSESSMENT — ENCOUNTER SYMPTOMS
WEIGHT LOSS: 0
FEVER: 0
WHEEZING: 0
VOMITING: 0
DIZZINESS: 0
NAUSEA: 0
CHILLS: 0
SHORTNESS OF BREATH: 0
HEADACHES: 0
PALPITATIONS: 0

## 2025-03-20 ASSESSMENT — PATIENT HEALTH QUESTIONNAIRE - PHQ9: CLINICAL INTERPRETATION OF PHQ2 SCORE: 0

## 2025-03-20 ASSESSMENT — FIBROSIS 4 INDEX: FIB4 SCORE: 2.19

## 2025-03-20 NOTE — PROGRESS NOTES
Subjective:     CC: Presents for 6-month follow-up    HPI:   Selma presents today with    PMH HTN, HLD, IFG, hx of post-operative-afb no longer on warfarin and bb, mvr bioprosthetic, elevated PSA ( ~9, prior biopsy 3/2017), large simple right renal cyst, incidental LLL pulmonary nodule 5mm, low risk ( 2024), hx of diverticulitis      Specialist  [facility] - Dr Duke  [facility] [place]- Dr. Roberts --> referral sent to [facility] urology 3/20/2020  Dermatology -  Q6m hx of BCC, hx of melanoma    Presents for 6-month follow-up since last visit has follow-up with [facility] for left shoulder pain in urgent care for BPPV.  Patient was referred to vertigo clinic  He has been following with Dr Roberts, he would like to change his care to [facility].  He has some concern about his renal function reviewed past 2-year his kidney function has been stable.  Previously had a nuclear medicine renal thyroid and washout.  Right renal function estimated at 37% of total renal function.  Reviewed that GFR has been stable for the past 2 years    2 weeks ago underwent left shoulder replacement, doing well.    Health Maintenance:     ROS:  Review of Systems   Constitutional:  Negative for chills, fever and weight loss.   HENT:  Negative for hearing loss.    Respiratory:  Negative for shortness of breath and wheezing.    Cardiovascular:  Negative for chest pain and palpitations.   Gastrointestinal:  Negative for nausea and vomiting.   Genitourinary:  Negative for frequency and urgency.   Skin:  Negative for rash.   Neurological:  Negative for dizziness and headaches.       Objective:     Exam:  /60 (BP Location: Right arm, Patient Position: Sitting, BP Cuff Size: Adult)   Pulse 91   Temp 35.9 °C (96.6 °F) (Temporal)   Ht 1.829 m (6')   Wt 84.9 kg (187 lb 2.7 oz)   SpO2 93%   BMI 25.38 kg/m²  Body mass index is 25.38 kg/m².    Physical Exam  Constitutional:       Appearance: Normal appearance.   Cardiovascular:      Rate and  Rhythm: Normal rate and regular rhythm.   Pulmonary:      Effort: Pulmonary effort is normal.      Breath sounds: Normal breath sounds.   Musculoskeletal:      Cervical back: Normal range of motion and neck supple.   Lymphadenopathy:      Cervical: No cervical adenopathy.   Neurological:      Mental Status: He is alert.           Labs: Reviewed GFR past year stable, reviewed last A1c less than 5.5, TSH within normal limit    Assessment & Plan:     79 y.o. male with the following -     1. Essential hypertension  Vital stable  Reports losartan 100 mg cause dizziness.  Currently on losartan 50 mg    2. Pre-diabetes  History of prediabetes.  Last A1c within normal limit    3. Renal cyst  Chronic Stable previously following with urology [place] visualizes changes so it is in the same system  - Referral to Urology    4. Benign prostatic hyperplasia, unspecified whether lower urinary tract symptoms present  5. Elevated PSA  Stable history   Currently his symptoms is stable  Biopsy 8/2017  - Referral to Urology        HCC Gap Form    Last edited 03/20/25 17:24 PDT by Aaron Lazaro M.D.               Return in about 6 months (around 9/20/2025) for Lab review, Med check.    Please note that this dictation was created using voice recognition software. I have made every reasonable attempt to correct obvious errors, but I expect that there are errors of grammar and possibly content that I did not discover before finalizing the note.

## 2025-04-08 ENCOUNTER — TELEPHONE (OUTPATIENT)
Dept: HEALTH INFORMATION MANAGEMENT | Facility: OTHER | Age: 80
End: 2025-04-08
Payer: MEDICARE

## 2025-04-08 NOTE — PROGRESS NOTES
Subjective  Selma Avila is a 79 y.o. male who presents today for renal cysts and an enlarged prostate.  He has had renal cysts on multiple CT scans which have not changed significantly in size on his CT scan in 2024.  His several large right renal cysts which are relatively stable and Bosniak 1. No CVAT. Has chronic right renal pelvis dilation but no loss of renal parenchyma. Creat 3/22 1.03, curently 1.01.         Family History   Problem Relation Age of Onset    Ovarian Cancer Mother     Cancer Mother         Ovarian cancer    Cancer Father         prostate cx    Lung Disease Father         Microbacterium intercellulare avium    Heart Disease Neg Hx        Social History     Socioeconomic History    Marital status:     Highest education level: Bachelor's degree (e.g., BA, AB, BS)   Tobacco Use    Smoking status: Former     Current packs/day: 0.00     Average packs/day: 0.3 packs/day for 0.3 years (0.1 ttl pk-yrs)     Types: Cigarettes     Start date: 1966     Quit date: 1966     Years since quittin.6     Passive exposure: Never    Smokeless tobacco: Never   Vaping Use    Vaping status: Never Used   Substance and Sexual Activity    Alcohol use: Yes     Comment: 1 drink per month    Drug use: No     Social Drivers of Health     Financial Resource Strain: Low Risk  (10/26/2024)    Overall Financial Resource Strain (CARDIA)     Difficulty of Paying Living Expenses: Not hard at all   Food Insecurity: No Food Insecurity (10/26/2024)    Hunger Vital Sign     Worried About Running Out of Food in the Last Year: Never true     Ran Out of Food in the Last Year: Never true   Transportation Needs: No Transportation Needs (10/26/2024)    PRAPARE - Transportation     Lack of Transportation (Medical): No     Lack of Transportation (Non-Medical): No   Physical Activity: Sufficiently Active (10/26/2024)    Exercise Vital Sign     Days of Exercise per Week: 4 days     Minutes of Exercise per  Session: 40 min   Stress: No Stress Concern Present (10/26/2024)    [place] [employer] - Occupational Stress Questionnaire     Feeling of Stress : Not at all   Social Connections: Socially Integrated (10/26/2024)    Social Connection and Isolation Panel [NHANES]     Frequency of Communication with Friends and Family: More than three times a week     Frequency of Social Gatherings with Friends and Family: Twice a week     Attends [employer] Services: More than 4 times per year     Active Member of Clubs or Organizations: Yes     Attends Club or Organization Meetings: More than 4 times per year     Marital Status:    Housing Stability: Unknown (10/26/2024)    Housing Stability Vital Sign     Unable to Pay for Housing in the Last Year: No     Homeless in the Last Year: No       Past Surgical History:   Procedure Laterality Date    SHOULDER ARTHROPLASTY TOTAL Left 3/6/2025    Procedure: ARTHROPLASTY, SHOULDER, TOTAL;  Surgeon: Delonte Kim M.D.;  Location: SURGERY [facility];  Service: Orthopedics    MITRAL VALVE REPLACE  07/06/2023    Procedure: REPLACEMENT, MITRAL VALVE, LEFT ATRIAL APPENDAGE LIGATION;  Surgeon: Jacqueline Neri M.D.;  Location: SURGERY [facility];  Service: Cardiothoracic    ECHOCARDIOGRAM, TRANSESOPHAGEAL, INTRAOPERATIVE  07/06/2023    Procedure: ECHOCARDIOGRAM, TRANSESOPHAGEAL, INTRAOPERATIVE;  Surgeon: Jacqueline Neri M.D.;  Location: SURGERY [facility];  Service: Cardiothoracic    FISTULA IN ANO REPAIR Left 2019    TONSILLECTOMY  1950    COLONOSCOPY         Past Medical History:   Diagnosis Date    Arrhythmia 2017    Arthritis 1989    thumbs spine neck    Breath shortness 2022    not currently    Cancer (HCC) 2022    skin melanoma left jaw    Diverticulosis     Dyslipidemia 12/14/2018    Essential hypertension 09/12/2013    on medication; cardiologist Dr. Duke    Hard to intubate July 6,2023    Heart murmur 1980    History of heart valve replacement with  bioprosthetic valve 07/2023    Mitral valve prolapse 09/12/2013    Non-rheumatic mitral regurgitation 09/12/2013    Obstructive uropathy 01/25/2021    Pre-diabetes     Renal disorder     impaired function pt states two benign cysts on right kidney    Skin cancer (melanoma) (HCC)     had removed    Snoring        Current Outpatient Medications   Medication Sig    losartan (COZAAR) 50 MG Tab Take 50 mg by mouth every day.    mupirocin (BACTROBAN) 2 % Ointment Apply 1 Application topically 2 times a day.    meloxicam (MOBIC) 15 MG tablet Take 1 Tablet by mouth every day.    NON SPECIFIED Take  by mouth every day. AG1    celecoxib (CELEBREX) 200 MG Cap Take 1 Capsule by mouth 1 time a day as needed for Moderate Pain. (Patient taking differently: Take 200 mg by mouth 2 times a day.)    Omega-3 Fatty Acids (FISH OIL) 300 MG Cap Take 2,800 mg by mouth every day.    ibuprofen (MOTRIN) 200 MG Tab Take 200 mg by mouth every 6 hours as needed.    aspirin 81 MG EC tablet Take 81 mg by mouth 2 times a day. 1 tablet in the morning and 1 tablet before bed.    Carboxymethylcellulose Sodium (ARTIFICIAL TEARS OP) Administer 1 Drop into both eyes at bedtime as needed (dry eyes). Indications: DRY EYES (INACTIVE)    Ascorbic Acid (VITAMIN C) 500 MG Chew Tab Chew 500 mg every day. Indications: supplement    VITAMIN D PO Take 6,000 Units by mouth every day. Indications: supplement       No Known Allergies    Objective  There were no vitals taken for this visit.  Physical Exam  Constitutional:       Appearance: Normal appearance. He is normal weight.   Neurological:      Mental Status: He is alert.           Labs:     Lab Results   Component Value Date/Time    WBC 9.3 03/11/2025 0858    RBC 5.39 03/11/2025 0858    HEMOGLOBIN 16.0 03/11/2025 0858    HEMATOCRIT 48.0 03/11/2025 0858    MCV 89.1 03/11/2025 0858    MCH 29.7 03/11/2025 0858    MCHC 33.3 03/11/2025 0858    RDW 44.9 03/11/2025 0858    MPV 12.2 03/11/2025 0858    LYMPHOCYTES 33.10  07/03/2023 0835    LYMPHS 2.08 07/03/2023 0835    MONOCYTES 7.30 07/03/2023 0835    MONOS 0.46 07/03/2023 0835    EOSINOPHILS 1.60 07/03/2023 0835    EOS 0.10 07/03/2023 0835    BASOPHILS 1.00 07/03/2023 0835    BASO 0.06 07/03/2023 0835    NRBC 0.00 07/03/2023 0835       BMP   Lab Results   Component Value Date/Time    SODIUM 139 03/11/2025 0858    POTASSIUM 4.3 03/11/2025 0858    CHLORIDE 104 03/11/2025 0858    CO2 24 03/11/2025 0858    GLUCOSE 106 (H) 03/11/2025 0858    BUN 15 03/11/2025 0858    CREATININE 1.01 03/11/2025 0858    CALCIUM 9.3 03/11/2025 0858     PSA   Lab Results   Component Value Date/Time    PSATOTAL 9.74 (H) 09/25/2024 1242       Imaging:   CT PELVIS WITHOUT   CT-ABDOMEN-PELVIS W/O 10/03/2024    Narrative  10/3/2024 2:23 PM    HISTORY/REASON FOR EXAM:  Uknown.  Renal cysts.    TECHNIQUE/EXAM DESCRIPTION:  CT scan of the abdomen and pelvis without contrast.    Noncontrast helical scanning was obtained from the diaphragmatic domes through the pubic symphysis.    Low dose optimization technique was utilized for this CT exam including automated exposure control and adjustment of the mA and/or kV according to patient size.    COMPARISON: CT abdomen pelvis 9/29/2023    FINDINGS:  Lower Chest: 5 mm left upper lobe pulmonary nodule.    Liver: Normal.    Spleen: Unremarkable.    Pancreas: Unremarkable.    Gallbladder: Cholelithiasis.    Biliary: Nondilated.    Adrenal glands: Normal.    Kidneys: Dilated right renal pelvis with normal right ureter. Multiple simple right renal cyst measuring 2.7 cm, previously 9.4 cm. Simple left renal cyst. These are prior follow-up per ACR guidelines.    Bowel: No obstruction or acute inflammation. Distal colonic diverticulosis.    Lymph nodes: No adenopathy.    Vasculature: Atherosclerotic changes.    Peritoneum: Unremarkable without ascites.    Pelvis: No adenopathy or free fluid. Prostatomegaly.    Musculoskeletal: No acute or destructive process.    Impression  1.   Multiple simple right renal cysts, the largest of which has minimally increased in size.  2.  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  3.  Cholelithiasis.  4.  Distal colonic diverticulosis.  5.  Prostatomegaly.  6.  5 mm left upper lobe pulmonary nodule.    Fleischner society recommendations for follow up:  Low Risk: No routine follow-up    High Risk: Optional CT at 12 months    Comments: Nodules less than 6 mm do not require routine follow-up, but certain patients at high risk with suspicious nodule morphology, upper lobe location, or both may warrant 12-month follow-up.    Low Risk - Minimal or absent history of smoking and of other known risk factors.    High Risk - History of smoking or of other known risk factors.    Note: These recommendations do not apply to lung cancer screening, patients with immunosuppression, or patients with known primary cancer.    Fleischner Society 2017 Guidelines for Management of Incidentally Detected Pulmonary Nodules in Adults        Assessment & Plan  Patient with predominantly right renal cyst that have increased in size but are Bosniak 1 and without concern for cancer.  He has some renal pelvic dilatation which is chronic but no hydroureter.  Potentially from a crossing vessel however he has normal renal parenchyma and normal renal function over the last several years therefore I do not think he needs any intervention of his right kidney and he does not need further imaging of his Bosniak 1 renal cysts.  I have shown him his x-rays today.  I spent 20 minutes reviewing his x-rays and care plan.  I will see him as needed.    Edgar Leija M.D., [email or web address].  Urologic Oncology  Vice-Chair, Department of Surgery  [facility]      Edgar Leija M.D., F.A.C.S.  Urologic Oncology  Vice-Chair, Department of Surgery  [facility]

## 2025-04-10 ENCOUNTER — OFFICE VISIT (OUTPATIENT)
Dept: UROLOGY | Facility: MEDICAL CENTER | Age: 80
End: 2025-04-10
Payer: MEDICARE

## 2025-04-10 DIAGNOSIS — N28.1 RENAL CYSTS, ACQUIRED, BILATERAL: ICD-10-CM

## 2025-04-10 PROCEDURE — 99203 OFFICE O/P NEW LOW 30 MIN: CPT | Performed by: UROLOGY

## 2025-06-30 ENCOUNTER — TELEPHONE (OUTPATIENT)
Facility: MEDICAL CENTER | Age: 80
End: 2025-06-30
Payer: MEDICARE

## 2025-08-14 ENCOUNTER — APPOINTMENT (OUTPATIENT)
Dept: UROLOGY | Facility: MEDICAL CENTER | Age: 80
End: 2025-08-14
Payer: MEDICARE

## (undated) DEVICE — SENSOR OXIMETER ADULT SPO2 RD SET (20EA/BX)

## (undated) DEVICE — BLADE SURGICAL #15 - (50/BX 3BX/CA)

## (undated) DEVICE — SUTURE 2-0 ETHIBOND V-5 30 (12EA/BX)"

## (undated) DEVICE — BLADE STERNUM SAW SURGICAL 32.0 X 6.4 MM STERILE (1/EA)

## (undated) DEVICE — ELECTRODE RADIOLUCNT SOLID GEL DEFIB PADS (12EA/CA)

## (undated) DEVICE — PACK CV DRAPING/BASIN 2PART - (1/CA)

## (undated) DEVICE — BAG SPONGE COUNT 10.25 X 32 - BLUE (250/CA)

## (undated) DEVICE — NEEDLE, MAYO CATGUT 1826-4D TROCAR

## (undated) DEVICE — SUTURE 1 VICRYL PLUS CT-1 - 18 INCH (12/BX)

## (undated) DEVICE — PAD LAP STERILE 18 X 18 - (5/PK 40PK/CA)

## (undated) DEVICE — GLOVE SIZE 7.0 SURGEON ACCELERATOR FREE GREEN (50PR/BX 4BX/CA)

## (undated) DEVICE — DERMABOND ADVANCED - (12EA/BX)

## (undated) DEVICE — KIT INTROPERCUTANEOUS SHEATH - 8.5 FR W/MAX BARRIER AND BIOPATCH  (5/CA)

## (undated) DEVICE — PTFE PLED STER - (250/CA)

## (undated) DEVICE — DEVICE KIT COR-KNOT COMBO2 DEVICES & 12 KNOTS PER KIT (6KT/CA)

## (undated) DEVICE — DRAPE LARGE 3 QUARTER - (20/CA)

## (undated) DEVICE — STOPCOCK MALE 4-WAY - (50/CA)

## (undated) DEVICE — SUCTION INSTRUMENT YANKAUER BULBOUS TIP W/O VENT (50EA/CA)

## (undated) DEVICE — GOWN WARMING STANDARD FLEX - (30/CA)

## (undated) DEVICE — TUBE CHEST 32FR. STRAIGHT - (10EA/CA)

## (undated) DEVICE — DRAPE IOBAN II INCISE 23X17 - (10EA/BX 4BX/CA)

## (undated) DEVICE — SOD. CHL. INJ. 0.9% 1000 ML - (14EA/CA 60CA/PF)

## (undated) DEVICE — HANDPIECE 10FT INTPLS SCT PLS IRRIGATION HAND CONTROL SET (6/PK)

## (undated) DEVICE — FIBERWIRE 5.0 - 12/BX ORDER IN MULTIPLES OF 12

## (undated) DEVICE — TUBE CONNECTING SUCTION - CLEAR PLASTIC STERILE 72 IN (50EA/CA)

## (undated) DEVICE — QUICKLOADS COR-KNOT TITANIUM - (12/BX)

## (undated) DEVICE — DRESSING ABDOMINAL PAD STERILE 8 X 10" (360EA/CA)"

## (undated) DEVICE — GOWN SURGEONS LARGE - (32/CA)

## (undated) DEVICE — GUIDE PIN

## (undated) DEVICE — ELECTRODE DUAL RETURN W/ CORD - (50/PK)

## (undated) DEVICE — GLOVE BIOGEL PI INDICATOR SZ 6.5 SURGICAL PF LF - (50/BX 4BX/CA)

## (undated) DEVICE — DRAPE SLUSH WARMER DISC (24EA/CA)

## (undated) DEVICE — KIT CATHETERIZATION TWO-LUMEN CENTRAL VENOUS PI CVC (5EA/CA)

## (undated) DEVICE — SET EXTENSION WITH 2 PORTS (48EA/CA) ***PART #2C8610 IS A SUBSTITUTE*****

## (undated) DEVICE — ARMBOARD  SMALL IV 9 INLONG - (25EA/CA)

## (undated) DEVICE — STOCKINETTE IMPERVIOUS 12X48 - STERILELF (10/CA)"

## (undated) DEVICE — SODIUM CHL IRRIGATION 0.9% 1000ML (12EA/CA)

## (undated) DEVICE — TRANSDUCER BIFURCATED MONITORING KIT (10EA/CA)

## (undated) DEVICE — SUTURE 4-0 PROLENE V-7 D/A (36PK/BX)

## (undated) DEVICE — SUTURE 2-0 ETHIBOND V-5 - (6/BX)

## (undated) DEVICE — SUTURE OHS

## (undated) DEVICE — INSERT STEALTH #5 - (10/BX)

## (undated) DEVICE — FIBRILLAR SURGICEL 4X4 - 10/CA

## (undated) DEVICE — MICRODRIP PRIMARY VENTED 60 (48EA/CA) THIS WAS PART #2C8428 WHICH WAS DISCONTINUED

## (undated) DEVICE — KIT DISPOSABLE HIP 2.8MM IMPLANT INCLUDES DRILL DRILL GUIDE AND OBTURATOR

## (undated) DEVICE — GLOVE BIOGEL INDICATOR SZ 6.5 SURGICAL PF LTX - (50PR/BX 4BX/CA)

## (undated) DEVICE — GUIDEWIRE

## (undated) DEVICE — GLOVE BIOGEL PI INDICATOR SZ 6.0 SURGICAL PF LF -(200PR/CA)

## (undated) DEVICE — BLANKET UNDERBODY FULL ACCES - (5/CA)

## (undated) DEVICE — SUTURE 5-0 PROLENE C-1 D/A 24 (36PK/BX)"

## (undated) DEVICE — GLOVE BIOGEL SZ 8 SURGICAL PF LTX - (50PR/BX 4BX/CA)

## (undated) DEVICE — SUTURE 6-0 PROLENE RB-2 D/A 30 (36PK/BX)"

## (undated) DEVICE — CANISTER SUCTION 3000ML MECHANICAL FILTER AUTO SHUTOFF MEDI-VAC NONSTERILE LF DISP  (40EA/CA)

## (undated) DEVICE — KIT RADIAL ARTERY 20GA W/MAX BARRIER AND BIOPATCH  (5EA/CA) #10740 IS FOR THE SET RADIAL ARTERIAL

## (undated) DEVICE — SUTURE 4-0 PROLENE RB-1 D/A 36 (36PK/BX)"

## (undated) DEVICE — SUTURE 4-0 ETHIBOND RB-1 EXCEL (12/BX)

## (undated) DEVICE — DRESSING AQUACEL AG ADVANTAGE 3.5 X 10" (10EA/BX)"

## (undated) DEVICE — BLADE SAGITTAL SAW DUAL CUT 75.0 X 25.0MM (1/EA)

## (undated) DEVICE — SET FLUID WARMING STANDARD FLOW - (10/CA)

## (undated) DEVICE — GLOVE BIOGEL SZ 7.5 SURGICAL PF LTX - (50PR/BX 4BX/CA)

## (undated) DEVICE — CANISTER SUCTION RIGID RED 1500CC (40EA/CA)

## (undated) DEVICE — SYSTEM CHEST DRAIN ADULT/PEDS W/AUTO TRANSFUSION CAPABILITY SAHARA (6EA/CA)

## (undated) DEVICE — SUTURE 2-0 VICRYL PLUS CT-1 - 8 X 18 INCH(12/BX)

## (undated) DEVICE — PACK MAJOR ORTHO - (2EA/CA)

## (undated) DEVICE — GLOVE BIOGEL INDICATOR SZ 8 SURGICAL PF LTX - (50/BX 4BX/CA)

## (undated) DEVICE — BAG RESUSCITATION DISPOSABLE - WITH MASK (10 EA/CA)

## (undated) DEVICE — COVER LIGHT HANDLE FLEXIBLE - SOFT (2EA/PK 80PK/CA)

## (undated) DEVICE — GLOVE BIOGEL PI ORTHO SZ 7 PF LF (40PR/BX)

## (undated) DEVICE — GLOVE SURGICAL PROTEXIS PI 8.0 LF - (50PR/BX)

## (undated) DEVICE — ORGANIZER SUTURE GABBAY-FRAT - ER STERILE (3/SET 4ST/BX)

## (undated) DEVICE — SUTURE 2-0 ETHIBOND V-5 (36PK/BX)

## (undated) DEVICE — LENS/HOOD FOR SPACESUIT - (32/PK) PEEL AWAY FACE

## (undated) DEVICE — SET LEADWIRE 5 LEAD BEDSIDE DISPOSABLE ECG (1SET OF 5/EA)

## (undated) DEVICE — TUBING CLEARLINK DUO-VENT - C-FLO (48EA/CA)

## (undated) DEVICE — CHLORAPREP 26 ML APPLICATOR - ORANGE TINT(25/CA)

## (undated) DEVICE — QUICKLOADS COR-KNOT TITANIUM - (6EA/PK 12PK/BX)

## (undated) DEVICE — SYS DLV COST CLS RM TEMP - INJECTATE (CO-SET II) (10EA/CA)

## (undated) DEVICE — LACTATED RINGERS INJ 1000 ML - (14EA/CA 60CA/PF)

## (undated) DEVICE — COVER LIGHT HANDLE ALC PLUS DISP (18EA/BX)

## (undated) DEVICE — GLOVE BIOGEL SZ 6.5 SURGICAL PF LTX (50PR/BX 4BX/CA)

## (undated) DEVICE — BANDAGE ELASTIC 4 IN X 5 YDS - LATEX FREE(10/BX 5BX/CA)

## (undated) DEVICE — TOWEL STOP TIMEOUT SAFETY FLAG (40EA/CA)

## (undated) DEVICE — SET BIFURCATED BLOOD - (48EA/CS)

## (undated) DEVICE — DRAPETIBURON SHOULDER W/POUCH - (5EA/CA)

## (undated) DEVICE — Device

## (undated) DEVICE — SUTURE GENERAL

## (undated) DEVICE — SYRINGE 30 ML LL (56/BX)

## (undated) DEVICE — TUBE CHEST 32FR. RIGHT ANGLED (10EA/CA)

## (undated) DEVICE — LEAD PACING TEMP MYO - (12/BX)

## (undated) DEVICE — D-5-W INJ. 500 ML - (24EA/CA)

## (undated) DEVICE — TIP INTPLS HFLO ML ORFC BTRY - (12/CS) FOR SURGILAV

## (undated) DEVICE — GLOVE BIOGEL SZ 7 SURGICAL PF LTX - (50PR/BX 4BX/CA)

## (undated) DEVICE — SENSOR CEREBRAL AND SOMATIC MONITORING (20/CA)

## (undated) DEVICE — WATER IRRIGATION STERILE 1000ML (12EA/CA)

## (undated) DEVICE — SODIUM CHL. INJ. 0.9% 500ML (24EA/CA 50CA/PF)

## (undated) DEVICE — HUMID-VENT HEAT AND MOISTURE EXCHANGE- (50/BX)

## (undated) DEVICE — SUTURE 4-0 30CM STRATAFIX SPIRAL PS-2 (12EA/BX)

## (undated) DEVICE — GLOVE BIOGEL PI ORTHO SZ 8 PF LF (40PR/BX)

## (undated) DEVICE — CATHETER THERMALDILUTION SWAN - (5EA/CA)

## (undated) DEVICE — TRAY SURESTEP FOLEY TEMP SENSING 16FR (10EA/CA) ORDER  #18764 FOR TEMP FOLEY ONLY

## (undated) DEVICE — NEEDLE NON-SAFETY HYPO 21 GA X 1 1/2 IN HYPO (100/BX)

## (undated) DEVICE — WIRE STEEL 5-0 B&S 20 OHS - 5/PK 12PK/BX ITEM. D5329 OR D6625 CAN BE USED AS A SUB

## (undated) DEVICE — DRESSING TRANSPARENT FILM TEGADERM 2.375 X 2.75"  (100EA/BX)"